# Patient Record
Sex: FEMALE | Race: BLACK OR AFRICAN AMERICAN | Employment: OTHER | URBAN - METROPOLITAN AREA
[De-identification: names, ages, dates, MRNs, and addresses within clinical notes are randomized per-mention and may not be internally consistent; named-entity substitution may affect disease eponyms.]

---

## 2018-03-29 ENCOUNTER — OFFICE VISIT (OUTPATIENT)
Dept: FAMILY MEDICINE CLINIC | Age: 68
End: 2018-03-29
Payer: MEDICARE

## 2018-03-29 VITALS
HEART RATE: 77 BPM | RESPIRATION RATE: 16 BRPM | WEIGHT: 165 LBS | TEMPERATURE: 98.3 F | OXYGEN SATURATION: 96 % | SYSTOLIC BLOOD PRESSURE: 130 MMHG | DIASTOLIC BLOOD PRESSURE: 75 MMHG | HEIGHT: 62 IN | BODY MASS INDEX: 30.36 KG/M2

## 2018-03-29 DIAGNOSIS — I10 ESSENTIAL HYPERTENSION, BENIGN: Primary | ICD-10-CM

## 2018-03-29 DIAGNOSIS — Z12.31 ENCOUNTER FOR SCREENING MAMMOGRAM FOR BREAST CANCER: ICD-10-CM

## 2018-03-29 DIAGNOSIS — R19.09 GROIN MASS IN FEMALE: ICD-10-CM

## 2018-03-29 DIAGNOSIS — F41.9 ANXIETY: ICD-10-CM

## 2018-03-29 DIAGNOSIS — Z78.0 POSTMENOPAUSAL: ICD-10-CM

## 2018-03-29 PROCEDURE — 1090F PRES/ABSN URINE INCON ASSESS: CPT | Performed by: FAMILY MEDICINE

## 2018-03-29 PROCEDURE — 3014F SCREEN MAMMO DOC REV: CPT | Performed by: FAMILY MEDICINE

## 2018-03-29 PROCEDURE — G8417 CALC BMI ABV UP PARAM F/U: HCPCS | Performed by: FAMILY MEDICINE

## 2018-03-29 PROCEDURE — G8400 PT W/DXA NO RESULTS DOC: HCPCS | Performed by: FAMILY MEDICINE

## 2018-03-29 PROCEDURE — G8484 FLU IMMUNIZE NO ADMIN: HCPCS | Performed by: FAMILY MEDICINE

## 2018-03-29 PROCEDURE — 4040F PNEUMOC VAC/ADMIN/RCVD: CPT | Performed by: FAMILY MEDICINE

## 2018-03-29 PROCEDURE — 3017F COLORECTAL CA SCREEN DOC REV: CPT | Performed by: FAMILY MEDICINE

## 2018-03-29 PROCEDURE — 1036F TOBACCO NON-USER: CPT | Performed by: FAMILY MEDICINE

## 2018-03-29 PROCEDURE — 99213 OFFICE O/P EST LOW 20 MIN: CPT | Performed by: FAMILY MEDICINE

## 2018-03-29 PROCEDURE — 1123F ACP DISCUSS/DSCN MKR DOCD: CPT | Performed by: FAMILY MEDICINE

## 2018-03-29 PROCEDURE — G8427 DOCREV CUR MEDS BY ELIG CLIN: HCPCS | Performed by: FAMILY MEDICINE

## 2018-03-29 RX ORDER — ESCITALOPRAM OXALATE 10 MG/1
10 TABLET ORAL DAILY
Qty: 30 TABLET | Refills: 5 | Status: SHIPPED | OUTPATIENT
Start: 2018-03-29 | End: 2018-04-23 | Stop reason: SDUPTHER

## 2018-03-29 NOTE — PROGRESS NOTES
unremarkable    Assessment/Plan:      Anthony Mcfarland was seen today for hypertension and mass. Diagnoses and all orders for this visit:    Essential hypertension, benign    Groin mass in female  Comments:  right side  Orders:  -     US Nonvascular Trunk Scan; Future    Encounter for screening mammogram for breast cancer  -     NADIA DIGITAL SCREEN W CAD BILATERAL; Future    Postmenopausal  -     DEXA BONE DENSITY AXIAL SKELETON; Future  -     escitalopram (LEXAPRO) 10 MG tablet; Take 1 tablet by mouth daily    Anxiety  -     escitalopram (LEXAPRO) 10 MG tablet; Take 1 tablet by mouth daily      As above. Call or go to ED immediately if symptoms worsen or persist.  Return in about 3 months (around 6/29/2018). , or sooner if necessary. Educational materials and/or home exercises printed for patient's review and were included in patient instructions on his/her After Visit Summary and given to patient at the end of visit. Counseled regarding above diagnosis, including possible risks and complications,  especially if left uncontrolled. Counseled regarding the possible side effects, risks, benefits and alternatives to treatment; patient and/or guardian verbalizes understanding, agrees, feels comfortable with and wishes to proceed with above treatment plan. Advised patient to call with any new medication issues, and read all Rx info from pharmacy to assure aware of all possible risks and side effects of medication before taking. Reviewed age and gender appropriate health screening exams and vaccinations. Advised patient regarding importance of keeping up with recommended health maintenance and to schedule as soon as possible if overdue, as this is important in assessing for undiagnosed pathology, especially cancer, as well as protecting against potentially harmful/life threatening disease. Patient and/or guardian verbalizes understanding and agrees with above counseling, assessment and plan.     All

## 2018-04-10 ENCOUNTER — HOSPITAL ENCOUNTER (OUTPATIENT)
Dept: ULTRASOUND IMAGING | Age: 68
Discharge: HOME | End: 2018-04-12
Payer: MEDICARE

## 2018-04-10 ENCOUNTER — APPOINTMENT (OUTPATIENT)
Dept: MAMMOGRAPHY | Age: 68
End: 2018-04-10
Payer: MEDICARE

## 2018-04-10 DIAGNOSIS — R19.09 GROIN MASS IN FEMALE: ICD-10-CM

## 2018-04-10 PROCEDURE — 76604 US EXAM CHEST: CPT

## 2018-04-11 DIAGNOSIS — R19.09 GROIN MASS: Primary | ICD-10-CM

## 2018-04-18 ENCOUNTER — APPOINTMENT (OUTPATIENT)
Dept: GENERAL RADIOLOGY | Age: 68
End: 2018-04-18
Payer: MEDICARE

## 2018-04-18 ENCOUNTER — APPOINTMENT (OUTPATIENT)
Dept: CT IMAGING | Age: 68
End: 2018-04-18
Payer: MEDICARE

## 2018-04-18 ENCOUNTER — HOSPITAL ENCOUNTER (OUTPATIENT)
Age: 68
Setting detail: OBSERVATION
Discharge: HOME | End: 2018-04-19
Attending: EMERGENCY MEDICINE | Admitting: INTERNAL MEDICINE
Payer: MEDICARE

## 2018-04-18 DIAGNOSIS — R07.9 CHEST PAIN, UNSPECIFIED TYPE: Primary | ICD-10-CM

## 2018-04-18 LAB
ANION GAP SERPL CALCULATED.3IONS-SCNC: 12 MMOL/L (ref 7–16)
BASOPHILS ABSOLUTE: 0.09 E9/L (ref 0–0.2)
BASOPHILS RELATIVE PERCENT: 1.1 % (ref 0–2)
BUN BLDV-MCNC: 14 MG/DL (ref 8–23)
CALCIUM SERPL-MCNC: 9 MG/DL (ref 8.6–10.2)
CHLORIDE BLD-SCNC: 100 MMOL/L (ref 98–107)
CK MB: 1.2 NG/ML (ref 0–4.3)
CK MB: 1.3 NG/ML (ref 0–4.3)
CO2: 26 MMOL/L (ref 22–29)
CREAT SERPL-MCNC: 0.6 MG/DL (ref 0.5–1)
EOSINOPHILS ABSOLUTE: 0.09 E9/L (ref 0.05–0.5)
EOSINOPHILS RELATIVE PERCENT: 1.1 % (ref 0–6)
GFR AFRICAN AMERICAN: >60
GFR NON-AFRICAN AMERICAN: >60 ML/MIN/1.73
GLUCOSE BLD-MCNC: 108 MG/DL (ref 74–109)
HCT VFR BLD CALC: 36.1 % (ref 34–48)
HEMOGLOBIN: 11.9 G/DL (ref 11.5–15.5)
IMMATURE GRANULOCYTES #: 0.1 E9/L
IMMATURE GRANULOCYTES %: 1.2 % (ref 0–5)
INR BLD: 1.1
LYMPHOCYTES ABSOLUTE: 3.05 E9/L (ref 1.5–4)
LYMPHOCYTES RELATIVE PERCENT: 35.7 % (ref 20–42)
MCH RBC QN AUTO: 33.6 PG (ref 26–35)
MCHC RBC AUTO-ENTMCNC: 33 % (ref 32–34.5)
MCV RBC AUTO: 102 FL (ref 80–99.9)
MONOCYTES ABSOLUTE: 0.99 E9/L (ref 0.1–0.95)
MONOCYTES RELATIVE PERCENT: 11.6 % (ref 2–12)
NEUTROPHILS ABSOLUTE: 4.22 E9/L (ref 1.8–7.3)
NEUTROPHILS RELATIVE PERCENT: 49.3 % (ref 43–80)
PDW BLD-RTO: 17.2 FL (ref 11.5–15)
PLATELET # BLD: 405 E9/L (ref 130–450)
PMV BLD AUTO: 10.5 FL (ref 7–12)
POTASSIUM SERPL-SCNC: 3.6 MMOL/L (ref 3.5–5)
PROTHROMBIN TIME: 12.3 SEC (ref 9.3–12.4)
RBC # BLD: 3.54 E12/L (ref 3.5–5.5)
SODIUM BLD-SCNC: 138 MMOL/L (ref 132–146)
TOTAL CK: 74 U/L (ref 20–180)
TROPONIN: <0.01 NG/ML (ref 0–0.03)
WBC # BLD: 8.5 E9/L (ref 4.5–11.5)

## 2018-04-18 PROCEDURE — G0378 HOSPITAL OBSERVATION PER HR: HCPCS

## 2018-04-18 PROCEDURE — 82553 CREATINE MB FRACTION: CPT

## 2018-04-18 PROCEDURE — 85610 PROTHROMBIN TIME: CPT

## 2018-04-18 PROCEDURE — 2580000003 HC RX 258: Performed by: GENERAL PRACTICE

## 2018-04-18 PROCEDURE — 94761 N-INVAS EAR/PLS OXIMETRY MLT: CPT

## 2018-04-18 PROCEDURE — 71250 CT THORAX DX C-: CPT

## 2018-04-18 PROCEDURE — 36415 COLL VENOUS BLD VENIPUNCTURE: CPT

## 2018-04-18 PROCEDURE — 6370000000 HC RX 637 (ALT 250 FOR IP): Performed by: EMERGENCY MEDICINE

## 2018-04-18 PROCEDURE — 84484 ASSAY OF TROPONIN QUANT: CPT

## 2018-04-18 PROCEDURE — 80048 BASIC METABOLIC PNL TOTAL CA: CPT

## 2018-04-18 PROCEDURE — 85025 COMPLETE CBC W/AUTO DIFF WBC: CPT

## 2018-04-18 PROCEDURE — 93005 ELECTROCARDIOGRAM TRACING: CPT | Performed by: EMERGENCY MEDICINE

## 2018-04-18 PROCEDURE — 71045 X-RAY EXAM CHEST 1 VIEW: CPT

## 2018-04-18 PROCEDURE — 99285 EMERGENCY DEPT VISIT HI MDM: CPT

## 2018-04-18 PROCEDURE — 6370000000 HC RX 637 (ALT 250 FOR IP): Performed by: GENERAL PRACTICE

## 2018-04-18 PROCEDURE — 82550 ASSAY OF CK (CPK): CPT

## 2018-04-18 RX ORDER — LOVASTATIN 40 MG/1
40 TABLET ORAL NIGHTLY
Status: DISCONTINUED | OUTPATIENT
Start: 2018-04-18 | End: 2018-04-19 | Stop reason: HOSPADM

## 2018-04-18 RX ORDER — SODIUM CHLORIDE 0.9 % (FLUSH) 0.9 %
10 SYRINGE (ML) INJECTION EVERY 12 HOURS SCHEDULED
Status: DISCONTINUED | OUTPATIENT
Start: 2018-04-18 | End: 2018-04-19 | Stop reason: HOSPADM

## 2018-04-18 RX ORDER — SODIUM CHLORIDE 0.9 % (FLUSH) 0.9 %
10 SYRINGE (ML) INJECTION PRN
Status: DISCONTINUED | OUTPATIENT
Start: 2018-04-18 | End: 2018-04-19 | Stop reason: HOSPADM

## 2018-04-18 RX ORDER — ESCITALOPRAM OXALATE 10 MG/1
10 TABLET ORAL EVERY MORNING
Status: DISCONTINUED | OUTPATIENT
Start: 2018-04-19 | End: 2018-04-19 | Stop reason: HOSPADM

## 2018-04-18 RX ORDER — LISINOPRIL 20 MG/1
20 TABLET ORAL DAILY
Status: DISCONTINUED | OUTPATIENT
Start: 2018-04-18 | End: 2018-04-19 | Stop reason: HOSPADM

## 2018-04-18 RX ORDER — ONDANSETRON 2 MG/ML
4 INJECTION INTRAMUSCULAR; INTRAVENOUS EVERY 6 HOURS PRN
Status: DISCONTINUED | OUTPATIENT
Start: 2018-04-18 | End: 2018-04-19 | Stop reason: HOSPADM

## 2018-04-18 RX ORDER — IBUPROFEN 400 MG/1
400 TABLET ORAL EVERY 6 HOURS PRN
Status: ON HOLD | COMMUNITY
End: 2019-07-19 | Stop reason: HOSPADM

## 2018-04-18 RX ORDER — LEVOTHYROXINE SODIUM 175 UG/1
175 TABLET ORAL DAILY
Status: DISCONTINUED | OUTPATIENT
Start: 2018-04-18 | End: 2018-04-19 | Stop reason: HOSPADM

## 2018-04-18 RX ORDER — SIMVASTATIN 20 MG
20 TABLET ORAL NIGHTLY
Status: DISCONTINUED | OUTPATIENT
Start: 2018-04-18 | End: 2018-04-18 | Stop reason: SDUPTHER

## 2018-04-18 RX ORDER — ACETAMINOPHEN 325 MG/1
650 TABLET ORAL EVERY 4 HOURS PRN
Status: DISCONTINUED | OUTPATIENT
Start: 2018-04-18 | End: 2018-04-19 | Stop reason: HOSPADM

## 2018-04-18 RX ORDER — PANTOPRAZOLE SODIUM 40 MG/1
40 TABLET, DELAYED RELEASE ORAL EVERY MORNING
Status: DISCONTINUED | OUTPATIENT
Start: 2018-04-19 | End: 2018-04-19 | Stop reason: HOSPADM

## 2018-04-18 RX ORDER — ASPIRIN 81 MG/1
324 TABLET, CHEWABLE ORAL ONCE
Status: COMPLETED | OUTPATIENT
Start: 2018-04-18 | End: 2018-04-18

## 2018-04-18 RX ADMIN — ACETAMINOPHEN 650 MG: 325 TABLET ORAL at 21:52

## 2018-04-18 RX ADMIN — Medication 10 ML: at 21:52

## 2018-04-18 RX ADMIN — ASPIRIN 81 MG 324 MG: 81 TABLET ORAL at 11:29

## 2018-04-18 RX ADMIN — LOVASTATIN 40 MG: 40 TABLET ORAL at 21:52

## 2018-04-18 RX ADMIN — LISINOPRIL 20 MG: 20 TABLET ORAL at 21:56

## 2018-04-18 ASSESSMENT — ENCOUNTER SYMPTOMS
EYE REDNESS: 0
ABDOMINAL DISTENTION: 0
COUGH: 0
SHORTNESS OF BREATH: 0
EYE PAIN: 0
BACK PAIN: 0
DIARRHEA: 0
SINUS PRESSURE: 0
SORE THROAT: 0
EYE DISCHARGE: 0
VOMITING: 0
WHEEZING: 0
NAUSEA: 0

## 2018-04-18 ASSESSMENT — PAIN DESCRIPTION - LOCATION: LOCATION: HEAD

## 2018-04-18 ASSESSMENT — PAIN DESCRIPTION - PAIN TYPE: TYPE: ACUTE PAIN

## 2018-04-18 ASSESSMENT — PAIN SCALES - GENERAL
PAINLEVEL_OUTOF10: 0
PAINLEVEL_OUTOF10: 0
PAINLEVEL_OUTOF10: 3

## 2018-04-18 ASSESSMENT — HEART SCORE: ECG: 0

## 2018-04-18 ASSESSMENT — PAIN DESCRIPTION - DESCRIPTORS: DESCRIPTORS: HEADACHE;ACHING

## 2018-04-19 ENCOUNTER — APPOINTMENT (OUTPATIENT)
Dept: NUCLEAR MEDICINE | Age: 68
End: 2018-04-19
Payer: MEDICARE

## 2018-04-19 VITALS
HEART RATE: 77 BPM | SYSTOLIC BLOOD PRESSURE: 145 MMHG | TEMPERATURE: 98.8 F | DIASTOLIC BLOOD PRESSURE: 79 MMHG | RESPIRATION RATE: 16 BRPM | WEIGHT: 167.3 LBS | BODY MASS INDEX: 29.64 KG/M2 | OXYGEN SATURATION: 96 % | HEIGHT: 63 IN

## 2018-04-19 LAB
LV EF: 66 %
LVEF MODALITY: NORMAL

## 2018-04-19 PROCEDURE — 6360000002 HC RX W HCPCS: Performed by: INTERNAL MEDICINE

## 2018-04-19 PROCEDURE — 78452 HT MUSCLE IMAGE SPECT MULT: CPT

## 2018-04-19 PROCEDURE — 6370000000 HC RX 637 (ALT 250 FOR IP): Performed by: GENERAL PRACTICE

## 2018-04-19 PROCEDURE — G0378 HOSPITAL OBSERVATION PER HR: HCPCS

## 2018-04-19 PROCEDURE — A9500 TC99M SESTAMIBI: HCPCS | Performed by: RADIOLOGY

## 2018-04-19 PROCEDURE — 93017 CV STRESS TEST TRACING ONLY: CPT

## 2018-04-19 PROCEDURE — 2580000003 HC RX 258: Performed by: GENERAL PRACTICE

## 2018-04-19 PROCEDURE — 3430000000 HC RX DIAGNOSTIC RADIOPHARMACEUTICAL: Performed by: RADIOLOGY

## 2018-04-19 RX ADMIN — Medication 10 ML: at 08:09

## 2018-04-19 RX ADMIN — REGADENOSON 0.4 MG: 0.08 INJECTION, SOLUTION INTRAVENOUS at 10:10

## 2018-04-19 RX ADMIN — Medication 30 MILLICURIE: at 08:55

## 2018-04-19 RX ADMIN — PANTOPRAZOLE SODIUM 40 MG: 40 TABLET, DELAYED RELEASE ORAL at 08:09

## 2018-04-19 RX ADMIN — ESCITALOPRAM OXALATE 10 MG: 10 TABLET ORAL at 08:09

## 2018-04-19 RX ADMIN — Medication 10 MILLICURIE: at 08:55

## 2018-04-19 RX ADMIN — LEVOTHYROXINE SODIUM 175 MCG: 0.17 TABLET ORAL at 08:08

## 2018-04-19 RX ADMIN — LISINOPRIL 20 MG: 20 TABLET ORAL at 08:09

## 2018-04-19 ASSESSMENT — PAIN SCALES - GENERAL
PAINLEVEL_OUTOF10: 0
PAINLEVEL_OUTOF10: 0

## 2018-04-21 LAB
EKG ATRIAL RATE: 88 BPM
EKG P AXIS: 43 DEGREES
EKG P-R INTERVAL: 146 MS
EKG Q-T INTERVAL: 394 MS
EKG QRS DURATION: 78 MS
EKG QTC CALCULATION (BAZETT): 476 MS
EKG R AXIS: -18 DEGREES
EKG T AXIS: 23 DEGREES
EKG VENTRICULAR RATE: 88 BPM

## 2018-04-23 DIAGNOSIS — F41.9 ANXIETY: ICD-10-CM

## 2018-04-23 DIAGNOSIS — Z78.0 POSTMENOPAUSAL: ICD-10-CM

## 2018-04-23 RX ORDER — ESCITALOPRAM OXALATE 10 MG/1
10 TABLET ORAL EVERY MORNING
Qty: 90 TABLET | Refills: 3 | Status: SHIPPED | OUTPATIENT
Start: 2018-04-23 | End: 2019-06-18 | Stop reason: SDUPTHER

## 2018-04-23 RX ORDER — LEVOTHYROXINE SODIUM 175 UG/1
TABLET ORAL
Qty: 90 TABLET | Refills: 3 | Status: SHIPPED | OUTPATIENT
Start: 2018-04-23 | End: 2019-04-11 | Stop reason: SDUPTHER

## 2018-05-04 ENCOUNTER — OFFICE VISIT (OUTPATIENT)
Dept: FAMILY MEDICINE CLINIC | Age: 68
End: 2018-05-04
Payer: MEDICARE

## 2018-05-04 VITALS
BODY MASS INDEX: 28.7 KG/M2 | DIASTOLIC BLOOD PRESSURE: 82 MMHG | TEMPERATURE: 98.2 F | RESPIRATION RATE: 16 BRPM | SYSTOLIC BLOOD PRESSURE: 133 MMHG | HEART RATE: 86 BPM | WEIGHT: 162 LBS | OXYGEN SATURATION: 97 % | HEIGHT: 63 IN

## 2018-05-04 DIAGNOSIS — F41.9 ANXIETY: ICD-10-CM

## 2018-05-04 DIAGNOSIS — R07.89 OTHER CHEST PAIN: Primary | ICD-10-CM

## 2018-05-04 DIAGNOSIS — Z78.0 POSTMENOPAUSAL: ICD-10-CM

## 2018-05-04 DIAGNOSIS — Z12.31 ENCOUNTER FOR SCREENING MAMMOGRAM FOR BREAST CANCER: ICD-10-CM

## 2018-05-04 PROCEDURE — 99214 OFFICE O/P EST MOD 30 MIN: CPT | Performed by: FAMILY MEDICINE

## 2018-05-04 PROCEDURE — G8400 PT W/DXA NO RESULTS DOC: HCPCS | Performed by: FAMILY MEDICINE

## 2018-05-04 PROCEDURE — 4040F PNEUMOC VAC/ADMIN/RCVD: CPT | Performed by: FAMILY MEDICINE

## 2018-05-04 PROCEDURE — 1090F PRES/ABSN URINE INCON ASSESS: CPT | Performed by: FAMILY MEDICINE

## 2018-05-04 PROCEDURE — 3017F COLORECTAL CA SCREEN DOC REV: CPT | Performed by: FAMILY MEDICINE

## 2018-05-04 PROCEDURE — 1123F ACP DISCUSS/DSCN MKR DOCD: CPT | Performed by: FAMILY MEDICINE

## 2018-05-04 PROCEDURE — G8417 CALC BMI ABV UP PARAM F/U: HCPCS | Performed by: FAMILY MEDICINE

## 2018-05-04 PROCEDURE — 1036F TOBACCO NON-USER: CPT | Performed by: FAMILY MEDICINE

## 2018-05-04 PROCEDURE — G8427 DOCREV CUR MEDS BY ELIG CLIN: HCPCS | Performed by: FAMILY MEDICINE

## 2018-05-04 RX ORDER — LORAZEPAM 0.5 MG/1
0.5 TABLET ORAL EVERY 8 HOURS PRN
Qty: 90 TABLET | Refills: 0 | Status: SHIPPED | OUTPATIENT
Start: 2018-05-04 | End: 2018-07-09 | Stop reason: SDUPTHER

## 2018-05-08 ENCOUNTER — HOSPITAL ENCOUNTER (OUTPATIENT)
Dept: CT IMAGING | Age: 68
Discharge: HOME | End: 2018-05-10
Payer: MEDICARE

## 2018-05-08 DIAGNOSIS — R59.0 LOCALIZED ENLARGED LYMPH NODES: ICD-10-CM

## 2018-05-08 PROCEDURE — 6360000004 HC RX CONTRAST MEDICATION: Performed by: RADIOLOGY

## 2018-05-08 PROCEDURE — 72193 CT PELVIS W/DYE: CPT

## 2018-05-08 RX ADMIN — IOPAMIDOL 110 ML: 755 INJECTION, SOLUTION INTRAVENOUS at 10:18

## 2018-05-09 DIAGNOSIS — Z78.0 POSTMENOPAUSAL: ICD-10-CM

## 2018-05-09 DIAGNOSIS — Z12.31 ENCOUNTER FOR SCREENING MAMMOGRAM FOR BREAST CANCER: ICD-10-CM

## 2018-05-09 RX ORDER — LOVASTATIN 40 MG/1
40 TABLET ORAL NIGHTLY
Qty: 90 TABLET | Refills: 3 | Status: SHIPPED | OUTPATIENT
Start: 2018-05-09 | End: 2019-05-22 | Stop reason: SDUPTHER

## 2018-05-09 RX ORDER — CYCLOBENZAPRINE HCL 10 MG
10 TABLET ORAL 2 TIMES DAILY PRN
Qty: 60 TABLET | Refills: 1 | Status: SHIPPED
Start: 2018-05-09 | End: 2020-03-24

## 2018-06-19 ENCOUNTER — HOSPITAL ENCOUNTER (OUTPATIENT)
Age: 68
Discharge: HOME | End: 2018-06-21

## 2018-06-19 PROCEDURE — 88342 IMHCHEM/IMCYTCHM 1ST ANTB: CPT

## 2018-06-19 PROCEDURE — 88184 FLOWCYTOMETRY/ TC 1 MARKER: CPT

## 2018-06-19 PROCEDURE — 88307 TISSUE EXAM BY PATHOLOGIST: CPT

## 2018-06-19 PROCEDURE — 88341 IMHCHEM/IMCYTCHM EA ADD ANTB: CPT

## 2018-06-19 PROCEDURE — 88185 FLOWCYTOMETRY/TC ADD-ON: CPT

## 2018-06-19 PROCEDURE — 88182 CELL MARKER STUDY: CPT

## 2018-06-25 LAB
Lab: NORMAL
REPORT: NORMAL
THIS TEST SENT TO: NORMAL

## 2018-06-28 ENCOUNTER — OFFICE VISIT (OUTPATIENT)
Dept: FAMILY MEDICINE CLINIC | Age: 68
End: 2018-06-28
Payer: MEDICARE

## 2018-06-28 VITALS
HEART RATE: 85 BPM | OXYGEN SATURATION: 95 % | HEIGHT: 63 IN | BODY MASS INDEX: 28.53 KG/M2 | SYSTOLIC BLOOD PRESSURE: 135 MMHG | WEIGHT: 161 LBS | RESPIRATION RATE: 16 BRPM | TEMPERATURE: 98.4 F | DIASTOLIC BLOOD PRESSURE: 77 MMHG

## 2018-06-28 DIAGNOSIS — E03.9 ACQUIRED HYPOTHYROIDISM: ICD-10-CM

## 2018-06-28 DIAGNOSIS — C80.1 ADENOCARCINOMA OF UNKNOWN PRIMARY (HCC): ICD-10-CM

## 2018-06-28 DIAGNOSIS — R97.8 OTHER ABNORMAL TUMOR MARKERS: ICD-10-CM

## 2018-06-28 DIAGNOSIS — I10 ESSENTIAL HYPERTENSION, BENIGN: Primary | ICD-10-CM

## 2018-06-28 DIAGNOSIS — M47.817 DJD (DEGENERATIVE JOINT DISEASE), LUMBOSACRAL: ICD-10-CM

## 2018-06-28 DIAGNOSIS — M17.0 PRIMARY OSTEOARTHRITIS OF BOTH KNEES: ICD-10-CM

## 2018-06-28 DIAGNOSIS — E78.00 HYPERCHOLESTEROLEMIA: ICD-10-CM

## 2018-06-28 DIAGNOSIS — K21.9 GASTROESOPHAGEAL REFLUX DISEASE WITHOUT ESOPHAGITIS: ICD-10-CM

## 2018-06-28 PROCEDURE — 3017F COLORECTAL CA SCREEN DOC REV: CPT | Performed by: FAMILY MEDICINE

## 2018-06-28 PROCEDURE — 4040F PNEUMOC VAC/ADMIN/RCVD: CPT | Performed by: FAMILY MEDICINE

## 2018-06-28 PROCEDURE — 1123F ACP DISCUSS/DSCN MKR DOCD: CPT | Performed by: FAMILY MEDICINE

## 2018-06-28 PROCEDURE — G8427 DOCREV CUR MEDS BY ELIG CLIN: HCPCS | Performed by: FAMILY MEDICINE

## 2018-06-28 PROCEDURE — 99215 OFFICE O/P EST HI 40 MIN: CPT | Performed by: FAMILY MEDICINE

## 2018-06-28 PROCEDURE — 1090F PRES/ABSN URINE INCON ASSESS: CPT | Performed by: FAMILY MEDICINE

## 2018-06-28 PROCEDURE — G8400 PT W/DXA NO RESULTS DOC: HCPCS | Performed by: FAMILY MEDICINE

## 2018-06-28 PROCEDURE — 1036F TOBACCO NON-USER: CPT | Performed by: FAMILY MEDICINE

## 2018-06-28 PROCEDURE — G8417 CALC BMI ABV UP PARAM F/U: HCPCS | Performed by: FAMILY MEDICINE

## 2018-07-06 ENCOUNTER — HOSPITAL ENCOUNTER (OUTPATIENT)
Dept: CT IMAGING | Age: 68
Discharge: HOME | End: 2018-07-08
Payer: MEDICARE

## 2018-07-06 ENCOUNTER — HOSPITAL ENCOUNTER (OUTPATIENT)
Age: 68
Discharge: HOME | End: 2018-07-06
Payer: MEDICARE

## 2018-07-06 DIAGNOSIS — C80.1 ADENOCARCINOMA OF UNKNOWN PRIMARY (HCC): ICD-10-CM

## 2018-07-06 DIAGNOSIS — R97.8 OTHER ABNORMAL TUMOR MARKERS: ICD-10-CM

## 2018-07-06 LAB
ALBUMIN SERPL-MCNC: 4.7 G/DL (ref 3.5–5.2)
ALP BLD-CCNC: 63 U/L (ref 35–104)
ALT SERPL-CCNC: 14 U/L (ref 0–32)
ANION GAP SERPL CALCULATED.3IONS-SCNC: 14 MMOL/L (ref 7–16)
AST SERPL-CCNC: 13 U/L (ref 0–31)
BASOPHILS ABSOLUTE: 0.09 E9/L (ref 0–0.2)
BASOPHILS RELATIVE PERCENT: 1.1 % (ref 0–2)
BILIRUB SERPL-MCNC: 1.1 MG/DL (ref 0–1.2)
BUN BLDV-MCNC: 26 MG/DL (ref 8–23)
CA 125: <2 U/ML (ref 0–35)
CALCIUM SERPL-MCNC: 9.7 MG/DL (ref 8.6–10.2)
CEA: 1.2 NG/ML (ref 0–5.2)
CHLORIDE BLD-SCNC: 96 MMOL/L (ref 98–107)
CO2: 25 MMOL/L (ref 22–29)
CREAT SERPL-MCNC: 0.9 MG/DL (ref 0.5–1)
EOSINOPHILS ABSOLUTE: 0.08 E9/L (ref 0.05–0.5)
EOSINOPHILS RELATIVE PERCENT: 1 % (ref 0–6)
GFR AFRICAN AMERICAN: >60
GFR NON-AFRICAN AMERICAN: >60 ML/MIN/1.73
GLUCOSE BLD-MCNC: 109 MG/DL (ref 74–109)
HCT VFR BLD CALC: 36.6 % (ref 34–48)
HEMOGLOBIN: 12.2 G/DL (ref 11.5–15.5)
IMMATURE GRANULOCYTES #: 0.08 E9/L
IMMATURE GRANULOCYTES %: 1 % (ref 0–5)
LYMPHOCYTES ABSOLUTE: 3.56 E9/L (ref 1.5–4)
LYMPHOCYTES RELATIVE PERCENT: 42.8 % (ref 20–42)
MCH RBC QN AUTO: 33.5 PG (ref 26–35)
MCHC RBC AUTO-ENTMCNC: 33.3 % (ref 32–34.5)
MCV RBC AUTO: 100.5 FL (ref 80–99.9)
MONOCYTES ABSOLUTE: 1.1 E9/L (ref 0.1–0.95)
MONOCYTES RELATIVE PERCENT: 13.2 % (ref 2–12)
NEUTROPHILS ABSOLUTE: 3.41 E9/L (ref 1.8–7.3)
NEUTROPHILS RELATIVE PERCENT: 40.9 % (ref 43–80)
PDW BLD-RTO: 17.6 FL (ref 11.5–15)
PLATELET # BLD: 455 E9/L (ref 130–450)
PMV BLD AUTO: 10.5 FL (ref 7–12)
POTASSIUM SERPL-SCNC: 4.7 MMOL/L (ref 3.5–5)
RBC # BLD: 3.64 E12/L (ref 3.5–5.5)
SODIUM BLD-SCNC: 135 MMOL/L (ref 132–146)
TOTAL PROTEIN: 8.8 G/DL (ref 6.4–8.3)
WBC # BLD: 8.3 E9/L (ref 4.5–11.5)

## 2018-07-06 PROCEDURE — 80053 COMPREHEN METABOLIC PANEL: CPT

## 2018-07-06 PROCEDURE — 74178 CT ABD&PLV WO CNTR FLWD CNTR: CPT

## 2018-07-06 PROCEDURE — 82378 CARCINOEMBRYONIC ANTIGEN: CPT

## 2018-07-06 PROCEDURE — 86301 IMMUNOASSAY TUMOR CA 19-9: CPT

## 2018-07-06 PROCEDURE — 71270 CT THORAX DX C-/C+: CPT

## 2018-07-06 PROCEDURE — 36415 COLL VENOUS BLD VENIPUNCTURE: CPT

## 2018-07-06 PROCEDURE — 86304 IMMUNOASSAY TUMOR CA 125: CPT

## 2018-07-06 PROCEDURE — 85025 COMPLETE CBC W/AUTO DIFF WBC: CPT

## 2018-07-06 PROCEDURE — 6360000004 HC RX CONTRAST MEDICATION: Performed by: RADIOLOGY

## 2018-07-06 RX ADMIN — IOPAMIDOL 110 ML: 755 INJECTION, SOLUTION INTRAVENOUS at 14:57

## 2018-07-06 RX ADMIN — IOHEXOL 50 ML: 240 INJECTION, SOLUTION INTRATHECAL; INTRAVASCULAR; INTRAVENOUS; ORAL at 14:57

## 2018-07-08 LAB — CA 19-9: 8 U/ML (ref 0–37)

## 2018-07-09 DIAGNOSIS — F41.9 ANXIETY: ICD-10-CM

## 2018-07-09 RX ORDER — LORAZEPAM 0.5 MG/1
0.5 TABLET ORAL EVERY 8 HOURS PRN
Qty: 90 TABLET | Refills: 0 | OUTPATIENT
Start: 2018-07-09 | End: 2018-08-08

## 2018-07-12 ENCOUNTER — TELEPHONE (OUTPATIENT)
Dept: FAMILY MEDICINE CLINIC | Age: 68
End: 2018-07-12

## 2018-07-12 DIAGNOSIS — R93.2 ABNORMAL CT SCAN, GALLBLADDER: Primary | ICD-10-CM

## 2018-07-12 NOTE — TELEPHONE ENCOUNTER
Discussed labs and imaging. Patient is confused as to where the cancer started. The surgeon mentioned a fallopion tube issue. She is to see the oncologist on July 24. Gallbladder ultrasound ordered. Please schedule asap.

## 2018-07-12 NOTE — TELEPHONE ENCOUNTER
She asked if you can call her today. She has some questions about the cancer cells they found. She was confused after seeing dr Yaima Lambert.

## 2018-07-14 ENCOUNTER — HOSPITAL ENCOUNTER (OUTPATIENT)
Dept: ULTRASOUND IMAGING | Age: 68
Discharge: HOME | End: 2018-07-16
Payer: MEDICARE

## 2018-07-14 DIAGNOSIS — R93.2 ABNORMAL CT SCAN, GALLBLADDER: ICD-10-CM

## 2018-07-14 PROCEDURE — 76705 ECHO EXAM OF ABDOMEN: CPT

## 2018-08-22 ENCOUNTER — HOSPITAL ENCOUNTER (OUTPATIENT)
Dept: PET IMAGING | Age: 68
Discharge: HOME | End: 2018-08-24
Payer: MEDICARE

## 2018-08-22 DIAGNOSIS — R93.5 ABNORMAL CT SCAN, PELVIS: ICD-10-CM

## 2018-08-22 DIAGNOSIS — C80.1 ADENOCARCINOMA OF UNKNOWN PRIMARY (HCC): ICD-10-CM

## 2018-08-22 PROCEDURE — 78815 PET IMAGE W/CT SKULL-THIGH: CPT

## 2018-08-22 PROCEDURE — 3430000000 HC RX DIAGNOSTIC RADIOPHARMACEUTICAL: Performed by: RADIOLOGY

## 2018-08-22 PROCEDURE — A9552 F18 FDG: HCPCS | Performed by: RADIOLOGY

## 2018-08-22 RX ORDER — FLUDEOXYGLUCOSE F 18 200 MCI/ML
15 INJECTION, SOLUTION INTRAVENOUS
Status: COMPLETED | OUTPATIENT
Start: 2018-08-22 | End: 2018-08-22

## 2018-08-22 RX ADMIN — FLUDEOXYGLUCOSE F 18 15 MILLICURIE: 200 INJECTION, SOLUTION INTRAVENOUS at 09:14

## 2018-09-10 ENCOUNTER — HOSPITAL ENCOUNTER (OUTPATIENT)
Dept: ULTRASOUND IMAGING | Age: 68
Discharge: HOME | End: 2018-09-12
Payer: MEDICARE

## 2018-09-10 DIAGNOSIS — R94.8 ABNORMAL PET SCAN OF RETROPERITONEUM: ICD-10-CM

## 2018-09-10 DIAGNOSIS — C56.9 MALIGNANT NEOPLASM OF OVARY, UNSPECIFIED LATERALITY (HCC): ICD-10-CM

## 2018-09-10 PROCEDURE — 76856 US EXAM PELVIC COMPLETE: CPT

## 2018-09-10 PROCEDURE — 76830 TRANSVAGINAL US NON-OB: CPT

## 2018-09-27 ENCOUNTER — OFFICE VISIT (OUTPATIENT)
Dept: FAMILY MEDICINE CLINIC | Age: 68
End: 2018-09-27
Payer: MEDICARE

## 2018-09-27 VITALS
HEART RATE: 101 BPM | DIASTOLIC BLOOD PRESSURE: 64 MMHG | RESPIRATION RATE: 20 BRPM | TEMPERATURE: 98.3 F | BODY MASS INDEX: 29.26 KG/M2 | SYSTOLIC BLOOD PRESSURE: 104 MMHG | OXYGEN SATURATION: 98 % | WEIGHT: 160 LBS

## 2018-09-27 DIAGNOSIS — I15.8 OTHER SECONDARY HYPERTENSION: ICD-10-CM

## 2018-09-27 PROCEDURE — 4040F PNEUMOC VAC/ADMIN/RCVD: CPT | Performed by: FAMILY MEDICINE

## 2018-09-27 PROCEDURE — 99214 OFFICE O/P EST MOD 30 MIN: CPT | Performed by: FAMILY MEDICINE

## 2018-09-27 PROCEDURE — 1090F PRES/ABSN URINE INCON ASSESS: CPT | Performed by: FAMILY MEDICINE

## 2018-09-27 PROCEDURE — G8417 CALC BMI ABV UP PARAM F/U: HCPCS | Performed by: FAMILY MEDICINE

## 2018-09-27 PROCEDURE — 1036F TOBACCO NON-USER: CPT | Performed by: FAMILY MEDICINE

## 2018-09-27 PROCEDURE — 1123F ACP DISCUSS/DSCN MKR DOCD: CPT | Performed by: FAMILY MEDICINE

## 2018-09-27 PROCEDURE — 3017F COLORECTAL CA SCREEN DOC REV: CPT | Performed by: FAMILY MEDICINE

## 2018-09-27 PROCEDURE — G8427 DOCREV CUR MEDS BY ELIG CLIN: HCPCS | Performed by: FAMILY MEDICINE

## 2018-09-27 PROCEDURE — 1101F PT FALLS ASSESS-DOCD LE1/YR: CPT | Performed by: FAMILY MEDICINE

## 2018-09-27 PROCEDURE — G8400 PT W/DXA NO RESULTS DOC: HCPCS | Performed by: FAMILY MEDICINE

## 2018-09-27 RX ORDER — LISINOPRIL 20 MG/1
10 TABLET ORAL DAILY
Qty: 90 TABLET | Refills: 3 | Status: SHIPPED | OUTPATIENT
Start: 2018-09-27 | End: 2019-04-01 | Stop reason: SDUPTHER

## 2018-09-27 NOTE — PROGRESS NOTES
Hypertension:  Patient is here for follow up chronic hypertension. This is  generally controlled on current medication regimen. Takes meds as directed and tolerates them well. Most recent labs reviewed with patient and are not remarkable. No symptoms from htn standpoint per ROS. Patient is  compliant with lifestyle modifications. Patient does not smoke. Comorbid conditions include recently dx with adenocarcinoma. She is doing chemotherapy. She does have blood work every 3 weeks with the oncologist.  She declines flu vaccine. Patient's past medical, surgical, social and/or family history reviewed, updated in chart, and are non-contributory (unless otherwise stated). Medications and allergies also reviewed and updated in chart.         Review of Systems:  Constitutional:  No fever, no fatigue, no chills, no headaches, no weight change  Dermatology:  No rash, no mole, no dry or sensitive skin  ENT:  No cough, no sore throat, no sinus pain, no runny nose, no ear pain  Cardiology:  No chest pain, no palpitations, no leg edema, no shortness of breath, no PND  Gastroenterology:  No dysphagia, no abdominal pain, no nausea, no vomiting, no constipation, no diarrhea, no heartburn  Musculoskeletal:  No joint pain, no leg cramps, no back pain, no muscle aches  Respiratory:  No shortness of breath, no orthopnea, no wheezing, no GUTIERRES, no hemoptysis  Urology:  No blood in the urine, no urinary frequency, no urinary incontinence, no urinary urgency, no nocturia, no dysuria    Vitals:    09/27/18 0840   BP: 104/64   Pulse: 101   Resp: 20   Temp: 98.3 °F (36.8 °C)   TempSrc: Oral   SpO2: 98%   Weight: 160 lb (72.6 kg)       General:  Patient alert and oriented x 3, NAD, pleasant  HEENT:  Atraumatic, normocephalic, PERRLA, EOMI, clear conjunctiva, TMs clear, nose-clear, throat - no erythema  Neck:  Supple, no goiter, no carotid bruits, no LAD  Lungs:  CTA B  Heart:  RRR, no murmurs, gallops or rubs  Abdomen:

## 2018-12-01 ENCOUNTER — HOSPITAL ENCOUNTER (OUTPATIENT)
Dept: CT IMAGING | Age: 68
Discharge: HOME | End: 2018-12-03
Payer: MEDICARE

## 2018-12-01 PROCEDURE — 74177 CT ABD & PELVIS W/CONTRAST: CPT

## 2018-12-01 PROCEDURE — 6360000004 HC RX CONTRAST MEDICATION: Performed by: RADIOLOGY

## 2018-12-01 RX ADMIN — IOHEXOL 50 ML: 240 INJECTION, SOLUTION INTRATHECAL; INTRAVASCULAR; INTRAVENOUS; ORAL at 08:47

## 2018-12-01 RX ADMIN — IOPAMIDOL 110 ML: 755 INJECTION, SOLUTION INTRAVENOUS at 08:47

## 2018-12-14 ENCOUNTER — HOSPITAL ENCOUNTER (OUTPATIENT)
Dept: ULTRASOUND IMAGING | Age: 68
Discharge: HOME | End: 2018-12-16
Payer: MEDICARE

## 2018-12-14 DIAGNOSIS — I82.411 DEEP VEIN THROMBOSIS (DVT) OF FEMORAL VEIN OF RIGHT LOWER EXTREMITY, UNSPECIFIED CHRONICITY (HCC): ICD-10-CM

## 2018-12-14 PROCEDURE — 93971 EXTREMITY STUDY: CPT

## 2019-02-01 ENCOUNTER — HOSPITAL ENCOUNTER (OUTPATIENT)
Dept: CT IMAGING | Age: 69
Discharge: HOME | End: 2019-02-03
Payer: MEDICARE

## 2019-02-01 ENCOUNTER — HOSPITAL ENCOUNTER (OUTPATIENT)
Age: 69
Discharge: HOME | End: 2019-02-01
Payer: MEDICARE

## 2019-02-01 DIAGNOSIS — C56.9 MALIGNANT NEOPLASM OF OVARY, UNSPECIFIED LATERALITY (HCC): ICD-10-CM

## 2019-02-01 LAB
BUN BLDV-MCNC: 13 MG/DL (ref 8–23)
CREAT SERPL-MCNC: 0.7 MG/DL (ref 0.5–1)
GFR AFRICAN AMERICAN: >60
GFR NON-AFRICAN AMERICAN: >60 ML/MIN/1.73

## 2019-02-01 PROCEDURE — 82565 ASSAY OF CREATININE: CPT

## 2019-02-01 PROCEDURE — 6360000004 HC RX CONTRAST MEDICATION: Performed by: RADIOLOGY

## 2019-02-01 PROCEDURE — 2580000003 HC RX 258: Performed by: RADIOLOGY

## 2019-02-01 PROCEDURE — 71260 CT THORAX DX C+: CPT

## 2019-02-01 PROCEDURE — 36415 COLL VENOUS BLD VENIPUNCTURE: CPT

## 2019-02-01 PROCEDURE — 74177 CT ABD & PELVIS W/CONTRAST: CPT

## 2019-02-01 PROCEDURE — 84520 ASSAY OF UREA NITROGEN: CPT

## 2019-02-01 RX ORDER — SODIUM CHLORIDE 0.9 % (FLUSH) 0.9 %
10 SYRINGE (ML) INJECTION PRN
Status: DISCONTINUED | OUTPATIENT
Start: 2019-02-01 | End: 2019-02-04 | Stop reason: HOSPADM

## 2019-02-01 RX ADMIN — Medication 10 ML: at 08:11

## 2019-02-01 RX ADMIN — IOHEXOL 50 ML: 240 INJECTION, SOLUTION INTRATHECAL; INTRAVASCULAR; INTRAVENOUS; ORAL at 08:11

## 2019-02-01 RX ADMIN — IOPAMIDOL 110 ML: 755 INJECTION, SOLUTION INTRAVENOUS at 08:11

## 2019-03-21 ENCOUNTER — OFFICE VISIT (OUTPATIENT)
Dept: FAMILY MEDICINE CLINIC | Age: 69
End: 2019-03-21
Payer: MEDICARE

## 2019-03-21 ENCOUNTER — HOSPITAL ENCOUNTER (OUTPATIENT)
Age: 69
Discharge: HOME | End: 2019-03-23
Payer: MEDICARE

## 2019-03-21 VITALS
OXYGEN SATURATION: 98 % | HEIGHT: 62 IN | HEART RATE: 78 BPM | BODY MASS INDEX: 28.71 KG/M2 | WEIGHT: 156 LBS | RESPIRATION RATE: 16 BRPM | DIASTOLIC BLOOD PRESSURE: 75 MMHG | SYSTOLIC BLOOD PRESSURE: 113 MMHG

## 2019-03-21 DIAGNOSIS — Z00.00 ROUTINE GENERAL MEDICAL EXAMINATION AT A HEALTH CARE FACILITY: Primary | ICD-10-CM

## 2019-03-21 DIAGNOSIS — E03.9 ACQUIRED HYPOTHYROIDISM: ICD-10-CM

## 2019-03-21 DIAGNOSIS — I10 ESSENTIAL HYPERTENSION, BENIGN: ICD-10-CM

## 2019-03-21 DIAGNOSIS — Z12.39 SCREENING FOR BREAST CANCER: ICD-10-CM

## 2019-03-21 PROCEDURE — 4040F PNEUMOC VAC/ADMIN/RCVD: CPT | Performed by: FAMILY MEDICINE

## 2019-03-21 PROCEDURE — G8484 FLU IMMUNIZE NO ADMIN: HCPCS | Performed by: FAMILY MEDICINE

## 2019-03-21 PROCEDURE — 84443 ASSAY THYROID STIM HORMONE: CPT

## 2019-03-21 PROCEDURE — 80061 LIPID PANEL: CPT

## 2019-03-21 PROCEDURE — G0439 PPPS, SUBSEQ VISIT: HCPCS | Performed by: FAMILY MEDICINE

## 2019-03-21 RX ORDER — GABAPENTIN 300 MG/1
CAPSULE ORAL
Refills: 3 | COMMUNITY
Start: 2019-03-08 | End: 2020-09-02 | Stop reason: SDUPTHER

## 2019-03-21 RX ORDER — RIVAROXABAN 20 MG/1
TABLET, FILM COATED ORAL
Refills: 6 | COMMUNITY
Start: 2019-02-17 | End: 2019-06-11

## 2019-03-21 ASSESSMENT — LIFESTYLE VARIABLES: HOW OFTEN DO YOU HAVE A DRINK CONTAINING ALCOHOL: 0

## 2019-03-21 ASSESSMENT — PATIENT HEALTH QUESTIONNAIRE - PHQ9
SUM OF ALL RESPONSES TO PHQ QUESTIONS 1-9: 0
SUM OF ALL RESPONSES TO PHQ QUESTIONS 1-9: 0

## 2019-03-21 ASSESSMENT — ANXIETY QUESTIONNAIRES: GAD7 TOTAL SCORE: 0

## 2019-03-22 LAB
CHOLESTEROL, TOTAL: 176 MG/DL (ref 0–199)
HDLC SERPL-MCNC: 37 MG/DL
LDL CHOLESTEROL CALCULATED: 110 MG/DL (ref 0–99)
TRIGL SERPL-MCNC: 144 MG/DL (ref 0–149)
TSH SERPL DL<=0.05 MIU/L-ACNC: 0.01 UIU/ML (ref 0.27–4.2)
VLDLC SERPL CALC-MCNC: 29 MG/DL

## 2019-03-22 RX ORDER — LEVOTHYROXINE SODIUM 150 MCG
150 TABLET ORAL DAILY
Qty: 30 TABLET | Refills: 3
Start: 2019-03-22 | End: 2019-04-01 | Stop reason: SDUPTHER

## 2019-04-01 DIAGNOSIS — I15.8 OTHER SECONDARY HYPERTENSION: ICD-10-CM

## 2019-04-01 RX ORDER — LISINOPRIL 20 MG/1
10 TABLET ORAL DAILY
Qty: 90 TABLET | Refills: 3 | Status: SHIPPED | OUTPATIENT
Start: 2019-04-01 | End: 2019-06-11

## 2019-04-01 RX ORDER — LEVOTHYROXINE SODIUM 150 MCG
150 TABLET ORAL DAILY
Qty: 30 TABLET | Refills: 3 | Status: SHIPPED | OUTPATIENT
Start: 2019-04-01 | End: 2019-07-12

## 2019-04-11 RX ORDER — LEVOTHYROXINE SODIUM 175 UG/1
TABLET ORAL
Qty: 90 TABLET | Refills: 3 | Status: SHIPPED | OUTPATIENT
Start: 2019-04-11 | End: 2019-04-18

## 2019-04-17 DIAGNOSIS — I15.8 OTHER SECONDARY HYPERTENSION: ICD-10-CM

## 2019-04-17 RX ORDER — LISINOPRIL 20 MG/1
20 TABLET ORAL DAILY
Qty: 90 TABLET | Refills: 3 | Status: SHIPPED | OUTPATIENT
Start: 2019-04-17 | End: 2019-06-11

## 2019-04-23 ENCOUNTER — TELEPHONE (OUTPATIENT)
Dept: FAMILY MEDICINE CLINIC | Age: 69
End: 2019-04-23

## 2019-05-03 DIAGNOSIS — Z12.39 SCREENING FOR BREAST CANCER: ICD-10-CM

## 2019-05-14 RX ORDER — PANTOPRAZOLE SODIUM 40 MG/1
40 TABLET, DELAYED RELEASE ORAL DAILY
Qty: 90 TABLET | Refills: 1 | Status: SHIPPED | OUTPATIENT
Start: 2019-05-14 | End: 2019-12-11 | Stop reason: SDUPTHER

## 2019-05-17 ENCOUNTER — HOSPITAL ENCOUNTER (OUTPATIENT)
Dept: CT IMAGING | Age: 69
Discharge: HOME | End: 2019-05-19
Payer: MEDICARE

## 2019-05-17 ENCOUNTER — HOSPITAL ENCOUNTER (OUTPATIENT)
Age: 69
Discharge: HOME | End: 2019-05-17
Payer: MEDICARE

## 2019-05-17 DIAGNOSIS — C48.2 MALIGNANT NEOPLASM OF PERITONEUM, UNSPECIFIED (HCC): ICD-10-CM

## 2019-05-17 LAB
BUN BLDV-MCNC: 15 MG/DL (ref 8–23)
CREAT SERPL-MCNC: 0.8 MG/DL (ref 0.5–1)
GFR AFRICAN AMERICAN: >60
GFR NON-AFRICAN AMERICAN: >60 ML/MIN/1.73

## 2019-05-17 PROCEDURE — 36415 COLL VENOUS BLD VENIPUNCTURE: CPT

## 2019-05-17 PROCEDURE — 74177 CT ABD & PELVIS W/CONTRAST: CPT

## 2019-05-17 PROCEDURE — 2580000003 HC RX 258: Performed by: RADIOLOGY

## 2019-05-17 PROCEDURE — 84520 ASSAY OF UREA NITROGEN: CPT

## 2019-05-17 PROCEDURE — 82565 ASSAY OF CREATININE: CPT

## 2019-05-17 PROCEDURE — 6360000004 HC RX CONTRAST MEDICATION: Performed by: RADIOLOGY

## 2019-05-17 RX ORDER — 0.9 % SODIUM CHLORIDE 0.9 %
10 VIAL (ML) INJECTION ONCE
Status: COMPLETED | OUTPATIENT
Start: 2019-05-17 | End: 2019-05-17

## 2019-05-17 RX ADMIN — IOHEXOL 50 ML: 240 INJECTION, SOLUTION INTRATHECAL; INTRAVASCULAR; INTRAVENOUS; ORAL at 11:17

## 2019-05-17 RX ADMIN — IOPAMIDOL 110 ML: 755 INJECTION, SOLUTION INTRAVENOUS at 11:17

## 2019-05-17 RX ADMIN — Medication 10 ML: at 11:18

## 2019-05-22 ENCOUNTER — TELEPHONE (OUTPATIENT)
Dept: PHARMACY | Facility: CLINIC | Age: 69
End: 2019-05-22

## 2019-05-22 RX ORDER — LOVASTATIN 40 MG/1
40 TABLET ORAL NIGHTLY
Qty: 90 TABLET | Refills: 3 | Status: SHIPPED
Start: 2019-05-22 | End: 2020-05-29

## 2019-05-22 NOTE — LETTER
[address]  [address], [place] [address]  Phone: [phone] [facility] [phone]           06/07/19     Dear Shaista Araujo,    · We tried to reach you recently regarding your lovastatin 40mg, but were unable to reach you on the telephone. It appears that this medication has not been filled at proper times. We are worried you might be missing doses or not taking it as directed. It is important that you take your medications regularly and try not to miss a single dose. · We have on file that you are currently taking lovastatin 40mg. If you are no longer taking it or taking it differently than above, please call us at the number below so that we can discuss this and update your medication profile.     Some ways to help you remember to take and refill your medications are to:  · Use a pill box, set an alarm, and/or keep your medication near something that you do every day  · Fill a 3-month supply of your prescription at a time to save you time and trips to the pharmacy  if you would like assistance in switching your prescriptions to a 3-month supply, please contact us  · Ask your pharmacy if they participate in [facility]", a program where you can  all of your medications on the same day each month  · Ask your pharmacy if you can be set up with automatic refill, where they will automatically refill your prescription when it is due and let you know it's ready to     Sincerely,     [address]  Phone: [phone], option 7

## 2019-05-22 NOTE — TELEPHONE ENCOUNTER
CLINICAL PHARMACY: [facility]    Identified care gap per [employer] insurance: lovastatin 40mg adherence  Per records, appears 90-day supply last filled 1/9/19 due 4/9/19    Patient also identified on:  lisinopril 20 MG ([address])    Per [phone]   lovastatin 40mg last filled on 5/22/19 for a 90-day supply billed thru patient's insurance    Attempting to reach patient to review. Left message asking for return call to toll free [phone] option 7.

## 2019-06-07 NOTE — TELEPHONE ENCOUNTER
2nd Attempt Documentation:  2nd attempt to contact this patient regarding the previous message  CLINICAL PHARMACY: [facility]  Patient unavailable at the time of call. Left following message on home TAD: please call back at toll-free [phone] option 7 to retrieve previous message. Letter mailed to patient.

## 2019-06-18 DIAGNOSIS — F41.9 ANXIETY: ICD-10-CM

## 2019-06-18 DIAGNOSIS — Z78.0 POSTMENOPAUSAL: ICD-10-CM

## 2019-06-18 RX ORDER — ESCITALOPRAM OXALATE 10 MG/1
10 TABLET ORAL EVERY MORNING
Qty: 90 TABLET | Refills: 3 | Status: SHIPPED
Start: 2019-06-18 | End: 2020-05-26

## 2019-07-19 PROBLEM — Z98.890 S/P EXPLORATORY LAPAROTOMY: Status: ACTIVE | Noted: 2019-07-19

## 2019-07-19 PROBLEM — R19.00 PELVIC MASS: Status: ACTIVE | Noted: 2019-07-19

## 2019-08-24 ENCOUNTER — HOSPITAL ENCOUNTER (EMERGENCY)
Age: 69
Discharge: HOME | End: 2019-08-25
Attending: EMERGENCY MEDICINE
Payer: MEDICARE

## 2019-08-24 ENCOUNTER — APPOINTMENT (OUTPATIENT)
Dept: CT IMAGING | Age: 69
End: 2019-08-24
Payer: MEDICARE

## 2019-08-24 DIAGNOSIS — K57.32 DIVERTICULITIS OF COLON: Primary | ICD-10-CM

## 2019-08-24 DIAGNOSIS — R10.30 LOWER ABDOMINAL PAIN: ICD-10-CM

## 2019-08-24 LAB
ALBUMIN SERPL-MCNC: 4 G/DL (ref 3.5–5.2)
ALP BLD-CCNC: 109 U/L (ref 35–104)
ALT SERPL-CCNC: 10 U/L (ref 0–32)
ANION GAP SERPL CALCULATED.3IONS-SCNC: 12 MMOL/L (ref 7–16)
AST SERPL-CCNC: 14 U/L (ref 0–31)
BACTERIA: ABNORMAL /HPF
BILIRUB SERPL-MCNC: 0.2 MG/DL (ref 0–1.2)
BILIRUBIN URINE: NEGATIVE
BLOOD, URINE: ABNORMAL
BUN BLDV-MCNC: 17 MG/DL (ref 8–23)
CALCIUM SERPL-MCNC: 8.7 MG/DL (ref 8.6–10.2)
CHLORIDE BLD-SCNC: 103 MMOL/L (ref 98–107)
CLARITY: CLEAR
CO2: 27 MMOL/L (ref 22–29)
COLOR: YELLOW
CREAT SERPL-MCNC: 0.8 MG/DL (ref 0.5–1)
EPITHELIAL CELLS, UA: ABNORMAL /HPF
GFR AFRICAN AMERICAN: >60
GFR NON-AFRICAN AMERICAN: >60 ML/MIN/1.73
GLUCOSE BLD-MCNC: 115 MG/DL (ref 74–99)
GLUCOSE URINE: NEGATIVE MG/DL
KETONES, URINE: NEGATIVE MG/DL
LACTIC ACID: 0.7 MMOL/L (ref 0.5–2.2)
LEUKOCYTE ESTERASE, URINE: NEGATIVE
MAGNESIUM: 1.2 MG/DL (ref 1.6–2.6)
NITRITE, URINE: NEGATIVE
PH UA: 5.5 (ref 5–9)
POTASSIUM REFLEX MAGNESIUM: 3.4 MMOL/L (ref 3.5–5)
PROTEIN UA: NEGATIVE MG/DL
RBC UA: ABNORMAL /HPF (ref 0–2)
SODIUM BLD-SCNC: 142 MMOL/L (ref 132–146)
SPECIFIC GRAVITY UA: 1.02 (ref 1–1.03)
TOTAL PROTEIN: 7.4 G/DL (ref 6.4–8.3)
UROBILINOGEN, URINE: 0.2 E.U./DL
WBC UA: ABNORMAL /HPF (ref 0–5)

## 2019-08-24 PROCEDURE — 2580000003 HC RX 258: Performed by: RADIOLOGY

## 2019-08-24 PROCEDURE — 2580000003 HC RX 258: Performed by: STUDENT IN AN ORGANIZED HEALTH CARE EDUCATION/TRAINING PROGRAM

## 2019-08-24 PROCEDURE — 6360000004 HC RX CONTRAST MEDICATION: Performed by: RADIOLOGY

## 2019-08-24 PROCEDURE — 6360000002 HC RX W HCPCS: Performed by: STUDENT IN AN ORGANIZED HEALTH CARE EDUCATION/TRAINING PROGRAM

## 2019-08-24 PROCEDURE — 85025 COMPLETE CBC W/AUTO DIFF WBC: CPT

## 2019-08-24 PROCEDURE — 74177 CT ABD & PELVIS W/CONTRAST: CPT

## 2019-08-24 PROCEDURE — 83605 ASSAY OF LACTIC ACID: CPT

## 2019-08-24 PROCEDURE — 80053 COMPREHEN METABOLIC PANEL: CPT

## 2019-08-24 PROCEDURE — 81001 URINALYSIS AUTO W/SCOPE: CPT

## 2019-08-24 PROCEDURE — 99284 EMERGENCY DEPT VISIT MOD MDM: CPT

## 2019-08-24 PROCEDURE — 83735 ASSAY OF MAGNESIUM: CPT

## 2019-08-24 PROCEDURE — 96374 THER/PROPH/DIAG INJ IV PUSH: CPT

## 2019-08-24 RX ORDER — SODIUM CHLORIDE 0.9 % (FLUSH) 0.9 %
10 SYRINGE (ML) INJECTION 2 TIMES DAILY
Status: DISCONTINUED | OUTPATIENT
Start: 2019-08-24 | End: 2019-08-25 | Stop reason: HOSPADM

## 2019-08-24 RX ORDER — MORPHINE SULFATE 4 MG/ML
4 INJECTION, SOLUTION INTRAMUSCULAR; INTRAVENOUS ONCE
Status: COMPLETED | OUTPATIENT
Start: 2019-08-24 | End: 2019-08-24

## 2019-08-24 RX ORDER — 0.9 % SODIUM CHLORIDE 0.9 %
1000 INTRAVENOUS SOLUTION INTRAVENOUS ONCE
Status: COMPLETED | OUTPATIENT
Start: 2019-08-24 | End: 2019-08-24

## 2019-08-24 RX ADMIN — Medication 10 ML: at 23:27

## 2019-08-24 RX ADMIN — IOPAMIDOL 110 ML: 755 INJECTION, SOLUTION INTRAVENOUS at 23:29

## 2019-08-24 RX ADMIN — MORPHINE SULFATE 4 MG: 4 INJECTION, SOLUTION INTRAMUSCULAR; INTRAVENOUS at 22:42

## 2019-08-24 RX ADMIN — SODIUM CHLORIDE 1000 ML: 9 INJECTION, SOLUTION INTRAVENOUS at 22:38

## 2019-08-24 ASSESSMENT — PAIN DESCRIPTION - PAIN TYPE: TYPE: ACUTE PAIN

## 2019-08-24 ASSESSMENT — PAIN SCALES - GENERAL
PAINLEVEL_OUTOF10: 8
PAINLEVEL_OUTOF10: 7

## 2019-08-25 VITALS
HEIGHT: 63 IN | DIASTOLIC BLOOD PRESSURE: 69 MMHG | OXYGEN SATURATION: 97 % | TEMPERATURE: 98.2 F | RESPIRATION RATE: 18 BRPM | BODY MASS INDEX: 26.58 KG/M2 | HEART RATE: 90 BPM | SYSTOLIC BLOOD PRESSURE: 160 MMHG | WEIGHT: 150 LBS

## 2019-08-25 LAB
ANISOCYTOSIS: ABNORMAL
BASOPHILS ABSOLUTE: 0 E9/L (ref 0–0.2)
BASOPHILS RELATIVE PERCENT: 0 % (ref 0–2)
DOHLE BODIES: ABNORMAL
EOSINOPHILS ABSOLUTE: 0.19 E9/L (ref 0.05–0.5)
EOSINOPHILS RELATIVE PERCENT: 0.9 % (ref 0–6)
HCT VFR BLD CALC: 30.5 % (ref 34–48)
HEMOGLOBIN: 9.9 G/DL (ref 11.5–15.5)
LYMPHOCYTES ABSOLUTE: 3.46 E9/L (ref 1.5–4)
LYMPHOCYTES RELATIVE PERCENT: 15.7 % (ref 20–42)
MCH RBC QN AUTO: 33.4 PG (ref 26–35)
MCHC RBC AUTO-ENTMCNC: 32.5 % (ref 32–34.5)
MCV RBC AUTO: 103 FL (ref 80–99.9)
METAMYELOCYTES RELATIVE PERCENT: 4.3 % (ref 0–1)
MONOCYTES ABSOLUTE: 0.65 E9/L (ref 0.1–0.95)
MONOCYTES RELATIVE PERCENT: 2.6 % (ref 2–12)
MYELOCYTE PERCENT: 3.5 % (ref 0–0)
NEUTROPHILS ABSOLUTE: 17.5 E9/L (ref 1.8–7.3)
NEUTROPHILS RELATIVE PERCENT: 73 % (ref 43–80)
NUCLEATED RED BLOOD CELLS: 0 /100 WBC
OVALOCYTES: ABNORMAL
PDW BLD-RTO: 15 FL (ref 11.5–15)
PLATELET # BLD: 168 E9/L (ref 130–450)
PMV BLD AUTO: 10.6 FL (ref 7–12)
POIKILOCYTES: ABNORMAL
POLYCHROMASIA: ABNORMAL
RBC # BLD: 2.96 E12/L (ref 3.5–5.5)
TOXIC GRANULATION: ABNORMAL
WBC # BLD: 21.6 E9/L (ref 4.5–11.5)

## 2019-08-25 PROCEDURE — 6370000000 HC RX 637 (ALT 250 FOR IP): Performed by: STUDENT IN AN ORGANIZED HEALTH CARE EDUCATION/TRAINING PROGRAM

## 2019-08-25 RX ORDER — METRONIDAZOLE 500 MG/1
500 TABLET ORAL 3 TIMES DAILY
Qty: 21 TABLET | Refills: 0 | Status: SHIPPED | OUTPATIENT
Start: 2019-08-25 | End: 2019-09-01

## 2019-08-25 RX ORDER — TRAMADOL HYDROCHLORIDE 50 MG/1
50 TABLET ORAL EVERY 6 HOURS PRN
Qty: 12 TABLET | Refills: 0 | Status: SHIPPED | OUTPATIENT
Start: 2019-08-25 | End: 2019-08-28

## 2019-08-25 RX ORDER — TRAMADOL HYDROCHLORIDE 50 MG/1
50 TABLET ORAL ONCE
Status: COMPLETED | OUTPATIENT
Start: 2019-08-25 | End: 2019-08-25

## 2019-08-25 RX ORDER — CEFDINIR 300 MG/1
300 CAPSULE ORAL 2 TIMES DAILY
Qty: 14 CAPSULE | Refills: 0 | Status: SHIPPED | OUTPATIENT
Start: 2019-08-25 | End: 2019-09-01

## 2019-08-25 RX ADMIN — TRAMADOL HYDROCHLORIDE 50 MG: 50 TABLET, FILM COATED ORAL at 01:15

## 2019-08-25 ASSESSMENT — ENCOUNTER SYMPTOMS
HEMATEMESIS: 0
SHORTNESS OF BREATH: 0
DIARRHEA: 0
NAUSEA: 0
HEMATOCHEZIA: 0
ABDOMINAL PAIN: 1
VOMITING: 0
PHOTOPHOBIA: 0
COUGH: 0
BACK PAIN: 0

## 2019-08-25 ASSESSMENT — PAIN SCALES - GENERAL: PAINLEVEL_OUTOF10: 2

## 2019-08-25 NOTE — ED PROVIDER NOTES
results and indications to return to ED with patient and family member, they are agreeable to plan. Advised patient to follow up with PCP or oncologist Monday for re-evaluation of abdominal pain. Advised patient to return if symptoms worsen or she develops systemic signs of infection including fever or chills. Patient would like to be discharged home at this time. Patient is not experiencing chest pain or shortness of breath. Amount and/or Complexity of Data Reviewed  Decide to obtain previous medical records or to obtain history from someone other than the patient: yes              --------------------------------------------- PAST HISTORY ---------------------------------------------  Past Medical History:  has a past medical history of Asthma, Cancer ([facility] [address] Depression, GERD (gastroesophageal reflux disease), Headache(784.0), Hyperlipidemia, Hypertension, Hypothyroidism, Osteoarthritis, and Vitamin D deficiency. Past Surgical History:  has a past surgical history that includes Hysterectomy (); Breast surgery (1960s); Appendectomy; Tunneled venous port placement; lymph node dissection;  section; and laparotomy (2019). Social History:  reports that she has never smoked. She has never used smokeless tobacco. She reports that she does not drink alcohol or use drugs. Family History: family history includes Diabetes in her father; Heart Disease in her mother; High Blood Pressure in her mother; Thyroid Disease in her sister. The patients home medications have been reviewed. Allergies: Patient has no known allergies.     -------------------------------------------------- RESULTS -------------------------------------------------  Labs:  Results for orders placed or performed during the hospital encounter of 19   CBC auto differential   Result Value Ref Range    WBC 21.6 (H) 4.5 - 11.5 E9/L    RBC 2.96 (L) 3.50 - 5.50 E12/L    Hemoglobin 9.9 (L) 11.5 - 15.5 g/dL    Hematocrit 30.5 (L) 34.0 - 48.0 %    .0 (H) 80.0 - 99.9 fL    MCH 33.4 26.0 - 35.0 pg    MCHC 32.5 32.0 - 34.5 %    RDW 15.0 11.5 - 15.0 fL    Platelets 222 838 - 084 E9/L    MPV 10.6 7.0 - 12.0 fL    Neutrophils % 73.0 43.0 - 80.0 %    Lymphocytes % 15.7 (L) 20.0 - 42.0 %    Monocytes % 2.6 2.0 - 12.0 %    Eosinophils % 0.9 0.0 - 6.0 %    Basophils % 0.0 0.0 - 2.0 %    Neutrophils Absolute 17.50 (H) 1.80 - 7.30 E9/L    Lymphocytes Absolute 3.46 1.50 - 4.00 E9/L    Monocytes Absolute 0.65 0.10 - 0.95 E9/L    Eosinophils Absolute 0.19 0.05 - 0.50 E9/L    Basophils Absolute 0.00 0.00 - 0.20 E9/L    Metamyelocytes Relative 4.3 (H) 0.0 - 1.0 %    Myelocyte Percent 3.5 0 - 0 %    nRBC 0.0 /100 WBC    Toxic Granulation 1+     Dohle Bodies 1+     Anisocytosis 1+     Polychromasia 1+     Poikilocytes 1+     Ovalocytes 1+    Comprehensive Metabolic Panel w/ Reflex to MG   Result Value Ref Range    Sodium 142 132 - 146 mmol/L    Potassium reflex Magnesium 3.4 (L) 3.5 - 5.0 mmol/L    Chloride 103 98 - 107 mmol/L    CO2 27 22 - 29 mmol/L    Anion Gap 12 7 - 16 mmol/L    Glucose 115 (H) 74 - 99 mg/dL    BUN 17 8 - 23 mg/dL    CREATININE 0.8 0.5 - 1.0 mg/dL    GFR Non-African American >60 >=60 mL/min/1.73    GFR African American >60     Calcium 8.7 8.6 - 10.2 mg/dL    Total Protein 7.4 6.4 - 8.3 g/dL    Alb 4.0 3.5 - 5.2 g/dL    Total Bilirubin 0.2 0.0 - 1.2 mg/dL    Alkaline Phosphatase 109 (H) 35 - 104 U/L    ALT 10 0 - 32 U/L    AST 14 0 - 31 U/L   Lactic acid, plasma   Result Value Ref Range    Lactic Acid 0.7 0.5 - 2.2 mmol/L   Urinalysis with Microscopic   Result Value Ref Range    Color, UA Yellow Straw/Yellow    Clarity, UA Clear Clear    Glucose, Ur Negative Negative mg/dL    Bilirubin Urine Negative Negative    Ketones, Urine Negative Negative mg/dL    Specific Gravity, UA 1.020 1.005 - 1.030    Blood, Urine MODERATE (A) Negative    pH, UA 5.5 5.0 - 9.0    Protein, UA Negative Negative mg/dL    Urobilinogen, Urine 0.2 <2.0 E. U./dL    Nitrite, Urine Negative Negative    Leukocyte Esterase, Urine Negative Negative    WBC, UA 1-3 0 - 5 /HPF    RBC, UA 5-10 (A) 0 - 2 /HPF    Epi Cells FEW /HPF    Bacteria, UA NONE /HPF   Magnesium   Result Value Ref Range    Magnesium 1.2 (L) 1.6 - 2.6 mg/dL       Radiology:  CT ABDOMEN PELVIS W IV CONTRAST Additional Contrast? None   Final Result   1. Diverticulosis of the sigmoid colon. Mild haziness is seen adjacent to the    distal sigmoid colon, findings could represent mild diverticulitis. 2. Multiple gallstones without evidence of cholecystitis    3. Simple appearing right renal cyst measuring 3.6 cm. No further workup    needed. This report has been electronically signed by Sarah Zavaleta MD.          ------------------------- NURSING NOTES AND VITALS REVIEWED ---------------------------  Date / Time Roomed:  8/24/2019  9:35 PM  ED Bed Assignment:  25/25    The nursing notes within the ED encounter and vital signs as below have been reviewed. BP (!) 160/69   Pulse 90   Temp 98.2 °F (36.8 °C) (Oral)   Resp 18   Ht 5' 3\" (1.6 m)   Wt 150 lb (68 kg)   SpO2 97%   BMI 26.57 kg/m²   Oxygen Saturation Interpretation: Normal      ------------------------------------------ PROGRESS NOTES ------------------------------------------  11:51 AM  I have spoken with the patient and discussed todays results, in addition to providing specific details for the plan of care and counseling regarding the diagnosis and prognosis. Their questions are answered at this time and they are agreeable with the plan. I discussed at length with them reasons for immediate return here for re evaluation.  They will followup with their and the on call primary care physician, general surgeon and orthopedic physician by calling their office tomorrow and on Monday.      --------------------------------- ADDITIONAL PROVIDER NOTES ---------------------------------  At this time the patient is without objective evidence

## 2019-08-28 ENCOUNTER — TELEPHONE (OUTPATIENT)
Dept: FAMILY MEDICINE CLINIC | Age: 69
End: 2019-08-28

## 2019-08-28 RX ORDER — IBUPROFEN 800 MG/1
800 TABLET ORAL EVERY 8 HOURS PRN
Qty: 60 TABLET | Refills: 0 | Status: SHIPPED
Start: 2019-08-28 | End: 2020-09-02

## 2019-08-28 RX ORDER — IBUPROFEN 800 MG/1
800 TABLET ORAL EVERY 8 HOURS PRN
COMMUNITY
End: 2019-08-28 | Stop reason: SDUPTHER

## 2019-08-28 NOTE — TELEPHONE ENCOUNTER
PT WAS IN THE ER AND WAS DX WITH DIVERTICULITIS. SHE WAS GIVEN TRAMADOL FOR PAIN. SHE STATES SHE HAS TAKEN ALL OF THIS AND DID HAVE IBU 600MG AT HOME FROM A PREVIOUS PROCEDURE. SHE HAS BEEN TAKING THOSE AND ITS BEEN HELPING. WANTING TO KNOW IF WE CAN RENEW THE RX. SHE STATES SHE WILL KEEP F/U APPT WITH [facility] AND [facility] NEXT MONTH.

## 2019-09-03 ENCOUNTER — TELEPHONE (OUTPATIENT)
Dept: PHARMACY | Facility: CLINIC | Age: 69
End: 2019-09-03

## 2019-09-24 ENCOUNTER — OFFICE VISIT (OUTPATIENT)
Dept: FAMILY MEDICINE CLINIC | Age: 69
End: 2019-09-24
Payer: MEDICARE

## 2019-09-24 VITALS
TEMPERATURE: 98.6 F | BODY MASS INDEX: 28.52 KG/M2 | DIASTOLIC BLOOD PRESSURE: 78 MMHG | HEIGHT: 62 IN | SYSTOLIC BLOOD PRESSURE: 129 MMHG | WEIGHT: 155 LBS | OXYGEN SATURATION: 97 % | HEART RATE: 94 BPM

## 2019-09-24 DIAGNOSIS — E03.9 ACQUIRED HYPOTHYROIDISM: ICD-10-CM

## 2019-09-24 DIAGNOSIS — I10 ESSENTIAL HYPERTENSION, BENIGN: Primary | ICD-10-CM

## 2019-09-24 PROCEDURE — G8400 PT W/DXA NO RESULTS DOC: HCPCS | Performed by: FAMILY MEDICINE

## 2019-09-24 PROCEDURE — 3017F COLORECTAL CA SCREEN DOC REV: CPT | Performed by: FAMILY MEDICINE

## 2019-09-24 PROCEDURE — 4040F PNEUMOC VAC/ADMIN/RCVD: CPT | Performed by: FAMILY MEDICINE

## 2019-09-24 PROCEDURE — G8417 CALC BMI ABV UP PARAM F/U: HCPCS | Performed by: FAMILY MEDICINE

## 2019-09-24 PROCEDURE — 1123F ACP DISCUSS/DSCN MKR DOCD: CPT | Performed by: FAMILY MEDICINE

## 2019-09-24 PROCEDURE — G8427 DOCREV CUR MEDS BY ELIG CLIN: HCPCS | Performed by: FAMILY MEDICINE

## 2019-09-24 PROCEDURE — 1036F TOBACCO NON-USER: CPT | Performed by: FAMILY MEDICINE

## 2019-09-24 PROCEDURE — 1090F PRES/ABSN URINE INCON ASSESS: CPT | Performed by: FAMILY MEDICINE

## 2019-09-24 PROCEDURE — 99214 OFFICE O/P EST MOD 30 MIN: CPT | Performed by: FAMILY MEDICINE

## 2019-09-24 NOTE — PROGRESS NOTES
erythema  Neck:  Supple, no goiter, no carotid bruits, no lymphadenopathy  Lungs:  CTA B  Heart:  RRR, no murmurs, gallops or rubs  Abdomen:  Soft/mild LLQ ttp without R/G/nd, + bowel sounds  Extremities:  No clubbing, cyanosis or edema  Skin: unremarkable    Assessment/Plan:      Michael Wooten was seen today for hypertension and hyperlipidemia. Diagnoses and all orders for this visit:    Essential hypertension, benign    Acquired hypothyroidism      As above. Call or go to ED immediately if symptoms worsen or persist.  Return in about 6 months (around 3/24/2020) for AMW. , or sooner if necessary. Educational materials and/or home exercises printed for patient's review and were included in patient instructions on his/her After Visit Summary and given to patient at the end of visit. Counseled regarding above diagnosis, including possible risks and complications,  especially if left uncontrolled. Counseled regarding the possible side effects, risks, benefits and alternatives to treatment; patient and/or guardian verbalizes understanding, agrees, feels comfortable with and wishes to proceed with above treatment plan. Advised patient to call with any new medication issues, and read all Rx info from pharmacy to assure aware of all possible risks and side effects of medication before taking. Reviewed age and gender appropriate health screening exams and vaccinations. Advised patient regarding importance of keeping up with recommended health maintenance and to schedule as soon as possible if overdue, as this is important in assessing for undiagnosed pathology, especially cancer, as well as protecting against potentially harmful/life threatening disease. Patient and/or guardian verbalizes understanding and agrees with above counseling, assessment and plan. All questions answered.     Margareth Reed MD  9/24/2019    I have personally reviewed and updated the chief complaint, HPI, Past Medical, Family and Social History, as well as the above Review of Systems.

## 2019-12-11 RX ORDER — BUMETANIDE 0.5 MG/1
0.5 TABLET ORAL DAILY PRN
Qty: 90 TABLET | Refills: 1 | Status: SHIPPED
Start: 2019-12-11 | End: 2020-09-02 | Stop reason: SDUPTHER

## 2019-12-11 RX ORDER — PANTOPRAZOLE SODIUM 40 MG/1
40 TABLET, DELAYED RELEASE ORAL DAILY
Qty: 90 TABLET | Refills: 1 | Status: SHIPPED
Start: 2019-12-11 | End: 2020-05-26

## 2019-12-15 ENCOUNTER — HOSPITAL ENCOUNTER (OUTPATIENT)
Dept: CT IMAGING | Age: 69
Discharge: HOME | End: 2019-12-17
Payer: MEDICARE

## 2019-12-15 DIAGNOSIS — C56.9 MALIGNANT NEOPLASM OF OVARY, UNSPECIFIED LATERALITY (HCC): ICD-10-CM

## 2019-12-15 PROCEDURE — 74177 CT ABD & PELVIS W/CONTRAST: CPT

## 2019-12-15 PROCEDURE — 6360000004 HC RX CONTRAST MEDICATION: Performed by: RADIOLOGY

## 2019-12-15 RX ORDER — SODIUM CHLORIDE 0.9 % (FLUSH) 0.9 %
10 SYRINGE (ML) INJECTION PRN
Status: DISCONTINUED | OUTPATIENT
Start: 2019-12-15 | End: 2019-12-18 | Stop reason: HOSPADM

## 2019-12-15 RX ADMIN — IOPAMIDOL 110 ML: 755 INJECTION, SOLUTION INTRAVENOUS at 08:53

## 2019-12-15 RX ADMIN — IOHEXOL 50 ML: 240 INJECTION, SOLUTION INTRATHECAL; INTRAVASCULAR; INTRAVENOUS; ORAL at 08:53

## 2020-01-16 RX ORDER — LEVOTHYROXINE SODIUM 175 UG/1
175 TABLET ORAL DAILY
Qty: 90 TABLET | Refills: 1 | Status: SHIPPED
Start: 2020-01-16 | End: 2020-05-26

## 2020-03-24 RX ORDER — CYCLOBENZAPRINE HCL 10 MG
TABLET ORAL
Qty: 60 TABLET | Refills: 1 | Status: SHIPPED
Start: 2020-03-24 | End: 2020-09-02

## 2020-03-27 ENCOUNTER — TELEPHONE (OUTPATIENT)
Dept: FAMILY MEDICINE CLINIC | Age: 70
End: 2020-03-27

## 2020-03-27 NOTE — TELEPHONE ENCOUNTER
Spoke with patient in regards to setting up a [employer] account. Patient gave me their e-mail address, sent over [employer] email with activation code for patient.  Patient agreed to sign up and appreciative of the call

## 2020-05-26 RX ORDER — PANTOPRAZOLE SODIUM 40 MG/1
40 TABLET, DELAYED RELEASE ORAL DAILY
Qty: 90 TABLET | Refills: 1 | Status: SHIPPED
Start: 2020-05-26 | End: 2020-09-02 | Stop reason: SDUPTHER

## 2020-05-26 RX ORDER — ESCITALOPRAM OXALATE 10 MG/1
10 TABLET ORAL EVERY MORNING
Qty: 90 TABLET | Refills: 3 | Status: SHIPPED
Start: 2020-05-26 | End: 2020-09-02 | Stop reason: SDUPTHER

## 2020-05-26 RX ORDER — LEVOTHYROXINE SODIUM 175 UG/1
175 TABLET ORAL DAILY
Qty: 90 TABLET | Refills: 1 | Status: SHIPPED
Start: 2020-05-26 | End: 2020-09-02 | Stop reason: SDUPTHER

## 2020-05-29 RX ORDER — LOVASTATIN 40 MG/1
40 TABLET ORAL NIGHTLY
Qty: 90 TABLET | Refills: 0 | Status: SHIPPED
Start: 2020-05-29 | End: 2020-09-02 | Stop reason: SDUPTHER

## 2020-07-11 ENCOUNTER — HOSPITAL ENCOUNTER (OUTPATIENT)
Age: 70
Discharge: HOME | End: 2020-07-11
Payer: MEDICARE

## 2020-07-11 ENCOUNTER — HOSPITAL ENCOUNTER (OUTPATIENT)
Dept: CT IMAGING | Age: 70
Discharge: HOME | End: 2020-07-13
Payer: MEDICARE

## 2020-07-11 LAB
BUN BLDV-MCNC: 15 MG/DL (ref 8–23)
CREAT SERPL-MCNC: 0.7 MG/DL (ref 0.5–1)
GFR AFRICAN AMERICAN: >60
GFR NON-AFRICAN AMERICAN: >60 ML/MIN/1.73

## 2020-07-11 PROCEDURE — 36415 COLL VENOUS BLD VENIPUNCTURE: CPT

## 2020-07-11 PROCEDURE — 2580000003 HC RX 258: Performed by: RADIOLOGY

## 2020-07-11 PROCEDURE — 82565 ASSAY OF CREATININE: CPT

## 2020-07-11 PROCEDURE — 84520 ASSAY OF UREA NITROGEN: CPT

## 2020-07-11 PROCEDURE — 6360000004 HC RX CONTRAST MEDICATION: Performed by: RADIOLOGY

## 2020-07-11 PROCEDURE — 74177 CT ABD & PELVIS W/CONTRAST: CPT

## 2020-07-11 RX ORDER — SODIUM CHLORIDE 0.9 % (FLUSH) 0.9 %
10 SYRINGE (ML) INJECTION PRN
Status: DISCONTINUED | OUTPATIENT
Start: 2020-07-11 | End: 2020-07-14 | Stop reason: HOSPADM

## 2020-07-11 RX ADMIN — IOPAMIDOL 110 ML: 755 INJECTION, SOLUTION INTRAVENOUS at 09:04

## 2020-07-11 RX ADMIN — SODIUM CHLORIDE, PRESERVATIVE FREE 10 ML: 5 INJECTION INTRAVENOUS at 09:04

## 2020-07-11 RX ADMIN — IOHEXOL 50 ML: 240 INJECTION, SOLUTION INTRATHECAL; INTRAVASCULAR; INTRAVENOUS; ORAL at 09:03

## 2020-09-02 ENCOUNTER — OFFICE VISIT (OUTPATIENT)
Dept: FAMILY MEDICINE CLINIC | Age: 70
End: 2020-09-02
Payer: MEDICARE

## 2020-09-02 ENCOUNTER — HOSPITAL ENCOUNTER (OUTPATIENT)
Age: 70
Discharge: HOME | End: 2020-09-04
Payer: MEDICARE

## 2020-09-02 VITALS
BODY MASS INDEX: 28.34 KG/M2 | OXYGEN SATURATION: 97 % | DIASTOLIC BLOOD PRESSURE: 70 MMHG | SYSTOLIC BLOOD PRESSURE: 120 MMHG | RESPIRATION RATE: 18 BRPM | HEIGHT: 62 IN | WEIGHT: 154 LBS | TEMPERATURE: 97.3 F | HEART RATE: 107 BPM

## 2020-09-02 PROBLEM — C79.89 SECONDARY MALIGNANT NEOPLASM OF OTHER SPECIFIED SITES (HCC): Status: ACTIVE | Noted: 2020-09-02

## 2020-09-02 PROBLEM — R07.9 CHEST PAIN: Status: RESOLVED | Noted: 2018-04-18 | Resolved: 2020-09-02

## 2020-09-02 PROBLEM — Z98.890 S/P EXPLORATORY LAPAROTOMY: Status: RESOLVED | Noted: 2019-07-19 | Resolved: 2020-09-02

## 2020-09-02 PROBLEM — R19.00 PELVIC MASS: Status: RESOLVED | Noted: 2019-07-19 | Resolved: 2020-09-02

## 2020-09-02 LAB
ALBUMIN SERPL-MCNC: 4.4 G/DL (ref 3.5–5.2)
ALP BLD-CCNC: 100 U/L (ref 35–104)
ALT SERPL-CCNC: 11 U/L (ref 0–32)
ANION GAP SERPL CALCULATED.3IONS-SCNC: 15 MMOL/L (ref 7–16)
AST SERPL-CCNC: 21 U/L (ref 0–31)
BASOPHILS ABSOLUTE: 0.04 E9/L (ref 0–0.2)
BASOPHILS RELATIVE PERCENT: 0.6 % (ref 0–2)
BILIRUB SERPL-MCNC: 0.4 MG/DL (ref 0–1.2)
BUN BLDV-MCNC: 14 MG/DL (ref 8–23)
CALCIUM SERPL-MCNC: 9.7 MG/DL (ref 8.6–10.2)
CHLORIDE BLD-SCNC: 100 MMOL/L (ref 98–107)
CHOLESTEROL, TOTAL: 166 MG/DL (ref 0–199)
CO2: 23 MMOL/L (ref 22–29)
CREAT SERPL-MCNC: 0.8 MG/DL (ref 0.5–1)
EOSINOPHILS ABSOLUTE: 0.04 E9/L (ref 0.05–0.5)
EOSINOPHILS RELATIVE PERCENT: 0.6 % (ref 0–6)
GFR AFRICAN AMERICAN: >60
GFR NON-AFRICAN AMERICAN: >60 ML/MIN/1.73
GLUCOSE BLD-MCNC: 85 MG/DL (ref 74–99)
HCT VFR BLD CALC: 37.1 % (ref 34–48)
HDLC SERPL-MCNC: 46 MG/DL
HEMOGLOBIN: 11.9 G/DL (ref 11.5–15.5)
IMMATURE GRANULOCYTES #: 0.03 E9/L
IMMATURE GRANULOCYTES %: 0.4 % (ref 0–5)
LDL CHOLESTEROL CALCULATED: 91 MG/DL (ref 0–99)
LYMPHOCYTES ABSOLUTE: 3.47 E9/L (ref 1.5–4)
LYMPHOCYTES RELATIVE PERCENT: 51 % (ref 20–42)
MCH RBC QN AUTO: 32.3 PG (ref 26–35)
MCHC RBC AUTO-ENTMCNC: 32.1 % (ref 32–34.5)
MCV RBC AUTO: 100.8 FL (ref 80–99.9)
MONOCYTES ABSOLUTE: 0.62 E9/L (ref 0.1–0.95)
MONOCYTES RELATIVE PERCENT: 9.1 % (ref 2–12)
NEUTROPHILS ABSOLUTE: 2.61 E9/L (ref 1.8–7.3)
NEUTROPHILS RELATIVE PERCENT: 38.3 % (ref 43–80)
PDW BLD-RTO: 16.2 FL (ref 11.5–15)
PLATELET # BLD: 220 E9/L (ref 130–450)
PMV BLD AUTO: 10 FL (ref 7–12)
POTASSIUM SERPL-SCNC: 4.5 MMOL/L (ref 3.5–5)
RBC # BLD: 3.68 E12/L (ref 3.5–5.5)
SODIUM BLD-SCNC: 138 MMOL/L (ref 132–146)
TOTAL PROTEIN: 7.9 G/DL (ref 6.4–8.3)
TRIGL SERPL-MCNC: 146 MG/DL (ref 0–149)
TSH SERPL DL<=0.05 MIU/L-ACNC: <0.01 UIU/ML (ref 0.27–4.2)
VITAMIN D 25-HYDROXY: 33 NG/ML (ref 30–100)
VLDLC SERPL CALC-MCNC: 29 MG/DL
WBC # BLD: 6.8 E9/L (ref 4.5–11.5)

## 2020-09-02 PROCEDURE — 85025 COMPLETE CBC W/AUTO DIFF WBC: CPT

## 2020-09-02 PROCEDURE — 80053 COMPREHEN METABOLIC PANEL: CPT

## 2020-09-02 PROCEDURE — 3288F FALL RISK ASSESSMENT DOCD: CPT | Performed by: FAMILY MEDICINE

## 2020-09-02 PROCEDURE — 80061 LIPID PANEL: CPT

## 2020-09-02 PROCEDURE — 99214 OFFICE O/P EST MOD 30 MIN: CPT | Performed by: FAMILY MEDICINE

## 2020-09-02 PROCEDURE — G8510 SCR DEP NEG, NO PLAN REQD: HCPCS | Performed by: FAMILY MEDICINE

## 2020-09-02 PROCEDURE — 84443 ASSAY THYROID STIM HORMONE: CPT

## 2020-09-02 PROCEDURE — 82306 VITAMIN D 25 HYDROXY: CPT

## 2020-09-02 RX ORDER — PANTOPRAZOLE SODIUM 40 MG/1
40 TABLET, DELAYED RELEASE ORAL DAILY
Qty: 90 TABLET | Refills: 3 | Status: SHIPPED
Start: 2020-09-02 | End: 2021-01-01 | Stop reason: SDUPTHER

## 2020-09-02 RX ORDER — BUMETANIDE 0.5 MG/1
0.5 TABLET ORAL DAILY PRN
Qty: 90 TABLET | Refills: 3 | Status: ON HOLD
Start: 2020-09-02 | End: 2021-01-01 | Stop reason: HOSPADM

## 2020-09-02 RX ORDER — LOVASTATIN 40 MG/1
40 TABLET ORAL NIGHTLY
Qty: 90 TABLET | Refills: 3 | Status: SHIPPED
Start: 2020-09-02 | End: 2021-01-01 | Stop reason: SDUPTHER

## 2020-09-02 RX ORDER — TIZANIDINE 2 MG/1
2 TABLET ORAL EVERY 8 HOURS PRN
Qty: 90 TABLET | Refills: 0 | Status: SHIPPED
Start: 2020-09-02 | End: 2021-01-01 | Stop reason: SDUPTHER

## 2020-09-02 RX ORDER — NIRAPARIB 100 MG/1
200 CAPSULE ORAL NIGHTLY
Status: ON HOLD | COMMUNITY
End: 2021-01-01 | Stop reason: HOSPADM

## 2020-09-02 RX ORDER — LEVOTHYROXINE SODIUM 175 UG/1
175 TABLET ORAL DAILY
Qty: 90 TABLET | Refills: 3 | Status: SHIPPED
Start: 2020-09-02 | End: 2020-09-03 | Stop reason: SDUPTHER

## 2020-09-02 RX ORDER — LISINOPRIL 20 MG/1
20 TABLET ORAL DAILY
Qty: 90 TABLET | Refills: 3 | Status: SHIPPED
Start: 2020-09-02 | End: 2021-01-01 | Stop reason: SDUPTHER

## 2020-09-02 RX ORDER — ALBUTEROL SULFATE 90 UG/1
2 AEROSOL, METERED RESPIRATORY (INHALATION) EVERY 6 HOURS PRN
Qty: 1 INHALER | Refills: 11 | Status: SHIPPED
Start: 2020-09-02 | End: 2021-01-01 | Stop reason: SDUPTHER

## 2020-09-02 RX ORDER — GABAPENTIN 300 MG/1
300 CAPSULE ORAL 3 TIMES DAILY
Qty: 270 CAPSULE | Refills: 3 | Status: SHIPPED
Start: 2020-09-02 | End: 2021-01-01 | Stop reason: SDUPTHER

## 2020-09-02 RX ORDER — ESCITALOPRAM OXALATE 10 MG/1
10 TABLET ORAL EVERY MORNING
Qty: 90 TABLET | Refills: 3 | Status: SHIPPED
Start: 2020-09-02 | End: 2021-01-01 | Stop reason: SDUPTHER

## 2020-09-02 RX ORDER — ACETAMINOPHEN 500 MG
500 TABLET ORAL EVERY 6 HOURS PRN
COMMUNITY

## 2020-09-02 ASSESSMENT — PATIENT HEALTH QUESTIONNAIRE - PHQ9
SUM OF ALL RESPONSES TO PHQ9 QUESTIONS 1 & 2: 0
SUM OF ALL RESPONSES TO PHQ QUESTIONS 1-9: 0
2. FEELING DOWN, DEPRESSED OR HOPELESS: 0
1. LITTLE INTEREST OR PLEASURE IN DOING THINGS: 0
SUM OF ALL RESPONSES TO PHQ QUESTIONS 1-9: 0

## 2020-09-02 NOTE — PROGRESS NOTES
WOMENS 50 PLUS PO) Take by mouth   Yes Historical Provider, MD   Niraparib Tosylate (Harden Cheema) 100 MG CAPS Take by mouth   Yes Historical Provider, MD   lovastatin (MEVACOR) 40 MG tablet Take 1 tablet by mouth nightly 9/2/20  Yes Saadia Blunt MD   levothyroxine (SYNTHROID) 175 MCG tablet Take 1 tablet by mouth Daily 9/2/20  Yes Saadia Blunt MD   pantoprazole (PROTONIX) 40 MG tablet Take 1 tablet by mouth daily 9/2/20  Yes Saadia Blunt MD   escitalopram (LEXAPRO) 10 MG tablet Take 1 tablet by mouth every morning 9/2/20  Yes Saadia Blunt MD   bumetanide (BUMEX) 0.5 MG tablet Take 1 tablet by mouth daily as needed (ankle swelling) 9/2/20  Yes Saadia Blunt MD   tiZANidine (ZANAFLEX) 2 MG tablet Take 1 tablet by mouth every 8 hours as needed (muscle spasms) 9/2/20  Yes Saadia Blunt MD   lisinopril (PRINIVIL;ZESTRIL) 20 MG tablet Take 1 tablet by mouth daily 9/2/20  Yes Saadia Blunt MD   gabapentin (NEURONTIN) 300 MG capsule Take 1 capsule by mouth 3 times daily for 360 days. 9/2/20 8/28/21 Yes Iraida Sahni MD   albuterol sulfate HFA (VENTOLIN HFA) 108 (90 Base) MCG/ACT inhaler Inhale 2 puffs into the lungs every 6 hours as needed for Wheezing or Shortness of Breath 9/2/20  Yes Iraida Sahni MD   LORazepam (ATIVAN) 0.5 MG tablet Take 0.5 mg by mouth 3 times daily as needed for Anxiety. Yes Historical Provider, MD   vitamin B-6 (PYRIDOXINE) 100 MG tablet Take 100 mg by mouth daily   Yes Historical Provider, MD   vitamin B-12 (CYANOCOBALAMIN) 1000 MCG tablet Take 1,000 mcg by mouth daily   Yes Historical Provider, MD   Handicap Placard MISC by Does not apply route Cannot walk 200 ft.  Without stopping to rest  Duration: Lifetime  Exp:  4/23/2024 4/23/19  Yes Johnny Lefort, MD   calcium carbonate-vitamin D3 (CALCIUM 600+D3) 600-400 MG-UNIT TABS Take by mouth daily    Yes Historical Provider, MD   aspirin 325 MG tablet Take 1 tablet by mouth daily for 28 days  Patient not taking: Reported on 9/24/2019 7/22/19 9/24/19  Rajani Padron MD       ROS:  General: no new fever, chills, night sweats, weight changes      Ophthalmic: no new vision changes  ENT: no new headaches, sinus problems, URI symptoms  Cardiovascular: no new chest pain or dyspnea on exertion, palpitations  Respiratory: no new cough, shortness of breath  Gastrointestinal: no new abdominal pain, change in bowel habits, or black or bloody stools  Genito-Urinary: no new dysuria, trouble voiding, or hematuria  Endocrine: no new hot flashes, malaise/lethargy, polydipsia/polyuria, skin changes, temperature intolerance and unexpected weight changes   Musculoskeletal: no new  joint pain, muscle pain  Hematological and Lymphatic: no new bleeding problems  Dermatological: no new rash and skin lesion changes  Neurological: no new TIA or stroke symptoms  Psychological: + current depression or anxiety; no homicidal or suicidal ideation  Allergy and Immunology: no new nasal congestion, postnasal drip and seasonal allergies    Vitals:  /70 (Site: Left Upper Arm, Position: Sitting, Cuff Size: Medium Adult)   Pulse 107   Temp 97.3 °F (36.3 °C) (Temporal)   Resp 18   Ht 5' 2\" (1.575 m)   Wt 154 lb (69.9 kg)   SpO2 97%   BMI 28.17 kg/m²   Wt Readings from Last 3 Encounters:   09/02/20 154 lb (69.9 kg)   06/24/20 150 lb 3.2 oz (68.1 kg)   02/11/20 149 lb 12.8 oz (67.9 kg)       Physical Exam:  Constitutional - alert, well appearing, and in no distress  Eyes - pupils equal and reactive, extraocular eye movements intact, left eye normal, right eye normal, no conjunctivitis noted  ENT - right ear normal, left ear normal; nose normal and patent, no erythema, discharge; mouth/throat mucous membranes moist, pharynx normal without lesions  Neck - supple, no significant adenopathy; thyroid exam: thyroid is normal in size without nodules or tenderness  Respiratory- clear to auscultation, no wheezes, rales or rhonchi, symmetric air capsule   13. Hypovitaminosis D  Vitamin D 25 Hydroxy   14. Medication refill  bumetanide (BUMEX) 0.5 MG tablet    tiZANidine (ZANAFLEX) 2 MG tablet    gabapentin (NEURONTIN) 300 MG capsule   15. Screen for colon cancer  POCT Fecal Immunochemical Test (FIT)       Medication refills provided  Change flexeril to zanaflex prn  bumex only when needed  Labs as ordered. RTO: Return in about 6 months (around 3/2/2021) for HTN, HLD.       An electronic signature was used to authenticate this note.  ---- Donna Vaughan MD on 9/2/2020 at 2:27 PM

## 2020-09-03 RX ORDER — LEVOTHYROXINE SODIUM 0.15 MG/1
150 TABLET ORAL DAILY
Qty: 90 TABLET | Refills: 3 | Status: SHIPPED
Start: 2020-09-03 | End: 2021-01-01 | Stop reason: SDUPTHER

## 2020-09-24 LAB
CONTROL: NORMAL
HEMOCCULT STL QL: POSITIVE

## 2020-09-24 PROCEDURE — 82274 ASSAY TEST FOR BLOOD FECAL: CPT | Performed by: FAMILY MEDICINE

## 2020-10-15 ENCOUNTER — OFFICE VISIT (OUTPATIENT)
Dept: SURGERY | Age: 70
End: 2020-10-15
Payer: MEDICARE

## 2020-10-15 VITALS
WEIGHT: 150 LBS | DIASTOLIC BLOOD PRESSURE: 88 MMHG | RESPIRATION RATE: 18 BRPM | HEIGHT: 62 IN | SYSTOLIC BLOOD PRESSURE: 137 MMHG | OXYGEN SATURATION: 98 % | TEMPERATURE: 97.3 F | BODY MASS INDEX: 27.6 KG/M2 | HEART RATE: 108 BPM

## 2020-10-15 PROCEDURE — 3017F COLORECTAL CA SCREEN DOC REV: CPT | Performed by: SURGERY

## 2020-10-15 PROCEDURE — 99203 OFFICE O/P NEW LOW 30 MIN: CPT | Performed by: SURGERY

## 2020-10-15 PROCEDURE — 1090F PRES/ABSN URINE INCON ASSESS: CPT | Performed by: SURGERY

## 2020-10-15 PROCEDURE — G8400 PT W/DXA NO RESULTS DOC: HCPCS | Performed by: SURGERY

## 2020-10-15 PROCEDURE — G8484 FLU IMMUNIZE NO ADMIN: HCPCS | Performed by: SURGERY

## 2020-10-15 PROCEDURE — 4040F PNEUMOC VAC/ADMIN/RCVD: CPT | Performed by: SURGERY

## 2020-10-15 PROCEDURE — 1123F ACP DISCUSS/DSCN MKR DOCD: CPT | Performed by: SURGERY

## 2020-10-15 PROCEDURE — 1036F TOBACCO NON-USER: CPT | Performed by: SURGERY

## 2020-10-15 PROCEDURE — G8427 DOCREV CUR MEDS BY ELIG CLIN: HCPCS | Performed by: SURGERY

## 2020-10-15 PROCEDURE — G8417 CALC BMI ABV UP PARAM F/U: HCPCS | Performed by: SURGERY

## 2020-10-15 RX ORDER — SODIUM CHLORIDE 9 MG/ML
INJECTION, SOLUTION INTRAVENOUS CONTINUOUS
Status: CANCELLED | OUTPATIENT
Start: 2020-10-15

## 2020-10-15 NOTE — PATIENT INSTRUCTIONS
Lissett Palm MD, FACS    Preoperative Instructions    Please read the following information very carefully. It contains information that is necessary to best prepare you for your upcoming procedure. Make arrangements for a  to take you to and from your procedure. YOU MUST HAVE SOMEONE DRIVE YOU HOME - this cannot be a taxi or public transportation. You will not be administered anesthesia without someone to go home and be at home with you that day. Nothing to eat or drink after midnight the night before your procedure. Follow your bowel prep instructions if you have them for this procedure. 3 days prior to your procedure: Stop taking blood thinners like Coumadin or Plavix or Xarelto. 5 days prior to your procedure: Stop taking Aspirin or Aspirin containing products. If you cannot stop any of these medications prior to your procedure, please contact our office. Medications morning of procedure: Only heart, breathing, blood pressure, and seizure medications are permitted on the morning of your procedure. These medications can be taken with a sip of water. IF YOU ARE UNABLE TO KEEP THE ABOVE SCHEDULED PROCEDURE, YOU MUST NOTIFY DR. RDZ'S OFFICE [phone]. NOT THE FACILITY. NO CHEWING GUM OR CHEWING TOBACCO AFTER MIDNIGHT ON DAY OF PROCEDURE.    YOU MUST HAVE TRANSPORTATION TO AND FROM THE FACILITY. What is a colonoscopy? A colonoscopy is a test that lets a doctor look inside your colon. The doctor uses a thin, lighted tube called a colonoscope to look for problems. These include small growths called polyps, cancer, or bleeding. During the test, the doctor can take samples of tissue that can be checked for cancer or other problems. This is called a biopsy. The doctor can also take out polyps. Before the test, you will need to stop eating solid foods. You also will drink a liquid or take a tablet that cleans out your colon.  This helps your doctor be able to see inside your colon during the test.  Follow-up care is a key part of your treatment and safety. Be sure to make and go to all appointments, and call your doctor if you are having problems. It's also a good idea to know your test results and keep a list of the medicines you take. What happens before the procedure? Procedures can be stressful. This information will help you understand what you can expect. And it will help you safely prepare for your procedure. Preparing for the procedure  · Understand exactly what procedure is planned, along with the risks, benefits, and other options. · Tell your doctors ALL the medicines, vitamins, supplements, and herbal remedies you take. Some of these can increase the risk of bleeding or interact with anesthesia. · If you take blood thinners, such as warfarin (Coumadin), clopidogrel (Plavix), or aspirin, be sure to talk to your doctor. He or she will tell you if you should stop taking these medicines before your procedure. Make sure that you understand exactly what your doctor wants you to do. · Your doctor will tell you which medicines to take or stop before your procedure. You may need to stop taking certain medicines a week or more before the procedure. So talk to your doctor as soon as you can. · If you have an advance directive, let your doctor know. It may include a living will and a durable power of  for health care. Bring a copy to the hospital. If you don't have one, you may want to prepare one. It lets your doctor and loved ones know your health care wishes. Doctors advise that everyone prepare these papers before any type of surgery or procedure. Before the procedure  · Follow your doctor's directions about when to stop eating solid foods and drink only clear liquids. You can drink water, clear juices, clear broths, flavored ice pops, and gelatin (such as Jell-O). Do not eat or drink anything red or purple.  This includes grape juice questions or concerns. · You don't understand how to prepare for your procedure. · You are having trouble with the bowel prep. · You become ill before the procedure (such as fever, flu, or a cold). · You need to reschedule or have changed your mind about having the procedure. Where can you learn more? Go to [email or web address] and sign in to your Trustribe account. Enter C315 in the Noveporter box to learn more about Colonoscopy: Before Your Procedure.     If you do not have an account, please click on the Sign Up Now link. © 1637-0864 Healthwise, Incorporated. Care instructions adapted under license by [facility] ([facility]). This care instruction is for use with your licensed healthcare professional. If you have questions about a medical condition or this instruction, always ask your healthcare professional. [address] any warranty or liability for your use of this information.   Content Version: 87.5.589716; Current as of: November 20, 2015

## 2020-10-15 NOTE — PROGRESS NOTES
General Surgery History and Physical    Patient's Name/Date of Birth: Mary Holder / 1950    Date: 10/15/2020    PCP: Kia Mccord MD    Referring Physician:   Juno Aguiar MD  [phone]    CHIEF COMPLAINT:    Chief Complaint   Patient presents with    Colonoscopy     colonoscopy consultation, fit test positive         HISTORY OF PRESENT ILLNESS:    Mary Holder is an 68 y.o. female who presents for a colonoscopy. The patient had positive FIT. No nausea, vomiting, diarrhea, constipation. No changes in stool caliber. No black stools. No abdominal pain. No unintentional weight loss. She has a family history of colon cancer - her son Garrison Tenorio was my patient and had colon cancer. The patient has a known history of: first degree relative with colon cancer. The patient has had a colonoscopy before - her last was 10-20 years ago. Past Medical History:   Past Medical History:   Diagnosis Date    Asthma     Cancer ([address]     ovarian    Depression     GERD (gastroesophageal reflux disease)     Headache(784.0)     resolved    Hyperlipidemia     Hypertension     Hypothyroidism     Osteoarthritis     Vitamin D deficiency         Past Surgical History:   Past Surgical History:   Procedure Laterality Date    APPENDECTOMY      BREAST SURGERY  1960s     SECTION      HYSTERECTOMY  1984    LAPAROTOMY  2019    debulking of pelvic mass, DR. Fel[facility]SAI[facility]    LYMPH NODE DISSECTION      TUNNELED VENOUS PORT PLACEMENT          Allergies: Patient has no known allergies.      Medications:   Current Outpatient Medications   Medication Sig Dispense Refill    levothyroxine (SYNTHROID) 150 MCG tablet Take 1 tablet by mouth Daily 90 tablet 3    acetaminophen (TYLENOL) 500 MG tablet Take 500 mg by mouth every 6 hours as needed for Pain      Multiple Vitamins-Minerals (ONE-A-DAY WOMENS 50 PLUS PO) Take by mouth      Niraparib Tosylate (ZEJULA) 100 MG CAPS Take by mouth      lovastatin (MEVACOR) 40 MG tablet Take 1 tablet by mouth nightly 90 tablet 3    pantoprazole (PROTONIX) 40 MG tablet Take 1 tablet by mouth daily 90 tablet 3    escitalopram (LEXAPRO) 10 MG tablet Take 1 tablet by mouth every morning 90 tablet 3    bumetanide (BUMEX) 0.5 MG tablet Take 1 tablet by mouth daily as needed (ankle swelling) 90 tablet 3    tiZANidine (ZANAFLEX) 2 MG tablet Take 1 tablet by mouth every 8 hours as needed (muscle spasms) 90 tablet 0    lisinopril (PRINIVIL;ZESTRIL) 20 MG tablet Take 1 tablet by mouth daily 90 tablet 3    gabapentin (NEURONTIN) 300 MG capsule Take 1 capsule by mouth 3 times daily for 360 days. 270 capsule 3    albuterol sulfate HFA (VENTOLIN HFA) 108 (90 Base) MCG/ACT inhaler Inhale 2 puffs into the lungs every 6 hours as needed for Wheezing or Shortness of Breath 1 Inhaler 11    LORazepam (ATIVAN) 0.5 MG tablet Take 0.5 mg by mouth 3 times daily as needed for Anxiety.  vitamin B-6 (PYRIDOXINE) 100 MG tablet Take 100 mg by mouth daily      vitamin B-12 (CYANOCOBALAMIN) 1000 MCG tablet Take 1,000 mcg by mouth daily      Handicap Placard MISC by Does not apply route Cannot walk 200 ft. Without stopping to rest  Duration: Lifetime  Exp:  4/23/2024 1 each 0    calcium carbonate-vitamin D3 (CALCIUM 600+D3) 600-400 MG-UNIT TABS Take by mouth daily       aspirin 325 MG tablet Take 1 tablet by mouth daily for 28 days (Patient not taking: Reported on 9/24/2019) 28 tablet 0     No current facility-administered medications for this visit.           Social History:   Social History     Tobacco Use    Smoking status: Never Smoker    Smokeless tobacco: Never Used   Substance Use Topics    Alcohol use: No        Family History:   Family History   Problem Relation Age of Onset    Heart Disease Mother     High Blood Pressure Mother     Diabetes Father     Thyroid Disease Sister         2    Ovarian Cancer Neg Hx     Uterine Cancer Neg Hx     Colon Cancer Neg Hx    

## 2020-10-16 ENCOUNTER — TELEPHONE (OUTPATIENT)
Dept: SURGERY | Age: 70
End: 2020-10-16

## 2020-10-16 ENCOUNTER — HOSPITAL ENCOUNTER (OUTPATIENT)
Dept: NON INVASIVE DIAGNOSTICS | Age: 70
Discharge: HOME | End: 2020-10-16
Payer: MEDICARE

## 2020-10-16 LAB
EKG ATRIAL RATE: 100 BPM
EKG P AXIS: 60 DEGREES
EKG P-R INTERVAL: 146 MS
EKG Q-T INTERVAL: 376 MS
EKG QRS DURATION: 76 MS
EKG QTC CALCULATION (BAZETT): 485 MS
EKG R AXIS: 6 DEGREES
EKG T AXIS: 52 DEGREES
EKG VENTRICULAR RATE: 100 BPM

## 2020-10-16 PROCEDURE — 93005 ELECTROCARDIOGRAM TRACING: CPT | Performed by: NURSE PRACTITIONER

## 2020-10-16 NOTE — TELEPHONE ENCOUNTER
Prior Authorization Form:      DEMOGRAPHICS:                     Patient Name:  Chasity Pacheco  Patient :  1950            Insurance:  Payor: [facility] MEDICARE / Plan: [facility] MEDICARE COMPLETE / Product Type: *No Product type* /   Insurance ID Number:    Payor/Plan Subscr  Sex Relation Sub. Ins. ID Effective Group Num   1.  [address] 1950 Female Self [id number]                                   [address] [address]         DIAGNOSIS & PROCEDURE:                       Procedure/Operation: COLONOSACOPY           CPT Code: 03991    Diagnosis:  BLOOD IN STOOL    ICD10 Code: K92.1    Location:  [facility]    Surgeon:  Subhash Gary INFORMATION:                          Date: 2020    Time: 930              Anesthesia:  MAC/TIVA                                                       Status:  Outpatient        Special Comments:         Electronically signed by Jaun Yeh MA on 10/16/2020 at 10:41 AM

## 2020-11-02 ENCOUNTER — HOSPITAL ENCOUNTER (OUTPATIENT)
Age: 70
Discharge: HOME | End: 2020-11-04
Payer: MEDICARE

## 2020-11-02 DIAGNOSIS — Z01.818 PREOP TESTING: ICD-10-CM

## 2020-11-02 PROCEDURE — U0003 INFECTIOUS AGENT DETECTION BY NUCLEIC ACID (DNA OR RNA); SEVERE ACUTE RESPIRATORY SYNDROME CORONAVIRUS 2 (SARS-COV-2) (CORONAVIRUS DISEASE [COVID-19]), AMPLIFIED PROBE TECHNIQUE, MAKING USE OF HIGH THROUGHPUT TECHNOLOGIES AS DESCRIBED BY CMS-2020-01-R: HCPCS

## 2020-11-02 NOTE — PROGRESS NOTES
[facility] PRE-ADMISSION TESTING INSTRUCTIONS    The Preadmission Testing patient is instructed accordingly using the following criteria (check applicable):    ARRIVAL INSTRUCTIONS:  [x] Parking the day of Surgery is located in the Main Entrance lot. Upon entering the door, make an immediate right to the surgery reception desk    [x] Bring photo ID and insurance card    [] Bring in a copy of Living will or Durable Power of  papers. [x] Please be sure to arrange for responsible adult to provide transportation to and from the hospital    [x] Please arrange for responsible adult to be with you for the 24 hour period post procedure due to having anesthesia      GENERAL INSTRUCTIONS:    [x] Nothing by mouth after midnight, including gum, candy, mints or water    [x] You may brush your teeth, but do not swallow any water    [x] Take medications as instructed with 1-2 oz of water    [x] Stop herbal supplements and vitamins 5 days prior to procedure    [] Follow preop dosing of blood thinners per physician instructions    [] Take 1/2 dose of evening insulin, but no insulin after midnight    [] No oral diabetic medications after midnight    [] If diabetic and have low blood sugar or feel symptomatic, take 1-2oz apple juice only    [x] Bring inhalers day of surgery    [] Bring C-PAP/ Bi-Pap day of surgery    [] Bring urine specimen day of surgery    [x]  no lotion, powders or creams    [x] Follow bowel prep as instructed per surgeon    [] No tobacco products within 24 hours of surgery     [] No alcohol or illegal drug use within 24 hours of surgery.     [x] Jewelry, body piercing's, eyeglasses, contact lenses and dentures are not permitted into surgery (bring cases)      [x] Please do not wear any nail polish, make up or hair products on the day of surgery    [x] You can expect a call the business day prior to procedure to notify you if your arrival time changes    [x] If you receive a survey after surgery we would greatly appreciate your comments    [] Parent/guardian of a minor must accompany their child and remain on the premises  the entire time they are under our care     [] Pediatric patients may bring favorite toy, blanket or comfort item with them    [] A caregiver or family member must remain with the patient during their stay if they are mentally handicapped, have dementia, disoriented or unable to use a call light or would be a safety concern if left unattended    [x] Please notify surgeon if you develop any illness between now and time of surgery (cold, cough, sore throat, fever, nausea, vomiting) or any signs of infections  including skin, wounds, and dental.    []  The Outpatient Pharmacy is available to fill your prescription here on your day of surgery, ask your preop nurse for details    [] Other instructions    EDUCATIONAL MATERIALS PROVIDED:    [] PAT Preoperative Education Packet/Booklet     [] Medication List    [] Transfusion bracelet applied with instructions    [] Shower with soap, lather and rinse well, and use CHG wipes provided the evening before surgery as instructed    [] Incentive spirometer with instructions

## 2020-11-04 LAB
SARS-COV-2: NOT DETECTED
SOURCE: NORMAL

## 2020-11-06 ENCOUNTER — ANESTHESIA (OUTPATIENT)
Dept: ENDOSCOPY | Age: 70
End: 2020-11-06
Payer: MEDICARE

## 2020-11-06 ENCOUNTER — ANESTHESIA EVENT (OUTPATIENT)
Dept: ENDOSCOPY | Age: 70
End: 2020-11-06
Payer: MEDICARE

## 2020-11-06 ENCOUNTER — HOSPITAL ENCOUNTER (OUTPATIENT)
Age: 70
Setting detail: OUTPATIENT SURGERY
Discharge: HOME | End: 2020-11-06
Attending: SURGERY | Admitting: SURGERY
Payer: MEDICARE

## 2020-11-06 VITALS
TEMPERATURE: 96.9 F | BODY MASS INDEX: 27.6 KG/M2 | SYSTOLIC BLOOD PRESSURE: 146 MMHG | RESPIRATION RATE: 18 BRPM | DIASTOLIC BLOOD PRESSURE: 75 MMHG | HEART RATE: 89 BPM | OXYGEN SATURATION: 100 % | WEIGHT: 150 LBS | HEIGHT: 62 IN

## 2020-11-06 VITALS
SYSTOLIC BLOOD PRESSURE: 91 MMHG | RESPIRATION RATE: 18 BRPM | OXYGEN SATURATION: 100 % | DIASTOLIC BLOOD PRESSURE: 52 MMHG

## 2020-11-06 PROCEDURE — 2709999900 HC NON-CHARGEABLE SUPPLY: Performed by: SURGERY

## 2020-11-06 PROCEDURE — 6360000002 HC RX W HCPCS: Performed by: NURSE ANESTHETIST, CERTIFIED REGISTERED

## 2020-11-06 PROCEDURE — 7100000011 HC PHASE II RECOVERY - ADDTL 15 MIN: Performed by: SURGERY

## 2020-11-06 PROCEDURE — 3609027000 HC COLONOSCOPY: Performed by: SURGERY

## 2020-11-06 PROCEDURE — 45378 DIAGNOSTIC COLONOSCOPY: CPT | Performed by: SURGERY

## 2020-11-06 PROCEDURE — 2580000003 HC RX 258: Performed by: SURGERY

## 2020-11-06 PROCEDURE — 3700000000 HC ANESTHESIA ATTENDED CARE: Performed by: SURGERY

## 2020-11-06 PROCEDURE — 7100000010 HC PHASE II RECOVERY - FIRST 15 MIN: Performed by: SURGERY

## 2020-11-06 PROCEDURE — 3700000001 HC ADD 15 MINUTES (ANESTHESIA): Performed by: SURGERY

## 2020-11-06 RX ORDER — PROPOFOL 10 MG/ML
INJECTION, EMULSION INTRAVENOUS PRN
Status: DISCONTINUED | OUTPATIENT
Start: 2020-11-06 | End: 2020-11-06 | Stop reason: SDUPTHER

## 2020-11-06 RX ORDER — SODIUM CHLORIDE 9 MG/ML
INJECTION, SOLUTION INTRAVENOUS CONTINUOUS
Status: DISCONTINUED | OUTPATIENT
Start: 2020-11-06 | End: 2020-11-06 | Stop reason: HOSPADM

## 2020-11-06 RX ADMIN — SODIUM CHLORIDE: 9 INJECTION, SOLUTION INTRAVENOUS at 09:32

## 2020-11-06 RX ADMIN — PROPOFOL 80 MG: 10 INJECTION, EMULSION INTRAVENOUS at 09:41

## 2020-11-06 RX ADMIN — PROPOFOL 50 MG: 10 INJECTION, EMULSION INTRAVENOUS at 09:46

## 2020-11-06 ASSESSMENT — PAIN - FUNCTIONAL ASSESSMENT: PAIN_FUNCTIONAL_ASSESSMENT: 0-10

## 2020-11-06 ASSESSMENT — LIFESTYLE VARIABLES: SMOKING_STATUS: 0

## 2020-11-06 NOTE — ANESTHESIA PRE PROCEDURE
carbonate-vitamin D3 (CALCIUM 600+D3) 600-400 MG-UNIT TABS Take by mouth daily    Yes Historical Provider, MD   bumetanide (BUMEX) 0.5 MG tablet Take 1 tablet by mouth daily as needed (ankle swelling) 20   You Vargas MD   Handicajess Placard MISC by Does not apply route Cannot walk 200 ft.  Without stopping to rest  Duration: Lifetime  Exp:  2024   Sarah Buitrago MD       Current medications:    Current Facility-Administered Medications   Medication Dose Route Frequency Provider Last Rate Last Dose    0.9 % sodium chloride infusion   Intravenous Continuous Chivo Pendleton MD           Allergies:  No Known Allergies    Problem List:    Patient Active Problem List   Diagnosis Code    HTN (hypertension) I10    DJD (degenerative joint disease) of knee M17.10    DJD (degenerative joint disease), lumbosacral M47.817    Hypothyroid E03.9    Hypercholesterolemia E78.00    GERD (gastroesophageal reflux disease) K21.9    Mild intermittent asthma without complication K64.84    Secondary malignant neoplasm of other specified sites ([facility] C79.89    Depression F32.9       Past Medical History:        Diagnosis Date    Ankle swelling     takes diuretic prn    Asthma     mild    Cancer ([facility] 2019    ovarian, treated with surgery and chemo    Depression     GERD (gastroesophageal reflux disease)     Hyperlipidemia     Hypertension     Hypothyroidism     Osteoarthritis     Positive FIT (fecal immunochemical test)     Vitamin D deficiency     Wears partial dentures     upper       Past Surgical History:        Procedure Laterality Date    APPENDECTOMY  years ago    BREAST SURGERY  1960s    removal lump     SECTION      x 1    HYSTERECTOMY  1984    LAPAROTOMY  2019    debulking of pelvic mass, DR. [facility]    LYMPH NODE DISSECTION      TUNNELED VENOUS PORT PLACEMENT         Social History:    Social History     Tobacco Use    Smoking status: Never Smoker    Smokeless tobacco: Never Used   Substance Use Topics    Alcohol use: No                                Counseling given: Not Answered      Vital Signs (Current):   Vitals:    11/02/20 1511 11/06/20 0836   BP:  (!) 142/83   Pulse:  86   Resp:  18   Temp:  96.9 °F (36.1 °C)   TempSrc:  Temporal   SpO2:  100%   Weight: 150 lb (68 kg) 150 lb (68 kg)   Height: 5' 2\" (1.575 m) 5' 2\" (1.575 m)                                              BP Readings from Last 3 Encounters:   11/06/20 (!) 142/83   10/15/20 137/88   09/25/20 134/87       NPO Status: Time of last liquid consumption: 2130                        Time of last solid consumption: 0900                        Date of last liquid consumption: 11/05/20                        Date of last solid food consumption: 11/05/20    BMI:   Wt Readings from Last 3 Encounters:   11/06/20 150 lb (68 kg)   10/15/20 150 lb (68 kg)   09/25/20 152 lb 6.4 oz (69.1 kg)     Body mass index is 27.44 kg/m². CBC:   Lab Results   Component Value Date    WBC 6.8 09/02/2020    RBC 3.68 09/02/2020    RBC 3.12 07/20/2019    HGB 11.9 09/02/2020    HCT 37.1 09/02/2020    .8 09/02/2020    RDW 16.2 09/02/2020     09/02/2020       CMP:   Lab Results   Component Value Date     09/02/2020    K 4.5 09/02/2020    K 3.4 08/24/2019     09/02/2020    CO2 23 09/02/2020    BUN 14 09/02/2020    CREATININE 0.8 09/02/2020    GFRAA >60 09/02/2020    LABGLOM >60 09/02/2020    GLUCOSE 85 09/02/2020    GLUCOSE 101 12/19/2011    PROT 7.9 09/02/2020    CALCIUM 9.7 09/02/2020    BILITOT 0.4 09/02/2020    ALKPHOS 100 09/02/2020    AST 21 09/02/2020    ALT 11 09/02/2020       POC Tests: No results for input(s): POCGLU, POCNA, POCK, POCCL, POCBUN, POCHEMO, POCHCT in the last 72 hours.     Coags:   Lab Results   Component Value Date    PROTIME 12.3 04/18/2018    PROTIME 11.0 09/14/2011    INR 1.1 04/18/2018    APTT 31.0 09/14/2011       HCG (If Applicable): No results found for: PREGTESTUR, PREGSERUM, HCG, HCGQUANT     ABGs: No results found for: PHART, PO2ART, DNN4SRC, JAV8QPO, BEART, T5PIXVJS     Type & Screen (If Applicable):  Lab Results   Component Value Date    LABABO O 07/19/2019       Drug/Infectious Status (If Applicable):  No results found for: HIV, HEPCAB    COVID-19 Screening (If Applicable):   Lab Results   Component Value Date    COVID19 Not Detected 11/02/2020         Anesthesia Evaluation  Patient summary reviewed and Nursing notes reviewed no history of anesthetic complications:   Airway: Mallampati: II  TM distance: >3 FB   Neck ROM: full  Mouth opening: > = 3 FB Dental:    (+) upper dentures and partials      Pulmonary: breath sounds clear to auscultation  (+) asthma:     (-) not a current smoker                           Cardiovascular:  Exercise tolerance: good (>4 METS),   (+) hypertension:, hyperlipidemia        Rhythm: regular  Rate: normal           Beta Blocker:  Not on Beta Blocker         Neuro/Psych:   (+) depression/anxiety             GI/Hepatic/Renal:   (+) GERD: well controlled, bowel prep,           Endo/Other:    (+) hypothyroidism: arthritis: OA., malignancy/cancer. Abdominal:           Vascular: negative vascular ROS. Anesthesia Plan      MAC     ASA 3       Induction: intravenous. Anesthetic plan and risks discussed with patient and spouse.                       Fidencio Atkinson MD   11/6/2020

## 2020-11-06 NOTE — H&P
Patient's office history and physical was reviewed. Patient examined. There has been no change in the patient's history and physical.      Physician Signature: Electronically signed by Dr. Michaela Herring Surgery History and Physical    Patient's Name/Date of Birth: Trent Simeon / 1950    Date: 10/15/2020    PCP: You Vargas MD    Referring Physician:   Leigh De Dios MD  [phone]    CHIEF COMPLAINT:    No chief complaint on file. HISTORY OF PRESENT ILLNESS:    Trent Simeon is an 68 y.o. female who presents for a colonoscopy. The patient had positive FIT. No nausea, vomiting, diarrhea, constipation. No changes in stool caliber. No black stools. No abdominal pain. No unintentional weight loss. She has a family history of colon cancer - her son Mali Rasmussen was my patient and had colon cancer. The patient has a known history of: first degree relative with colon cancer. The patient has had a colonoscopy before - her last was 10-20 years ago. Past Medical History:   Past Medical History:   Diagnosis Date    Ankle swelling     takes diuretic prn    Asthma     mild    Cancer ([address] 2019    ovarian, treated with surgery and chemo    Depression     GERD (gastroesophageal reflux disease)     Hyperlipidemia     Hypertension     Hypothyroidism     Osteoarthritis     Positive FIT (fecal immunochemical test)     Vitamin D deficiency     Wears partial dentures     upper        Past Surgical History:   Past Surgical History:   Procedure Laterality Date    APPENDECTOMY  years ago    BREAST SURGERY  1960s    removal lump     SECTION      x 1    HYSTERECTOMY  1984    [facility]    LYMPH NODE DISSECTION      TUNNELED VENOUS PORT PLACEMENT          Allergies: Patient has no known allergies.      Medications:   Current Facility-Administered Medications   Medication Dose Route Frequency Provider Last Rate Last Dose    0.9 % sodium chloride infusion   Intravenous Continuous Sakina Garcia MD             Social History:   Social History     Tobacco Use    Smoking status: Never Smoker    Smokeless tobacco: Never Used   Substance Use Topics    Alcohol use: No        Family History:   Family History   Problem Relation Age of Onset    Heart Disease Mother     High Blood Pressure Mother     Diabetes Father     Thyroid Disease Sister         2    Ovarian Cancer Neg Hx     Uterine Cancer Neg Hx     Colon Cancer Neg Hx     Breast Cancer Neg Hx        REVIEW OF SYSTEMS:    Constitutional: negative  Eyes: negative  Ears, nose, mouth, throat, and face: negative  Respiratory: negative  Cardiovascular: negative  Gastrointestinal: as in HPI  Genitourinary:negative  Integument/breast: negative  Hematologic/lymphatic: negative  Musculoskeletal:negative  Neurological: negative  Allergic/Immunologic: negative    PHYSICAL EXAM   BP (!) 142/83   Pulse 86   Temp 96.9 °F (36.1 °C) (Temporal)   Resp 18   Ht 5' 2\" (1.575 m)   Wt 150 lb (68 kg)   SpO2 100%   BMI 27.44 kg/m²     General appearance: alert, cooperative and in no acute distress. Eyes: Grossly normal   Lungs: clear to auscultation bilaterally  Heart: regular rate and rhythm  Abdomen:  soft, non-tender; bowel sounds normal; no masses,  no organomegaly  Skin: No skin abnormalities  Neurologic: Alert and oriented x 3. Grossly normal  Musculoskeletal: No clubbing cyanosis or edema. ASSESSMENT AND PLAN:       Assessment: Jose Ramon Farrar is an 68 y.o. female who presents for a colonoscopy with blood in her stool     Plan: I will set the patient up for a colonoscopy, possible biopsy, possible polypectomy. I explained the risks including but not limited to bleeding, perforation leading to possible surgery, or infection.  The benefits, alternatives, and potential complications associated with the above procedure to be performed and transfusions when applicable with the patient/responsible person prior to the procedure. I discussed the risk of bowel peroration, postoperative bleeding, post-polypectomy syndrome, as well as the possibility of needing emergency surgery or another colonoscopy. All of the patient's questions were answered. The patient understands and agrees to the procedure.        Physician Signature: Electronically signed by Olive Ramires MD, General Surgery    Send copy of H&P to PCP, Vasu Quiles MD and referring physician, Evelyn Chairez MD

## 2020-11-06 NOTE — ANESTHESIA POSTPROCEDURE EVALUATION
Department of Anesthesiology  Postprocedure Note    Patient: Syed Aranda  MRN: [id number]  YOB: 1950  Date of evaluation: 11/6/2020  Time:  11:49 AM     Procedure Summary     Date:  11/06/20 Room / Location:  [address] [place] [address]    Anesthesia Start:  0876 Anesthesia Stop:  9240    Procedure:  COLONOSCOPY DIAGNOSTIC (N/A ) Diagnosis:  (BLOOD IN STOOL)    Surgeon:  Debbie Phillips MD Responsible Provider:  Kimmy Griggs MD    Anesthesia Type:  MAC ASA Status:  3          Anesthesia Type: MAC    Jolly Phase I: Jolly Score: 10    Jolly Phase II: Jolly Score: 10    Last vitals: Reviewed and per EMR flowsheets.        Anesthesia Post Evaluation    Patient location during evaluation: PACU  Patient participation: complete - patient participated  Level of consciousness: awake and alert  Airway patency: patent  Nausea & Vomiting: no vomiting and no nausea  Complications: no  Cardiovascular status: blood pressure returned to baseline  Respiratory status: acceptable  Hydration status: euvolemic

## 2021-01-01 ENCOUNTER — TELEPHONE (OUTPATIENT)
Dept: FAMILY MEDICINE CLINIC | Age: 71
End: 2021-01-01

## 2021-01-01 ENCOUNTER — APPOINTMENT (OUTPATIENT)
Dept: CT IMAGING | Age: 71
End: 2021-01-01
Payer: MEDICARE

## 2021-01-01 ENCOUNTER — APPOINTMENT (OUTPATIENT)
Dept: CT IMAGING | Age: 71
DRG: 644 | End: 2021-01-01
Payer: MEDICARE

## 2021-01-01 ENCOUNTER — HOSPITAL ENCOUNTER (OUTPATIENT)
Dept: CT IMAGING | Age: 71
Discharge: HOME | End: 2021-02-16
Payer: MEDICARE

## 2021-01-01 ENCOUNTER — OFFICE VISIT (OUTPATIENT)
Dept: FAMILY MEDICINE CLINIC | Age: 71
End: 2021-01-01
Payer: MEDICARE

## 2021-01-01 ENCOUNTER — TELEPHONE (OUTPATIENT)
Dept: NON INVASIVE DIAGNOSTICS | Age: 71
End: 2021-01-01

## 2021-01-01 ENCOUNTER — CARE COORDINATION (OUTPATIENT)
Dept: CASE MANAGEMENT | Age: 71
End: 2021-01-01

## 2021-01-01 ENCOUNTER — HOSPITAL ENCOUNTER (EMERGENCY)
Age: 71
Discharge: ANOTHER HOSPITAL | End: 2021-12-12
Attending: EMERGENCY MEDICINE
Payer: MEDICARE

## 2021-01-01 ENCOUNTER — HOSPITAL ENCOUNTER (INPATIENT)
Age: 71
LOS: 15 days | Discharge: HOME | DRG: 644 | End: 2021-11-24
Attending: EMERGENCY MEDICINE | Admitting: HOSPITALIST
Payer: MEDICARE

## 2021-01-01 ENCOUNTER — HOSPITAL ENCOUNTER (INPATIENT)
Age: 71
LOS: 3 days | Discharge: HOME | DRG: 809 | End: 2021-10-22
Attending: EMERGENCY MEDICINE | Admitting: INTERNAL MEDICINE
Payer: MEDICARE

## 2021-01-01 ENCOUNTER — APPOINTMENT (OUTPATIENT)
Dept: GENERAL RADIOLOGY | Age: 71
DRG: 644 | End: 2021-01-01
Payer: MEDICARE

## 2021-01-01 ENCOUNTER — HOSPITAL ENCOUNTER (INPATIENT)
Age: 71
LOS: 4 days | Discharge: HOME | DRG: 809 | End: 2021-10-31
Attending: EMERGENCY MEDICINE | Admitting: INTERNAL MEDICINE
Payer: MEDICARE

## 2021-01-01 ENCOUNTER — APPOINTMENT (OUTPATIENT)
Dept: ULTRASOUND IMAGING | Age: 71
DRG: 644 | End: 2021-01-01
Payer: MEDICARE

## 2021-01-01 ENCOUNTER — APPOINTMENT (OUTPATIENT)
Dept: GENERAL RADIOLOGY | Age: 71
End: 2021-01-01
Payer: MEDICARE

## 2021-01-01 ENCOUNTER — HOSPITAL ENCOUNTER (OUTPATIENT)
Age: 71
Setting detail: OBSERVATION
Discharge: HOME | End: 2021-01-31
Attending: EMERGENCY MEDICINE | Admitting: INTERNAL MEDICINE
Payer: MEDICARE

## 2021-01-01 ENCOUNTER — HOSPITAL ENCOUNTER (OUTPATIENT)
Dept: CT IMAGING | Age: 71
Discharge: HOME | End: 2021-06-05
Payer: MEDICARE

## 2021-01-01 ENCOUNTER — ANESTHESIA EVENT (OUTPATIENT)
Dept: CT IMAGING | Age: 71
DRG: 644 | End: 2021-01-01
Payer: MEDICARE

## 2021-01-01 ENCOUNTER — OFFICE VISIT (OUTPATIENT)
Dept: NON INVASIVE DIAGNOSTICS | Age: 71
End: 2021-01-01
Payer: MEDICARE

## 2021-01-01 ENCOUNTER — ANESTHESIA (OUTPATIENT)
Dept: CT IMAGING | Age: 71
DRG: 644 | End: 2021-01-01
Payer: MEDICARE

## 2021-01-01 ENCOUNTER — HOSPITAL ENCOUNTER (OUTPATIENT)
Age: 71
Discharge: HOME | End: 2021-10-02
Payer: MEDICARE

## 2021-01-01 ENCOUNTER — APPOINTMENT (OUTPATIENT)
Dept: CT IMAGING | Age: 71
DRG: 809 | End: 2021-01-01
Payer: MEDICARE

## 2021-01-01 ENCOUNTER — HOSPITAL ENCOUNTER (OUTPATIENT)
Dept: GENERAL RADIOLOGY | Age: 71
Discharge: HOME | End: 2021-10-02
Payer: MEDICARE

## 2021-01-01 ENCOUNTER — HOSPITAL ENCOUNTER (OUTPATIENT)
Age: 71
Setting detail: OBSERVATION
Discharge: HOME | End: 2021-12-08
Attending: EMERGENCY MEDICINE | Admitting: FAMILY MEDICINE
Payer: MEDICARE

## 2021-01-01 ENCOUNTER — APPOINTMENT (OUTPATIENT)
Dept: GENERAL RADIOLOGY | Age: 71
DRG: 809 | End: 2021-01-01
Payer: MEDICARE

## 2021-01-01 VITALS
DIASTOLIC BLOOD PRESSURE: 50 MMHG | SYSTOLIC BLOOD PRESSURE: 100 MMHG | WEIGHT: 133 LBS | OXYGEN SATURATION: 98 % | BODY MASS INDEX: 24.33 KG/M2 | TEMPERATURE: 97.8 F | HEART RATE: 101 BPM | RESPIRATION RATE: 16 BRPM

## 2021-01-01 VITALS
BODY MASS INDEX: 23.92 KG/M2 | WEIGHT: 130 LBS | DIASTOLIC BLOOD PRESSURE: 65 MMHG | HEART RATE: 90 BPM | HEIGHT: 62 IN | OXYGEN SATURATION: 99 % | SYSTOLIC BLOOD PRESSURE: 123 MMHG | RESPIRATION RATE: 16 BRPM | TEMPERATURE: 98.3 F

## 2021-01-01 VITALS
RESPIRATION RATE: 16 BRPM | OXYGEN SATURATION: 100 % | HEART RATE: 59 BPM | HEIGHT: 62 IN | DIASTOLIC BLOOD PRESSURE: 71 MMHG | SYSTOLIC BLOOD PRESSURE: 115 MMHG | TEMPERATURE: 97.3 F | WEIGHT: 138.3 LBS | BODY MASS INDEX: 25.45 KG/M2

## 2021-01-01 VITALS
TEMPERATURE: 98.4 F | RESPIRATION RATE: 17 BRPM | SYSTOLIC BLOOD PRESSURE: 110 MMHG | OXYGEN SATURATION: 95 % | DIASTOLIC BLOOD PRESSURE: 57 MMHG | HEART RATE: 82 BPM | HEIGHT: 62 IN | BODY MASS INDEX: 24.84 KG/M2 | WEIGHT: 135 LBS

## 2021-01-01 VITALS
HEART RATE: 117 BPM | DIASTOLIC BLOOD PRESSURE: 62 MMHG | RESPIRATION RATE: 16 BRPM | HEIGHT: 62 IN | BODY MASS INDEX: 25.95 KG/M2 | TEMPERATURE: 97 F | OXYGEN SATURATION: 98 % | WEIGHT: 141 LBS | SYSTOLIC BLOOD PRESSURE: 110 MMHG

## 2021-01-01 VITALS — DIASTOLIC BLOOD PRESSURE: 59 MMHG | OXYGEN SATURATION: 100 % | SYSTOLIC BLOOD PRESSURE: 109 MMHG

## 2021-01-01 VITALS
DIASTOLIC BLOOD PRESSURE: 80 MMHG | RESPIRATION RATE: 18 BRPM | OXYGEN SATURATION: 100 % | WEIGHT: 137 LBS | TEMPERATURE: 98.2 F | SYSTOLIC BLOOD PRESSURE: 122 MMHG | BODY MASS INDEX: 25.21 KG/M2 | HEART RATE: 100 BPM | HEIGHT: 62 IN

## 2021-01-01 VITALS
OXYGEN SATURATION: 100 % | HEART RATE: 105 BPM | TEMPERATURE: 98.1 F | BODY MASS INDEX: 25.21 KG/M2 | DIASTOLIC BLOOD PRESSURE: 70 MMHG | WEIGHT: 137 LBS | RESPIRATION RATE: 20 BRPM | SYSTOLIC BLOOD PRESSURE: 128 MMHG | HEIGHT: 62 IN

## 2021-01-01 VITALS
WEIGHT: 131 LBS | RESPIRATION RATE: 16 BRPM | SYSTOLIC BLOOD PRESSURE: 122 MMHG | DIASTOLIC BLOOD PRESSURE: 86 MMHG | BODY MASS INDEX: 24.11 KG/M2 | HEIGHT: 62 IN | HEART RATE: 89 BPM

## 2021-01-01 VITALS
WEIGHT: 140.6 LBS | SYSTOLIC BLOOD PRESSURE: 130 MMHG | OXYGEN SATURATION: 96 % | RESPIRATION RATE: 16 BRPM | DIASTOLIC BLOOD PRESSURE: 68 MMHG | TEMPERATURE: 96.5 F | HEART RATE: 102 BPM | BODY MASS INDEX: 25.72 KG/M2

## 2021-01-01 VITALS
DIASTOLIC BLOOD PRESSURE: 70 MMHG | BODY MASS INDEX: 26.05 KG/M2 | OXYGEN SATURATION: 99 % | RESPIRATION RATE: 16 BRPM | TEMPERATURE: 97.5 F | WEIGHT: 142.4 LBS | SYSTOLIC BLOOD PRESSURE: 120 MMHG | HEART RATE: 72 BPM

## 2021-01-01 VITALS
DIASTOLIC BLOOD PRESSURE: 65 MMHG | HEIGHT: 62 IN | HEART RATE: 57 BPM | OXYGEN SATURATION: 100 % | TEMPERATURE: 98.1 F | BODY MASS INDEX: 24 KG/M2 | WEIGHT: 130.4 LBS | RESPIRATION RATE: 16 BRPM | SYSTOLIC BLOOD PRESSURE: 127 MMHG

## 2021-01-01 VITALS
BODY MASS INDEX: 23.92 KG/M2 | DIASTOLIC BLOOD PRESSURE: 80 MMHG | HEART RATE: 94 BPM | WEIGHT: 130 LBS | SYSTOLIC BLOOD PRESSURE: 118 MMHG | TEMPERATURE: 97.8 F | HEIGHT: 62 IN | RESPIRATION RATE: 18 BRPM | OXYGEN SATURATION: 99 %

## 2021-01-01 VITALS
WEIGHT: 140 LBS | RESPIRATION RATE: 14 BRPM | DIASTOLIC BLOOD PRESSURE: 80 MMHG | HEIGHT: 62 IN | HEART RATE: 94 BPM | SYSTOLIC BLOOD PRESSURE: 149 MMHG | TEMPERATURE: 99.8 F | BODY MASS INDEX: 25.76 KG/M2 | OXYGEN SATURATION: 98 %

## 2021-01-01 VITALS
RESPIRATION RATE: 16 BRPM | HEIGHT: 62 IN | SYSTOLIC BLOOD PRESSURE: 115 MMHG | DIASTOLIC BLOOD PRESSURE: 63 MMHG | HEART RATE: 94 BPM | TEMPERATURE: 99 F | WEIGHT: 140.2 LBS | OXYGEN SATURATION: 96 % | BODY MASS INDEX: 25.8 KG/M2

## 2021-01-01 DIAGNOSIS — E03.9 ACQUIRED HYPOTHYROIDISM: ICD-10-CM

## 2021-01-01 DIAGNOSIS — Z23 NEED FOR SHINGLES VACCINE: ICD-10-CM

## 2021-01-01 DIAGNOSIS — C48.2 MALIGNANT NEOPLASM OF PERITONEUM, UNSPECIFIED (HCC): ICD-10-CM

## 2021-01-01 DIAGNOSIS — T80.911A: Primary | ICD-10-CM

## 2021-01-01 DIAGNOSIS — E83.42 HYPOMAGNESEMIA: ICD-10-CM

## 2021-01-01 DIAGNOSIS — I10 ESSENTIAL HYPERTENSION, BENIGN: ICD-10-CM

## 2021-01-01 DIAGNOSIS — K21.9 GASTROESOPHAGEAL REFLUX DISEASE WITHOUT ESOPHAGITIS: ICD-10-CM

## 2021-01-01 DIAGNOSIS — C80.1 CANCER (HCC): ICD-10-CM

## 2021-01-01 DIAGNOSIS — C56.9 MALIGNANT NEOPLASM OF OVARY, UNSPECIFIED LATERALITY (HCC): ICD-10-CM

## 2021-01-01 DIAGNOSIS — R55 SYNCOPE AND COLLAPSE: Primary | ICD-10-CM

## 2021-01-01 DIAGNOSIS — Z85.9 HISTORY OF CANCER: ICD-10-CM

## 2021-01-01 DIAGNOSIS — D61.818 PANCYTOPENIA (HCC): ICD-10-CM

## 2021-01-01 DIAGNOSIS — E87.1 HYPONATREMIA: ICD-10-CM

## 2021-01-01 DIAGNOSIS — R00.0 TACHYCARDIA: ICD-10-CM

## 2021-01-01 DIAGNOSIS — Z00.00 ROUTINE GENERAL MEDICAL EXAMINATION AT A HEALTH CARE FACILITY: Primary | ICD-10-CM

## 2021-01-01 DIAGNOSIS — E03.8 OTHER SPECIFIED HYPOTHYROIDISM: ICD-10-CM

## 2021-01-01 DIAGNOSIS — Z23 NEED FOR VACCINATION: ICD-10-CM

## 2021-01-01 DIAGNOSIS — R05.9 COUGH: ICD-10-CM

## 2021-01-01 DIAGNOSIS — I48.91 NEW ONSET ATRIAL FIBRILLATION (HCC): Primary | ICD-10-CM

## 2021-01-01 DIAGNOSIS — J45.901 ASTHMA WITH ACUTE EXACERBATION, UNSPECIFIED ASTHMA SEVERITY, UNSPECIFIED WHETHER PERSISTENT: ICD-10-CM

## 2021-01-01 DIAGNOSIS — U07.1 COVID-19 VIRUS INFECTION: Primary | ICD-10-CM

## 2021-01-01 DIAGNOSIS — I95.1 ORTHOSTASIS: ICD-10-CM

## 2021-01-01 DIAGNOSIS — I95.9 HYPOTENSION, UNSPECIFIED HYPOTENSION TYPE: ICD-10-CM

## 2021-01-01 DIAGNOSIS — M62.838 MUSCLE SPASM: ICD-10-CM

## 2021-01-01 DIAGNOSIS — Z71.89 ADVANCED CARE PLANNING/COUNSELING DISCUSSION: Primary | ICD-10-CM

## 2021-01-01 DIAGNOSIS — D64.9 ANEMIA, UNSPECIFIED TYPE: Primary | ICD-10-CM

## 2021-01-01 DIAGNOSIS — R55 SYNCOPE AND COLLAPSE: ICD-10-CM

## 2021-01-01 DIAGNOSIS — J45.901 ASTHMA WITH ACUTE EXACERBATION, UNSPECIFIED ASTHMA SEVERITY, UNSPECIFIED WHETHER PERSISTENT: Primary | ICD-10-CM

## 2021-01-01 DIAGNOSIS — I49.1 PAC (PREMATURE ATRIAL CONTRACTION): ICD-10-CM

## 2021-01-01 DIAGNOSIS — R17: ICD-10-CM

## 2021-01-01 DIAGNOSIS — U07.1 COVID-19: Primary | ICD-10-CM

## 2021-01-01 DIAGNOSIS — D64.9 ANEMIA, UNSPECIFIED TYPE: ICD-10-CM

## 2021-01-01 DIAGNOSIS — R25.2 LEG CRAMP: ICD-10-CM

## 2021-01-01 DIAGNOSIS — Z12.31 ENCOUNTER FOR SCREENING MAMMOGRAM FOR MALIGNANT NEOPLASM OF BREAST: ICD-10-CM

## 2021-01-01 DIAGNOSIS — D64.9 ANEMIA REQUIRING TRANSFUSIONS: ICD-10-CM

## 2021-01-01 DIAGNOSIS — J45.21 MILD INTERMITTENT ASTHMA WITH ACUTE EXACERBATION: ICD-10-CM

## 2021-01-01 DIAGNOSIS — I10 HYPERTENSION, UNSPECIFIED TYPE: ICD-10-CM

## 2021-01-01 DIAGNOSIS — J45.20 MILD INTERMITTENT ASTHMA WITHOUT COMPLICATION: ICD-10-CM

## 2021-01-01 DIAGNOSIS — R47.01 EXPRESSIVE APHASIA: ICD-10-CM

## 2021-01-01 DIAGNOSIS — E78.5 HYPERLIPIDEMIA, UNSPECIFIED HYPERLIPIDEMIA TYPE: ICD-10-CM

## 2021-01-01 DIAGNOSIS — R55 SYNCOPE, UNSPECIFIED SYNCOPE TYPE: ICD-10-CM

## 2021-01-01 DIAGNOSIS — I49.1 PAC (PREMATURE ATRIAL CONTRACTION): Primary | ICD-10-CM

## 2021-01-01 DIAGNOSIS — I48.91 NEW ONSET ATRIAL FIBRILLATION (HCC): ICD-10-CM

## 2021-01-01 DIAGNOSIS — Z76.0 MEDICATION REFILL: ICD-10-CM

## 2021-01-01 DIAGNOSIS — D61.818 PANCYTOPENIA (HCC): Primary | ICD-10-CM

## 2021-01-01 DIAGNOSIS — I60.9 SUBARACHNOID BLEED (HCC): ICD-10-CM

## 2021-01-01 DIAGNOSIS — E78.00 HYPERCHOLESTEROLEMIA: ICD-10-CM

## 2021-01-01 DIAGNOSIS — E87.6 HYPOKALEMIA: ICD-10-CM

## 2021-01-01 DIAGNOSIS — R06.02 SHORTNESS OF BREATH: ICD-10-CM

## 2021-01-01 DIAGNOSIS — F32.A DEPRESSION, UNSPECIFIED DEPRESSION TYPE: ICD-10-CM

## 2021-01-01 DIAGNOSIS — E55.9 VITAMIN D DEFICIENCY, UNSPECIFIED: ICD-10-CM

## 2021-01-01 DIAGNOSIS — I10 PRIMARY HYPERTENSION: ICD-10-CM

## 2021-01-01 DIAGNOSIS — Z78.0 POSTMENOPAUSAL: ICD-10-CM

## 2021-01-01 DIAGNOSIS — F41.9 ANXIETY: ICD-10-CM

## 2021-01-01 DIAGNOSIS — T80.89XA: ICD-10-CM

## 2021-01-01 DIAGNOSIS — S06.5XAA SUBDURAL HEMATOMA: Primary | ICD-10-CM

## 2021-01-01 LAB
ABO/RH: NORMAL
ACETAMINOPHEN LEVEL: 10.7 MCG/ML (ref 10–30)
ALBUMIN SERPL-MCNC: 3.3 G/DL (ref 3.5–5.2)
ALBUMIN SERPL-MCNC: 3.5 G/DL (ref 3.5–5.2)
ALBUMIN SERPL-MCNC: 3.6 G/DL (ref 3.5–5.2)
ALBUMIN SERPL-MCNC: 3.7 G/DL (ref 3.5–5.2)
ALBUMIN SERPL-MCNC: 3.7 G/DL (ref 3.5–5.2)
ALBUMIN SERPL-MCNC: 3.9 G/DL (ref 3.5–5.2)
ALBUMIN SERPL-MCNC: 4.2 G/DL (ref 3.5–5.2)
ALBUMIN SERPL-MCNC: 4.4 G/DL (ref 3.5–5.2)
ALBUMIN SERPL-MCNC: 4.7 G/DL (ref 3.5–5.2)
ALP BLD-CCNC: 102 U/L (ref 35–104)
ALP BLD-CCNC: 103 U/L (ref 35–104)
ALP BLD-CCNC: 110 U/L (ref 35–104)
ALP BLD-CCNC: 80 U/L (ref 35–104)
ALP BLD-CCNC: 82 U/L (ref 35–104)
ALP BLD-CCNC: 87 U/L (ref 35–104)
ALP BLD-CCNC: 88 U/L (ref 35–104)
ALP BLD-CCNC: 89 U/L (ref 35–104)
ALP BLD-CCNC: 91 U/L (ref 35–104)
ALP BLD-CCNC: 94 U/L (ref 35–104)
ALP BLD-CCNC: 95 U/L (ref 35–104)
ALP BLD-CCNC: 95 U/L (ref 35–104)
ALP BLD-CCNC: 99 U/L (ref 35–104)
ALT SERPL-CCNC: 10 U/L (ref 0–32)
ALT SERPL-CCNC: 11 U/L (ref 0–32)
ALT SERPL-CCNC: 12 U/L (ref 0–32)
ALT SERPL-CCNC: 12 U/L (ref 0–32)
ALT SERPL-CCNC: 13 U/L (ref 0–32)
ALT SERPL-CCNC: 17 U/L (ref 0–32)
ALT SERPL-CCNC: 7 U/L (ref 0–32)
ALT SERPL-CCNC: 8 U/L (ref 0–32)
AMMONIA: <10 UMOL/L (ref 11–51)
ANION GAP SERPL CALCULATED.3IONS-SCNC: 10 MMOL/L (ref 7–16)
ANION GAP SERPL CALCULATED.3IONS-SCNC: 11 MMOL/L (ref 7–16)
ANION GAP SERPL CALCULATED.3IONS-SCNC: 12 MMOL/L (ref 7–16)
ANION GAP SERPL CALCULATED.3IONS-SCNC: 13 MMOL/L (ref 7–16)
ANION GAP SERPL CALCULATED.3IONS-SCNC: 14 MMOL/L (ref 7–16)
ANION GAP SERPL CALCULATED.3IONS-SCNC: 15 MMOL/L (ref 7–16)
ANION GAP SERPL CALCULATED.3IONS-SCNC: 15 MMOL/L (ref 7–16)
ANION GAP SERPL CALCULATED.3IONS-SCNC: 16 MMOL/L (ref 7–16)
ANION GAP SERPL CALCULATED.3IONS-SCNC: 17 MMOL/L (ref 7–16)
ANION GAP SERPL CALCULATED.3IONS-SCNC: 7 MMOL/L (ref 7–16)
ANION GAP SERPL CALCULATED.3IONS-SCNC: 8 MMOL/L (ref 7–16)
ANION GAP SERPL CALCULATED.3IONS-SCNC: 9 MMOL/L (ref 7–16)
ANISOCYTOSIS: ABNORMAL
ANTIBODY IDENTIFICATION: NORMAL
ANTIBODY SCREEN: NORMAL
APTT: 25.5 SEC (ref 24.5–35.1)
APTT: 25.9 SEC (ref 24.5–35.1)
APTT: 28.6 SEC (ref 24.5–35.1)
AST SERPL-CCNC: 12 U/L (ref 0–31)
AST SERPL-CCNC: 13 U/L (ref 0–31)
AST SERPL-CCNC: 13 U/L (ref 0–31)
AST SERPL-CCNC: 14 U/L (ref 0–31)
AST SERPL-CCNC: 15 U/L (ref 0–31)
AST SERPL-CCNC: 17 U/L (ref 0–31)
AST SERPL-CCNC: 17 U/L (ref 0–31)
AST SERPL-CCNC: 18 U/L (ref 0–31)
AST SERPL-CCNC: 19 U/L (ref 0–31)
AST SERPL-CCNC: 21 U/L (ref 0–31)
AST SERPL-CCNC: 22 U/L (ref 0–31)
AST SERPL-CCNC: 26 U/L (ref 0–31)
AST SERPL-CCNC: 50 U/L (ref 0–31)
ATYPICAL LYMPHOCYTE RELATIVE PERCENT: 0.9 % (ref 0–4)
ATYPICAL LYMPHOCYTE RELATIVE PERCENT: 1 % (ref 0–4)
ATYPICAL LYMPHOCYTE RELATIVE PERCENT: 1 % (ref 0–4)
ATYPICAL LYMPHOCYTE RELATIVE PERCENT: 1.8 % (ref 0–4)
ATYPICAL LYMPHOCYTE RELATIVE PERCENT: 10.4 % (ref 0–4)
ATYPICAL LYMPHOCYTE RELATIVE PERCENT: 2 % (ref 0–4)
ATYPICAL LYMPHOCYTE RELATIVE PERCENT: 2 % (ref 0–4)
ATYPICAL LYMPHOCYTE RELATIVE PERCENT: 5 % (ref 0–4)
ATYPICAL LYMPHOCYTE RELATIVE PERCENT: 8 % (ref 0–4)
BACTERIA: ABNORMAL /HPF
BACTERIA: NORMAL /HPF
BASOPHILIC STIPPLING: ABNORMAL
BASOPHILS ABSOLUTE: 0 E9/L (ref 0–0.2)
BASOPHILS ABSOLUTE: 0.02 E9/L (ref 0–0.2)
BASOPHILS ABSOLUTE: 0.03 E9/L (ref 0–0.2)
BASOPHILS ABSOLUTE: 0.04 E9/L (ref 0–0.2)
BASOPHILS RELATIVE PERCENT: 0 % (ref 0–2)
BASOPHILS RELATIVE PERCENT: 0.7 % (ref 0–2)
BASOPHILS RELATIVE PERCENT: 0.8 % (ref 0–2)
BASOPHILS RELATIVE PERCENT: 0.8 % (ref 0–2)
BASOPHILS RELATIVE PERCENT: 0.9 % (ref 0–2)
BASOPHILS RELATIVE PERCENT: 1.5 % (ref 0–2)
BASOPHILS RELATIVE PERCENT: 1.7 % (ref 0–2)
BILIRUB SERPL-MCNC: 0.5 MG/DL (ref 0–1.2)
BILIRUB SERPL-MCNC: 0.6 MG/DL (ref 0–1.2)
BILIRUB SERPL-MCNC: 0.7 MG/DL (ref 0–1.2)
BILIRUB SERPL-MCNC: 0.8 MG/DL (ref 0–1.2)
BILIRUB SERPL-MCNC: 1 MG/DL (ref 0–1.2)
BILIRUB SERPL-MCNC: 1.1 MG/DL (ref 0–1.2)
BILIRUB SERPL-MCNC: 1.2 MG/DL (ref 0–1.2)
BILIRUB SERPL-MCNC: 1.2 MG/DL (ref 0–1.2)
BILIRUB SERPL-MCNC: 1.5 MG/DL (ref 0–1.2)
BILIRUB SERPL-MCNC: 1.9 MG/DL (ref 0–1.2)
BILIRUB SERPL-MCNC: 2.1 MG/DL (ref 0–1.2)
BILIRUB SERPL-MCNC: 9.5 MG/DL (ref 0–1.2)
BILIRUBIN DIRECT: 0.2 MG/DL (ref 0–0.3)
BILIRUBIN DIRECT: 0.3 MG/DL (ref 0–0.3)
BILIRUBIN DIRECT: 0.5 MG/DL (ref 0–0.3)
BILIRUBIN DIRECT: 0.9 MG/DL (ref 0–0.3)
BILIRUBIN DIRECT: <0.2 MG/DL (ref 0–0.3)
BILIRUBIN URINE: NEGATIVE
BILIRUBIN, INDIRECT: 0.5 MG/DL (ref 0–1)
BILIRUBIN, INDIRECT: 0.6 MG/DL (ref 0–1)
BILIRUBIN, INDIRECT: 0.7 MG/DL (ref 0–1)
BILIRUBIN, INDIRECT: 0.9 MG/DL (ref 0–1)
BILIRUBIN, INDIRECT: 1.2 MG/DL (ref 0–1)
BILIRUBIN, INDIRECT: 1.6 MG/DL (ref 0–1)
BLASTS RELATIVE PERCENT: 0.9 % (ref 0–0)
BLOOD BANK DISPENSE STATUS: NORMAL
BLOOD BANK PRODUCT CODE: NORMAL
BLOOD BANK REFERENCE LAB TEST: NORMAL
BLOOD BANK REFERENCE LAB TEST: NORMAL
BLOOD CULTURE, ROUTINE: NORMAL
BLOOD, URINE: ABNORMAL
BLOOD, URINE: NEGATIVE
BLOOD, URINE: NEGATIVE
BLOOD, URINE: NORMAL
BPU ID: NORMAL
BUN BLDV-MCNC: 10 MG/DL (ref 6–23)
BUN BLDV-MCNC: 10 MG/DL (ref 6–23)
BUN BLDV-MCNC: 11 MG/DL (ref 6–23)
BUN BLDV-MCNC: 11 MG/DL (ref 8–23)
BUN BLDV-MCNC: 12 MG/DL (ref 8–23)
BUN BLDV-MCNC: 13 MG/DL (ref 6–23)
BUN BLDV-MCNC: 14 MG/DL (ref 6–23)
BUN BLDV-MCNC: 15 MG/DL (ref 6–23)
BUN BLDV-MCNC: 15 MG/DL (ref 6–23)
BUN BLDV-MCNC: 16 MG/DL (ref 6–23)
BUN BLDV-MCNC: 17 MG/DL (ref 6–23)
BUN BLDV-MCNC: 18 MG/DL (ref 6–23)
BUN BLDV-MCNC: 19 MG/DL (ref 6–23)
BUN BLDV-MCNC: 19 MG/DL (ref 8–23)
BUN BLDV-MCNC: 20 MG/DL (ref 6–23)
BUN BLDV-MCNC: 20 MG/DL (ref 6–23)
BUN BLDV-MCNC: 21 MG/DL (ref 6–23)
BUN BLDV-MCNC: 22 MG/DL (ref 6–23)
BUN BLDV-MCNC: 22 MG/DL (ref 6–23)
BUN BLDV-MCNC: 23 MG/DL (ref 6–23)
BUN BLDV-MCNC: 23 MG/DL (ref 6–23)
BUN BLDV-MCNC: 24 MG/DL (ref 6–23)
BUN BLDV-MCNC: 24 MG/DL (ref 6–23)
BUN BLDV-MCNC: 28 MG/DL (ref 6–23)
BUN BLDV-MCNC: 29 MG/DL (ref 6–23)
BUN BLDV-MCNC: 29 MG/DL (ref 6–23)
BUN BLDV-MCNC: 32 MG/DL (ref 6–23)
BUN BLDV-MCNC: 33 MG/DL (ref 6–23)
BUN BLDV-MCNC: 34 MG/DL (ref 6–23)
BUN BLDV-MCNC: 37 MG/DL (ref 6–23)
BUN BLDV-MCNC: 8 MG/DL (ref 6–23)
BUN BLDV-MCNC: 8 MG/DL (ref 6–23)
BUN BLDV-MCNC: 9 MG/DL (ref 6–23)
BURR CELLS: ABNORMAL
C-REACTIVE PROTEIN: 5.4 MG/DL (ref 0–0.4)
C-REACTIVE PROTEIN: 5.7 MG/DL (ref 0–0.4)
C-REACTIVE PROTEIN: 6.7 MG/DL (ref 0–0.4)
CALCIUM SERPL-MCNC: 7.9 MG/DL (ref 8.6–10.2)
CALCIUM SERPL-MCNC: 7.9 MG/DL (ref 8.6–10.2)
CALCIUM SERPL-MCNC: 8.1 MG/DL (ref 8.6–10.2)
CALCIUM SERPL-MCNC: 8.2 MG/DL (ref 8.6–10.2)
CALCIUM SERPL-MCNC: 8.3 MG/DL (ref 8.6–10.2)
CALCIUM SERPL-MCNC: 8.4 MG/DL (ref 8.6–10.2)
CALCIUM SERPL-MCNC: 8.5 MG/DL (ref 8.6–10.2)
CALCIUM SERPL-MCNC: 8.6 MG/DL (ref 8.6–10.2)
CALCIUM SERPL-MCNC: 8.7 MG/DL (ref 8.6–10.2)
CALCIUM SERPL-MCNC: 8.8 MG/DL (ref 8.6–10.2)
CALCIUM SERPL-MCNC: 8.9 MG/DL (ref 8.6–10.2)
CALCIUM SERPL-MCNC: 9 MG/DL (ref 8.6–10.2)
CALCIUM SERPL-MCNC: 9.1 MG/DL (ref 8.6–10.2)
CALCIUM SERPL-MCNC: 9.2 MG/DL (ref 8.6–10.2)
CALCIUM SERPL-MCNC: 9.3 MG/DL (ref 8.6–10.2)
CALCIUM SERPL-MCNC: 9.3 MG/DL (ref 8.6–10.2)
CALCIUM SERPL-MCNC: 9.9 MG/DL (ref 8.6–10.2)
CHLORIDE BLD-SCNC: 100 MMOL/L (ref 98–107)
CHLORIDE BLD-SCNC: 100 MMOL/L (ref 98–107)
CHLORIDE BLD-SCNC: 87 MMOL/L (ref 98–107)
CHLORIDE BLD-SCNC: 87 MMOL/L (ref 98–107)
CHLORIDE BLD-SCNC: 88 MMOL/L (ref 98–107)
CHLORIDE BLD-SCNC: 89 MMOL/L (ref 98–107)
CHLORIDE BLD-SCNC: 90 MMOL/L (ref 98–107)
CHLORIDE BLD-SCNC: 91 MMOL/L (ref 98–107)
CHLORIDE BLD-SCNC: 92 MMOL/L (ref 98–107)
CHLORIDE BLD-SCNC: 93 MMOL/L (ref 98–107)
CHLORIDE BLD-SCNC: 94 MMOL/L (ref 98–107)
CHLORIDE BLD-SCNC: 95 MMOL/L (ref 98–107)
CHLORIDE BLD-SCNC: 96 MMOL/L (ref 98–107)
CHLORIDE BLD-SCNC: 97 MMOL/L (ref 98–107)
CHLORIDE BLD-SCNC: 98 MMOL/L (ref 98–107)
CHLORIDE BLD-SCNC: 99 MMOL/L (ref 98–107)
CHLORIDE BLD-SCNC: 99 MMOL/L (ref 98–107)
CHLORIDE URINE RANDOM: 116 MMOL/L
CHP ED QC CHECK: NORMAL
CHP ED QC CHECK: NORMAL
CHP ED QC CHECK: YES
CLARITY: CLEAR
CO2: 20 MMOL/L (ref 22–29)
CO2: 21 MMOL/L (ref 22–29)
CO2: 22 MMOL/L (ref 22–29)
CO2: 23 MMOL/L (ref 22–29)
CO2: 24 MMOL/L (ref 22–29)
CO2: 25 MMOL/L (ref 22–29)
CO2: 26 MMOL/L (ref 22–29)
CO2: 27 MMOL/L (ref 22–29)
CO2: 27 MMOL/L (ref 22–29)
COLOR: YELLOW
CORTISOL TOTAL: 11.34 MCG/DL (ref 2.68–18.4)
CREAT SERPL-MCNC: 0.5 MG/DL (ref 0.5–1)
CREAT SERPL-MCNC: 0.6 MG/DL (ref 0.5–1)
CREAT SERPL-MCNC: 0.7 MG/DL (ref 0.5–1)
CREAT SERPL-MCNC: 0.8 MG/DL (ref 0.5–1)
CREAT SERPL-MCNC: 1 MG/DL (ref 0.5–1)
CREAT SERPL-MCNC: 1 MG/DL (ref 0.5–1)
CREAT SERPL-MCNC: 1.1 MG/DL (ref 0.5–1)
CREAT SERPL-MCNC: 1.2 MG/DL (ref 0.5–1)
CREATININE URINE: 49 MG/DL (ref 29–226)
CREATININE URINE: 66 MG/DL (ref 29–226)
CULTURE, BLOOD 2: ABNORMAL
D DIMER: 2316 NG/ML DDU
D DIMER: 297 NG/ML DDU
D DIMER: 378 NG/ML DDU
D DIMER: 430 NG/ML DDU
DAT C3: NORMAL
DAT IGG: NORMAL
DAT POLYSPECIFIC: NORMAL
DESCRIPTION BLOOD BANK: NORMAL
DOHLE BODIES: ABNORMAL
DR. NOTIFY: NORMAL
EKG ATRIAL RATE: 103 BPM
EKG ATRIAL RATE: 110 BPM
EKG ATRIAL RATE: 111 BPM
EKG ATRIAL RATE: 61 BPM
EKG ATRIAL RATE: 89 BPM
EKG P AXIS: 48 DEGREES
EKG P AXIS: 51 DEGREES
EKG P AXIS: 53 DEGREES
EKG P AXIS: 62 DEGREES
EKG P AXIS: 65 DEGREES
EKG P-R INTERVAL: 126 MS
EKG P-R INTERVAL: 128 MS
EKG P-R INTERVAL: 132 MS
EKG P-R INTERVAL: 134 MS
EKG P-R INTERVAL: 138 MS
EKG Q-T INTERVAL: 348 MS
EKG Q-T INTERVAL: 352 MS
EKG Q-T INTERVAL: 358 MS
EKG Q-T INTERVAL: 372 MS
EKG Q-T INTERVAL: 472 MS
EKG QRS DURATION: 66 MS
EKG QRS DURATION: 70 MS
EKG QRS DURATION: 70 MS
EKG QRS DURATION: 72 MS
EKG QRS DURATION: 80 MS
EKG QTC CALCULATION (BAZETT): 428 MS
EKG QTC CALCULATION (BAZETT): 473 MS
EKG QTC CALCULATION (BAZETT): 475 MS
EKG QTC CALCULATION (BAZETT): 484 MS
EKG QTC CALCULATION (BAZETT): 487 MS
EKG R AXIS: -5 DEGREES
EKG R AXIS: -5 DEGREES
EKG R AXIS: 1 DEGREES
EKG R AXIS: 26 DEGREES
EKG R AXIS: 6 DEGREES
EKG T AXIS: 49 DEGREES
EKG T AXIS: 59 DEGREES
EKG T AXIS: 59 DEGREES
EKG T AXIS: 60 DEGREES
EKG T AXIS: 63 DEGREES
EKG VENTRICULAR RATE: 103 BPM
EKG VENTRICULAR RATE: 110 BPM
EKG VENTRICULAR RATE: 111 BPM
EKG VENTRICULAR RATE: 61 BPM
EKG VENTRICULAR RATE: 89 BPM
ELUATE ANTIBODY IDENTIFICATION: NORMAL
EOSINOPHIL, URINE: 0 % (ref 0–1)
EOSINOPHILS ABSOLUTE: 0 E9/L (ref 0.05–0.5)
EOSINOPHILS ABSOLUTE: 0.02 E9/L (ref 0.05–0.5)
EOSINOPHILS ABSOLUTE: 0.03 E9/L (ref 0.05–0.5)
EOSINOPHILS ABSOLUTE: 0.03 E9/L (ref 0.05–0.5)
EOSINOPHILS ABSOLUTE: 0.04 E9/L (ref 0.05–0.5)
EOSINOPHILS ABSOLUTE: 0.06 E9/L (ref 0.05–0.5)
EOSINOPHILS ABSOLUTE: 0.08 E9/L (ref 0.05–0.5)
EOSINOPHILS ABSOLUTE: 0.09 E9/L (ref 0.05–0.5)
EOSINOPHILS RELATIVE PERCENT: 0 % (ref 0–6)
EOSINOPHILS RELATIVE PERCENT: 0.7 % (ref 0–6)
EOSINOPHILS RELATIVE PERCENT: 0.8 % (ref 0–6)
EOSINOPHILS RELATIVE PERCENT: 0.9 % (ref 0–6)
EOSINOPHILS RELATIVE PERCENT: 0.9 % (ref 0–6)
EOSINOPHILS RELATIVE PERCENT: 1 % (ref 0–6)
EOSINOPHILS RELATIVE PERCENT: 1.2 % (ref 0–6)
EOSINOPHILS RELATIVE PERCENT: 1.7 % (ref 0–6)
EOSINOPHILS RELATIVE PERCENT: 1.8 % (ref 0–6)
EOSINOPHILS RELATIVE PERCENT: 2.6 % (ref 0–6)
EOSINOPHILS RELATIVE PERCENT: 2.6 % (ref 0–6)
EOSINOPHILS RELATIVE PERCENT: 2.7 % (ref 0–6)
EOSINOPHILS RELATIVE PERCENT: 3.5 % (ref 0–6)
EPITHELIAL CELLS, UA: NORMAL /HPF
ETHANOL: <10 MG/DL (ref 0–0.08)
FERRITIN: 1069 NG/ML
FERRITIN: 456 NG/ML
FERRITIN: 659 NG/ML
FIBRINOGEN: 627 MG/DL (ref 225–540)
FOLATE: 13.9 NG/ML (ref 4.8–24.2)
FOLATE: 16.5 NG/ML (ref 4.8–24.2)
GFR AFRICAN AMERICAN: 53
GFR AFRICAN AMERICAN: 59
GFR AFRICAN AMERICAN: >60
GFR NON-AFRICAN AMERICAN: 53 ML/MIN/1.73
GFR NON-AFRICAN AMERICAN: 59 ML/MIN/1.73
GFR NON-AFRICAN AMERICAN: >60 ML/MIN/1.73
GLUCOSE BLD-MCNC: 101 MG/DL (ref 74–99)
GLUCOSE BLD-MCNC: 105 MG/DL (ref 74–99)
GLUCOSE BLD-MCNC: 114 MG/DL (ref 74–99)
GLUCOSE BLD-MCNC: 116 MG/DL (ref 74–99)
GLUCOSE BLD-MCNC: 118 MG/DL (ref 74–99)
GLUCOSE BLD-MCNC: 118 MG/DL (ref 74–99)
GLUCOSE BLD-MCNC: 119 MG/DL (ref 74–99)
GLUCOSE BLD-MCNC: 119 MG/DL (ref 74–99)
GLUCOSE BLD-MCNC: 120 MG/DL (ref 74–99)
GLUCOSE BLD-MCNC: 121 MG/DL
GLUCOSE BLD-MCNC: 121 MG/DL (ref 74–99)
GLUCOSE BLD-MCNC: 124 MG/DL (ref 74–99)
GLUCOSE BLD-MCNC: 125 MG/DL (ref 74–99)
GLUCOSE BLD-MCNC: 125 MG/DL (ref 74–99)
GLUCOSE BLD-MCNC: 126 MG/DL (ref 74–99)
GLUCOSE BLD-MCNC: 126 MG/DL (ref 74–99)
GLUCOSE BLD-MCNC: 133 MG/DL (ref 74–99)
GLUCOSE BLD-MCNC: 135 MG/DL (ref 74–99)
GLUCOSE BLD-MCNC: 143 MG/DL (ref 74–99)
GLUCOSE BLD-MCNC: 144 MG/DL (ref 74–99)
GLUCOSE BLD-MCNC: 147 MG/DL (ref 74–99)
GLUCOSE BLD-MCNC: 147 MG/DL (ref 74–99)
GLUCOSE BLD-MCNC: 148 MG/DL (ref 74–99)
GLUCOSE BLD-MCNC: 152 MG/DL (ref 74–99)
GLUCOSE BLD-MCNC: 153 MG/DL (ref 74–99)
GLUCOSE BLD-MCNC: 153 MG/DL (ref 74–99)
GLUCOSE BLD-MCNC: 159 MG/DL (ref 74–99)
GLUCOSE BLD-MCNC: 163 MG/DL (ref 74–99)
GLUCOSE BLD-MCNC: 165 MG/DL (ref 74–99)
GLUCOSE BLD-MCNC: 170 MG/DL (ref 74–99)
GLUCOSE BLD-MCNC: 171 MG/DL (ref 74–99)
GLUCOSE BLD-MCNC: 172 MG/DL
GLUCOSE BLD-MCNC: 178 MG/DL (ref 74–99)
GLUCOSE BLD-MCNC: 180 MG/DL (ref 74–99)
GLUCOSE BLD-MCNC: 183 MG/DL (ref 74–99)
GLUCOSE BLD-MCNC: 185 MG/DL (ref 74–99)
GLUCOSE BLD-MCNC: 186 MG/DL (ref 74–99)
GLUCOSE BLD-MCNC: 193 MG/DL (ref 74–99)
GLUCOSE BLD-MCNC: 200 MG/DL (ref 74–99)
GLUCOSE BLD-MCNC: 205 MG/DL (ref 74–99)
GLUCOSE BLD-MCNC: 208 MG/DL (ref 74–99)
GLUCOSE BLD-MCNC: 210 MG/DL (ref 74–99)
GLUCOSE BLD-MCNC: 259 MG/DL (ref 74–99)
GLUCOSE BLD-MCNC: 270 MG/DL (ref 74–99)
GLUCOSE BLD-MCNC: 304 MG/DL (ref 74–99)
GLUCOSE BLD-MCNC: 88 MG/DL
GLUCOSE BLD-MCNC: 94 MG/DL (ref 74–99)
GLUCOSE URINE: NEGATIVE MG/DL
HAPTOGLOBIN: 107 MG/DL (ref 30–200)
HAPTOGLOBIN: 110 MG/DL (ref 30–200)
HAPTOGLOBIN: 110 MG/DL (ref 30–200)
HAPTOGLOBIN: 116 MG/DL (ref 30–200)
HAPTOGLOBIN: 121 MG/DL (ref 30–200)
HAPTOGLOBIN: 41 MG/DL (ref 30–200)
HAPTOGLOBIN: 72 MG/DL (ref 30–200)
HAPTOGLOBIN: 84 MG/DL (ref 30–200)
HAPTOGLOBIN: 99 MG/DL (ref 30–200)
HAPTOGLOBIN: <10 MG/DL (ref 30–200)
HBA1C MFR BLD: 6.6 % (ref 4–5.6)
HCT VFR BLD CALC: 14.5 % (ref 34–48)
HCT VFR BLD CALC: 15.8 % (ref 34–48)
HCT VFR BLD CALC: 15.9 % (ref 34–48)
HCT VFR BLD CALC: 16 % (ref 34–48)
HCT VFR BLD CALC: 16.8 % (ref 34–48)
HCT VFR BLD CALC: 17.6 % (ref 34–48)
HCT VFR BLD CALC: 18 % (ref 34–48)
HCT VFR BLD CALC: 18.8 % (ref 34–48)
HCT VFR BLD CALC: 19.1 % (ref 34–48)
HCT VFR BLD CALC: 19.3 % (ref 34–48)
HCT VFR BLD CALC: 19.3 % (ref 34–48)
HCT VFR BLD CALC: 19.4 % (ref 34–48)
HCT VFR BLD CALC: 19.4 % (ref 34–48)
HCT VFR BLD CALC: 19.5 % (ref 34–48)
HCT VFR BLD CALC: 19.6 % (ref 34–48)
HCT VFR BLD CALC: 19.7 % (ref 34–48)
HCT VFR BLD CALC: 20.3 % (ref 34–48)
HCT VFR BLD CALC: 20.5 % (ref 34–48)
HCT VFR BLD CALC: 20.5 % (ref 34–48)
HCT VFR BLD CALC: 21.9 % (ref 34–48)
HCT VFR BLD CALC: 22.9 % (ref 34–48)
HCT VFR BLD CALC: 22.9 % (ref 34–48)
HCT VFR BLD CALC: 23.2 % (ref 34–48)
HCT VFR BLD CALC: 23.7 % (ref 34–48)
HCT VFR BLD CALC: 24 % (ref 34–48)
HCT VFR BLD CALC: 24.1 % (ref 34–48)
HCT VFR BLD CALC: 24.6 % (ref 34–48)
HCT VFR BLD CALC: 27.8 % (ref 34–48)
HCT VFR BLD CALC: 28 % (ref 34–48)
HCT VFR BLD CALC: 28.6 % (ref 34–48)
HCT VFR BLD CALC: 28.7 % (ref 34–48)
HCT VFR BLD CALC: 28.8 % (ref 34–48)
HCT VFR BLD CALC: 30.1 % (ref 34–48)
HCT VFR BLD CALC: 31 % (ref 34–48)
HCT VFR BLD CALC: 34.4 % (ref 34–48)
HCT VFR BLD CALC: 35.6 % (ref 34–48)
HEMOGLOBIN: 10.4 G/DL (ref 11.5–15.5)
HEMOGLOBIN: 10.8 G/DL (ref 11.5–15.5)
HEMOGLOBIN: 11.6 G/DL (ref 11.5–15.5)
HEMOGLOBIN: 4.9 G/DL (ref 11.5–15.5)
HEMOGLOBIN: 5.2 G/DL (ref 11.5–15.5)
HEMOGLOBIN: 5.3 G/DL (ref 11.5–15.5)
HEMOGLOBIN: 5.7 G/DL (ref 11.5–15.5)
HEMOGLOBIN: 6 G/DL (ref 11.5–15.5)
HEMOGLOBIN: 6 G/DL (ref 11.5–15.5)
HEMOGLOBIN: 6.3 G/DL (ref 11.5–15.5)
HEMOGLOBIN: 6.3 G/DL (ref 11.5–15.5)
HEMOGLOBIN: 6.4 G/DL (ref 11.5–15.5)
HEMOGLOBIN: 6.4 G/DL (ref 11.5–15.5)
HEMOGLOBIN: 6.5 G/DL (ref 11.5–15.5)
HEMOGLOBIN: 6.6 G/DL (ref 11.5–15.5)
HEMOGLOBIN: 6.7 G/DL (ref 11.5–15.5)
HEMOGLOBIN: 6.8 G/DL (ref 11.5–15.5)
HEMOGLOBIN: 6.8 G/DL (ref 11.5–15.5)
HEMOGLOBIN: 7.2 G/DL (ref 11.5–15.5)
HEMOGLOBIN: 7.5 G/DL (ref 11.5–15.5)
HEMOGLOBIN: 7.9 G/DL (ref 11.5–15.5)
HEMOGLOBIN: 7.9 G/DL (ref 11.5–15.5)
HEMOGLOBIN: 8 G/DL (ref 11.5–15.5)
HEMOGLOBIN: 8.1 G/DL (ref 11.5–15.5)
HEMOGLOBIN: 8.1 G/DL (ref 11.5–15.5)
HEMOGLOBIN: 9.3 G/DL (ref 11.5–15.5)
HEMOGLOBIN: 9.3 G/DL (ref 11.5–15.5)
HEMOGLOBIN: 9.5 G/DL (ref 11.5–15.5)
HEMOGLOBIN: 9.6 G/DL (ref 11.5–15.5)
HEMOGLOBIN: 9.7 G/DL (ref 11.5–15.5)
HEMOGLOBIN: 9.9 G/DL (ref 11.5–15.5)
HYPOCHROMIA: ABNORMAL
IMMATURE GRANULOCYTES #: 0.01 E9/L
IMMATURE GRANULOCYTES #: 0.02 E9/L
IMMATURE GRANULOCYTES %: 0.4 % (ref 0–5)
IMMATURE GRANULOCYTES %: 0.8 % (ref 0–5)
IMMATURE RETIC FRACT: 13.6 % (ref 3–15.9)
IMMATURE RETIC FRACT: 15.7 % (ref 3–15.9)
IMMATURE RETIC FRACT: 16.7 % (ref 3–15.9)
IMMATURE RETIC FRACT: 18.1 % (ref 3–15.9)
IMMATURE RETIC FRACT: 19 % (ref 3–15.9)
IMMATURE RETIC FRACT: 20.9 % (ref 3–15.9)
IMMATURE RETIC FRACT: 21 % (ref 3–15.9)
IMMATURE RETIC FRACT: 21.2 % (ref 3–15.9)
IMMATURE RETIC FRACT: 23.3 % (ref 3–15.9)
IMMATURE RETIC FRACT: 25.7 % (ref 3–15.9)
IMMATURE RETIC FRACT: 29 % (ref 3–15.9)
IMMATURE RETIC FRACT: 42.5 % (ref 3–15.9)
INR BLD: 1.2
INR BLD: 1.3
IRON SATURATION: 40 % (ref 15–50)
IRON SATURATION: 74 % (ref 15–50)
IRON: 123 MCG/DL (ref 37–145)
IRON: 222 MCG/DL (ref 37–145)
KETONES, URINE: NEGATIVE MG/DL
LACTATE DEHYDROGENASE: 178 U/L (ref 135–214)
LACTATE DEHYDROGENASE: 310 U/L (ref 135–214)
LACTATE DEHYDROGENASE: 313 U/L (ref 135–214)
LACTATE DEHYDROGENASE: 327 U/L (ref 135–214)
LACTATE DEHYDROGENASE: 330 U/L (ref 135–214)
LACTATE DEHYDROGENASE: 336 U/L (ref 135–214)
LACTATE DEHYDROGENASE: 336 U/L (ref 135–214)
LACTATE DEHYDROGENASE: 359 U/L (ref 135–214)
LACTATE DEHYDROGENASE: 363 U/L (ref 135–214)
LACTATE DEHYDROGENASE: 369 U/L (ref 135–214)
LACTATE DEHYDROGENASE: 542 U/L (ref 135–214)
LACTATE DEHYDROGENASE: 611 U/L (ref 135–214)
LACTATE DEHYDROGENASE: 733 U/L (ref 135–214)
LACTIC ACID, SEPSIS: 2.6 MMOL/L (ref 0.5–1.9)
LACTIC ACID: 1.1 MMOL/L (ref 0.5–2.2)
LACTIC ACID: 1.4 MMOL/L (ref 0.5–2.2)
LEUKOCYTE ESTERASE, URINE: NEGATIVE
LIPASE: 16 U/L (ref 13–60)
LIPASE: 19 U/L (ref 13–60)
LV EF: 58 %
LVEF MODALITY: NORMAL
LYMPHOCYTES ABSOLUTE: 0.67 E9/L (ref 1.5–4)
LYMPHOCYTES ABSOLUTE: 0.7 E9/L (ref 1.5–4)
LYMPHOCYTES ABSOLUTE: 0.72 E9/L (ref 1.5–4)
LYMPHOCYTES ABSOLUTE: 0.83 E9/L (ref 1.5–4)
LYMPHOCYTES ABSOLUTE: 0.85 E9/L (ref 1.5–4)
LYMPHOCYTES ABSOLUTE: 0.91 E9/L (ref 1.5–4)
LYMPHOCYTES ABSOLUTE: 1.01 E9/L (ref 1.5–4)
LYMPHOCYTES ABSOLUTE: 1.04 E9/L (ref 1.5–4)
LYMPHOCYTES ABSOLUTE: 1.2 E9/L (ref 1.5–4)
LYMPHOCYTES ABSOLUTE: 1.22 E9/L (ref 1.5–4)
LYMPHOCYTES ABSOLUTE: 1.23 E9/L (ref 1.5–4)
LYMPHOCYTES ABSOLUTE: 1.27 E9/L (ref 1.5–4)
LYMPHOCYTES ABSOLUTE: 1.34 E9/L (ref 1.5–4)
LYMPHOCYTES ABSOLUTE: 1.4 E9/L (ref 1.5–4)
LYMPHOCYTES ABSOLUTE: 1.44 E9/L (ref 1.5–4)
LYMPHOCYTES ABSOLUTE: 1.55 E9/L (ref 1.5–4)
LYMPHOCYTES ABSOLUTE: 1.86 E9/L (ref 1.5–4)
LYMPHOCYTES ABSOLUTE: 2.21 E9/L (ref 1.5–4)
LYMPHOCYTES ABSOLUTE: 2.57 E9/L (ref 1.5–4)
LYMPHOCYTES ABSOLUTE: 3.3 E9/L (ref 1.5–4)
LYMPHOCYTES ABSOLUTE: 3.82 E9/L (ref 1.5–4)
LYMPHOCYTES RELATIVE PERCENT: 10 % (ref 20–42)
LYMPHOCYTES RELATIVE PERCENT: 23 % (ref 20–42)
LYMPHOCYTES RELATIVE PERCENT: 31 % (ref 20–42)
LYMPHOCYTES RELATIVE PERCENT: 33 % (ref 20–42)
LYMPHOCYTES RELATIVE PERCENT: 33.1 % (ref 20–42)
LYMPHOCYTES RELATIVE PERCENT: 36.9 % (ref 20–42)
LYMPHOCYTES RELATIVE PERCENT: 37 % (ref 20–42)
LYMPHOCYTES RELATIVE PERCENT: 37.4 % (ref 20–42)
LYMPHOCYTES RELATIVE PERCENT: 37.4 % (ref 20–42)
LYMPHOCYTES RELATIVE PERCENT: 40 % (ref 20–42)
LYMPHOCYTES RELATIVE PERCENT: 40.9 % (ref 20–42)
LYMPHOCYTES RELATIVE PERCENT: 41 % (ref 20–42)
LYMPHOCYTES RELATIVE PERCENT: 43.6 % (ref 20–42)
LYMPHOCYTES RELATIVE PERCENT: 43.9 % (ref 20–42)
LYMPHOCYTES RELATIVE PERCENT: 44.4 % (ref 20–42)
LYMPHOCYTES RELATIVE PERCENT: 44.7 % (ref 20–42)
LYMPHOCYTES RELATIVE PERCENT: 45.3 % (ref 20–42)
LYMPHOCYTES RELATIVE PERCENT: 46.5 % (ref 20–42)
LYMPHOCYTES RELATIVE PERCENT: 52.8 % (ref 20–42)
LYMPHOCYTES RELATIVE PERCENT: 53 % (ref 20–42)
LYMPHOCYTES RELATIVE PERCENT: 65 % (ref 20–42)
Lab: NORMAL
MAGNESIUM: 1.2 MG/DL (ref 1.6–2.6)
MAGNESIUM: 1.3 MG/DL (ref 1.6–2.6)
MAGNESIUM: 1.4 MG/DL (ref 1.6–2.6)
MAGNESIUM: 1.5 MG/DL (ref 1.6–2.6)
MAGNESIUM: 1.6 MG/DL (ref 1.6–2.6)
MAGNESIUM: 1.6 MG/DL (ref 1.6–2.6)
MAGNESIUM: 1.7 MG/DL (ref 1.6–2.6)
MAGNESIUM: 1.8 MG/DL (ref 1.6–2.6)
MCH RBC QN AUTO: 29.1 PG (ref 26–35)
MCH RBC QN AUTO: 29.5 PG (ref 26–35)
MCH RBC QN AUTO: 30.1 PG (ref 26–35)
MCH RBC QN AUTO: 30.6 PG (ref 26–35)
MCH RBC QN AUTO: 30.7 PG (ref 26–35)
MCH RBC QN AUTO: 31.2 PG (ref 26–35)
MCH RBC QN AUTO: 31.3 PG (ref 26–35)
MCH RBC QN AUTO: 31.6 PG (ref 26–35)
MCH RBC QN AUTO: 32 PG (ref 26–35)
MCH RBC QN AUTO: 32 PG (ref 26–35)
MCH RBC QN AUTO: 32.1 PG (ref 26–35)
MCH RBC QN AUTO: 32.2 PG (ref 26–35)
MCH RBC QN AUTO: 34.2 PG (ref 26–35)
MCH RBC QN AUTO: 34.5 PG (ref 26–35)
MCH RBC QN AUTO: 34.9 PG (ref 26–35)
MCH RBC QN AUTO: 35.1 PG (ref 26–35)
MCH RBC QN AUTO: 35.2 PG (ref 26–35)
MCH RBC QN AUTO: 35.3 PG (ref 26–35)
MCH RBC QN AUTO: 35.9 PG (ref 26–35)
MCH RBC QN AUTO: 36 PG (ref 26–35)
MCH RBC QN AUTO: 36.1 PG (ref 26–35)
MCH RBC QN AUTO: 36.6 PG (ref 26–35)
MCH RBC QN AUTO: 36.8 PG (ref 26–35)
MCH RBC QN AUTO: 37 PG (ref 26–35)
MCH RBC QN AUTO: 37.1 PG (ref 26–35)
MCH RBC QN AUTO: 37.9 PG (ref 26–35)
MCH RBC QN AUTO: 40.3 PG (ref 26–35)
MCH RBC QN AUTO: 40.5 PG (ref 26–35)
MCH RBC QN AUTO: 41.5 PG (ref 26–35)
MCH RBC QN AUTO: 41.9 PG (ref 26–35)
MCHC RBC AUTO-ENTMCNC: 31.4 % (ref 32–34.5)
MCHC RBC AUTO-ENTMCNC: 32.5 % (ref 32–34.5)
MCHC RBC AUTO-ENTMCNC: 32.6 % (ref 32–34.5)
MCHC RBC AUTO-ENTMCNC: 32.8 % (ref 32–34.5)
MCHC RBC AUTO-ENTMCNC: 32.8 % (ref 32–34.5)
MCHC RBC AUTO-ENTMCNC: 32.9 % (ref 32–34.5)
MCHC RBC AUTO-ENTMCNC: 32.9 % (ref 32–34.5)
MCHC RBC AUTO-ENTMCNC: 33 % (ref 32–34.5)
MCHC RBC AUTO-ENTMCNC: 33 % (ref 32–34.5)
MCHC RBC AUTO-ENTMCNC: 33.2 % (ref 32–34.5)
MCHC RBC AUTO-ENTMCNC: 33.3 % (ref 32–34.5)
MCHC RBC AUTO-ENTMCNC: 33.3 % (ref 32–34.5)
MCHC RBC AUTO-ENTMCNC: 33.4 % (ref 32–34.5)
MCHC RBC AUTO-ENTMCNC: 33.5 % (ref 32–34.5)
MCHC RBC AUTO-ENTMCNC: 33.6 % (ref 32–34.5)
MCHC RBC AUTO-ENTMCNC: 33.7 % (ref 32–34.5)
MCHC RBC AUTO-ENTMCNC: 33.7 % (ref 32–34.5)
MCHC RBC AUTO-ENTMCNC: 33.8 % (ref 32–34.5)
MCHC RBC AUTO-ENTMCNC: 33.9 % (ref 32–34.5)
MCHC RBC AUTO-ENTMCNC: 34 % (ref 32–34.5)
MCHC RBC AUTO-ENTMCNC: 34 % (ref 32–34.5)
MCHC RBC AUTO-ENTMCNC: 34.1 % (ref 32–34.5)
MCHC RBC AUTO-ENTMCNC: 34.1 % (ref 32–34.5)
MCHC RBC AUTO-ENTMCNC: 34.5 % (ref 32–34.5)
MCHC RBC AUTO-ENTMCNC: 34.7 % (ref 32–34.5)
MCHC RBC AUTO-ENTMCNC: 34.7 % (ref 32–34.5)
MCHC RBC AUTO-ENTMCNC: 34.9 % (ref 32–34.5)
MCV RBC AUTO: 100.4 FL (ref 80–99.9)
MCV RBC AUTO: 101 FL (ref 80–99.9)
MCV RBC AUTO: 102.6 FL (ref 80–99.9)
MCV RBC AUTO: 103.4 FL (ref 80–99.9)
MCV RBC AUTO: 103.5 FL (ref 80–99.9)
MCV RBC AUTO: 103.9 FL (ref 80–99.9)
MCV RBC AUTO: 105.1 FL (ref 80–99.9)
MCV RBC AUTO: 106 FL (ref 80–99.9)
MCV RBC AUTO: 108.5 FL (ref 80–99.9)
MCV RBC AUTO: 108.7 FL (ref 80–99.9)
MCV RBC AUTO: 109 FL (ref 80–99.9)
MCV RBC AUTO: 109.2 FL (ref 80–99.9)
MCV RBC AUTO: 110.2 FL (ref 80–99.9)
MCV RBC AUTO: 118.2 FL (ref 80–99.9)
MCV RBC AUTO: 121.6 FL (ref 80–99.9)
MCV RBC AUTO: 122.9 FL (ref 80–99.9)
MCV RBC AUTO: 123.5 FL (ref 80–99.9)
MCV RBC AUTO: 124 FL (ref 80–99.9)
MCV RBC AUTO: 89.4 FL (ref 80–99.9)
MCV RBC AUTO: 89.5 FL (ref 80–99.9)
MCV RBC AUTO: 90.9 FL (ref 80–99.9)
MCV RBC AUTO: 92.6 FL (ref 80–99.9)
MCV RBC AUTO: 92.8 FL (ref 80–99.9)
MCV RBC AUTO: 93.9 FL (ref 80–99.9)
MCV RBC AUTO: 94.3 FL (ref 80–99.9)
MCV RBC AUTO: 94.5 FL (ref 80–99.9)
MCV RBC AUTO: 94.6 FL (ref 80–99.9)
MCV RBC AUTO: 94.8 FL (ref 80–99.9)
MCV RBC AUTO: 95.1 FL (ref 80–99.9)
MCV RBC AUTO: 95.1 FL (ref 80–99.9)
MCV RBC AUTO: 95.3 FL (ref 80–99.9)
MCV RBC AUTO: 96.1 FL (ref 80–99.9)
METAMYELOCYTES RELATIVE PERCENT: 0.9 % (ref 0–1)
METAMYELOCYTES RELATIVE PERCENT: 2 % (ref 0–1)
METAMYELOCYTES RELATIVE PERCENT: 2 % (ref 0–1)
METAMYELOCYTES RELATIVE PERCENT: 3 % (ref 0–1)
METER GLUCOSE: 121 MG/DL (ref 74–99)
METER GLUCOSE: 124 MG/DL (ref 74–99)
METER GLUCOSE: 129 MG/DL (ref 74–99)
METER GLUCOSE: 130 MG/DL (ref 74–99)
METER GLUCOSE: 131 MG/DL (ref 74–99)
METER GLUCOSE: 151 MG/DL (ref 74–99)
METER GLUCOSE: 153 MG/DL (ref 74–99)
METER GLUCOSE: 156 MG/DL (ref 74–99)
METER GLUCOSE: 163 MG/DL (ref 74–99)
METER GLUCOSE: 164 MG/DL (ref 74–99)
METER GLUCOSE: 166 MG/DL (ref 74–99)
METER GLUCOSE: 169 MG/DL (ref 74–99)
METER GLUCOSE: 172 MG/DL (ref 74–99)
METER GLUCOSE: 178 MG/DL (ref 74–99)
METER GLUCOSE: 188 MG/DL (ref 74–99)
METER GLUCOSE: 189 MG/DL (ref 74–99)
METER GLUCOSE: 244 MG/DL (ref 74–99)
METER GLUCOSE: 275 MG/DL (ref 74–99)
METER GLUCOSE: 279 MG/DL (ref 74–99)
METER GLUCOSE: 355 MG/DL (ref 74–99)
METER GLUCOSE: 360 MG/DL (ref 74–99)
METER GLUCOSE: 363 MG/DL (ref 74–99)
METER GLUCOSE: 88 MG/DL (ref 74–99)
MONOCYTES ABSOLUTE: 0.04 E9/L (ref 0.1–0.95)
MONOCYTES ABSOLUTE: 0.05 E9/L (ref 0.1–0.95)
MONOCYTES ABSOLUTE: 0.08 E9/L (ref 0.1–0.95)
MONOCYTES ABSOLUTE: 0.13 E9/L (ref 0.1–0.95)
MONOCYTES ABSOLUTE: 0.13 E9/L (ref 0.1–0.95)
MONOCYTES ABSOLUTE: 0.14 E9/L (ref 0.1–0.95)
MONOCYTES ABSOLUTE: 0.14 E9/L (ref 0.1–0.95)
MONOCYTES ABSOLUTE: 0.16 E9/L (ref 0.1–0.95)
MONOCYTES ABSOLUTE: 0.18 E9/L (ref 0.1–0.95)
MONOCYTES ABSOLUTE: 0.23 E9/L (ref 0.1–0.95)
MONOCYTES ABSOLUTE: 0.24 E9/L (ref 0.1–0.95)
MONOCYTES ABSOLUTE: 0.24 E9/L (ref 0.1–0.95)
MONOCYTES ABSOLUTE: 0.28 E9/L (ref 0.1–0.95)
MONOCYTES ABSOLUTE: 0.29 E9/L (ref 0.1–0.95)
MONOCYTES ABSOLUTE: 0.31 E9/L (ref 0.1–0.95)
MONOCYTES ABSOLUTE: 0.31 E9/L (ref 0.1–0.95)
MONOCYTES ABSOLUTE: 0.5 E9/L (ref 0.1–0.95)
MONOCYTES ABSOLUTE: 0.63 E9/L (ref 0.1–0.95)
MONOCYTES ABSOLUTE: 0.63 E9/L (ref 0.1–0.95)
MONOCYTES ABSOLUTE: 0.7 E9/L (ref 0.1–0.95)
MONOCYTES ABSOLUTE: 0.75 E9/L (ref 0.1–0.95)
MONOCYTES ABSOLUTE: 0.76 E9/L (ref 0.1–0.95)
MONOCYTES ABSOLUTE: 1.08 E9/L (ref 0.1–0.95)
MONOCYTES RELATIVE PERCENT: 1.7 % (ref 2–12)
MONOCYTES RELATIVE PERCENT: 1.7 % (ref 2–12)
MONOCYTES RELATIVE PERCENT: 10 % (ref 2–12)
MONOCYTES RELATIVE PERCENT: 10 % (ref 2–12)
MONOCYTES RELATIVE PERCENT: 10.3 % (ref 2–12)
MONOCYTES RELATIVE PERCENT: 12.2 % (ref 2–12)
MONOCYTES RELATIVE PERCENT: 12.6 % (ref 2–12)
MONOCYTES RELATIVE PERCENT: 14 % (ref 2–12)
MONOCYTES RELATIVE PERCENT: 14.7 % (ref 2–12)
MONOCYTES RELATIVE PERCENT: 15.3 % (ref 2–12)
MONOCYTES RELATIVE PERCENT: 19 % (ref 2–12)
MONOCYTES RELATIVE PERCENT: 2 % (ref 2–12)
MONOCYTES RELATIVE PERCENT: 22.6 % (ref 2–12)
MONOCYTES RELATIVE PERCENT: 4 % (ref 2–12)
MONOCYTES RELATIVE PERCENT: 6 % (ref 2–12)
MONOCYTES RELATIVE PERCENT: 6.1 % (ref 2–12)
MONOCYTES RELATIVE PERCENT: 6.1 % (ref 2–12)
MONOCYTES RELATIVE PERCENT: 6.2 % (ref 2–12)
MONOCYTES RELATIVE PERCENT: 6.9 % (ref 2–12)
MONOCYTES RELATIVE PERCENT: 7 % (ref 2–12)
MONOCYTES RELATIVE PERCENT: 7 % (ref 2–12)
MONOCYTES RELATIVE PERCENT: 7.8 % (ref 2–12)
MONOCYTES RELATIVE PERCENT: 9.4 % (ref 2–12)
MYELOCYTE PERCENT: 0 % (ref 0–0)
MYELOCYTE PERCENT: 0.9 % (ref 0–0)
MYELOCYTE PERCENT: 1.8 % (ref 0–0)
MYELOCYTE PERCENT: 2.6 % (ref 0–0)
MYELOCYTE PERCENT: 2.6 % (ref 0–0)
MYELOCYTE PERCENT: 4 % (ref 0–0)
NEUTROPHILS ABSOLUTE: 0.82 E9/L (ref 1.8–7.3)
NEUTROPHILS ABSOLUTE: 0.85 E9/L (ref 1.8–7.3)
NEUTROPHILS ABSOLUTE: 0.97 E9/L (ref 1.8–7.3)
NEUTROPHILS ABSOLUTE: 1.03 E9/L (ref 1.8–7.3)
NEUTROPHILS ABSOLUTE: 1.06 E9/L (ref 1.8–7.3)
NEUTROPHILS ABSOLUTE: 1.13 E9/L (ref 1.8–7.3)
NEUTROPHILS ABSOLUTE: 1.13 E9/L (ref 1.8–7.3)
NEUTROPHILS ABSOLUTE: 1.22 E9/L (ref 1.8–7.3)
NEUTROPHILS ABSOLUTE: 1.37 E9/L (ref 1.8–7.3)
NEUTROPHILS ABSOLUTE: 1.41 E9/L (ref 1.8–7.3)
NEUTROPHILS ABSOLUTE: 1.48 E9/L (ref 1.8–7.3)
NEUTROPHILS ABSOLUTE: 1.52 E9/L (ref 1.8–7.3)
NEUTROPHILS ABSOLUTE: 1.53 E9/L (ref 1.8–7.3)
NEUTROPHILS ABSOLUTE: 1.53 E9/L (ref 1.8–7.3)
NEUTROPHILS ABSOLUTE: 1.6 E9/L (ref 1.8–7.3)
NEUTROPHILS ABSOLUTE: 1.65 E9/L (ref 1.8–7.3)
NEUTROPHILS ABSOLUTE: 1.83 E9/L (ref 1.8–7.3)
NEUTROPHILS ABSOLUTE: 1.89 E9/L (ref 1.8–7.3)
NEUTROPHILS ABSOLUTE: 2 E9/L (ref 1.8–7.3)
NEUTROPHILS ABSOLUTE: 2.48 E9/L (ref 1.8–7.3)
NEUTROPHILS ABSOLUTE: 3.2 E9/L (ref 1.8–7.3)
NEUTROPHILS ABSOLUTE: 3.3 E9/L (ref 1.8–7.3)
NEUTROPHILS ABSOLUTE: 3.38 E9/L (ref 1.8–7.3)
NEUTROPHILS RELATIVE PERCENT: 29 % (ref 43–80)
NEUTROPHILS RELATIVE PERCENT: 32.2 % (ref 43–80)
NEUTROPHILS RELATIVE PERCENT: 33 % (ref 43–80)
NEUTROPHILS RELATIVE PERCENT: 36.5 % (ref 43–80)
NEUTROPHILS RELATIVE PERCENT: 38.1 % (ref 43–80)
NEUTROPHILS RELATIVE PERCENT: 38.3 % (ref 43–80)
NEUTROPHILS RELATIVE PERCENT: 38.8 % (ref 43–80)
NEUTROPHILS RELATIVE PERCENT: 42.4 % (ref 43–80)
NEUTROPHILS RELATIVE PERCENT: 43 % (ref 43–80)
NEUTROPHILS RELATIVE PERCENT: 43.6 % (ref 43–80)
NEUTROPHILS RELATIVE PERCENT: 44.4 % (ref 43–80)
NEUTROPHILS RELATIVE PERCENT: 46.5 % (ref 43–80)
NEUTROPHILS RELATIVE PERCENT: 48 % (ref 43–80)
NEUTROPHILS RELATIVE PERCENT: 48.7 % (ref 43–80)
NEUTROPHILS RELATIVE PERCENT: 48.7 % (ref 43–80)
NEUTROPHILS RELATIVE PERCENT: 49 % (ref 43–80)
NEUTROPHILS RELATIVE PERCENT: 56 % (ref 43–80)
NEUTROPHILS RELATIVE PERCENT: 56.5 % (ref 43–80)
NEUTROPHILS RELATIVE PERCENT: 59 % (ref 43–80)
NEUTROPHILS RELATIVE PERCENT: 59 % (ref 43–80)
NEUTROPHILS RELATIVE PERCENT: 63.5 % (ref 43–80)
NEUTROPHILS RELATIVE PERCENT: 64 % (ref 43–80)
NEUTROPHILS RELATIVE PERCENT: 80 % (ref 43–80)
NITRITE, URINE: NEGATIVE
NUCLEATED RED BLOOD CELLS: 0 /100 WBC
NUCLEATED RED BLOOD CELLS: 0.9 /100 WBC
NUCLEATED RED BLOOD CELLS: 0.9 /100 WBC
NUCLEATED RED BLOOD CELLS: 1 /100 WBC
NUCLEATED RED BLOOD CELLS: 1.7 /100 WBC
NUCLEATED RED BLOOD CELLS: 2 /100 WBC
NUCLEATED RED BLOOD CELLS: 3 /100 WBC
NUCLEATED RED BLOOD CELLS: 5 /100 WBC
NUCLEATED RED BLOOD CELLS: 9 /100 WBC
ORGANISM: ABNORMAL
OSMOLALITY URINE: 286 MOSM/KG (ref 300–900)
OSMOLALITY URINE: 425 MOSM/KG (ref 300–900)
OSMOLALITY URINE: 430 MOSM/KG (ref 300–900)
OSMOLALITY URINE: 447 MOSM/KG (ref 300–900)
OSMOLALITY URINE: 508 MOSM/KG (ref 300–900)
OSMOLALITY URINE: 562 MOSM/KG (ref 300–900)
OSMOLALITY URINE: 573 MOSM/KG (ref 300–900)
OSMOLALITY URINE: 582 MOSM/KG (ref 300–900)
OSMOLALITY URINE: 583 MOSM/KG (ref 300–900)
OSMOLALITY URINE: 609 MOSM/KG (ref 300–900)
OSMOLALITY URINE: 646 MOSM/KG (ref 300–900)
OSMOLALITY URINE: 652 MOSM/KG (ref 300–900)
OSMOLALITY: 272 MOSM/KG (ref 285–310)
OVALOCYTES: ABNORMAL
PATHOLOGIST REPORT, TRANSFUSION REACTION: NORMAL
PATHOLOGIST REPORT, TRANSFUSION REACTION: NORMAL
PATHOLOGIST REVIEW: NORMAL
PATHOLOGIST REVIEW: NORMAL
PDW BLD-RTO: 14.5 FL (ref 11.5–15)
PDW BLD-RTO: 14.5 FL (ref 11.5–15)
PDW BLD-RTO: 14.6 FL (ref 11.5–15)
PDW BLD-RTO: 14.7 FL (ref 11.5–15)
PDW BLD-RTO: 14.9 FL (ref 11.5–15)
PDW BLD-RTO: 15.1 FL (ref 11.5–15)
PDW BLD-RTO: 16.3 FL (ref 11.5–15)
PDW BLD-RTO: 16.7 FL (ref 11.5–15)
PDW BLD-RTO: 17 FL (ref 11.5–15)
PDW BLD-RTO: 17.6 FL (ref 11.5–15)
PDW BLD-RTO: 17.8 FL (ref 11.5–15)
PDW BLD-RTO: 19.7 FL (ref 11.5–15)
PDW BLD-RTO: 19.8 FL (ref 11.5–15)
PDW BLD-RTO: 20 FL (ref 11.5–15)
PDW BLD-RTO: 20 FL (ref 11.5–15)
PDW BLD-RTO: 21 FL (ref 11.5–15)
PDW BLD-RTO: 21.8 FL (ref 11.5–15)
PDW BLD-RTO: 22.6 FL (ref 11.5–15)
PDW BLD-RTO: 23.1 FL (ref 11.5–15)
PDW BLD-RTO: 23.2 FL (ref 11.5–15)
PDW BLD-RTO: 23.2 FL (ref 11.5–15)
PDW BLD-RTO: 23.3 FL (ref 11.5–15)
PDW BLD-RTO: 23.5 FL (ref 11.5–15)
PDW BLD-RTO: 23.6 FL (ref 11.5–15)
PDW BLD-RTO: 23.7 FL (ref 11.5–15)
PDW BLD-RTO: 23.8 FL (ref 11.5–15)
PDW BLD-RTO: 23.9 FL (ref 11.5–15)
PDW BLD-RTO: 24.1 FL (ref 11.5–15)
PDW BLD-RTO: 24.4 FL (ref 11.5–15)
PDW BLD-RTO: 24.4 FL (ref 11.5–15)
PDW BLD-RTO: 24.5 FL (ref 11.5–15)
PDW BLD-RTO: 25.1 FL (ref 11.5–15)
PH UA: 6 (ref 5–9)
PH UA: 6.5 (ref 5–9)
PH UA: 6.5 (ref 5–9)
PH UA: 7 (ref 5–9)
PHOSPHORUS: 2.5 MG/DL (ref 2.5–4.5)
PHOSPHORUS: 2.9 MG/DL (ref 2.5–4.5)
PHOSPHORUS: 3.7 MG/DL (ref 2.5–4.5)
PLASMA CELLS PERCENT: 0 % (ref 0–0)
PLATELET # BLD: 110 E9/L (ref 130–450)
PLATELET # BLD: 113 E9/L (ref 130–450)
PLATELET # BLD: 114 E9/L (ref 130–450)
PLATELET # BLD: 135 E9/L (ref 130–450)
PLATELET # BLD: 18 E9/L (ref 130–450)
PLATELET # BLD: 20 E9/L (ref 130–450)
PLATELET # BLD: 21 E9/L (ref 130–450)
PLATELET # BLD: 23 E9/L (ref 130–450)
PLATELET # BLD: 24 E9/L (ref 130–450)
PLATELET # BLD: 25 E9/L (ref 130–450)
PLATELET # BLD: 25 E9/L (ref 130–450)
PLATELET # BLD: 26 E9/L (ref 130–450)
PLATELET # BLD: 26 E9/L (ref 130–450)
PLATELET # BLD: 27 E9/L (ref 130–450)
PLATELET # BLD: 28 E9/L (ref 130–450)
PLATELET # BLD: 29 E9/L (ref 130–450)
PLATELET # BLD: 32 E9/L (ref 130–450)
PLATELET # BLD: 33 E9/L (ref 130–450)
PLATELET # BLD: 341 E9/L (ref 130–450)
PLATELET # BLD: 35 E9/L (ref 130–450)
PLATELET # BLD: 35 E9/L (ref 130–450)
PLATELET # BLD: 36 E9/L (ref 130–450)
PLATELET # BLD: 37 E9/L (ref 130–450)
PLATELET # BLD: 52 E9/L (ref 130–450)
PLATELET # BLD: 52 E9/L (ref 130–450)
PLATELET # BLD: 58 E9/L (ref 130–450)
PLATELET # BLD: 66 E9/L (ref 130–450)
PLATELET # BLD: 67 E9/L (ref 130–450)
PLATELET # BLD: 71 E9/L (ref 130–450)
PLATELET # BLD: 73 E9/L (ref 130–450)
PLATELET # BLD: 78 E9/L (ref 130–450)
PLATELET # BLD: 84 E9/L (ref 130–450)
PLATELET CONFIRMATION: NORMAL
PMV BLD AUTO: 10 FL (ref 7–12)
PMV BLD AUTO: 10.1 FL (ref 7–12)
PMV BLD AUTO: 10.5 FL (ref 7–12)
PMV BLD AUTO: 10.5 FL (ref 7–12)
PMV BLD AUTO: 10.9 FL (ref 7–12)
PMV BLD AUTO: 11 FL (ref 7–12)
PMV BLD AUTO: 11 FL (ref 7–12)
PMV BLD AUTO: 11.1 FL (ref 7–12)
PMV BLD AUTO: 11.1 FL (ref 7–12)
PMV BLD AUTO: 11.3 FL (ref 7–12)
PMV BLD AUTO: 11.3 FL (ref 7–12)
PMV BLD AUTO: 11.5 FL (ref 7–12)
PMV BLD AUTO: 11.5 FL (ref 7–12)
PMV BLD AUTO: 11.6 FL (ref 7–12)
PMV BLD AUTO: 11.7 FL (ref 7–12)
PMV BLD AUTO: 11.8 FL (ref 7–12)
PMV BLD AUTO: 11.9 FL (ref 7–12)
PMV BLD AUTO: 12.1 FL (ref 7–12)
PMV BLD AUTO: 12.2 FL (ref 7–12)
PMV BLD AUTO: 12.4 FL (ref 7–12)
PMV BLD AUTO: 12.5 FL (ref 7–12)
PMV BLD AUTO: 12.6 FL (ref 7–12)
PMV BLD AUTO: 12.7 FL (ref 7–12)
PMV BLD AUTO: 12.8 FL (ref 7–12)
PMV BLD AUTO: 13 FL (ref 7–12)
PMV BLD AUTO: ABNORMAL FL (ref 7–12)
POIKILOCYTES: ABNORMAL
POLYCHROMASIA: ABNORMAL
POTASSIUM REFLEX MAGNESIUM: 3.7 MMOL/L (ref 3.5–5)
POTASSIUM REFLEX MAGNESIUM: 3.8 MMOL/L (ref 3.5–5)
POTASSIUM REFLEX MAGNESIUM: 4 MMOL/L (ref 3.5–5)
POTASSIUM REFLEX MAGNESIUM: 4.1 MMOL/L (ref 3.5–5)
POTASSIUM REFLEX MAGNESIUM: 4.3 MMOL/L (ref 3.5–5)
POTASSIUM REFLEX MAGNESIUM: 4.5 MMOL/L (ref 3.5–5)
POTASSIUM REFLEX MAGNESIUM: 4.5 MMOL/L (ref 3.5–5)
POTASSIUM SERPL-SCNC: 2.8 MMOL/L (ref 3.5–5)
POTASSIUM SERPL-SCNC: 2.9 MMOL/L (ref 3.5–5)
POTASSIUM SERPL-SCNC: 3 MMOL/L (ref 3.5–5)
POTASSIUM SERPL-SCNC: 3.3 MMOL/L (ref 3.5–5)
POTASSIUM SERPL-SCNC: 3.4 MMOL/L (ref 3.5–5)
POTASSIUM SERPL-SCNC: 3.4 MMOL/L (ref 3.5–5)
POTASSIUM SERPL-SCNC: 3.5 MMOL/L (ref 3.5–5)
POTASSIUM SERPL-SCNC: 3.5 MMOL/L (ref 3.5–5)
POTASSIUM SERPL-SCNC: 3.6 MMOL/L (ref 3.5–5)
POTASSIUM SERPL-SCNC: 3.7 MMOL/L (ref 3.5–5)
POTASSIUM SERPL-SCNC: 3.8 MMOL/L (ref 3.5–5)
POTASSIUM SERPL-SCNC: 3.9 MMOL/L (ref 3.5–5)
POTASSIUM SERPL-SCNC: 3.9 MMOL/L (ref 3.5–5)
POTASSIUM SERPL-SCNC: 4 MMOL/L (ref 3.5–5)
POTASSIUM SERPL-SCNC: 4.1 MMOL/L (ref 3.5–5)
POTASSIUM SERPL-SCNC: 4.3 MMOL/L (ref 3.5–5)
POTASSIUM SERPL-SCNC: 4.3 MMOL/L (ref 3.5–5)
POTASSIUM SERPL-SCNC: 4.4 MMOL/L (ref 3.5–5)
POTASSIUM SERPL-SCNC: 4.5 MMOL/L (ref 3.5–5)
POTASSIUM SERPL-SCNC: 4.6 MMOL/L (ref 3.5–5)
POTASSIUM SERPL-SCNC: 4.6 MMOL/L (ref 3.5–5)
POTASSIUM SERPL-SCNC: 4.7 MMOL/L (ref 3.5–5)
POTASSIUM SERPL-SCNC: 4.8 MMOL/L (ref 3.5–5)
POTASSIUM, UR: 34 MMOL/L
PRO-BNP: 12 PG/ML (ref 0–125)
PRO-BNP: 368 PG/ML (ref 0–125)
PRO-BNP: 96 PG/ML (ref 0–125)
PROCALCITONIN: 0.08 NG/ML (ref 0–0.08)
PROCALCITONIN: 0.13 NG/ML (ref 0–0.08)
PROMYELOCYTES PERCENT: 0.9 % (ref 0–0)
PROTEIN UA: ABNORMAL MG/DL
PROTEIN UA: NEGATIVE MG/DL
PROTHROMBIN TIME: 13.4 SEC (ref 9.3–12.4)
PROTHROMBIN TIME: 13.7 SEC (ref 9.3–12.4)
RBC # BLD: 1.18 E12/L (ref 3.5–5.5)
RBC # BLD: 1.29 E12/L (ref 3.5–5.5)
RBC # BLD: 1.36 E12/L (ref 3.5–5.5)
RBC # BLD: 1.48 E12/L (ref 3.5–5.5)
RBC # BLD: 1.7 E12/L (ref 3.5–5.5)
RBC # BLD: 1.77 E12/L (ref 3.5–5.5)
RBC # BLD: 1.82 E12/L (ref 3.5–5.5)
RBC # BLD: 1.84 E12/L (ref 3.5–5.5)
RBC # BLD: 1.92 E12/L (ref 3.5–5.5)
RBC # BLD: 1.99 E12/L (ref 3.5–5.5)
RBC # BLD: 2.02 E12/L (ref 3.5–5.5)
RBC # BLD: 2.03 E12/L (ref 3.5–5.5)
RBC # BLD: 2.05 E12/L (ref 3.5–5.5)
RBC # BLD: 2.06 E12/L (ref 3.5–5.5)
RBC # BLD: 2.09 E12/L (ref 3.5–5.5)
RBC # BLD: 2.09 E12/L (ref 3.5–5.5)
RBC # BLD: 2.15 E12/L (ref 3.5–5.5)
RBC # BLD: 2.27 E12/L (ref 3.5–5.5)
RBC # BLD: 2.28 E12/L (ref 3.5–5.5)
RBC # BLD: 2.31 E12/L (ref 3.5–5.5)
RBC # BLD: 2.31 E12/L (ref 3.5–5.5)
RBC # BLD: 2.32 E12/L (ref 3.5–5.5)
RBC # BLD: 2.35 E12/L (ref 3.5–5.5)
RBC # BLD: 2.44 E12/L (ref 3.5–5.5)
RBC # BLD: 2.56 E12/L (ref 3.5–5.5)
RBC # BLD: 2.58 E12/L (ref 3.5–5.5)
RBC # BLD: 2.63 E12/L (ref 3.5–5.5)
RBC # BLD: 2.73 E12/L (ref 3.5–5.5)
RBC # BLD: 2.75 E12/L (ref 3.5–5.5)
RBC # BLD: 2.84 E12/L (ref 3.5–5.5)
RBC # BLD: 2.84 E12/L (ref 3.5–5.5)
RBC # BLD: 2.91 E12/L (ref 3.5–5.5)
RBC # BLD: 3.17 E12/L (ref 3.5–5.5)
RBC # BLD: 3.23 E12/L (ref 3.5–5.5)
RBC # BLD: NORMAL 10*6/UL
RBC # BLD: NORMAL 10*6/UL
RBC UA: ABNORMAL /HPF (ref 0–2)
RBC UA: NORMAL /HPF (ref 0–2)
REASON FOR REJECTION: NORMAL
REJECTED TEST: NORMAL
RETIC HGB EQUIVALENT: 41.6 PG (ref 28.2–36.6)
RETIC HGB EQUIVALENT: 41.6 PG (ref 28.2–36.6)
RETIC HGB EQUIVALENT: 41.7 PG (ref 28.2–36.6)
RETIC HGB EQUIVALENT: 42 PG (ref 28.2–36.6)
RETIC HGB EQUIVALENT: 42.2 PG (ref 28.2–36.6)
RETIC HGB EQUIVALENT: 42.5 PG (ref 28.2–36.6)
RETIC HGB EQUIVALENT: 42.7 PG (ref 28.2–36.6)
RETIC HGB EQUIVALENT: 43 PG (ref 28.2–36.6)
RETIC HGB EQUIVALENT: 43.9 PG (ref 28.2–36.6)
RETIC HGB EQUIVALENT: 44.4 PG (ref 28.2–36.6)
RETIC HGB EQUIVALENT: 44.5 PG (ref 28.2–36.6)
RETIC HGB EQUIVALENT: 45.8 PG (ref 28.2–36.6)
RETICULOCYTE ABSOLUTE COUNT: 0.02 E12/L
RETICULOCYTE ABSOLUTE COUNT: 0.03 E12/L
RETICULOCYTE ABSOLUTE COUNT: 0.03 E12/L
RETICULOCYTE ABSOLUTE COUNT: 0.04 E12/L
RETICULOCYTE ABSOLUTE COUNT: 0.04 E12/L
RETICULOCYTE COUNT PCT: 0.7 % (ref 0.4–1.9)
RETICULOCYTE COUNT PCT: 0.8 % (ref 0.4–1.9)
RETICULOCYTE COUNT PCT: 0.8 % (ref 0.4–1.9)
RETICULOCYTE COUNT PCT: 0.9 % (ref 0.4–1.9)
RETICULOCYTE COUNT PCT: 1 % (ref 0.4–1.9)
RETICULOCYTE COUNT PCT: 1.1 % (ref 0.4–1.9)
RETICULOCYTE COUNT PCT: 1.1 % (ref 0.4–1.9)
RETICULOCYTE COUNT PCT: 1.6 % (ref 0.4–1.9)
RETICULOCYTE COUNT PCT: 1.7 % (ref 0.4–1.9)
RETICULOCYTE COUNT PCT: 2.7 % (ref 0.4–1.9)
ROULEAUX: ABNORMAL
SALICYLATE, SERUM: <0.3 MG/DL (ref 0–30)
SARS-COV-2, NAAT: DETECTED
SARS-COV-2, NAAT: NOT DETECTED
SCHISTOCYTES: ABNORMAL
SEDIMENTATION RATE, ERYTHROCYTE: 140 MM/HR (ref 0–20)
SODIUM BLD-SCNC: 122 MMOL/L (ref 132–146)
SODIUM BLD-SCNC: 123 MMOL/L (ref 132–146)
SODIUM BLD-SCNC: 124 MMOL/L (ref 132–146)
SODIUM BLD-SCNC: 125 MMOL/L (ref 132–146)
SODIUM BLD-SCNC: 126 MMOL/L (ref 132–146)
SODIUM BLD-SCNC: 127 MMOL/L (ref 132–146)
SODIUM BLD-SCNC: 127 MMOL/L (ref 132–146)
SODIUM BLD-SCNC: 128 MMOL/L (ref 132–146)
SODIUM BLD-SCNC: 129 MMOL/L (ref 132–146)
SODIUM BLD-SCNC: 130 MMOL/L (ref 132–146)
SODIUM BLD-SCNC: 131 MMOL/L (ref 132–146)
SODIUM BLD-SCNC: 131 MMOL/L (ref 132–146)
SODIUM BLD-SCNC: 132 MMOL/L (ref 132–146)
SODIUM BLD-SCNC: 133 MMOL/L (ref 132–146)
SODIUM BLD-SCNC: 134 MMOL/L (ref 132–146)
SODIUM BLD-SCNC: 135 MMOL/L (ref 132–146)
SODIUM BLD-SCNC: 136 MMOL/L (ref 132–146)
SODIUM BLD-SCNC: 137 MMOL/L (ref 132–146)
SODIUM URINE: 119 MMOL/L
SODIUM URINE: 77 MMOL/L
SPECIFIC GRAVITY UA: 1.01 (ref 1–1.03)
SPECIFIC GRAVITY UA: 1.02 (ref 1–1.03)
STOMATOCYTES: ABNORMAL
T4 FREE: 2.03 NG/DL (ref 0.93–1.7)
T4 TOTAL: 12.1 MCG/DL (ref 4.5–11.7)
TARGET CELLS: ABNORMAL
TARGET CELLS: ABNORMAL
TEAR DROP CELLS: ABNORMAL
TOTAL IRON BINDING CAPACITY: 301 MCG/DL (ref 250–450)
TOTAL IRON BINDING CAPACITY: 305 MCG/DL (ref 250–450)
TOTAL PROTEIN: 6.7 G/DL (ref 6.4–8.3)
TOTAL PROTEIN: 6.8 G/DL (ref 6.4–8.3)
TOTAL PROTEIN: 7 G/DL (ref 6.4–8.3)
TOTAL PROTEIN: 7.1 G/DL (ref 6.4–8.3)
TOTAL PROTEIN: 7.3 G/DL (ref 6.4–8.3)
TOTAL PROTEIN: 7.6 G/DL (ref 6.4–8.3)
TOTAL PROTEIN: 7.9 G/DL (ref 6.4–8.3)
TOTAL PROTEIN: 8.1 G/DL (ref 6.4–8.3)
TOTAL PROTEIN: 8.2 G/DL (ref 6.4–8.3)
TOTAL PROTEIN: 8.5 G/DL (ref 6.4–8.3)
TOTAL PROTEIN: 8.6 G/DL (ref 6.4–8.3)
TRICYCLIC ANTIDEPRESSANTS SCREEN SERUM: NEGATIVE NG/ML
TROPONIN, HIGH SENSITIVITY: 10 NG/L (ref 0–9)
TROPONIN, HIGH SENSITIVITY: 11 NG/L (ref 0–9)
TROPONIN, HIGH SENSITIVITY: 7 NG/L (ref 0–9)
TROPONIN, HIGH SENSITIVITY: 8 NG/L (ref 0–9)
TROPONIN, HIGH SENSITIVITY: <6 NG/L (ref 0–9)
TROPONIN: <0.01 NG/ML (ref 0–0.03)
TROPONIN: <0.01 NG/ML (ref 0–0.03)
TSH SERPL DL<=0.05 MIU/L-ACNC: 0.01 UIU/ML (ref 0.27–4.2)
TSH SERPL DL<=0.05 MIU/L-ACNC: 0.04 UIU/ML (ref 0.27–4.2)
TSH SERPL DL<=0.05 MIU/L-ACNC: 0.25 UIU/ML (ref 0.27–4.2)
TSH SERPL DL<=0.05 MIU/L-ACNC: <0.01 UIU/ML (ref 0.27–4.2)
TSH SERPL DL<=0.05 MIU/L-ACNC: <0.01 UIU/ML (ref 0.27–4.2)
URIC ACID, SERUM: 4.1 MG/DL (ref 2.4–5.7)
UROBILINOGEN, URINE: 0.2 E.U./DL
UROBILINOGEN, URINE: 1 E.U./DL
UROBILINOGEN, URINE: 2 E.U./DL
UROBILINOGEN, URINE: 4 E.U./DL
VACUOLATED NEUTROPHILS: ABNORMAL
VITAMIN B-12: 1514 PG/ML (ref 211–946)
VITAMIN B-12: 1537 PG/ML (ref 211–946)
VITAMIN B-12: 637 PG/ML (ref 211–946)
WBC # BLD: 1.8 E9/L (ref 4.5–11.5)
WBC # BLD: 1.9 E9/L (ref 4.5–11.5)
WBC # BLD: 2 E9/L (ref 4.5–11.5)
WBC # BLD: 2.1 E9/L (ref 4.5–11.5)
WBC # BLD: 2.2 E9/L (ref 4.5–11.5)
WBC # BLD: 2.2 E9/L (ref 4.5–11.5)
WBC # BLD: 2.3 E9/L (ref 4.5–11.5)
WBC # BLD: 2.3 E9/L (ref 4.5–11.5)
WBC # BLD: 2.5 E9/L (ref 4.5–11.5)
WBC # BLD: 2.6 E9/L (ref 4.5–11.5)
WBC # BLD: 2.6 E9/L (ref 4.5–11.5)
WBC # BLD: 3 E9/L (ref 4.5–11.5)
WBC # BLD: 3 E9/L (ref 4.5–11.5)
WBC # BLD: 3.1 E9/L (ref 4.5–11.5)
WBC # BLD: 3.1 E9/L (ref 4.5–11.5)
WBC # BLD: 3.2 E9/L (ref 4.5–11.5)
WBC # BLD: 3.2 E9/L (ref 4.5–11.5)
WBC # BLD: 3.3 E9/L (ref 4.5–11.5)
WBC # BLD: 3.3 E9/L (ref 4.5–11.5)
WBC # BLD: 3.4 E9/L (ref 4.5–11.5)
WBC # BLD: 3.5 E9/L (ref 4.5–11.5)
WBC # BLD: 4 E9/L (ref 4.5–11.5)
WBC # BLD: 4.2 E9/L (ref 4.5–11.5)
WBC # BLD: 4.5 E9/L (ref 4.5–11.5)
WBC # BLD: 4.6 E9/L (ref 4.5–11.5)
WBC # BLD: 4.7 E9/L (ref 4.5–11.5)
WBC # BLD: 5 E9/L (ref 4.5–11.5)
WBC # BLD: 5.7 E9/L (ref 4.5–11.5)
WBC # BLD: 5.7 E9/L (ref 4.5–11.5)
WBC # BLD: 7.5 E9/L (ref 4.5–11.5)
WBC UA: ABNORMAL /HPF (ref 0–5)
WBC UA: NORMAL /HPF (ref 0–5)

## 2021-01-01 PROCEDURE — 85045 AUTOMATED RETICULOCYTE COUNT: CPT

## 2021-01-01 PROCEDURE — 82962 GLUCOSE BLOOD TEST: CPT

## 2021-01-01 PROCEDURE — 82248 BILIRUBIN DIRECT: CPT

## 2021-01-01 PROCEDURE — 99214 OFFICE O/P EST MOD 30 MIN: CPT | Performed by: FAMILY MEDICINE

## 2021-01-01 PROCEDURE — 6360000002 HC RX W HCPCS: Performed by: INTERNAL MEDICINE

## 2021-01-01 PROCEDURE — G0378 HOSPITAL OBSERVATION PER HR: HCPCS

## 2021-01-01 PROCEDURE — 6370000000 HC RX 637 (ALT 250 FOR IP): Performed by: HOSPITALIST

## 2021-01-01 PROCEDURE — 96361 HYDRATE IV INFUSION ADD-ON: CPT

## 2021-01-01 PROCEDURE — 85027 COMPLETE CBC AUTOMATED: CPT

## 2021-01-01 PROCEDURE — 86880 COOMBS TEST DIRECT: CPT

## 2021-01-01 PROCEDURE — 36430 TRANSFUSION BLD/BLD COMPNT: CPT

## 2021-01-01 PROCEDURE — 80076 HEPATIC FUNCTION PANEL: CPT

## 2021-01-01 PROCEDURE — 86901 BLOOD TYPING SEROLOGIC RH(D): CPT

## 2021-01-01 PROCEDURE — 83036 HEMOGLOBIN GLYCOSYLATED A1C: CPT

## 2021-01-01 PROCEDURE — 84145 PROCALCITONIN (PCT): CPT

## 2021-01-01 PROCEDURE — 83615 LACTATE (LD) (LDH) ENZYME: CPT

## 2021-01-01 PROCEDURE — 1200000000 HC SEMI PRIVATE

## 2021-01-01 PROCEDURE — 36415 COLL VENOUS BLD VENIPUNCTURE: CPT

## 2021-01-01 PROCEDURE — 1123F ACP DISCUSS/DSCN MKR DOCD: CPT | Performed by: FAMILY MEDICINE

## 2021-01-01 PROCEDURE — P9016 RBC LEUKOCYTES REDUCED: HCPCS

## 2021-01-01 PROCEDURE — 84100 ASSAY OF PHOSPHORUS: CPT

## 2021-01-01 PROCEDURE — 6370000000 HC RX 637 (ALT 250 FOR IP): Performed by: INTERNAL MEDICINE

## 2021-01-01 PROCEDURE — 82077 ASSAY SPEC XCP UR&BREATH IA: CPT

## 2021-01-01 PROCEDURE — 99222 1ST HOSP IP/OBS MODERATE 55: CPT | Performed by: FAMILY MEDICINE

## 2021-01-01 PROCEDURE — 99215 OFFICE O/P EST HI 40 MIN: CPT | Performed by: FAMILY MEDICINE

## 2021-01-01 PROCEDURE — 1111F DSCHRG MED/CURRENT MED MERGE: CPT | Performed by: PHYSICIAN ASSISTANT

## 2021-01-01 PROCEDURE — 84443 ASSAY THYROID STIM HORMONE: CPT

## 2021-01-01 PROCEDURE — 85610 PROTHROMBIN TIME: CPT

## 2021-01-01 PROCEDURE — 85025 COMPLETE CBC W/AUTO DIFF WBC: CPT

## 2021-01-01 PROCEDURE — 85014 HEMATOCRIT: CPT

## 2021-01-01 PROCEDURE — 99232 SBSQ HOSP IP/OBS MODERATE 35: CPT | Performed by: INTERNAL MEDICINE

## 2021-01-01 PROCEDURE — 80053 COMPREHEN METABOLIC PANEL: CPT

## 2021-01-01 PROCEDURE — 6360000004 HC RX CONTRAST MEDICATION: Performed by: RADIOLOGY

## 2021-01-01 PROCEDURE — 86923 COMPATIBILITY TEST ELECTRIC: CPT

## 2021-01-01 PROCEDURE — 80048 BASIC METABOLIC PNL TOTAL CA: CPT

## 2021-01-01 PROCEDURE — 2580000003 HC RX 258: Performed by: INTERNAL MEDICINE

## 2021-01-01 PROCEDURE — 6370000000 HC RX 637 (ALT 250 FOR IP): Performed by: PHYSICIAN ASSISTANT

## 2021-01-01 PROCEDURE — 99232 SBSQ HOSP IP/OBS MODERATE 35: CPT | Performed by: STUDENT IN AN ORGANIZED HEALTH CARE EDUCATION/TRAINING PROGRAM

## 2021-01-01 PROCEDURE — 82570 ASSAY OF URINE CREATININE: CPT

## 2021-01-01 PROCEDURE — 99283 EMERGENCY DEPT VISIT LOW MDM: CPT

## 2021-01-01 PROCEDURE — 86900 BLOOD TYPING SEROLOGIC ABO: CPT

## 2021-01-01 PROCEDURE — 6370000000 HC RX 637 (ALT 250 FOR IP): Performed by: EMERGENCY MEDICINE

## 2021-01-01 PROCEDURE — 86850 RBC ANTIBODY SCREEN: CPT

## 2021-01-01 PROCEDURE — 99204 OFFICE O/P NEW MOD 45 MIN: CPT | Performed by: INTERNAL MEDICINE

## 2021-01-01 PROCEDURE — 2580000003 HC RX 258: Performed by: HOSPITALIST

## 2021-01-01 PROCEDURE — 99233 SBSQ HOSP IP/OBS HIGH 50: CPT | Performed by: INTERNAL MEDICINE

## 2021-01-01 PROCEDURE — 2580000003 HC RX 258: Performed by: GENERAL PRACTICE

## 2021-01-01 PROCEDURE — 85018 HEMOGLOBIN: CPT

## 2021-01-01 PROCEDURE — 83880 ASSAY OF NATRIURETIC PEPTIDE: CPT

## 2021-01-01 PROCEDURE — 87205 SMEAR GRAM STAIN: CPT

## 2021-01-01 PROCEDURE — P9040 RBC LEUKOREDUCED IRRADIATED: HCPCS

## 2021-01-01 PROCEDURE — 96372 THER/PROPH/DIAG INJ SC/IM: CPT

## 2021-01-01 PROCEDURE — 83735 ASSAY OF MAGNESIUM: CPT

## 2021-01-01 PROCEDURE — 84484 ASSAY OF TROPONIN QUANT: CPT

## 2021-01-01 PROCEDURE — 83540 ASSAY OF IRON: CPT

## 2021-01-01 PROCEDURE — 99239 HOSP IP/OBS DSCHRG MGMT >30: CPT | Performed by: STUDENT IN AN ORGANIZED HEALTH CARE EDUCATION/TRAINING PROGRAM

## 2021-01-01 PROCEDURE — 93005 ELECTROCARDIOGRAM TRACING: CPT | Performed by: EMERGENCY MEDICINE

## 2021-01-01 PROCEDURE — 85730 THROMBOPLASTIN TIME PARTIAL: CPT

## 2021-01-01 PROCEDURE — 83690 ASSAY OF LIPASE: CPT

## 2021-01-01 PROCEDURE — 93005 ELECTROCARDIOGRAM TRACING: CPT | Performed by: PHYSICIAN ASSISTANT

## 2021-01-01 PROCEDURE — 85378 FIBRIN DEGRADE SEMIQUANT: CPT

## 2021-01-01 PROCEDURE — G8400 PT W/DXA NO RESULTS DOC: HCPCS | Performed by: FAMILY MEDICINE

## 2021-01-01 PROCEDURE — 84436 ASSAY OF TOTAL THYROXINE: CPT

## 2021-01-01 PROCEDURE — G8420 CALC BMI NORM PARAMETERS: HCPCS | Performed by: PHYSICIAN ASSISTANT

## 2021-01-01 PROCEDURE — G8420 CALC BMI NORM PARAMETERS: HCPCS | Performed by: FAMILY MEDICINE

## 2021-01-01 PROCEDURE — 86902 BLOOD TYPE ANTIGEN DONOR EA: CPT

## 2021-01-01 PROCEDURE — G8417 CALC BMI ABV UP PARAM F/U: HCPCS | Performed by: PHYSICIAN ASSISTANT

## 2021-01-01 PROCEDURE — 6370000000 HC RX 637 (ALT 250 FOR IP): Performed by: NURSE PRACTITIONER

## 2021-01-01 PROCEDURE — APPSS30 APP SPLIT SHARED TIME 16-30 MINUTES: Performed by: PHYSICIAN ASSISTANT

## 2021-01-01 PROCEDURE — 7100000010 HC PHASE II RECOVERY - FIRST 15 MIN

## 2021-01-01 PROCEDURE — 6360000002 HC RX W HCPCS

## 2021-01-01 PROCEDURE — 82607 VITAMIN B-12: CPT

## 2021-01-01 PROCEDURE — 2580000003 HC RX 258: Performed by: EMERGENCY MEDICINE

## 2021-01-01 PROCEDURE — G8400 PT W/DXA NO RESULTS DOC: HCPCS | Performed by: PHYSICIAN ASSISTANT

## 2021-01-01 PROCEDURE — 7100000011 HC PHASE II RECOVERY - ADDTL 15 MIN

## 2021-01-01 PROCEDURE — 99223 1ST HOSP IP/OBS HIGH 75: CPT | Performed by: INTERNAL MEDICINE

## 2021-01-01 PROCEDURE — G8420 CALC BMI NORM PARAMETERS: HCPCS | Performed by: INTERNAL MEDICINE

## 2021-01-01 PROCEDURE — G0008 ADMIN INFLUENZA VIRUS VAC: HCPCS | Performed by: FAMILY MEDICINE

## 2021-01-01 PROCEDURE — 6360000002 HC RX W HCPCS: Performed by: NURSE PRACTITIONER

## 2021-01-01 PROCEDURE — 1036F TOBACCO NON-USER: CPT | Performed by: FAMILY MEDICINE

## 2021-01-01 PROCEDURE — 74177 CT ABD & PELVIS W/CONTRAST: CPT

## 2021-01-01 PROCEDURE — 86870 RBC ANTIBODY IDENTIFICATION: CPT

## 2021-01-01 PROCEDURE — 1123F ACP DISCUSS/DSCN MKR DOCD: CPT | Performed by: INTERNAL MEDICINE

## 2021-01-01 PROCEDURE — 83010 ASSAY OF HAPTOGLOBIN QUANT: CPT

## 2021-01-01 PROCEDURE — 3017F COLORECTAL CA SCREEN DOC REV: CPT | Performed by: FAMILY MEDICINE

## 2021-01-01 PROCEDURE — G8400 PT W/DXA NO RESULTS DOC: HCPCS | Performed by: INTERNAL MEDICINE

## 2021-01-01 PROCEDURE — 87635 SARS-COV-2 COVID-19 AMP PRB: CPT

## 2021-01-01 PROCEDURE — 81003 URINALYSIS AUTO W/O SCOPE: CPT

## 2021-01-01 PROCEDURE — 99284 EMERGENCY DEPT VISIT MOD MDM: CPT

## 2021-01-01 PROCEDURE — 4040F PNEUMOC VAC/ADMIN/RCVD: CPT | Performed by: PHYSICIAN ASSISTANT

## 2021-01-01 PROCEDURE — 86922 COMPATIBILITY TEST ANTIGLOB: CPT

## 2021-01-01 PROCEDURE — G8427 DOCREV CUR MEDS BY ELIG CLIN: HCPCS | Performed by: PHYSICIAN ASSISTANT

## 2021-01-01 PROCEDURE — 3017F COLORECTAL CA SCREEN DOC REV: CPT | Performed by: PHYSICIAN ASSISTANT

## 2021-01-01 PROCEDURE — 1090F PRES/ABSN URINE INCON ASSESS: CPT | Performed by: FAMILY MEDICINE

## 2021-01-01 PROCEDURE — 2580000003 HC RX 258: Performed by: FAMILY MEDICINE

## 2021-01-01 PROCEDURE — APPSS60 APP SPLIT SHARED TIME 46-60 MINUTES: Performed by: PHYSICIAN ASSISTANT

## 2021-01-01 PROCEDURE — G8484 FLU IMMUNIZE NO ADMIN: HCPCS | Performed by: PHYSICIAN ASSISTANT

## 2021-01-01 PROCEDURE — 83935 ASSAY OF URINE OSMOLALITY: CPT

## 2021-01-01 PROCEDURE — 93010 ELECTROCARDIOGRAM REPORT: CPT | Performed by: INTERNAL MEDICINE

## 2021-01-01 PROCEDURE — 85660 RBC SICKLE CELL TEST: CPT

## 2021-01-01 PROCEDURE — 70450 CT HEAD/BRAIN W/O DYE: CPT

## 2021-01-01 PROCEDURE — 6360000002 HC RX W HCPCS: Performed by: PHYSICIAN ASSISTANT

## 2021-01-01 PROCEDURE — 99203 OFFICE O/P NEW LOW 30 MIN: CPT | Performed by: STUDENT IN AN ORGANIZED HEALTH CARE EDUCATION/TRAINING PROGRAM

## 2021-01-01 PROCEDURE — G8484 FLU IMMUNIZE NO ADMIN: HCPCS | Performed by: FAMILY MEDICINE

## 2021-01-01 PROCEDURE — 93000 ELECTROCARDIOGRAM COMPLETE: CPT | Performed by: FAMILY MEDICINE

## 2021-01-01 PROCEDURE — 83550 IRON BINDING TEST: CPT

## 2021-01-01 PROCEDURE — G8417 CALC BMI ABV UP PARAM F/U: HCPCS | Performed by: FAMILY MEDICINE

## 2021-01-01 PROCEDURE — 3700000000 HC ANESTHESIA ATTENDED CARE

## 2021-01-01 PROCEDURE — 86140 C-REACTIVE PROTEIN: CPT

## 2021-01-01 PROCEDURE — 84439 ASSAY OF FREE THYROXINE: CPT

## 2021-01-01 PROCEDURE — 87040 BLOOD CULTURE FOR BACTERIA: CPT

## 2021-01-01 PROCEDURE — 2500000003 HC RX 250 WO HCPCS: Performed by: RADIOLOGY

## 2021-01-01 PROCEDURE — 6360000002 HC RX W HCPCS: Performed by: HOSPITALIST

## 2021-01-01 PROCEDURE — G8427 DOCREV CUR MEDS BY ELIG CLIN: HCPCS | Performed by: INTERNAL MEDICINE

## 2021-01-01 PROCEDURE — 99214 OFFICE O/P EST MOD 30 MIN: CPT | Performed by: PHYSICIAN ASSISTANT

## 2021-01-01 PROCEDURE — 93000 ELECTROCARDIOGRAM COMPLETE: CPT | Performed by: INTERNAL MEDICINE

## 2021-01-01 PROCEDURE — 1036F TOBACCO NON-USER: CPT | Performed by: PHYSICIAN ASSISTANT

## 2021-01-01 PROCEDURE — U0002 COVID-19 LAB TEST NON-CDC: HCPCS

## 2021-01-01 PROCEDURE — G0439 PPPS, SUBSEQ VISIT: HCPCS | Performed by: FAMILY MEDICINE

## 2021-01-01 PROCEDURE — 99213 OFFICE O/P EST LOW 20 MIN: CPT | Performed by: PHYSICIAN ASSISTANT

## 2021-01-01 PROCEDURE — 1090F PRES/ABSN URINE INCON ASSESS: CPT | Performed by: PHYSICIAN ASSISTANT

## 2021-01-01 PROCEDURE — 85651 RBC SED RATE NONAUTOMATED: CPT

## 2021-01-01 PROCEDURE — P9073 PLATELETS PHERESIS PATH REDU: HCPCS

## 2021-01-01 PROCEDURE — 6360000002 HC RX W HCPCS: Performed by: GENERAL PRACTICE

## 2021-01-01 PROCEDURE — 81001 URINALYSIS AUTO W/SCOPE: CPT

## 2021-01-01 PROCEDURE — 82247 BILIRUBIN TOTAL: CPT

## 2021-01-01 PROCEDURE — 6370000000 HC RX 637 (ALT 250 FOR IP): Performed by: FAMILY MEDICINE

## 2021-01-01 PROCEDURE — 99219 PR INITIAL OBSERVATION CARE/DAY 50 MINUTES: CPT | Performed by: INTERNAL MEDICINE

## 2021-01-01 PROCEDURE — 86860 RBC ANTIBODY ELUTION: CPT

## 2021-01-01 PROCEDURE — 83605 ASSAY OF LACTIC ACID: CPT

## 2021-01-01 PROCEDURE — 80307 DRUG TEST PRSMV CHEM ANLYZR: CPT

## 2021-01-01 PROCEDURE — 2709999900 CT BIOPSY BONE MARROW

## 2021-01-01 PROCEDURE — 71046 X-RAY EXAM CHEST 2 VIEWS: CPT

## 2021-01-01 PROCEDURE — 96374 THER/PROPH/DIAG INJ IV PUSH: CPT

## 2021-01-01 PROCEDURE — 99222 1ST HOSP IP/OBS MODERATE 55: CPT | Performed by: INTERNAL MEDICINE

## 2021-01-01 PROCEDURE — 82533 TOTAL CORTISOL: CPT

## 2021-01-01 PROCEDURE — 84550 ASSAY OF BLOOD/URIC ACID: CPT

## 2021-01-01 PROCEDURE — 82436 ASSAY OF URINE CHLORIDE: CPT

## 2021-01-01 PROCEDURE — 87186 SC STD MICRODIL/AGAR DIL: CPT

## 2021-01-01 PROCEDURE — 4040F PNEUMOC VAC/ADMIN/RCVD: CPT | Performed by: FAMILY MEDICINE

## 2021-01-01 PROCEDURE — 71045 X-RAY EXAM CHEST 1 VIEW: CPT

## 2021-01-01 PROCEDURE — 80179 DRUG ASSAY SALICYLATE: CPT

## 2021-01-01 PROCEDURE — APPSS45 APP SPLIT SHARED TIME 31-45 MINUTES: Performed by: PHYSICIAN ASSISTANT

## 2021-01-01 PROCEDURE — 4040F PNEUMOC VAC/ADMIN/RCVD: CPT | Performed by: INTERNAL MEDICINE

## 2021-01-01 PROCEDURE — 07DR3ZX EXTRACTION OF ILIAC BONE MARROW, PERCUTANEOUS APPROACH, DIAGNOSTIC: ICD-10-PCS | Performed by: RADIOLOGY

## 2021-01-01 PROCEDURE — 99225 PR SBSQ OBSERVATION CARE/DAY 25 MINUTES: CPT | Performed by: INTERNAL MEDICINE

## 2021-01-01 PROCEDURE — 84133 ASSAY OF URINE POTASSIUM: CPT

## 2021-01-01 PROCEDURE — 77012 CT SCAN FOR NEEDLE BIOPSY: CPT

## 2021-01-01 PROCEDURE — 87077 CULTURE AEROBIC IDENTIFY: CPT

## 2021-01-01 PROCEDURE — 2060000000 HC ICU INTERMEDIATE R&B

## 2021-01-01 PROCEDURE — 2580000003 HC RX 258: Performed by: RADIOLOGY

## 2021-01-01 PROCEDURE — 90694 VACC AIIV4 NO PRSRV 0.5ML IM: CPT | Performed by: FAMILY MEDICINE

## 2021-01-01 PROCEDURE — 82728 ASSAY OF FERRITIN: CPT

## 2021-01-01 PROCEDURE — 71260 CT THORAX DX C+: CPT

## 2021-01-01 PROCEDURE — 82140 ASSAY OF AMMONIA: CPT

## 2021-01-01 PROCEDURE — 1123F ACP DISCUSS/DSCN MKR DOCD: CPT | Performed by: PHYSICIAN ASSISTANT

## 2021-01-01 PROCEDURE — 1090F PRES/ABSN URINE INCON ASSESS: CPT | Performed by: INTERNAL MEDICINE

## 2021-01-01 PROCEDURE — 96360 HYDRATION IV INFUSION INIT: CPT

## 2021-01-01 PROCEDURE — 99222 1ST HOSP IP/OBS MODERATE 55: CPT | Performed by: HOSPITALIST

## 2021-01-01 PROCEDURE — G8427 DOCREV CUR MEDS BY ELIG CLIN: HCPCS | Performed by: FAMILY MEDICINE

## 2021-01-01 PROCEDURE — 76770 US EXAM ABDO BACK WALL COMP: CPT

## 2021-01-01 PROCEDURE — 84300 ASSAY OF URINE SODIUM: CPT

## 2021-01-01 PROCEDURE — 99217 PR OBSERVATION CARE DISCHARGE MANAGEMENT: CPT | Performed by: INTERNAL MEDICINE

## 2021-01-01 PROCEDURE — 93005 ELECTROCARDIOGRAM TRACING: CPT | Performed by: NURSE PRACTITIONER

## 2021-01-01 PROCEDURE — 99239 HOSP IP/OBS DSCHRG MGMT >30: CPT | Performed by: INTERNAL MEDICINE

## 2021-01-01 PROCEDURE — 3700000001 HC ADD 15 MINUTES (ANESTHESIA)

## 2021-01-01 PROCEDURE — 6360000002 HC RX W HCPCS: Performed by: NURSE ANESTHETIST, CERTIFIED REGISTERED

## 2021-01-01 PROCEDURE — 80143 DRUG ASSAY ACETAMINOPHEN: CPT

## 2021-01-01 PROCEDURE — 85384 FIBRINOGEN ACTIVITY: CPT

## 2021-01-01 PROCEDURE — 82746 ASSAY OF FOLIC ACID SERUM: CPT

## 2021-01-01 PROCEDURE — 2580000003 HC RX 258: Performed by: NURSE ANESTHETIST, CERTIFIED REGISTERED

## 2021-01-01 PROCEDURE — 93306 TTE W/DOPPLER COMPLETE: CPT

## 2021-01-01 PROCEDURE — 1036F TOBACCO NON-USER: CPT | Performed by: INTERNAL MEDICINE

## 2021-01-01 PROCEDURE — 83930 ASSAY OF BLOOD OSMOLALITY: CPT

## 2021-01-01 PROCEDURE — 99233 SBSQ HOSP IP/OBS HIGH 50: CPT | Performed by: STUDENT IN AN ORGANIZED HEALTH CARE EDUCATION/TRAINING PROGRAM

## 2021-01-01 PROCEDURE — G8484 FLU IMMUNIZE NO ADMIN: HCPCS | Performed by: INTERNAL MEDICINE

## 2021-01-01 PROCEDURE — 3017F COLORECTAL CA SCREEN DOC REV: CPT | Performed by: INTERNAL MEDICINE

## 2021-01-01 RX ORDER — SODIUM CHLORIDE 9 MG/ML
INJECTION, SOLUTION INTRAVENOUS PRN
Status: DISCONTINUED | OUTPATIENT
Start: 2021-01-01 | End: 2021-01-01 | Stop reason: HOSPADM

## 2021-01-01 RX ORDER — ACETAMINOPHEN 325 MG/1
650 TABLET ORAL EVERY 6 HOURS PRN
Status: DISCONTINUED | OUTPATIENT
Start: 2021-01-01 | End: 2021-01-01 | Stop reason: SDUPTHER

## 2021-01-01 RX ORDER — PREDNISONE 20 MG/1
20 TABLET ORAL 2 TIMES DAILY
Qty: 10 TABLET | Refills: 0 | Status: SHIPPED | OUTPATIENT
Start: 2021-01-01 | End: 2021-01-01

## 2021-01-01 RX ORDER — SODIUM CHLORIDE 0.9 % (FLUSH) 0.9 %
5-40 SYRINGE (ML) INJECTION PRN
Status: CANCELLED | OUTPATIENT
Start: 2021-01-01

## 2021-01-01 RX ORDER — GABAPENTIN 300 MG/1
300 CAPSULE ORAL 3 TIMES DAILY
Status: DISCONTINUED | OUTPATIENT
Start: 2021-01-01 | End: 2021-01-01 | Stop reason: HOSPADM

## 2021-01-01 RX ORDER — SODIUM CHLORIDE 0.9 % (FLUSH) 0.9 %
5-40 SYRINGE (ML) INJECTION EVERY 12 HOURS SCHEDULED
Status: DISCONTINUED | OUTPATIENT
Start: 2021-01-01 | End: 2021-01-01 | Stop reason: HOSPADM

## 2021-01-01 RX ORDER — LISINOPRIL 10 MG/1
10 TABLET ORAL DAILY
Status: DISCONTINUED | OUTPATIENT
Start: 2021-01-01 | End: 2021-01-01

## 2021-01-01 RX ORDER — TIZANIDINE 4 MG/1
2 TABLET ORAL EVERY 8 HOURS PRN
Status: DISCONTINUED | OUTPATIENT
Start: 2021-01-01 | End: 2021-01-01 | Stop reason: HOSPADM

## 2021-01-01 RX ORDER — ACETAMINOPHEN 650 MG/1
650 SUPPOSITORY RECTAL EVERY 6 HOURS PRN
Status: DISCONTINUED | OUTPATIENT
Start: 2021-01-01 | End: 2021-01-01 | Stop reason: HOSPADM

## 2021-01-01 RX ORDER — ONDANSETRON 2 MG/ML
4 INJECTION INTRAMUSCULAR; INTRAVENOUS EVERY 6 HOURS PRN
Status: DISCONTINUED | OUTPATIENT
Start: 2021-01-01 | End: 2021-01-01 | Stop reason: HOSPADM

## 2021-01-01 RX ORDER — PANTOPRAZOLE SODIUM 40 MG/1
40 TABLET, DELAYED RELEASE ORAL DAILY
Status: DISCONTINUED | OUTPATIENT
Start: 2021-01-01 | End: 2021-01-01 | Stop reason: HOSPADM

## 2021-01-01 RX ORDER — SODIUM CHLORIDE 0.9 % (FLUSH) 0.9 %
10 SYRINGE (ML) INJECTION PRN
Status: COMPLETED | OUTPATIENT
Start: 2021-01-01 | End: 2021-01-01

## 2021-01-01 RX ORDER — MAGNESIUM SULFATE IN WATER 40 MG/ML
2000 INJECTION, SOLUTION INTRAVENOUS ONCE
Status: DISCONTINUED | OUTPATIENT
Start: 2021-01-01 | End: 2021-01-01

## 2021-01-01 RX ORDER — DEXAMETHASONE 4 MG/1
4 TABLET ORAL 3 TIMES DAILY
Qty: 30 TABLET | Refills: 0 | Status: ON HOLD | OUTPATIENT
Start: 2021-01-01 | End: 2021-01-01 | Stop reason: HOSPADM

## 2021-01-01 RX ORDER — PANTOPRAZOLE SODIUM 40 MG/1
40 TABLET, DELAYED RELEASE ORAL DAILY
Qty: 90 TABLET | Refills: 1 | Status: SHIPPED | OUTPATIENT
Start: 2021-01-01

## 2021-01-01 RX ORDER — ALBUTEROL SULFATE 2.5 MG/3ML
2.5 SOLUTION RESPIRATORY (INHALATION) EVERY 6 HOURS PRN
Status: DISCONTINUED | OUTPATIENT
Start: 2021-01-01 | End: 2021-01-01 | Stop reason: HOSPADM

## 2021-01-01 RX ORDER — POLYETHYLENE GLYCOL 3350 17 G/17G
17 POWDER, FOR SOLUTION ORAL ONCE
Status: COMPLETED | OUTPATIENT
Start: 2021-01-01 | End: 2021-01-01

## 2021-01-01 RX ORDER — MAGNESIUM SULFATE IN WATER 40 MG/ML
2000 INJECTION, SOLUTION INTRAVENOUS ONCE
Status: COMPLETED | OUTPATIENT
Start: 2021-01-01 | End: 2021-01-01

## 2021-01-01 RX ORDER — METOPROLOL SUCCINATE 25 MG/1
25 TABLET, EXTENDED RELEASE ORAL DAILY
Qty: 90 TABLET | Refills: 0
Start: 2021-01-01 | End: 2021-01-01

## 2021-01-01 RX ORDER — NICOTINE POLACRILEX 4 MG
15 LOZENGE BUCCAL PRN
Status: DISCONTINUED | OUTPATIENT
Start: 2021-01-01 | End: 2021-01-01 | Stop reason: HOSPADM

## 2021-01-01 RX ORDER — LISINOPRIL 20 MG/1
20 TABLET ORAL DAILY
Status: DISCONTINUED | OUTPATIENT
Start: 2021-01-01 | End: 2021-01-01

## 2021-01-01 RX ORDER — DEXTROSE MONOHYDRATE 25 G/50ML
12.5 INJECTION, SOLUTION INTRAVENOUS PRN
Status: DISCONTINUED | OUTPATIENT
Start: 2021-01-01 | End: 2021-01-01 | Stop reason: HOSPADM

## 2021-01-01 RX ORDER — SODIUM CHLORIDE 9 MG/ML
25 INJECTION, SOLUTION INTRAVENOUS PRN
Status: DISCONTINUED | OUTPATIENT
Start: 2021-01-01 | End: 2021-01-01 | Stop reason: HOSPADM

## 2021-01-01 RX ORDER — DEXAMETHASONE 4 MG/1
4 TABLET ORAL 3 TIMES DAILY
Status: DISPENSED | OUTPATIENT
Start: 2021-01-01 | End: 2021-01-01

## 2021-01-01 RX ORDER — ALBUTEROL SULFATE 90 UG/1
2 AEROSOL, METERED RESPIRATORY (INHALATION) EVERY 6 HOURS PRN
Status: DISCONTINUED | OUTPATIENT
Start: 2021-01-01 | End: 2021-01-01 | Stop reason: CLARIF

## 2021-01-01 RX ORDER — FOLIC ACID 1 MG/1
1 TABLET ORAL DAILY
Qty: 30 TABLET | Refills: 3 | Status: SHIPPED | OUTPATIENT
Start: 2021-01-01

## 2021-01-01 RX ORDER — SODIUM CHLORIDE 1000 MG
2 TABLET, SOLUBLE MISCELLANEOUS
Status: DISCONTINUED | OUTPATIENT
Start: 2021-01-01 | End: 2021-01-01

## 2021-01-01 RX ORDER — SODIUM CHLORIDE 9 MG/ML
INJECTION, SOLUTION INTRAVENOUS PRN
Status: DISCONTINUED | OUTPATIENT
Start: 2021-01-01 | End: 2021-01-01

## 2021-01-01 RX ORDER — MULTIVITAMIN WITH IRON
100 TABLET ORAL DAILY
Status: DISCONTINUED | OUTPATIENT
Start: 2021-01-01 | End: 2021-01-01 | Stop reason: HOSPADM

## 2021-01-01 RX ORDER — PREDNISONE 20 MG/1
60 TABLET ORAL ONCE
Status: COMPLETED | OUTPATIENT
Start: 2021-01-01 | End: 2021-01-01

## 2021-01-01 RX ORDER — FOLIC ACID 1 MG/1
1 TABLET ORAL DAILY
Status: DISCONTINUED | OUTPATIENT
Start: 2021-01-01 | End: 2021-01-01 | Stop reason: HOSPADM

## 2021-01-01 RX ORDER — CYCLOBENZAPRINE HCL 5 MG
5 TABLET ORAL NIGHTLY PRN
Qty: 30 TABLET | Refills: 0 | Status: SHIPPED | OUTPATIENT
Start: 2021-01-01 | End: 2021-01-01

## 2021-01-01 RX ORDER — CHOLECALCIFEROL (VITAMIN D3) 50 MCG
2000 TABLET ORAL DAILY
Status: DISCONTINUED | OUTPATIENT
Start: 2021-01-01 | End: 2021-01-01 | Stop reason: HOSPADM

## 2021-01-01 RX ORDER — LEVOTHYROXINE SODIUM 0.12 MG/1
125 TABLET ORAL DAILY
Qty: 90 TABLET | Refills: 3 | Status: SHIPPED
Start: 2021-01-01 | End: 2021-01-01 | Stop reason: SDUPTHER

## 2021-01-01 RX ORDER — ASCORBIC ACID
1 CRYSTALS ORAL DAILY
Status: DISCONTINUED | OUTPATIENT
Start: 2021-01-01 | End: 2021-01-01 | Stop reason: CLARIF

## 2021-01-01 RX ORDER — LEVOTHYROXINE SODIUM 0.07 MG/1
75 TABLET ORAL DAILY
Status: DISCONTINUED | OUTPATIENT
Start: 2021-01-01 | End: 2021-01-01 | Stop reason: HOSPADM

## 2021-01-01 RX ORDER — ALBUTEROL SULFATE 2.5 MG/3ML
2.5 SOLUTION RESPIRATORY (INHALATION)
Status: DISCONTINUED | OUTPATIENT
Start: 2021-01-01 | End: 2021-01-01 | Stop reason: HOSPADM

## 2021-01-01 RX ORDER — POTASSIUM CHLORIDE 20 MEQ/1
40 TABLET, EXTENDED RELEASE ORAL ONCE
Status: COMPLETED | OUTPATIENT
Start: 2021-01-01 | End: 2021-01-01

## 2021-01-01 RX ORDER — PREDNISONE 20 MG/1
20 TABLET ORAL 2 TIMES DAILY
Qty: 10 TABLET | Refills: 0 | Status: SHIPPED
Start: 2021-01-01 | End: 2021-01-01 | Stop reason: SDUPTHER

## 2021-01-01 RX ORDER — ATORVASTATIN CALCIUM 10 MG/1
10 TABLET, FILM COATED ORAL NIGHTLY
Status: DISCONTINUED | OUTPATIENT
Start: 2021-01-01 | End: 2021-01-01 | Stop reason: HOSPADM

## 2021-01-01 RX ORDER — ACETAMINOPHEN 325 MG/1
TABLET ORAL
Status: DISPENSED
Start: 2021-01-01 | End: 2021-01-01

## 2021-01-01 RX ORDER — ZOSTER VACCINE RECOMBINANT, ADJUVANTED 50 MCG/0.5
0.5 KIT INTRAMUSCULAR SEE ADMIN INSTRUCTIONS
Qty: 0.5 ML | Refills: 0 | Status: SHIPPED | OUTPATIENT
Start: 2021-01-01 | End: 2021-01-01

## 2021-01-01 RX ORDER — ACETAMINOPHEN 325 MG/1
650 TABLET ORAL EVERY 6 HOURS PRN
Status: DISCONTINUED | OUTPATIENT
Start: 2021-01-01 | End: 2021-01-01 | Stop reason: HOSPADM

## 2021-01-01 RX ORDER — POLYETHYLENE GLYCOL 3350 17 G/17G
17 POWDER, FOR SOLUTION ORAL DAILY PRN
Status: DISCONTINUED | OUTPATIENT
Start: 2021-01-01 | End: 2021-01-01 | Stop reason: HOSPADM

## 2021-01-01 RX ORDER — SODIUM CHLORIDE 9 MG/ML
INJECTION, SOLUTION INTRAVENOUS CONTINUOUS
Status: DISCONTINUED | OUTPATIENT
Start: 2021-01-01 | End: 2021-01-01 | Stop reason: HOSPADM

## 2021-01-01 RX ORDER — LISINOPRIL 10 MG/1
10 TABLET ORAL DAILY
Status: DISCONTINUED | OUTPATIENT
Start: 2021-01-01 | End: 2021-01-01 | Stop reason: HOSPADM

## 2021-01-01 RX ORDER — LANOLIN ALCOHOL/MO/W.PET/CERES
250 CREAM (GRAM) TOPICAL DAILY
Status: DISCONTINUED | OUTPATIENT
Start: 2021-01-01 | End: 2021-01-01 | Stop reason: HOSPADM

## 2021-01-01 RX ORDER — ACETAMINOPHEN 500 MG
500 TABLET ORAL EVERY 6 HOURS PRN
Status: DISCONTINUED | OUTPATIENT
Start: 2021-01-01 | End: 2021-01-01 | Stop reason: HOSPADM

## 2021-01-01 RX ORDER — ATORVASTATIN CALCIUM 10 MG/1
10 TABLET, FILM COATED ORAL DAILY
Status: DISCONTINUED | OUTPATIENT
Start: 2021-01-01 | End: 2021-01-01 | Stop reason: HOSPADM

## 2021-01-01 RX ORDER — ACETAMINOPHEN 325 MG/1
650 TABLET ORAL ONCE
Status: COMPLETED | OUTPATIENT
Start: 2021-01-01 | End: 2021-01-01

## 2021-01-01 RX ORDER — 0.9 % SODIUM CHLORIDE 0.9 %
1000 INTRAVENOUS SOLUTION INTRAVENOUS ONCE
Status: COMPLETED | OUTPATIENT
Start: 2021-01-01 | End: 2021-01-01

## 2021-01-01 RX ORDER — LISINOPRIL 20 MG/1
20 TABLET ORAL DAILY
Qty: 90 TABLET | Refills: 1 | Status: ON HOLD
Start: 2021-01-01 | End: 2021-01-01 | Stop reason: HOSPADM

## 2021-01-01 RX ORDER — CHLORPHENIR/PHENYLEPH/ASPIRIN 2-7.8-325
1 TABLET, EFFERVESCENT ORAL DAILY
Status: DISCONTINUED | OUTPATIENT
Start: 2021-01-01 | End: 2021-01-01 | Stop reason: CLARIF

## 2021-01-01 RX ORDER — ATORVASTATIN CALCIUM 10 MG/1
10 TABLET, FILM COATED ORAL DAILY
Refills: 3 | Status: DISCONTINUED | OUTPATIENT
Start: 2021-01-01 | End: 2021-01-01 | Stop reason: HOSPADM

## 2021-01-01 RX ORDER — ESCITALOPRAM OXALATE 10 MG/1
10 TABLET ORAL EVERY MORNING
Status: DISCONTINUED | OUTPATIENT
Start: 2021-01-01 | End: 2021-01-01 | Stop reason: HOSPADM

## 2021-01-01 RX ORDER — AZITHROMYCIN 250 MG/1
250 TABLET, FILM COATED ORAL SEE ADMIN INSTRUCTIONS
Qty: 6 TABLET | Refills: 0 | Status: SHIPPED | OUTPATIENT
Start: 2021-01-01 | End: 2021-01-01

## 2021-01-01 RX ORDER — PANTOPRAZOLE SODIUM 40 MG/1
40 TABLET, DELAYED RELEASE ORAL DAILY
Status: DISCONTINUED | OUTPATIENT
Start: 2021-01-01 | End: 2021-01-01

## 2021-01-01 RX ORDER — ESCITALOPRAM OXALATE 10 MG/1
10 TABLET ORAL EVERY MORNING
Qty: 90 TABLET | Refills: 1 | Status: SHIPPED | OUTPATIENT
Start: 2021-01-01

## 2021-01-01 RX ORDER — ALBUTEROL SULFATE 90 UG/1
1 AEROSOL, METERED RESPIRATORY (INHALATION) EVERY 6 HOURS PRN
Status: DISCONTINUED | OUTPATIENT
Start: 2021-01-01 | End: 2021-01-01 | Stop reason: CLARIF

## 2021-01-01 RX ORDER — SODIUM CHLORIDE 0.9 % (FLUSH) 0.9 %
10 SYRINGE (ML) INJECTION EVERY 12 HOURS SCHEDULED
Status: DISCONTINUED | OUTPATIENT
Start: 2021-01-01 | End: 2021-01-01 | Stop reason: HOSPADM

## 2021-01-01 RX ORDER — ALBUTEROL SULFATE 90 UG/1
2 AEROSOL, METERED RESPIRATORY (INHALATION) EVERY 6 HOURS PRN
Status: DISCONTINUED | OUTPATIENT
Start: 2021-01-01 | End: 2021-01-01 | Stop reason: HOSPADM

## 2021-01-01 RX ORDER — POTASSIUM CHLORIDE 20 MEQ/1
40 TABLET, EXTENDED RELEASE ORAL PRN
Status: DISCONTINUED | OUTPATIENT
Start: 2021-01-01 | End: 2021-01-01 | Stop reason: HOSPADM

## 2021-01-01 RX ORDER — OYSTER SHELL CALCIUM WITH VITAMIN D 500; 200 MG/1; [IU]/1
1 TABLET, FILM COATED ORAL DAILY
Status: DISCONTINUED | OUTPATIENT
Start: 2021-01-01 | End: 2021-01-01 | Stop reason: SDUPTHER

## 2021-01-01 RX ORDER — MAGNESIUM SULFATE IN WATER 40 MG/ML
2000 INJECTION, SOLUTION INTRAVENOUS ONCE
Status: DISCONTINUED | OUTPATIENT
Start: 2021-01-01 | End: 2021-01-01 | Stop reason: HOSPADM

## 2021-01-01 RX ORDER — LEVOTHYROXINE SODIUM 0.1 MG/1
100 TABLET ORAL DAILY
Qty: 90 TABLET | Refills: 1 | Status: SHIPPED | OUTPATIENT
Start: 2021-01-01

## 2021-01-01 RX ORDER — MULTIVITAMIN WITH IRON
1 TABLET ORAL DAILY
Status: DISCONTINUED | OUTPATIENT
Start: 2021-01-01 | End: 2021-01-01 | Stop reason: HOSPADM

## 2021-01-01 RX ORDER — BROMPHENIRAMINE MALEATE, PSEUDOEPHEDRINE HYDROCHLORIDE, AND DEXTROMETHORPHAN HYDROBROMIDE 2; 30; 10 MG/5ML; MG/5ML; MG/5ML
5 SYRUP ORAL 4 TIMES DAILY PRN
Qty: 240 ML | Refills: 0 | Status: SHIPPED
Start: 2021-01-01 | End: 2021-01-01

## 2021-01-01 RX ORDER — ONDANSETRON 4 MG/1
4 TABLET, ORALLY DISINTEGRATING ORAL EVERY 8 HOURS PRN
Status: DISCONTINUED | OUTPATIENT
Start: 2021-01-01 | End: 2021-01-01 | Stop reason: HOSPADM

## 2021-01-01 RX ORDER — ALBUTEROL SULFATE 90 UG/1
2 AEROSOL, METERED RESPIRATORY (INHALATION) EVERY 6 HOURS PRN
Qty: 1 EACH | Refills: 5 | Status: SHIPPED
Start: 2021-01-01 | End: 2021-01-01 | Stop reason: CLARIF

## 2021-01-01 RX ORDER — PROPOFOL 10 MG/ML
INJECTION, EMULSION INTRAVENOUS PRN
Status: DISCONTINUED | OUTPATIENT
Start: 2021-01-01 | End: 2021-01-01 | Stop reason: SDUPTHER

## 2021-01-01 RX ORDER — PROMETHAZINE HYDROCHLORIDE 25 MG/1
12.5 TABLET ORAL EVERY 6 HOURS PRN
Status: DISCONTINUED | OUTPATIENT
Start: 2021-01-01 | End: 2021-01-01 | Stop reason: HOSPADM

## 2021-01-01 RX ORDER — OYSTER SHELL CALCIUM WITH VITAMIN D 500; 200 MG/1; [IU]/1
1 TABLET, FILM COATED ORAL DAILY
Qty: 90 TABLET | Refills: 1 | Status: SHIPPED | OUTPATIENT
Start: 2021-01-01

## 2021-01-01 RX ORDER — PREDNISONE 20 MG/1
20 TABLET ORAL 2 TIMES DAILY
Qty: 10 TABLET | Refills: 0 | Status: SHIPPED
Start: 2021-01-01 | End: 2021-01-01 | Stop reason: CLARIF

## 2021-01-01 RX ORDER — SODIUM CHLORIDE 0.9 % (FLUSH) 0.9 %
5-40 SYRINGE (ML) INJECTION PRN
Status: DISCONTINUED | OUTPATIENT
Start: 2021-01-01 | End: 2021-01-01 | Stop reason: HOSPADM

## 2021-01-01 RX ORDER — LEVOTHYROXINE SODIUM 0.1 MG/1
100 TABLET ORAL DAILY
Status: DISCONTINUED | OUTPATIENT
Start: 2021-01-01 | End: 2021-01-01 | Stop reason: HOSPADM

## 2021-01-01 RX ORDER — SODIUM CHLORIDE 0.9 % (FLUSH) 0.9 %
10 SYRINGE (ML) INJECTION PRN
Status: DISCONTINUED | OUTPATIENT
Start: 2021-01-01 | End: 2021-01-01 | Stop reason: HOSPADM

## 2021-01-01 RX ORDER — POTASSIUM CHLORIDE 7.45 MG/ML
10 INJECTION INTRAVENOUS
Status: COMPLETED | OUTPATIENT
Start: 2021-01-01 | End: 2021-01-01

## 2021-01-01 RX ORDER — MULTIVITAMIN WITH IRON
100 TABLET ORAL DAILY
Qty: 30 TABLET | Refills: 1 | Status: SHIPPED | OUTPATIENT
Start: 2021-01-01 | End: 2021-01-01 | Stop reason: SDUPTHER

## 2021-01-01 RX ORDER — ACETAMINOPHEN 325 MG/1
650 TABLET ORAL SEE ADMIN INSTRUCTIONS
Status: COMPLETED | OUTPATIENT
Start: 2021-01-01 | End: 2021-01-01

## 2021-01-01 RX ORDER — OYSTER SHELL CALCIUM WITH VITAMIN D 500; 200 MG/1; [IU]/1
1 TABLET, FILM COATED ORAL DAILY
Status: DISCONTINUED | OUTPATIENT
Start: 2021-01-01 | End: 2021-01-01 | Stop reason: HOSPADM

## 2021-01-01 RX ORDER — UBIDECARENONE 75 MG
100 CAPSULE ORAL DAILY
Status: DISCONTINUED | OUTPATIENT
Start: 2021-01-01 | End: 2021-01-01 | Stop reason: HOSPADM

## 2021-01-01 RX ORDER — POLYETHYLENE GLYCOL 3350 17 G/17G
17 POWDER, FOR SOLUTION ORAL DAILY
Status: DISCONTINUED | OUTPATIENT
Start: 2021-01-01 | End: 2021-01-01 | Stop reason: HOSPADM

## 2021-01-01 RX ORDER — DEXTROSE MONOHYDRATE 50 MG/ML
100 INJECTION, SOLUTION INTRAVENOUS PRN
Status: DISCONTINUED | OUTPATIENT
Start: 2021-01-01 | End: 2021-01-01 | Stop reason: HOSPADM

## 2021-01-01 RX ORDER — LIDOCAINE HYDROCHLORIDE 20 MG/ML
10 INJECTION, SOLUTION INFILTRATION; PERINEURAL ONCE
Status: COMPLETED | OUTPATIENT
Start: 2021-01-01 | End: 2021-01-01

## 2021-01-01 RX ORDER — DIPHENHYDRAMINE HYDROCHLORIDE 50 MG/ML
25 INJECTION INTRAMUSCULAR; INTRAVENOUS ONCE
Status: COMPLETED | OUTPATIENT
Start: 2021-01-01 | End: 2021-01-01

## 2021-01-01 RX ORDER — SODIUM CHLORIDE 9 MG/ML
INJECTION, SOLUTION INTRAVENOUS PRN
Status: DISCONTINUED | OUTPATIENT
Start: 2021-01-01 | End: 2021-01-01 | Stop reason: SDUPTHER

## 2021-01-01 RX ORDER — METOPROLOL SUCCINATE 25 MG/1
12.5 TABLET, EXTENDED RELEASE ORAL DAILY
Qty: 45 TABLET | Refills: 1 | Status: SHIPPED
Start: 2021-01-01 | End: 2021-01-01 | Stop reason: SDUPTHER

## 2021-01-01 RX ORDER — ESCITALOPRAM OXALATE 10 MG/1
10 TABLET ORAL EVERY MORNING
Status: DISCONTINUED | OUTPATIENT
Start: 2021-01-01 | End: 2021-01-01

## 2021-01-01 RX ORDER — DEXAMETHASONE SODIUM PHOSPHATE 4 MG/ML
4 INJECTION, SOLUTION INTRA-ARTICULAR; INTRALESIONAL; INTRAMUSCULAR; INTRAVENOUS; SOFT TISSUE EVERY 8 HOURS
Status: DISCONTINUED | OUTPATIENT
Start: 2021-01-01 | End: 2021-01-01

## 2021-01-01 RX ORDER — BROMPHENIRAMINE MALEATE, PSEUDOEPHEDRINE HYDROCHLORIDE, AND DEXTROMETHORPHAN HYDROBROMIDE 2; 30; 10 MG/5ML; MG/5ML; MG/5ML
5 SYRUP ORAL 4 TIMES DAILY PRN
Qty: 240 ML | Refills: 0 | Status: SHIPPED
Start: 2021-01-01 | End: 2021-01-01 | Stop reason: SDUPTHER

## 2021-01-01 RX ORDER — DEXAMETHASONE 4 MG/1
4 TABLET ORAL 3 TIMES DAILY
Status: DISCONTINUED | OUTPATIENT
Start: 2021-01-01 | End: 2021-01-01 | Stop reason: HOSPADM

## 2021-01-01 RX ORDER — LEVOTHYROXINE SODIUM 0.1 MG/1
100 TABLET ORAL DAILY
Qty: 90 TABLET | Refills: 1 | Status: SHIPPED
Start: 2021-01-01 | End: 2021-01-01 | Stop reason: SDUPTHER

## 2021-01-01 RX ORDER — MULTIVITAMIN WITH IRON
100 TABLET ORAL DAILY
Qty: 90 TABLET | Refills: 1 | Status: SHIPPED | OUTPATIENT
Start: 2021-01-01

## 2021-01-01 RX ORDER — OYSTER SHELL CALCIUM WITH VITAMIN D 500; 200 MG/1; [IU]/1
1 TABLET, FILM COATED ORAL DAILY
Qty: 30 TABLET | Refills: 1 | Status: SHIPPED | OUTPATIENT
Start: 2021-01-01 | End: 2021-01-01 | Stop reason: SDUPTHER

## 2021-01-01 RX ORDER — FAMOTIDINE 20 MG/1
20 TABLET, FILM COATED ORAL DAILY
Status: DISCONTINUED | OUTPATIENT
Start: 2021-01-01 | End: 2021-01-01 | Stop reason: HOSPADM

## 2021-01-01 RX ORDER — LISINOPRIL 10 MG/1
10 TABLET ORAL DAILY
Qty: 30 TABLET | Refills: 3 | Status: SHIPPED | OUTPATIENT
Start: 2021-01-01 | End: 2021-01-01

## 2021-01-01 RX ORDER — LOVASTATIN 40 MG/1
40 TABLET ORAL NIGHTLY
Qty: 90 TABLET | Refills: 1 | Status: SHIPPED | OUTPATIENT
Start: 2021-01-01

## 2021-01-01 RX ORDER — DEXAMETHASONE SODIUM PHOSPHATE 4 MG/ML
4 INJECTION, SOLUTION INTRA-ARTICULAR; INTRALESIONAL; INTRAMUSCULAR; INTRAVENOUS; SOFT TISSUE EVERY 6 HOURS
Status: DISCONTINUED | OUTPATIENT
Start: 2021-01-01 | End: 2021-01-01

## 2021-01-01 RX ORDER — AMLODIPINE BESYLATE 10 MG/1
10 TABLET ORAL DAILY
Status: DISCONTINUED | OUTPATIENT
Start: 2021-01-01 | End: 2021-01-01 | Stop reason: HOSPADM

## 2021-01-01 RX ORDER — CEFTRIAXONE 1 G/1
INJECTION, POWDER, FOR SOLUTION INTRAMUSCULAR; INTRAVENOUS
Status: DISCONTINUED
Start: 2021-01-01 | End: 2021-01-01

## 2021-01-01 RX ORDER — SODIUM CHLORIDE 9 MG/ML
25 INJECTION, SOLUTION INTRAVENOUS PRN
Status: CANCELLED | OUTPATIENT
Start: 2021-01-01

## 2021-01-01 RX ORDER — HEPARIN SODIUM 5000 [USP'U]/ML
5000 INJECTION, SOLUTION INTRAVENOUS; SUBCUTANEOUS EVERY 8 HOURS SCHEDULED
Status: DISCONTINUED | OUTPATIENT
Start: 2021-01-01 | End: 2021-01-01

## 2021-01-01 RX ORDER — COLCHICINE 0.6 MG/1
0.6 TABLET ORAL 2 TIMES DAILY
Status: DISCONTINUED | OUTPATIENT
Start: 2021-01-01 | End: 2021-01-01 | Stop reason: HOSPADM

## 2021-01-01 RX ORDER — GABAPENTIN 300 MG/1
300 CAPSULE ORAL 3 TIMES DAILY
Qty: 270 CAPSULE | Refills: 0 | Status: SHIPPED | OUTPATIENT
Start: 2021-01-01 | End: 2022-12-05

## 2021-01-01 RX ORDER — 0.9 % SODIUM CHLORIDE 0.9 %
2000 INTRAVENOUS SOLUTION INTRAVENOUS ONCE
Status: COMPLETED | OUTPATIENT
Start: 2021-01-01 | End: 2021-01-01

## 2021-01-01 RX ORDER — SIMETHICONE 125 MG
125 TABLET,CHEWABLE ORAL EVERY 6 HOURS PRN
Status: DISCONTINUED | OUTPATIENT
Start: 2021-01-01 | End: 2021-01-01 | Stop reason: HOSPADM

## 2021-01-01 RX ORDER — BROMPHENIRAMINE MALEATE, PSEUDOEPHEDRINE HYDROCHLORIDE, AND DEXTROMETHORPHAN HYDROBROMIDE 2; 30; 10 MG/5ML; MG/5ML; MG/5ML
5 SYRUP ORAL 4 TIMES DAILY PRN
Qty: 240 ML | Refills: 0 | Status: ON HOLD
Start: 2021-01-01 | End: 2021-01-01

## 2021-01-01 RX ORDER — BROMPHENIRAMINE MALEATE, PSEUDOEPHEDRINE HYDROCHLORIDE, AND DEXTROMETHORPHAN HYDROBROMIDE 2; 30; 10 MG/5ML; MG/5ML; MG/5ML
5 SYRUP ORAL 4 TIMES DAILY PRN
Qty: 240 ML | Refills: 0 | Status: SHIPPED
Start: 2021-01-01 | End: 2021-01-01 | Stop reason: CLARIF

## 2021-01-01 RX ORDER — DEXAMETHASONE SODIUM PHOSPHATE 4 MG/ML
4 INJECTION, SOLUTION INTRA-ARTICULAR; INTRALESIONAL; INTRAMUSCULAR; INTRAVENOUS; SOFT TISSUE EVERY 8 HOURS
Status: DISCONTINUED | OUTPATIENT
Start: 2021-01-01 | End: 2021-01-01 | Stop reason: HOSPADM

## 2021-01-01 RX ORDER — SODIUM CHLORIDE 9 MG/ML
INJECTION, SOLUTION INTRAVENOUS CONTINUOUS PRN
Status: DISCONTINUED | OUTPATIENT
Start: 2021-01-01 | End: 2021-01-01 | Stop reason: SDUPTHER

## 2021-01-01 RX ORDER — TIZANIDINE 2 MG/1
2 TABLET ORAL EVERY 8 HOURS PRN
Qty: 90 TABLET | Refills: 1 | Status: SHIPPED
Start: 2021-01-01 | End: 2021-01-01

## 2021-01-01 RX ORDER — GUAIFENESIN/DEXTROMETHORPHAN 100-10MG/5
5 SYRUP ORAL EVERY 4 HOURS PRN
Status: DISCONTINUED | OUTPATIENT
Start: 2021-01-01 | End: 2021-01-01 | Stop reason: HOSPADM

## 2021-01-01 RX ORDER — POTASSIUM CHLORIDE 7.45 MG/ML
10 INJECTION INTRAVENOUS PRN
Status: DISCONTINUED | OUTPATIENT
Start: 2021-01-01 | End: 2021-01-01 | Stop reason: HOSPADM

## 2021-01-01 RX ORDER — DIPHENHYDRAMINE HYDROCHLORIDE 50 MG/ML
25 INJECTION INTRAMUSCULAR; INTRAVENOUS SEE ADMIN INSTRUCTIONS
Status: COMPLETED | OUTPATIENT
Start: 2021-01-01 | End: 2021-01-01

## 2021-01-01 RX ORDER — CALCIUM CARBONATE 200(500)MG
1000 TABLET,CHEWABLE ORAL 3 TIMES DAILY PRN
Status: DISCONTINUED | OUTPATIENT
Start: 2021-01-01 | End: 2021-01-01 | Stop reason: HOSPADM

## 2021-01-01 RX ORDER — MAGNESIUM SULFATE 1 G/100ML
1000 INJECTION INTRAVENOUS ONCE
Status: COMPLETED | OUTPATIENT
Start: 2021-01-01 | End: 2021-01-01

## 2021-01-01 RX ORDER — LEVOTHYROXINE SODIUM 0.07 MG/1
150 TABLET ORAL DAILY
Status: DISCONTINUED | OUTPATIENT
Start: 2021-01-01 | End: 2021-01-01 | Stop reason: HOSPADM

## 2021-01-01 RX ORDER — GABAPENTIN 300 MG/1
CAPSULE ORAL
Qty: 270 CAPSULE | Refills: 3 | OUTPATIENT
Start: 2021-01-01

## 2021-01-01 RX ORDER — LEVOTHYROXINE SODIUM 0.1 MG/1
100 TABLET ORAL DAILY
Status: DISCONTINUED | OUTPATIENT
Start: 2021-01-01 | End: 2021-01-01

## 2021-01-01 RX ORDER — TIZANIDINE 2 MG/1
2 TABLET ORAL EVERY 8 HOURS PRN
Qty: 90 TABLET | Refills: 2 | Status: SHIPPED
Start: 2021-01-01 | End: 2021-01-01

## 2021-01-01 RX ORDER — ALBUTEROL SULFATE 90 UG/1
2 AEROSOL, METERED RESPIRATORY (INHALATION) EVERY 6 HOURS PRN
Qty: 1 EACH | Refills: 5 | Status: SHIPPED | OUTPATIENT
Start: 2021-01-01

## 2021-01-01 RX ADMIN — MAGNESIUM SULFATE HEPTAHYDRATE 2000 MG: 40 INJECTION, SOLUTION INTRAVENOUS at 12:20

## 2021-01-01 RX ADMIN — LEVOTHYROXINE SODIUM 100 MCG: 100 TABLET ORAL at 06:57

## 2021-01-01 RX ADMIN — FAMOTIDINE 20 MG: 20 TABLET, FILM COATED ORAL at 08:37

## 2021-01-01 RX ADMIN — SODIUM CHLORIDE, PRESERVATIVE FREE 10 ML: 5 INJECTION INTRAVENOUS at 08:41

## 2021-01-01 RX ADMIN — INSULIN LISPRO 3 UNITS: 100 INJECTION, SOLUTION INTRAVENOUS; SUBCUTANEOUS at 20:19

## 2021-01-01 RX ADMIN — GABAPENTIN 300 MG: 300 CAPSULE ORAL at 21:00

## 2021-01-01 RX ADMIN — INSULIN LISPRO 1 UNITS: 100 INJECTION, SOLUTION INTRAVENOUS; SUBCUTANEOUS at 22:31

## 2021-01-01 RX ADMIN — GABAPENTIN 300 MG: 300 CAPSULE ORAL at 09:02

## 2021-01-01 RX ADMIN — SODIUM CHLORIDE, PRESERVATIVE FREE 10 ML: 5 INJECTION INTRAVENOUS at 22:03

## 2021-01-01 RX ADMIN — CALCIUM CARBONATE-VITAMIN D TAB 500 MG-200 UNIT 1 TABLET: 500-200 TAB at 09:42

## 2021-01-01 RX ADMIN — GABAPENTIN 300 MG: 300 CAPSULE ORAL at 19:54

## 2021-01-01 RX ADMIN — CALCIUM CARBONATE-VITAMIN D TAB 500 MG-200 UNIT 1 TABLET: 500-200 TAB at 09:05

## 2021-01-01 RX ADMIN — LEVOTHYROXINE SODIUM 75 MCG: 75 TABLET ORAL at 06:21

## 2021-01-01 RX ADMIN — SODIUM CHLORIDE, PRESERVATIVE FREE 10 ML: 5 INJECTION INTRAVENOUS at 21:48

## 2021-01-01 RX ADMIN — ATORVASTATIN CALCIUM 10 MG: 10 TABLET, FILM COATED ORAL at 09:49

## 2021-01-01 RX ADMIN — Medication 10 ML: at 09:18

## 2021-01-01 RX ADMIN — CALCIUM CARBONATE-VITAMIN D TAB 500 MG-200 UNIT 1 TABLET: 500-200 TAB at 08:45

## 2021-01-01 RX ADMIN — FAMOTIDINE 20 MG: 20 TABLET, FILM COATED ORAL at 08:21

## 2021-01-01 RX ADMIN — Medication 15 G: at 09:01

## 2021-01-01 RX ADMIN — IOPAMIDOL 75 ML: 755 INJECTION, SOLUTION INTRAVENOUS at 14:15

## 2021-01-01 RX ADMIN — ATORVASTATIN CALCIUM 10 MG: 10 TABLET, FILM COATED ORAL at 20:37

## 2021-01-01 RX ADMIN — Medication 100 MCG: at 08:21

## 2021-01-01 RX ADMIN — ACETAMINOPHEN 650 MG: 325 TABLET ORAL at 04:53

## 2021-01-01 RX ADMIN — FAMOTIDINE 20 MG: 20 TABLET, FILM COATED ORAL at 08:32

## 2021-01-01 RX ADMIN — SODIUM CHLORIDE, PRESERVATIVE FREE 10 ML: 5 INJECTION INTRAVENOUS at 11:19

## 2021-01-01 RX ADMIN — Medication 100 MG: at 09:03

## 2021-01-01 RX ADMIN — ACETAMINOPHEN 650 MG: 325 TABLET ORAL at 09:54

## 2021-01-01 RX ADMIN — POTASSIUM CHLORIDE 40 MEQ: 1500 TABLET, EXTENDED RELEASE ORAL at 22:39

## 2021-01-01 RX ADMIN — PANTOPRAZOLE SODIUM 40 MG: 40 TABLET, DELAYED RELEASE ORAL at 09:03

## 2021-01-01 RX ADMIN — Medication 100 MG: at 09:46

## 2021-01-01 RX ADMIN — INSULIN LISPRO 3 UNITS: 100 INJECTION, SOLUTION INTRAVENOUS; SUBCUTANEOUS at 12:27

## 2021-01-01 RX ADMIN — GABAPENTIN 300 MG: 300 CAPSULE ORAL at 14:26

## 2021-01-01 RX ADMIN — GABAPENTIN 300 MG: 300 CAPSULE ORAL at 08:20

## 2021-01-01 RX ADMIN — GABAPENTIN 300 MG: 300 CAPSULE ORAL at 08:54

## 2021-01-01 RX ADMIN — GABAPENTIN 300 MG: 300 CAPSULE ORAL at 09:09

## 2021-01-01 RX ADMIN — DEXAMETHASONE SODIUM PHOSPHATE 4 MG: 4 INJECTION, SOLUTION INTRA-ARTICULAR; INTRALESIONAL; INTRAMUSCULAR; INTRAVENOUS; SOFT TISSUE at 17:47

## 2021-01-01 RX ADMIN — GABAPENTIN 300 MG: 300 CAPSULE ORAL at 16:57

## 2021-01-01 RX ADMIN — POLYETHYLENE GLYCOL 3350 17 G: 17 POWDER, FOR SOLUTION ORAL at 08:35

## 2021-01-01 RX ADMIN — GABAPENTIN 300 MG: 300 CAPSULE ORAL at 13:04

## 2021-01-01 RX ADMIN — OYSTER SHELL CALCIUM WITH VITAMIN D 1 TABLET: 500; 200 TABLET, FILM COATED ORAL at 11:18

## 2021-01-01 RX ADMIN — DEXAMETHASONE SODIUM PHOSPHATE 4 MG: 4 INJECTION, SOLUTION INTRAMUSCULAR; INTRAVENOUS at 21:48

## 2021-01-01 RX ADMIN — GUAIFENESIN AND DEXTROMETHORPHAN 5 ML: 100; 10 SYRUP ORAL at 20:38

## 2021-01-01 RX ADMIN — Medication 10 ML: at 08:27

## 2021-01-01 RX ADMIN — CALCIUM CARBONATE-VITAMIN D TAB 500 MG-200 UNIT 1 TABLET: 500-200 TAB at 12:24

## 2021-01-01 RX ADMIN — Medication 15 G: at 09:42

## 2021-01-01 RX ADMIN — ACETAMINOPHEN 650 MG: 325 TABLET ORAL at 21:14

## 2021-01-01 RX ADMIN — LEVOTHYROXINE SODIUM 100 MCG: 100 TABLET ORAL at 05:48

## 2021-01-01 RX ADMIN — FOLIC ACID 1 MG: 1 TABLET ORAL at 09:46

## 2021-01-01 RX ADMIN — DEXAMETHASONE SODIUM PHOSPHATE 4 MG: 4 INJECTION, SOLUTION INTRAMUSCULAR; INTRAVENOUS at 05:23

## 2021-01-01 RX ADMIN — GABAPENTIN 300 MG: 300 CAPSULE ORAL at 14:14

## 2021-01-01 RX ADMIN — ATORVASTATIN CALCIUM 10 MG: 10 TABLET, FILM COATED ORAL at 20:14

## 2021-01-01 RX ADMIN — Medication 10 ML: at 22:04

## 2021-01-01 RX ADMIN — GABAPENTIN 300 MG: 300 CAPSULE ORAL at 15:59

## 2021-01-01 RX ADMIN — ACETAMINOPHEN 650 MG: 325 TABLET ORAL at 11:50

## 2021-01-01 RX ADMIN — Medication 30 G: at 10:35

## 2021-01-01 RX ADMIN — DEXAMETHASONE SODIUM PHOSPHATE 4 MG: 4 INJECTION, SOLUTION INTRAMUSCULAR; INTRAVENOUS at 11:24

## 2021-01-01 RX ADMIN — GABAPENTIN 300 MG: 300 CAPSULE ORAL at 12:24

## 2021-01-01 RX ADMIN — ENOXAPARIN SODIUM 40 MG: 40 INJECTION SUBCUTANEOUS at 08:39

## 2021-01-01 RX ADMIN — IOHEXOL 50 ML: 240 INJECTION, SOLUTION INTRATHECAL; INTRAVASCULAR; INTRAVENOUS; ORAL at 10:46

## 2021-01-01 RX ADMIN — SODIUM CHLORIDE 2000 ML: 9 INJECTION, SOLUTION INTRAVENOUS at 02:38

## 2021-01-01 RX ADMIN — Medication 2000 UNITS: at 10:51

## 2021-01-01 RX ADMIN — LISINOPRIL 20 MG: 20 TABLET ORAL at 09:55

## 2021-01-01 RX ADMIN — Medication 100 MG: at 08:36

## 2021-01-01 RX ADMIN — GABAPENTIN 300 MG: 300 CAPSULE ORAL at 20:38

## 2021-01-01 RX ADMIN — LEVOTHYROXINE SODIUM 150 MCG: 75 TABLET ORAL at 05:31

## 2021-01-01 RX ADMIN — CALCIUM CARBONATE-VITAMIN D TAB 500 MG-200 UNIT 1 TABLET: 500-200 TAB at 08:39

## 2021-01-01 RX ADMIN — LISINOPRIL 10 MG: 10 TABLET ORAL at 08:41

## 2021-01-01 RX ADMIN — DEXAMETHASONE SODIUM PHOSPHATE 4 MG: 4 INJECTION, SOLUTION INTRAMUSCULAR; INTRAVENOUS at 12:16

## 2021-01-01 RX ADMIN — LEVOTHYROXINE SODIUM 75 MCG: 75 TABLET ORAL at 05:53

## 2021-01-01 RX ADMIN — GABAPENTIN 300 MG: 300 CAPSULE ORAL at 22:39

## 2021-01-01 RX ADMIN — Medication 10 ML: at 10:17

## 2021-01-01 RX ADMIN — GABAPENTIN 300 MG: 300 CAPSULE ORAL at 20:14

## 2021-01-01 RX ADMIN — FAMOTIDINE 20 MG: 20 TABLET, FILM COATED ORAL at 08:20

## 2021-01-01 RX ADMIN — ATORVASTATIN CALCIUM 10 MG: 10 TABLET, FILM COATED ORAL at 21:17

## 2021-01-01 RX ADMIN — GABAPENTIN 300 MG: 300 CAPSULE ORAL at 08:00

## 2021-01-01 RX ADMIN — CALCIUM CARBONATE-VITAMIN D TAB 500 MG-200 UNIT 1 TABLET: 500-200 TAB at 08:36

## 2021-01-01 RX ADMIN — PANTOPRAZOLE SODIUM 40 MG: 40 TABLET, DELAYED RELEASE ORAL at 06:57

## 2021-01-01 RX ADMIN — FOLIC ACID 1 MG: 1 TABLET ORAL at 09:08

## 2021-01-01 RX ADMIN — Medication 30 G: at 12:26

## 2021-01-01 RX ADMIN — Medication 100 MG: at 09:08

## 2021-01-01 RX ADMIN — ESCITALOPRAM OXALATE 10 MG: 10 TABLET ORAL at 08:41

## 2021-01-01 RX ADMIN — Medication 100 MCG: at 12:24

## 2021-01-01 RX ADMIN — Medication 15 G: at 09:40

## 2021-01-01 RX ADMIN — LISINOPRIL 10 MG: 10 TABLET ORAL at 09:08

## 2021-01-01 RX ADMIN — Medication 100 MCG: at 08:45

## 2021-01-01 RX ADMIN — Medication 10 ML: at 22:46

## 2021-01-01 RX ADMIN — CYANOCOBALAMIN TAB 1000 MCG 250 MCG: 1000 TAB at 12:49

## 2021-01-01 RX ADMIN — Medication 2 G: at 16:57

## 2021-01-01 RX ADMIN — LEVOTHYROXINE SODIUM 100 MCG: 100 TABLET ORAL at 05:51

## 2021-01-01 RX ADMIN — Medication 10 ML: at 21:00

## 2021-01-01 RX ADMIN — INSULIN LISPRO 5 UNITS: 100 INJECTION, SOLUTION INTRAVENOUS; SUBCUTANEOUS at 20:55

## 2021-01-01 RX ADMIN — GABAPENTIN 300 MG: 300 CAPSULE ORAL at 20:28

## 2021-01-01 RX ADMIN — GABAPENTIN 300 MG: 300 CAPSULE ORAL at 08:40

## 2021-01-01 RX ADMIN — POLYETHYLENE GLYCOL 3350 17 G: 17 POWDER, FOR SOLUTION ORAL at 08:37

## 2021-01-01 RX ADMIN — PANTOPRAZOLE SODIUM 40 MG: 40 TABLET, DELAYED RELEASE ORAL at 18:17

## 2021-01-01 RX ADMIN — MAGNESIUM SULFATE HEPTAHYDRATE 1000 MG: 1 INJECTION, SOLUTION INTRAVENOUS at 13:10

## 2021-01-01 RX ADMIN — SODIUM CHLORIDE, PRESERVATIVE FREE 10 ML: 5 INJECTION INTRAVENOUS at 14:12

## 2021-01-01 RX ADMIN — Medication 100 MCG: at 08:37

## 2021-01-01 RX ADMIN — Medication 100 MG: at 08:39

## 2021-01-01 RX ADMIN — GABAPENTIN 300 MG: 300 CAPSULE ORAL at 13:54

## 2021-01-01 RX ADMIN — GUAIFENESIN AND DEXTROMETHORPHAN 5 ML: 100; 10 SYRUP ORAL at 22:12

## 2021-01-01 RX ADMIN — PROPOFOL 30 MG: 10 INJECTION, EMULSION INTRAVENOUS at 08:31

## 2021-01-01 RX ADMIN — GABAPENTIN 300 MG: 300 CAPSULE ORAL at 14:10

## 2021-01-01 RX ADMIN — COLCHICINE 0.6 MG: 0.6 TABLET ORAL at 09:03

## 2021-01-01 RX ADMIN — LISINOPRIL 20 MG: 20 TABLET ORAL at 08:42

## 2021-01-01 RX ADMIN — INSULIN LISPRO 3 UNITS: 100 INJECTION, SOLUTION INTRAVENOUS; SUBCUTANEOUS at 12:13

## 2021-01-01 RX ADMIN — ATORVASTATIN CALCIUM 10 MG: 10 TABLET, FILM COATED ORAL at 21:33

## 2021-01-01 RX ADMIN — ATORVASTATIN CALCIUM 10 MG: 10 TABLET, FILM COATED ORAL at 20:28

## 2021-01-01 RX ADMIN — SODIUM CHLORIDE, PRESERVATIVE FREE 10 ML: 5 INJECTION INTRAVENOUS at 12:54

## 2021-01-01 RX ADMIN — Medication 100 MG: at 09:05

## 2021-01-01 RX ADMIN — LEVOTHYROXINE SODIUM 75 MCG: 75 TABLET ORAL at 06:08

## 2021-01-01 RX ADMIN — SODIUM CHLORIDE, PRESERVATIVE FREE 10 ML: 5 INJECTION INTRAVENOUS at 21:01

## 2021-01-01 RX ADMIN — GABAPENTIN 300 MG: 300 CAPSULE ORAL at 08:39

## 2021-01-01 RX ADMIN — Medication 100 MG: at 08:21

## 2021-01-01 RX ADMIN — Medication 100 MG: at 08:54

## 2021-01-01 RX ADMIN — PANTOPRAZOLE SODIUM 40 MG: 40 TABLET, DELAYED RELEASE ORAL at 06:05

## 2021-01-01 RX ADMIN — GABAPENTIN 300 MG: 300 CAPSULE ORAL at 22:40

## 2021-01-01 RX ADMIN — CYANOCOBALAMIN TAB 1000 MCG 250 MCG: 1000 TAB at 11:19

## 2021-01-01 RX ADMIN — Medication 10 ML: at 08:36

## 2021-01-01 RX ADMIN — PREDNISONE 60 MG: 20 TABLET ORAL at 21:49

## 2021-01-01 RX ADMIN — DEXAMETHASONE SODIUM PHOSPHATE 4 MG: 4 INJECTION, SOLUTION INTRA-ARTICULAR; INTRALESIONAL; INTRAMUSCULAR; INTRAVENOUS; SOFT TISSUE at 22:29

## 2021-01-01 RX ADMIN — GABAPENTIN 300 MG: 300 CAPSULE ORAL at 22:03

## 2021-01-01 RX ADMIN — DEXAMETHASONE 4 MG: 4 TABLET ORAL at 01:26

## 2021-01-01 RX ADMIN — CYANOCOBALAMIN TAB 1000 MCG 250 MCG: 1000 TAB at 09:54

## 2021-01-01 RX ADMIN — DEXAMETHASONE SODIUM PHOSPHATE 4 MG: 4 INJECTION, SOLUTION INTRAMUSCULAR; INTRAVENOUS at 18:24

## 2021-01-01 RX ADMIN — ENOXAPARIN SODIUM 30 MG: 30 INJECTION SUBCUTANEOUS at 10:52

## 2021-01-01 RX ADMIN — GABAPENTIN 300 MG: 300 CAPSULE ORAL at 08:36

## 2021-01-01 RX ADMIN — GABAPENTIN 300 MG: 300 CAPSULE ORAL at 14:00

## 2021-01-01 RX ADMIN — FOLIC ACID 1 MG: 1 TABLET ORAL at 08:41

## 2021-01-01 RX ADMIN — Medication 100 MG: at 12:24

## 2021-01-01 RX ADMIN — GABAPENTIN 300 MG: 300 CAPSULE ORAL at 21:50

## 2021-01-01 RX ADMIN — DEXAMETHASONE SODIUM PHOSPHATE 4 MG: 4 INJECTION, SOLUTION INTRA-ARTICULAR; INTRALESIONAL; INTRAMUSCULAR; INTRAVENOUS; SOFT TISSUE at 15:59

## 2021-01-01 RX ADMIN — GUAIFENESIN AND DEXTROMETHORPHAN 5 ML: 100; 10 SYRUP ORAL at 08:39

## 2021-01-01 RX ADMIN — Medication 10 ML: at 08:49

## 2021-01-01 RX ADMIN — SODIUM CHLORIDE, PRESERVATIVE FREE 10 ML: 5 INJECTION INTRAVENOUS at 20:08

## 2021-01-01 RX ADMIN — CEFTRIAXONE 1000 MG: 1 INJECTION, POWDER, FOR SOLUTION INTRAMUSCULAR; INTRAVENOUS at 21:58

## 2021-01-01 RX ADMIN — Medication 15 G: at 08:40

## 2021-01-01 RX ADMIN — PROPOFOL 50 MG: 10 INJECTION, EMULSION INTRAVENOUS at 08:25

## 2021-01-01 RX ADMIN — GABAPENTIN 300 MG: 300 CAPSULE ORAL at 20:33

## 2021-01-01 RX ADMIN — DEXAMETHASONE SODIUM PHOSPHATE 4 MG: 4 INJECTION, SOLUTION INTRA-ARTICULAR; INTRALESIONAL; INTRAMUSCULAR; INTRAVENOUS; SOFT TISSUE at 00:03

## 2021-01-01 RX ADMIN — ACETAMINOPHEN 500 MG: 500 TABLET ORAL at 14:24

## 2021-01-01 RX ADMIN — GABAPENTIN 300 MG: 300 CAPSULE ORAL at 21:12

## 2021-01-01 RX ADMIN — POLYETHYLENE GLYCOL 3350 17 G: 17 POWDER, FOR SOLUTION ORAL at 17:57

## 2021-01-01 RX ADMIN — SODIUM CHLORIDE, PRESERVATIVE FREE 10 ML: 5 INJECTION INTRAVENOUS at 20:14

## 2021-01-01 RX ADMIN — GABAPENTIN 300 MG: 300 CAPSULE ORAL at 21:09

## 2021-01-01 RX ADMIN — Medication 100 MG: at 09:55

## 2021-01-01 RX ADMIN — SODIUM CHLORIDE, PRESERVATIVE FREE 10 ML: 5 INJECTION INTRAVENOUS at 21:32

## 2021-01-01 RX ADMIN — GABAPENTIN 300 MG: 300 CAPSULE ORAL at 08:42

## 2021-01-01 RX ADMIN — FAMOTIDINE 20 MG: 20 TABLET, FILM COATED ORAL at 08:36

## 2021-01-01 RX ADMIN — PANTOPRAZOLE SODIUM 40 MG: 20 TABLET, DELAYED RELEASE ORAL at 08:40

## 2021-01-01 RX ADMIN — DEXAMETHASONE SODIUM PHOSPHATE 4 MG: 4 INJECTION, SOLUTION INTRAMUSCULAR; INTRAVENOUS at 05:51

## 2021-01-01 RX ADMIN — ESCITALOPRAM OXALATE 10 MG: 10 TABLET ORAL at 12:39

## 2021-01-01 RX ADMIN — GABAPENTIN 300 MG: 300 CAPSULE ORAL at 08:45

## 2021-01-01 RX ADMIN — Medication 100 MG: at 08:00

## 2021-01-01 RX ADMIN — GUAIFENESIN AND DEXTROMETHORPHAN 5 ML: 100; 10 SYRUP ORAL at 09:03

## 2021-01-01 RX ADMIN — PANTOPRAZOLE SODIUM 40 MG: 20 TABLET, DELAYED RELEASE ORAL at 09:46

## 2021-01-01 RX ADMIN — COLCHICINE 0.6 MG: 0.6 TABLET ORAL at 08:39

## 2021-01-01 RX ADMIN — Medication 2000 UNITS: at 08:39

## 2021-01-01 RX ADMIN — Medication 10 ML: at 21:12

## 2021-01-01 RX ADMIN — Medication 10 ML: at 08:02

## 2021-01-01 RX ADMIN — IMMUNE GLOBULIN (HUMAN) 30 G: 10 INJECTION INTRAVENOUS; SUBCUTANEOUS at 16:27

## 2021-01-01 RX ADMIN — IOHEXOL 50 ML: 240 INJECTION, SOLUTION INTRATHECAL; INTRAVASCULAR; INTRAVENOUS; ORAL at 12:19

## 2021-01-01 RX ADMIN — Medication 30 G: at 08:21

## 2021-01-01 RX ADMIN — INSULIN LISPRO 2 UNITS: 100 INJECTION, SOLUTION INTRAVENOUS; SUBCUTANEOUS at 08:37

## 2021-01-01 RX ADMIN — Medication 2000 UNITS: at 09:03

## 2021-01-01 RX ADMIN — Medication 100 MG: at 09:42

## 2021-01-01 RX ADMIN — SODIUM CHLORIDE, PRESERVATIVE FREE 10 ML: 5 INJECTION INTRAVENOUS at 09:02

## 2021-01-01 RX ADMIN — ESCITALOPRAM OXALATE 10 MG: 10 TABLET ORAL at 10:51

## 2021-01-01 RX ADMIN — FAMOTIDINE 20 MG: 20 TABLET, FILM COATED ORAL at 09:05

## 2021-01-01 RX ADMIN — DEXAMETHASONE SODIUM PHOSPHATE 4 MG: 4 INJECTION, SOLUTION INTRAMUSCULAR; INTRAVENOUS at 20:28

## 2021-01-01 RX ADMIN — Medication 100 MG: at 21:33

## 2021-01-01 RX ADMIN — SODIUM CHLORIDE 25 ML: 9 INJECTION, SOLUTION INTRAVENOUS at 22:40

## 2021-01-01 RX ADMIN — GABAPENTIN 300 MG: 300 CAPSULE ORAL at 14:49

## 2021-01-01 RX ADMIN — LISINOPRIL 20 MG: 20 TABLET ORAL at 18:17

## 2021-01-01 RX ADMIN — Medication 100 MG: at 12:49

## 2021-01-01 RX ADMIN — Medication 15 G: at 09:56

## 2021-01-01 RX ADMIN — CALCIUM CARBONATE-VITAMIN D TAB 500 MG-200 UNIT 1 TABLET: 500-200 TAB at 08:21

## 2021-01-01 RX ADMIN — SODIUM CHLORIDE: 9 INJECTION, SOLUTION INTRAVENOUS at 08:12

## 2021-01-01 RX ADMIN — DEXAMETHASONE SODIUM PHOSPHATE 4 MG: 4 INJECTION, SOLUTION INTRA-ARTICULAR; INTRALESIONAL; INTRAMUSCULAR; INTRAVENOUS; SOFT TISSUE at 08:54

## 2021-01-01 RX ADMIN — GABAPENTIN 300 MG: 300 CAPSULE ORAL at 08:32

## 2021-01-01 RX ADMIN — ESCITALOPRAM OXALATE 10 MG: 10 TABLET ORAL at 08:42

## 2021-01-01 RX ADMIN — Medication 15 G: at 17:00

## 2021-01-01 RX ADMIN — SODIUM CHLORIDE, PRESERVATIVE FREE 10 ML: 5 INJECTION INTRAVENOUS at 21:51

## 2021-01-01 RX ADMIN — ATORVASTATIN CALCIUM 10 MG: 10 TABLET, FILM COATED ORAL at 20:55

## 2021-01-01 RX ADMIN — GABAPENTIN 300 MG: 300 CAPSULE ORAL at 21:33

## 2021-01-01 RX ADMIN — ATORVASTATIN CALCIUM 10 MG: 10 TABLET, FILM COATED ORAL at 21:48

## 2021-01-01 RX ADMIN — SODIUM CHLORIDE, PRESERVATIVE FREE 10 ML: 5 INJECTION INTRAVENOUS at 09:46

## 2021-01-01 RX ADMIN — GABAPENTIN 300 MG: 300 CAPSULE ORAL at 09:05

## 2021-01-01 RX ADMIN — GABAPENTIN 300 MG: 300 CAPSULE ORAL at 20:54

## 2021-01-01 RX ADMIN — OYSTER SHELL CALCIUM WITH VITAMIN D 1 TABLET: 500; 200 TABLET, FILM COATED ORAL at 09:55

## 2021-01-01 RX ADMIN — ATORVASTATIN CALCIUM 10 MG: 10 TABLET, FILM COATED ORAL at 23:18

## 2021-01-01 RX ADMIN — Medication 15 G: at 08:54

## 2021-01-01 RX ADMIN — Medication 10 ML: at 10:15

## 2021-01-01 RX ADMIN — DEXAMETHASONE SODIUM PHOSPHATE 4 MG: 4 INJECTION, SOLUTION INTRA-ARTICULAR; INTRALESIONAL; INTRAMUSCULAR; INTRAVENOUS; SOFT TISSUE at 06:28

## 2021-01-01 RX ADMIN — DEXAMETHASONE SODIUM PHOSPHATE 4 MG: 4 INJECTION, SOLUTION INTRA-ARTICULAR; INTRALESIONAL; INTRAMUSCULAR; INTRAVENOUS; SOFT TISSUE at 08:46

## 2021-01-01 RX ADMIN — ATORVASTATIN CALCIUM 10 MG: 10 TABLET, FILM COATED ORAL at 20:08

## 2021-01-01 RX ADMIN — ESCITALOPRAM OXALATE 10 MG: 10 TABLET ORAL at 09:55

## 2021-01-01 RX ADMIN — Medication 10 ML: at 21:18

## 2021-01-01 RX ADMIN — LEVOTHYROXINE SODIUM 75 MCG: 75 TABLET ORAL at 06:10

## 2021-01-01 RX ADMIN — CALCIUM CARBONATE-VITAMIN D TAB 500 MG-200 UNIT 1 TABLET: 500-200 TAB at 22:33

## 2021-01-01 RX ADMIN — GABAPENTIN 300 MG: 300 CAPSULE ORAL at 21:27

## 2021-01-01 RX ADMIN — INSULIN LISPRO 2 UNITS: 100 INJECTION, SOLUTION INTRAVENOUS; SUBCUTANEOUS at 07:10

## 2021-01-01 RX ADMIN — Medication 2 G: at 12:18

## 2021-01-01 RX ADMIN — ACETAMINOPHEN 650 MG: 325 TABLET ORAL at 21:00

## 2021-01-01 RX ADMIN — Medication 30 G: at 08:38

## 2021-01-01 RX ADMIN — MULTIVITAMIN TABLET 1 TABLET: TABLET at 09:54

## 2021-01-01 RX ADMIN — GABAPENTIN 300 MG: 300 CAPSULE ORAL at 21:17

## 2021-01-01 RX ADMIN — GABAPENTIN 300 MG: 300 CAPSULE ORAL at 13:14

## 2021-01-01 RX ADMIN — GABAPENTIN 300 MG: 300 CAPSULE ORAL at 14:17

## 2021-01-01 RX ADMIN — GABAPENTIN 300 MG: 300 CAPSULE ORAL at 15:00

## 2021-01-01 RX ADMIN — SODIUM CHLORIDE 1000 ML: 9 INJECTION, SOLUTION INTRAVENOUS at 19:48

## 2021-01-01 RX ADMIN — LEVOTHYROXINE SODIUM 100 MCG: 100 TABLET ORAL at 05:18

## 2021-01-01 RX ADMIN — ACETAMINOPHEN 500 MG: 500 TABLET ORAL at 22:40

## 2021-01-01 RX ADMIN — DEXAMETHASONE SODIUM PHOSPHATE 4 MG: 4 INJECTION, SOLUTION INTRA-ARTICULAR; INTRALESIONAL; INTRAMUSCULAR; INTRAVENOUS; SOFT TISSUE at 06:09

## 2021-01-01 RX ADMIN — SODIUM CHLORIDE, PRESERVATIVE FREE 10 ML: 5 INJECTION INTRAVENOUS at 20:29

## 2021-01-01 RX ADMIN — DEXAMETHASONE SODIUM PHOSPHATE 4 MG: 4 INJECTION, SOLUTION INTRA-ARTICULAR; INTRALESIONAL; INTRAMUSCULAR; INTRAVENOUS; SOFT TISSUE at 00:41

## 2021-01-01 RX ADMIN — LEVOTHYROXINE SODIUM 100 MCG: 100 TABLET ORAL at 10:16

## 2021-01-01 RX ADMIN — INSULIN LISPRO 3 UNITS: 100 INJECTION, SOLUTION INTRAVENOUS; SUBCUTANEOUS at 22:10

## 2021-01-01 RX ADMIN — Medication 100 MG: at 09:49

## 2021-01-01 RX ADMIN — LEVOTHYROXINE SODIUM 100 MCG: 100 TABLET ORAL at 06:05

## 2021-01-01 RX ADMIN — DEXAMETHASONE SODIUM PHOSPHATE 4 MG: 4 INJECTION, SOLUTION INTRA-ARTICULAR; INTRALESIONAL; INTRAMUSCULAR; INTRAVENOUS; SOFT TISSUE at 08:39

## 2021-01-01 RX ADMIN — GABAPENTIN 300 MG: 300 CAPSULE ORAL at 09:42

## 2021-01-01 RX ADMIN — DEXAMETHASONE SODIUM PHOSPHATE 4 MG: 4 INJECTION, SOLUTION INTRA-ARTICULAR; INTRALESIONAL; INTRAMUSCULAR; INTRAVENOUS; SOFT TISSUE at 22:03

## 2021-01-01 RX ADMIN — GABAPENTIN 300 MG: 300 CAPSULE ORAL at 14:28

## 2021-01-01 RX ADMIN — FAMOTIDINE 20 MG: 20 TABLET, FILM COATED ORAL at 08:45

## 2021-01-01 RX ADMIN — SODIUM CHLORIDE, PRESERVATIVE FREE 10 ML: 5 INJECTION INTRAVENOUS at 09:50

## 2021-01-01 RX ADMIN — GABAPENTIN 300 MG: 300 CAPSULE ORAL at 20:37

## 2021-01-01 RX ADMIN — ESCITALOPRAM OXALATE 10 MG: 10 TABLET ORAL at 09:02

## 2021-01-01 RX ADMIN — Medication 2 G: at 17:57

## 2021-01-01 RX ADMIN — Medication 10 ML: at 22:34

## 2021-01-01 RX ADMIN — FOLIC ACID 1 MG: 1 TABLET ORAL at 09:49

## 2021-01-01 RX ADMIN — AMLODIPINE BESYLATE 10 MG: 10 TABLET ORAL at 10:16

## 2021-01-01 RX ADMIN — Medication 10 ML: at 20:18

## 2021-01-01 RX ADMIN — ATORVASTATIN CALCIUM 10 MG: 10 TABLET, FILM COATED ORAL at 22:39

## 2021-01-01 RX ADMIN — CALCIUM CARBONATE-VITAMIN D TAB 500 MG-200 UNIT 1 TABLET: 500-200 TAB at 08:20

## 2021-01-01 RX ADMIN — GABAPENTIN 300 MG: 300 CAPSULE ORAL at 21:31

## 2021-01-01 RX ADMIN — LEVOTHYROXINE SODIUM 150 MCG: 75 TABLET ORAL at 06:33

## 2021-01-01 RX ADMIN — ACETAMINOPHEN 650 MG: 325 TABLET ORAL at 11:38

## 2021-01-01 RX ADMIN — CALCIUM CARBONATE-VITAMIN D TAB 500 MG-200 UNIT 1 TABLET: 500-200 TAB at 08:00

## 2021-01-01 RX ADMIN — Medication 100 MCG: at 09:42

## 2021-01-01 RX ADMIN — ENOXAPARIN SODIUM 30 MG: 30 INJECTION SUBCUTANEOUS at 02:36

## 2021-01-01 RX ADMIN — Medication 100 MG: at 09:02

## 2021-01-01 RX ADMIN — DEXAMETHASONE SODIUM PHOSPHATE 4 MG: 4 INJECTION, SOLUTION INTRA-ARTICULAR; INTRALESIONAL; INTRAMUSCULAR; INTRAVENOUS; SOFT TISSUE at 06:16

## 2021-01-01 RX ADMIN — ATORVASTATIN CALCIUM 10 MG: 10 TABLET, FILM COATED ORAL at 21:23

## 2021-01-01 RX ADMIN — LEVOTHYROXINE SODIUM 100 MCG: 100 TABLET ORAL at 07:03

## 2021-01-01 RX ADMIN — FAMOTIDINE 20 MG: 20 TABLET, FILM COATED ORAL at 08:00

## 2021-01-01 RX ADMIN — ACETAMINOPHEN 500 MG: 500 TABLET ORAL at 13:39

## 2021-01-01 RX ADMIN — GABAPENTIN 300 MG: 300 CAPSULE ORAL at 14:12

## 2021-01-01 RX ADMIN — POLYETHYLENE GLYCOL 3350 17 G: 17 POWDER, FOR SOLUTION ORAL at 08:00

## 2021-01-01 RX ADMIN — CYANOCOBALAMIN TAB 1000 MCG 250 MCG: 1000 TAB at 18:17

## 2021-01-01 RX ADMIN — INSULIN LISPRO 3 UNITS: 100 INJECTION, SOLUTION INTRAVENOUS; SUBCUTANEOUS at 11:38

## 2021-01-01 RX ADMIN — GABAPENTIN 300 MG: 300 CAPSULE ORAL at 14:44

## 2021-01-01 RX ADMIN — CALCIUM CARBONATE 1000 MG: 500 TABLET, CHEWABLE ORAL at 21:48

## 2021-01-01 RX ADMIN — Medication 2000 MG: at 12:23

## 2021-01-01 RX ADMIN — ATORVASTATIN CALCIUM 10 MG: 10 TABLET, FILM COATED ORAL at 22:40

## 2021-01-01 RX ADMIN — INSULIN LISPRO 6 UNITS: 100 INJECTION, SOLUTION INTRAVENOUS; SUBCUTANEOUS at 16:29

## 2021-01-01 RX ADMIN — SODIUM CHLORIDE, PRESERVATIVE FREE 10 ML: 5 INJECTION INTRAVENOUS at 08:42

## 2021-01-01 RX ADMIN — SODIUM CHLORIDE 1000 ML: 9 INJECTION, SOLUTION INTRAVENOUS at 12:10

## 2021-01-01 RX ADMIN — Medication 10 ML: at 20:40

## 2021-01-01 RX ADMIN — GABAPENTIN 300 MG: 300 CAPSULE ORAL at 08:37

## 2021-01-01 RX ADMIN — MULTIVITAMIN TABLET 1 TABLET: TABLET at 11:18

## 2021-01-01 RX ADMIN — LEVOTHYROXINE SODIUM 75 MCG: 75 TABLET ORAL at 06:28

## 2021-01-01 RX ADMIN — LISINOPRIL 10 MG: 10 TABLET ORAL at 11:19

## 2021-01-01 RX ADMIN — POLYETHYLENE GLYCOL 3350 17 G: 17 POWDER, FOR SOLUTION ORAL at 13:04

## 2021-01-01 RX ADMIN — Medication 10 ML: at 08:54

## 2021-01-01 RX ADMIN — GABAPENTIN 300 MG: 300 CAPSULE ORAL at 09:04

## 2021-01-01 RX ADMIN — PANTOPRAZOLE SODIUM 40 MG: 20 TABLET, DELAYED RELEASE ORAL at 21:50

## 2021-01-01 RX ADMIN — Medication 100 MG: at 11:19

## 2021-01-01 RX ADMIN — ATORVASTATIN CALCIUM 10 MG: 10 TABLET, FILM COATED ORAL at 20:33

## 2021-01-01 RX ADMIN — DEXAMETHASONE SODIUM PHOSPHATE 4 MG: 4 INJECTION, SOLUTION INTRAMUSCULAR; INTRAVENOUS at 04:53

## 2021-01-01 RX ADMIN — DIPHENHYDRAMINE HYDROCHLORIDE 25 MG: 50 INJECTION, SOLUTION INTRAMUSCULAR; INTRAVENOUS at 21:13

## 2021-01-01 RX ADMIN — GABAPENTIN 300 MG: 300 CAPSULE ORAL at 15:58

## 2021-01-01 RX ADMIN — LEVOTHYROXINE SODIUM 75 MCG: 75 TABLET ORAL at 05:48

## 2021-01-01 RX ADMIN — COLCHICINE 0.6 MG: 0.6 TABLET ORAL at 19:53

## 2021-01-01 RX ADMIN — SODIUM CHLORIDE 1000 ML: 9 INJECTION, SOLUTION INTRAVENOUS at 14:45

## 2021-01-01 RX ADMIN — LISINOPRIL 10 MG: 10 TABLET ORAL at 23:16

## 2021-01-01 RX ADMIN — CALCIUM CARBONATE-VITAMIN D TAB 500 MG-200 UNIT 1 TABLET: 500-200 TAB at 08:54

## 2021-01-01 RX ADMIN — POTASSIUM CHLORIDE 10 MEQ: 7.45 INJECTION INTRAVENOUS at 23:45

## 2021-01-01 RX ADMIN — LEVOTHYROXINE SODIUM 100 MCG: 100 TABLET ORAL at 05:23

## 2021-01-01 RX ADMIN — Medication 100 MCG: at 08:32

## 2021-01-01 RX ADMIN — IOPAMIDOL 75 ML: 755 INJECTION, SOLUTION INTRAVENOUS at 12:19

## 2021-01-01 RX ADMIN — ESCITALOPRAM OXALATE 10 MG: 10 TABLET ORAL at 08:39

## 2021-01-01 RX ADMIN — POTASSIUM CHLORIDE 40 MEQ: 1500 TABLET, EXTENDED RELEASE ORAL at 11:50

## 2021-01-01 RX ADMIN — LEVOTHYROXINE SODIUM 75 MCG: 75 TABLET ORAL at 06:22

## 2021-01-01 RX ADMIN — PANTOPRAZOLE SODIUM 40 MG: 40 TABLET, DELAYED RELEASE ORAL at 05:48

## 2021-01-01 RX ADMIN — FAMOTIDINE 20 MG: 20 TABLET, FILM COATED ORAL at 09:42

## 2021-01-01 RX ADMIN — GABAPENTIN 300 MG: 300 CAPSULE ORAL at 21:48

## 2021-01-01 RX ADMIN — AMLODIPINE BESYLATE 10 MG: 10 TABLET ORAL at 09:05

## 2021-01-01 RX ADMIN — GABAPENTIN 300 MG: 300 CAPSULE ORAL at 20:19

## 2021-01-01 RX ADMIN — GABAPENTIN 300 MG: 300 CAPSULE ORAL at 20:08

## 2021-01-01 RX ADMIN — LIDOCAINE HYDROCHLORIDE 10 ML: 20 INJECTION, SOLUTION INFILTRATION; PERINEURAL at 08:41

## 2021-01-01 RX ADMIN — FAMOTIDINE 20 MG: 20 TABLET, FILM COATED ORAL at 12:23

## 2021-01-01 RX ADMIN — GABAPENTIN 300 MG: 300 CAPSULE ORAL at 20:13

## 2021-01-01 RX ADMIN — GABAPENTIN 300 MG: 300 CAPSULE ORAL at 16:38

## 2021-01-01 RX ADMIN — POTASSIUM CHLORIDE 10 MEQ: 7.45 INJECTION INTRAVENOUS at 22:39

## 2021-01-01 RX ADMIN — GABAPENTIN 300 MG: 300 CAPSULE ORAL at 11:18

## 2021-01-01 RX ADMIN — ONDANSETRON 4 MG: 2 INJECTION INTRAMUSCULAR; INTRAVENOUS at 12:32

## 2021-01-01 RX ADMIN — ESCITALOPRAM OXALATE 10 MG: 10 TABLET ORAL at 09:08

## 2021-01-01 RX ADMIN — ATORVASTATIN CALCIUM 10 MG: 10 TABLET, FILM COATED ORAL at 21:31

## 2021-01-01 RX ADMIN — GABAPENTIN 300 MG: 300 CAPSULE ORAL at 08:21

## 2021-01-01 RX ADMIN — Medication 10 ML: at 00:41

## 2021-01-01 RX ADMIN — INSULIN LISPRO 3 UNITS: 100 INJECTION, SOLUTION INTRAVENOUS; SUBCUTANEOUS at 06:28

## 2021-01-01 RX ADMIN — SODIUM CHLORIDE, PRESERVATIVE FREE 10 ML: 5 INJECTION INTRAVENOUS at 09:55

## 2021-01-01 RX ADMIN — DIPHENHYDRAMINE HYDROCHLORIDE 25 MG: 50 INJECTION, SOLUTION INTRAMUSCULAR; INTRAVENOUS at 11:39

## 2021-01-01 RX ADMIN — GABAPENTIN 300 MG: 300 CAPSULE ORAL at 09:46

## 2021-01-01 RX ADMIN — SODIUM CHLORIDE 1000 ML: 9 INJECTION, SOLUTION INTRAVENOUS at 22:27

## 2021-01-01 RX ADMIN — Medication 100 MG: at 08:37

## 2021-01-01 RX ADMIN — INSULIN LISPRO 3 UNITS: 100 INJECTION, SOLUTION INTRAVENOUS; SUBCUTANEOUS at 16:50

## 2021-01-01 RX ADMIN — GABAPENTIN 300 MG: 300 CAPSULE ORAL at 21:23

## 2021-01-01 RX ADMIN — ACETAMINOPHEN 650 MG: 325 TABLET ORAL at 21:58

## 2021-01-01 RX ADMIN — Medication 100 MCG: at 09:05

## 2021-01-01 RX ADMIN — ACETAMINOPHEN 650 MG: 325 TABLET ORAL at 21:17

## 2021-01-01 RX ADMIN — Medication 100 MG: at 08:40

## 2021-01-01 RX ADMIN — Medication 10 ML: at 08:45

## 2021-01-01 RX ADMIN — Medication 10 ML: at 09:05

## 2021-01-01 RX ADMIN — IMMUNE GLOBULIN (HUMAN) 30 G: 10 INJECTION INTRAVENOUS; SUBCUTANEOUS at 22:19

## 2021-01-01 RX ADMIN — MULTIVITAMIN TABLET 1 TABLET: TABLET at 18:17

## 2021-01-01 RX ADMIN — Medication 10 ML: at 20:37

## 2021-01-01 RX ADMIN — Medication 10 ML: at 21:30

## 2021-01-01 RX ADMIN — GABAPENTIN 300 MG: 300 CAPSULE ORAL at 08:41

## 2021-01-01 RX ADMIN — GABAPENTIN 300 MG: 300 CAPSULE ORAL at 09:54

## 2021-01-01 RX ADMIN — GABAPENTIN 300 MG: 300 CAPSULE ORAL at 13:59

## 2021-01-01 RX ADMIN — LEVOTHYROXINE SODIUM 100 MCG: 100 TABLET ORAL at 04:55

## 2021-01-01 RX ADMIN — GABAPENTIN 300 MG: 300 CAPSULE ORAL at 10:50

## 2021-01-01 RX ADMIN — ATORVASTATIN CALCIUM 10 MG: 10 TABLET, FILM COATED ORAL at 19:55

## 2021-01-01 RX ADMIN — Medication 10 ML: at 21:23

## 2021-01-01 RX ADMIN — Medication 15 G: at 08:45

## 2021-01-01 RX ADMIN — Medication 10 ML: at 10:32

## 2021-01-01 RX ADMIN — GABAPENTIN 300 MG: 300 CAPSULE ORAL at 13:40

## 2021-01-01 RX ADMIN — PANTOPRAZOLE SODIUM 40 MG: 20 TABLET, DELAYED RELEASE ORAL at 09:08

## 2021-01-01 RX ADMIN — LEVOTHYROXINE SODIUM 100 MCG: 100 TABLET ORAL at 18:17

## 2021-01-01 RX ADMIN — SODIUM CHLORIDE, PRESERVATIVE FREE 10 ML: 5 INJECTION INTRAVENOUS at 18:17

## 2021-01-01 RX ADMIN — ENOXAPARIN SODIUM 40 MG: 40 INJECTION SUBCUTANEOUS at 09:04

## 2021-01-01 RX ADMIN — POLYETHYLENE GLYCOL 3350 17 G: 17 POWDER, FOR SOLUTION ORAL at 08:20

## 2021-01-01 RX ADMIN — INSULIN LISPRO 10 UNITS: 100 INJECTION, SOLUTION INTRAVENOUS; SUBCUTANEOUS at 16:41

## 2021-01-01 RX ADMIN — GABAPENTIN 300 MG: 300 CAPSULE ORAL at 13:36

## 2021-01-01 RX ADMIN — Medication 10 ML: at 20:59

## 2021-01-01 RX ADMIN — DEXAMETHASONE SODIUM PHOSPHATE 4 MG: 4 INJECTION, SOLUTION INTRA-ARTICULAR; INTRALESIONAL; INTRAMUSCULAR; INTRAVENOUS; SOFT TISSUE at 00:40

## 2021-01-01 RX ADMIN — CALCIUM CARBONATE-VITAMIN D TAB 500 MG-200 UNIT 1 TABLET: 500-200 TAB at 08:32

## 2021-01-01 RX ADMIN — FAMOTIDINE 20 MG: 20 TABLET, FILM COATED ORAL at 08:54

## 2021-01-01 RX ADMIN — LEVOTHYROXINE SODIUM 75 MCG: 75 TABLET ORAL at 06:09

## 2021-01-01 RX ADMIN — Medication 100 MG: at 08:45

## 2021-01-01 RX ADMIN — GUAIFENESIN AND DEXTROMETHORPHAN 5 ML: 100; 10 SYRUP ORAL at 15:58

## 2021-01-01 RX ADMIN — LEVOTHYROXINE SODIUM 100 MCG: 100 TABLET ORAL at 05:47

## 2021-01-01 RX ADMIN — INSULIN LISPRO 15 UNITS: 100 INJECTION, SOLUTION INTRAVENOUS; SUBCUTANEOUS at 11:37

## 2021-01-01 RX ADMIN — ESCITALOPRAM OXALATE 10 MG: 10 TABLET ORAL at 09:03

## 2021-01-01 RX ADMIN — ESCITALOPRAM OXALATE 10 MG: 10 TABLET ORAL at 09:46

## 2021-01-01 RX ADMIN — SODIUM CHLORIDE: 9 INJECTION, SOLUTION INTRAVENOUS at 21:00

## 2021-01-01 RX ADMIN — Medication 10 ML: at 12:30

## 2021-01-01 RX ADMIN — OYSTER SHELL CALCIUM WITH VITAMIN D 1 TABLET: 500; 200 TABLET, FILM COATED ORAL at 18:17

## 2021-01-01 RX ADMIN — GABAPENTIN 300 MG: 300 CAPSULE ORAL at 09:49

## 2021-01-01 RX ADMIN — COLCHICINE 0.6 MG: 0.6 TABLET ORAL at 06:58

## 2021-01-01 RX ADMIN — PANTOPRAZOLE SODIUM 40 MG: 40 TABLET, DELAYED RELEASE ORAL at 07:03

## 2021-01-01 RX ADMIN — INSULIN LISPRO 3 UNITS: 100 INJECTION, SOLUTION INTRAVENOUS; SUBCUTANEOUS at 08:21

## 2021-01-01 RX ADMIN — ATORVASTATIN CALCIUM 10 MG: 10 TABLET, FILM COATED ORAL at 21:12

## 2021-01-01 RX ADMIN — Medication 100 MG: at 10:50

## 2021-01-01 RX ADMIN — ATORVASTATIN CALCIUM 10 MG: 10 TABLET, FILM COATED ORAL at 20:19

## 2021-01-01 RX ADMIN — IMMUNE GLOBULIN (HUMAN) 30 G: 10 INJECTION INTRAVENOUS; SUBCUTANEOUS at 16:41

## 2021-01-01 RX ADMIN — FAMOTIDINE 20 MG: 20 TABLET, FILM COATED ORAL at 08:40

## 2021-01-01 RX ADMIN — OYSTER SHELL CALCIUM WITH VITAMIN D 1 TABLET: 500; 200 TABLET, FILM COATED ORAL at 08:42

## 2021-01-01 RX ADMIN — Medication 100 MCG: at 08:00

## 2021-01-01 RX ADMIN — SODIUM CHLORIDE 1000 ML: 9 INJECTION, SOLUTION INTRAVENOUS at 12:36

## 2021-01-01 RX ADMIN — SODIUM CHLORIDE, PRESERVATIVE FREE 10 ML: 5 INJECTION INTRAVENOUS at 09:09

## 2021-01-01 RX ADMIN — DEXAMETHASONE 4 MG: 4 TABLET ORAL at 13:54

## 2021-01-01 RX ADMIN — POLYETHYLENE GLYCOL 3350 17 G: 17 POWDER, FOR SOLUTION ORAL at 12:25

## 2021-01-01 RX ADMIN — Medication 10 ML: at 19:54

## 2021-01-01 RX ADMIN — LISINOPRIL 10 MG: 10 TABLET ORAL at 09:02

## 2021-01-01 RX ADMIN — Medication 100 MCG: at 08:36

## 2021-01-01 RX ADMIN — ATORVASTATIN CALCIUM 10 MG: 10 TABLET, FILM COATED ORAL at 21:09

## 2021-01-01 RX ADMIN — DEXAMETHASONE SODIUM PHOSPHATE 4 MG: 4 INJECTION, SOLUTION INTRA-ARTICULAR; INTRALESIONAL; INTRAMUSCULAR; INTRAVENOUS; SOFT TISSUE at 16:38

## 2021-01-01 RX ADMIN — DEXAMETHASONE SODIUM PHOSPHATE 4 MG: 4 INJECTION, SOLUTION INTRAMUSCULAR; INTRAVENOUS at 23:32

## 2021-01-01 RX ADMIN — LEVOTHYROXINE SODIUM 75 MCG: 75 TABLET ORAL at 06:16

## 2021-01-01 RX ADMIN — ESCITALOPRAM OXALATE 10 MG: 10 TABLET ORAL at 11:19

## 2021-01-01 RX ADMIN — PANTOPRAZOLE SODIUM 40 MG: 40 TABLET, DELAYED RELEASE ORAL at 10:51

## 2021-01-01 RX ADMIN — GABAPENTIN 300 MG: 300 CAPSULE ORAL at 10:16

## 2021-01-01 RX ADMIN — CALCIUM CARBONATE-VITAMIN D TAB 500 MG-200 UNIT 1 TABLET: 500-200 TAB at 09:49

## 2021-01-01 RX ADMIN — PANTOPRAZOLE SODIUM 40 MG: 40 TABLET, DELAYED RELEASE ORAL at 08:39

## 2021-01-01 RX ADMIN — ATORVASTATIN CALCIUM 10 MG: 10 TABLET, FILM COATED ORAL at 22:33

## 2021-01-01 RX ADMIN — SODIUM CHLORIDE, PRESERVATIVE FREE 10 ML: 5 INJECTION INTRAVENOUS at 22:40

## 2021-01-01 RX ADMIN — FOLIC ACID 1 MG: 1 TABLET ORAL at 21:50

## 2021-01-01 RX ADMIN — Medication 100 MCG: at 08:54

## 2021-01-01 RX ADMIN — Medication 100 MCG: at 08:39

## 2021-01-01 RX ADMIN — HYDROMORPHONE HYDROCHLORIDE 0.5 MG: 1 INJECTION, SOLUTION INTRAMUSCULAR; INTRAVENOUS; SUBCUTANEOUS at 01:16

## 2021-01-01 RX ADMIN — DEXAMETHASONE SODIUM PHOSPHATE 4 MG: 4 INJECTION, SOLUTION INTRA-ARTICULAR; INTRALESIONAL; INTRAMUSCULAR; INTRAVENOUS; SOFT TISSUE at 16:29

## 2021-01-01 RX ADMIN — Medication 10 ML: at 08:41

## 2021-01-01 RX ADMIN — ACETAMINOPHEN 650 MG: 325 TABLET ORAL at 20:37

## 2021-01-01 RX ADMIN — MAGNESIUM SULFATE HEPTAHYDRATE 2000 MG: 40 INJECTION, SOLUTION INTRAVENOUS at 12:47

## 2021-01-01 RX ADMIN — IOPAMIDOL 100 ML: 755 INJECTION, SOLUTION INTRAVENOUS at 10:46

## 2021-01-01 RX ADMIN — Medication 100 MG: at 23:18

## 2021-01-01 RX ADMIN — MAGNESIUM SULFATE HEPTAHYDRATE 2000 MG: 40 INJECTION, SOLUTION INTRAVENOUS at 12:18

## 2021-01-01 RX ADMIN — MULTIVITAMIN TABLET 1 TABLET: TABLET at 08:42

## 2021-01-01 RX ADMIN — PROPOFOL 30 MG: 10 INJECTION, EMULSION INTRAVENOUS at 08:38

## 2021-01-01 RX ADMIN — DEXAMETHASONE SODIUM PHOSPHATE 4 MG: 4 INJECTION, SOLUTION INTRA-ARTICULAR; INTRALESIONAL; INTRAMUSCULAR; INTRAVENOUS; SOFT TISSUE at 14:44

## 2021-01-01 RX ADMIN — GUAIFENESIN AND DEXTROMETHORPHAN 5 ML: 100; 10 SYRUP ORAL at 10:51

## 2021-01-01 RX ADMIN — GABAPENTIN 300 MG: 300 CAPSULE ORAL at 22:31

## 2021-01-01 RX ADMIN — Medication 2 G: at 08:32

## 2021-01-01 RX ADMIN — ATORVASTATIN CALCIUM 10 MG: 10 TABLET, FILM COATED ORAL at 20:38

## 2021-01-01 RX ADMIN — DEXAMETHASONE SODIUM PHOSPHATE 4 MG: 4 INJECTION, SOLUTION INTRA-ARTICULAR; INTRALESIONAL; INTRAMUSCULAR; INTRAVENOUS; SOFT TISSUE at 16:50

## 2021-01-01 RX ADMIN — Medication 10 ML: at 08:42

## 2021-01-01 RX ADMIN — FOLIC ACID 1 MG: 1 TABLET ORAL at 09:02

## 2021-01-01 RX ADMIN — GABAPENTIN 300 MG: 300 CAPSULE ORAL at 14:24

## 2021-01-01 RX ADMIN — LEVOTHYROXINE SODIUM 75 MCG: 75 TABLET ORAL at 06:06

## 2021-01-01 RX ADMIN — Medication 100 MG: at 08:32

## 2021-01-01 RX ADMIN — ATORVASTATIN CALCIUM 10 MG: 10 TABLET, FILM COATED ORAL at 22:31

## 2021-01-01 RX ADMIN — POLYETHYLENE GLYCOL 3350 17 G: 17 POWDER, FOR SOLUTION ORAL at 12:32

## 2021-01-01 RX ADMIN — FAMOTIDINE 20 MG: 20 TABLET, FILM COATED ORAL at 10:16

## 2021-01-01 RX ADMIN — ATORVASTATIN CALCIUM 10 MG: 10 TABLET, FILM COATED ORAL at 22:03

## 2021-01-01 RX ADMIN — ATORVASTATIN CALCIUM 10 MG: 10 TABLET, FILM COATED ORAL at 21:27

## 2021-01-01 RX ADMIN — Medication 10 ML: at 08:22

## 2021-01-01 RX ADMIN — LEVOTHYROXINE SODIUM 75 MCG: 75 TABLET ORAL at 05:05

## 2021-01-01 RX ADMIN — Medication 10 ML: at 09:43

## 2021-01-01 RX ADMIN — PANTOPRAZOLE SODIUM 40 MG: 20 TABLET, DELAYED RELEASE ORAL at 09:02

## 2021-01-01 RX ADMIN — Medication 10 ML: at 08:37

## 2021-01-01 SDOH — ECONOMIC STABILITY: FOOD INSECURITY: WITHIN THE PAST 12 MONTHS, THE FOOD YOU BOUGHT JUST DIDN'T LAST AND YOU DIDN'T HAVE MONEY TO GET MORE.: SOMETIMES TRUE

## 2021-01-01 SDOH — ECONOMIC STABILITY: TRANSPORTATION INSECURITY
IN THE PAST 12 MONTHS, HAS LACK OF TRANSPORTATION KEPT YOU FROM MEETINGS, WORK, OR FROM GETTING THINGS NEEDED FOR DAILY LIVING?: NO

## 2021-01-01 ASSESSMENT — PAIN DESCRIPTION - PAIN TYPE
TYPE: ACUTE PAIN

## 2021-01-01 ASSESSMENT — PAIN - FUNCTIONAL ASSESSMENT: PAIN_FUNCTIONAL_ASSESSMENT: ACTIVITIES ARE NOT PREVENTED

## 2021-01-01 ASSESSMENT — PAIN SCALES - GENERAL
PAINLEVEL_OUTOF10: 3
PAINLEVEL_OUTOF10: 0
PAINLEVEL_OUTOF10: 9
PAINLEVEL_OUTOF10: 0
PAINLEVEL_OUTOF10: 0
PAINLEVEL_OUTOF10: 5
PAINLEVEL_OUTOF10: 0
PAINLEVEL_OUTOF10: 3
PAINLEVEL_OUTOF10: 0
PAINLEVEL_OUTOF10: 3
PAINLEVEL_OUTOF10: 1
PAINLEVEL_OUTOF10: 0
PAINLEVEL_OUTOF10: 1
PAINLEVEL_OUTOF10: 5
PAINLEVEL_OUTOF10: 0
PAINLEVEL_OUTOF10: 6
PAINLEVEL_OUTOF10: 0
PAINLEVEL_OUTOF10: 3
PAINLEVEL_OUTOF10: 0
PAINLEVEL_OUTOF10: 3
PAINLEVEL_OUTOF10: 0
PAINLEVEL_OUTOF10: 2
PAINLEVEL_OUTOF10: 0
PAINLEVEL_OUTOF10: 6

## 2021-01-01 ASSESSMENT — LIFESTYLE VARIABLES
SMOKING_STATUS: 0
HOW OFTEN DO YOU HAVE A DRINK CONTAINING ALCOHOL: 0

## 2021-01-01 ASSESSMENT — ENCOUNTER SYMPTOMS
SORE THROAT: 0
VOMITING: 0
COUGH: 0
COUGH: 0
SORE THROAT: 0
NAUSEA: 0
COUGH: 0
COLOR CHANGE: 0
ABDOMINAL PAIN: 0
SINUS PAIN: 0
SHORTNESS OF BREATH: 0
CHEST TIGHTNESS: 0
DIARRHEA: 0
ABDOMINAL PAIN: 0
EYE ITCHING: 0
COUGH: 0
CHOKING: 0
SINUS PRESSURE: 0
NAUSEA: 0
VOMITING: 0
SHORTNESS OF BREATH: 0
VOMITING: 0
WHEEZING: 0
CONSTIPATION: 0
NAUSEA: 0
SORE THROAT: 0
SORE THROAT: 0
COUGH: 0
COUGH: 0
DIARRHEA: 0
SHORTNESS OF BREATH: 0
ABDOMINAL PAIN: 0
ABDOMINAL PAIN: 0
NAUSEA: 0
CHEST TIGHTNESS: 0
EYE PAIN: 0
EYE REDNESS: 0
SHORTNESS OF BREATH: 0
NAUSEA: 0
DIARRHEA: 0
NAUSEA: 0
SINUS PAIN: 0
EYE REDNESS: 0
WHEEZING: 0
CHEST TIGHTNESS: 0
DIARRHEA: 0
SINUS PRESSURE: 0
EYE PAIN: 0
DIARRHEA: 0
COUGH: 0
NAUSEA: 0
ABDOMINAL DISTENTION: 0
WHEEZING: 0
DIARRHEA: 0
SHORTNESS OF BREATH: 0
ABDOMINAL PAIN: 0
BACK PAIN: 0
SINUS PAIN: 0
ABDOMINAL PAIN: 0
VOMITING: 0
SHORTNESS OF BREATH: 0
BACK PAIN: 0
CONSTIPATION: 0
DIARRHEA: 0
EYE PAIN: 0
EYE DISCHARGE: 0
SHORTNESS OF BREATH: 0
EYE PAIN: 0
VOMITING: 0
VOMITING: 0
ABDOMINAL DISTENTION: 0
ABDOMINAL DISTENTION: 0
WHEEZING: 0
DIARRHEA: 0
NAUSEA: 0
BACK PAIN: 0
EYE DISCHARGE: 0

## 2021-01-01 ASSESSMENT — PATIENT HEALTH QUESTIONNAIRE - PHQ9
SUM OF ALL RESPONSES TO PHQ9 QUESTIONS 1 & 2: 0
SUM OF ALL RESPONSES TO PHQ QUESTIONS 1-9: 0
SUM OF ALL RESPONSES TO PHQ QUESTIONS 1-9: 0

## 2021-01-01 ASSESSMENT — PAIN DESCRIPTION - LOCATION
LOCATION: HEAD

## 2021-01-01 ASSESSMENT — PAIN DESCRIPTION - DESCRIPTORS
DESCRIPTORS: HEADACHE

## 2021-01-01 ASSESSMENT — PAIN DESCRIPTION - PROGRESSION: CLINICAL_PROGRESSION: GRADUALLY WORSENING

## 2021-01-01 ASSESSMENT — PAIN DESCRIPTION - ORIENTATION
ORIENTATION: LEFT
ORIENTATION: LEFT

## 2021-01-01 ASSESSMENT — PAIN DESCRIPTION - ONSET: ONSET: GRADUAL

## 2021-01-01 ASSESSMENT — PAIN DESCRIPTION - FREQUENCY: FREQUENCY: CONTINUOUS

## 2021-01-28 PROBLEM — E86.0 DEHYDRATION: Status: ACTIVE | Noted: 2021-01-01

## 2021-01-28 NOTE — ED NOTES
Bed: 09  Expected date:   Expected time:   Means of arrival:   Comments:  aj Bautista RN  01/28/21 8187

## 2021-01-28 NOTE — ED PROVIDER NOTES
ED  Provider Note  Admit Date/RoomTime: 1/28/2021  2:07 PM  ED Room: [facility]/[place] [address]     HPI:   Marge Mcadams is a 65 y.o. female presenting to the ED for weakness, beginning 3 days ago. History comes primarily from the patient. Past medical history includes pelvic cancer (unsure of source) s/p resection, currently in remission; hypertension; hypothyroid. The complaint has been persistent, moderate in severity, improved by nothing and worsened by nothing. Shay Garcia states that she receives maintenance chemotherapy at the [facility] every so often for her cancer in remission. The course the last several days she has been feeling very fatigued with decreased appetite. Denies any fever or chills or pain with this fatigue. She called the [facility] with the symptoms, and they advised her to come in for evaluation. At evaluation there she was found to be tachycardic and hypotensive was therefore redirected to [facility] emergency department for further evaluation and treatment. On arrival, she was assessed with history, physical exam, imaging studies, auditory studies, EKG, vital signs. Patient's vital signs were 89/53 and she was tachycardic at 116. Review of Systems   Constitutional: Positive for activity change, appetite change and fatigue. Negative for chills and fever. HENT: Negative for sinus pain and sore throat. Eyes: Negative for pain and visual disturbance. Respiratory: Negative for cough, choking, chest tightness, shortness of breath and wheezing. Cardiovascular: Negative for chest pain and palpitations. Gastrointestinal: Negative for abdominal pain, diarrhea, nausea and vomiting. Genitourinary: Negative for hematuria. Musculoskeletal: Negative for neck pain and neck stiffness. Skin: Negative for rash. Neurological: Negative for tremors, syncope and weakness. Psychiatric/Behavioral: Negative for confusion.         Physical Exam  Constitutional: AST 26 0 - 31 U/L   Troponin   Result Value Ref Range    Troponin <0.01 0.00 - 0.03 ng/mL   Lactic Acid, Plasma   Result Value Ref Range    Lactic Acid 1.1 0.5 - 2.2 mmol/L   Urinalysis   Result Value Ref Range    Color, UA Yellow Straw/Yellow    Clarity, UA Clear Clear    Glucose, Ur Negative Negative mg/dL    Bilirubin Urine Negative Negative    Ketones, Urine Negative Negative mg/dL    Specific Gravity, UA 1.020 1.005 - 1.030    Blood, Urine SMALL (A) Negative    pH, UA 6.0 5.0 - 9.0    Protein, UA TRACE Negative mg/dL    Urobilinogen, Urine 1.0 <2.0 E.U./dL    Nitrite, Urine Negative Negative    Leukocyte Esterase, Urine Negative Negative   TSH without Reflex   Result Value Ref Range    TSH 0.013 (L) 0.270 - 4.200 uIU/mL   T4   Result Value Ref Range    T4, Total 12.1 (H) 4.5 - 11.7 mcg/dL   Microscopic Urinalysis   Result Value Ref Range    WBC, UA NONE 0 - 5 /HPF    RBC, UA 0-1 0 - 2 /HPF    Epithelial Cells, UA RARE /HPF    Bacteria, UA NONE SEEN None Seen /HPF   Brain Natriuretic Peptide   Result Value Ref Range    Pro-BNP 12 0 - 125 pg/mL   COVID-19   Result Value Ref Range    SARS-CoV-2, NAAT DETECTED (A) Not Detected   EKG 12 Lead   Result Value Ref Range    Ventricular Rate 111 BPM    Atrial Rate 111 BPM    P-R Interval 132 ms    QRS Duration 66 ms    Q-T Interval 348 ms    QTc Calculation (Bazett) 473 ms    P Axis 65 degrees    R Axis 26 degrees    T Axis 59 degrees       RADIOLOGY:  XR CHEST (2 VW)   Final Result   Suspect early basilar infiltrates. ------------------------- NURSING NOTES AND VITALS REVIEWED ---------------------------  Date / Time Roomed:  1/28/2021  2:07 PM  ED Bed Assignment:  10/10    The nursing notes within the ED encounter and vital signs as below have been reviewed.      Patient Vitals for the past 24 hrs:   BP Temp Temp src Pulse Resp SpO2 Height Weight   01/28/21 2104 104/68 98.7 °F (37.1 °C) Oral 97 16 95 %   01/28/21 1900 (!) 102/58 98.5 °F (36.9 °C)  92 20 95 %     01/28/21 1645 129/65 98.6 °F (37 °C)  93 20 94 %     01/28/21 1455 120/66   93       01/28/21 1343 (!) 89/53 98.5 °F (36.9 °C)  116 18  5' 2\" (1.575 m) 135 lb (61.2 kg)       Oxygen Saturation Interpretation: Normal    ------------------------------------------ PROGRESS NOTES ------------------------------------------  Re-evaluation(s):  Time: 9:50 PM EST  Patients symptoms show no change  Repeat physical examination is not changed    Counseling:  I have spoken with the patient and discussed todays results, in addition to providing specific details for the plan of care and counseling regarding the diagnosis and prognosis. Their questions are answered at this time and they are agreeable with the plan of admission.    --------------------------------- ADDITIONAL PROVIDER NOTES ---------------------------------  Consultations:  Time: 9:51 PM EST. Spoke with Dr. Umesh Pratt. Discussed case. They will admit the patient. This patient's ED course included: a personal history and physicial examination, re-evaluation prior to disposition, multiple bedside re-evaluations, IV medications, cardiac monitoring and continuous pulse oximetry    This patient has remained hemodynamically stable during their ED course. Diagnosis:  1. COVID-19 virus infection    2. Hypotension, unspecified hypotension type        Disposition:  Patient's disposition: Admit to telemetry  Patient's condition is fair.                    [address] DO  Resident  01/28/21 5667

## 2021-01-28 NOTE — ED NOTES
FIRST PROVIDER CONTACT ASSESSMENT NOTE      Department of Emergency Medicine   ED  First Provider Note   1/28/21  1:51 PM EST    Chief Complaint: Fatigue (pt sent in by [facility] for leg weakness, fatigue, fever) and Fever    History of Present Illness:    Lannette Fothergill is a 65 y.o. female who presents to the ED by private car for fever, weakness/fatigue. Focused Screening Exam:  Constitutional:  Alert, appears stated age and is in no distress. *ALLERGIES*     Patient has no known allergies.      ED Triage Vitals [01/28/21 1343]   BP Temp Temp src Pulse Resp SpO2 Height Weight   (!) 89/53 98.5 °F (36.9 °C) -- 116 18 -- 5' 2\" (1.575 m) 135 lb (61.2 kg)      Initial Plan of Care:  Initiate Treatment-Testing, Proceed toTreatment Area When Bed Available for ED Attending/MLP to Continue Care    -----------------[address] ASSESSMENT NOTE--------------  Electronically signed by Dmitri Mckeon PA-C   DD: 1/28/21         Dmitri Mckeon PA-C  01/28/21 8788

## 2021-01-29 PROBLEM — U07.1 COVID-19 VIRUS INFECTION: Status: ACTIVE | Noted: 2021-01-01

## 2021-01-29 PROBLEM — R91.8 PULMONARY INFILTRATES ON CXR: Status: ACTIVE | Noted: 2021-01-01

## 2021-01-29 PROBLEM — I95.9 HYPOTENSION: Status: ACTIVE | Noted: 2021-01-01

## 2021-01-29 NOTE — ED NOTES
Patient ambulated in hallway. Gait steady.  Spo2 remained above 92% RA and      Gunjan Walker RN  01/28/21 5050

## 2021-01-29 NOTE — CONSULTS
[address] [place] [address] [facility]  Consultation Note     Admit Date: 2021  2:07 PM    Reason for Consult:   Covid +. Cancer high risk patient, low BP    Attending Physician:  Kaitlynn Acosta DO    HISTORY OF PRESENT ILLNESS:             The history is obtained from extensive review of available past medical records. The patient is a 65 y.o. female with a history of metastatic primary peritoneal adenocarcinoma. She had a hysterectomy with oophorectomy in . She developed lymphadenopathy in the right inguinal area that was biopsied and was positive for metastatic adenocarcinoma. Started treatment with carboplatin and paclitaxel in 2018. Completed chemotherapy in 2019. She started [facility] in 2019 because of progression of disease. She is being followed by the [facility]. Last seen on 2020. The patient presents to the ED on 2021 with weakness. She was tachycardic and hypotensive. White count was 4.0 with absolute lymphocyte count of 0.72. Tested positive for SARS-CoV-2. Procalcitonin was slightly elevated. CRP was 6.7. Cortisol level was not drawn. She is not requiring oxygen. She feels well and would like to go home.     Past Medical History:        Diagnosis Date    Ankle swelling     takes diuretic prn    Asthma     mild    Cancer ([address] 2019    ovarian, treated with surgery and chemo    Depression     GERD (gastroesophageal reflux disease)     Hyperlipidemia     Hypertension     Hypothyroidism     Osteoarthritis     Positive FIT (fecal immunochemical test)     Vitamin D deficiency     Wears partial dentures     upper     Past Surgical History:        Procedure Laterality Date    APPENDECTOMY  years ago    BREAST SURGERY  1960s    removal lump     SECTION      x 1    COLONOSCOPY N/A 2020    COLONOSCOPY DIAGNOSTIC performed by Lucy Cooper MD at [address] [place] [address]  LAPAROTOMY  07/19/2019    debulking of pelvic mass, DR. Brenden L[facility]    LYMPH NODE DISSECTION      TUNNELED VENOUS PORT PLACEMENT  2019     Current Medications:   Scheduled Meds:   sodium chloride  2,000 mL Intravenous Once    sodium chloride flush  10 mL Intravenous 2 times per day    enoxaparin  30 mg Subcutaneous BID    colchicine  0.6 mg Oral BID    vitamin D  2,000 Units Oral Daily    escitalopram  10 mg Oral QAM    gabapentin  300 mg Oral TID    levothyroxine  150 mcg Oral Daily    atorvastatin  10 mg Oral Daily    pantoprazole  40 mg Oral Daily    Niraparib Tosylate  2 capsule Oral Nightly    vitamin B-6  100 mg Oral Daily    acetaminophen        cefTRIAXone        sterile water         Continuous Infusions:  PRN Meds:sodium chloride flush, promethazine **OR** ondansetron, polyethylene glycol, acetaminophen **OR** acetaminophen, guaiFENesin-dextromethorphan, albuterol sulfate HFA, tiZANidine    Allergies:  Patient has no known allergies.     Social History:   Social History     Socioeconomic History    Marital status:      Spouse name: Not on file    Number of children: Not on file    Years of education: Not on file    Highest education level: Not on file   Occupational History    Occupation: disability     Comment: ankle/low back   Social Needs    Financial resource strain: Not on file    Food insecurity     Worry: Not on file     Inability: Not on file   [employer] needs     Medical: Not on file     Non-medical: Not on file   Tobacco Use    Smoking status: Never Smoker    Smokeless tobacco: Never Used   Substance and Sexual Activity    Alcohol use: No    Drug use: Never    Sexual activity: Not on file   Lifestyle    Physical activity     Days per week: Not on file     Minutes per session: Not on file    Stress: Not on file   Relationships    Social connections     Talks on phone: Not on file     Gets together: Not on file Attends Alevism service: Not on file     Active member of club or organization: Not on file     Attends meetings of clubs or organizations: Not on file     Relationship status: Not on file    Intimate partner violence     Fear of current or ex partner: Not on file     Emotionally abused: Not on file     Physically abused: Not on file     Forced sexual activity: Not on file   Other Topics Concern    Not on file   Social History Narrative    Not on file      Pets: None  Travel: No  The patient is around with her     Family History:       Problem Relation Age of Onset    Heart Disease Mother     High Blood Pressure Mother     Diabetes Father     Thyroid Disease Sister         2    Ovarian Cancer Neg Hx     Uterine Cancer Neg Hx     Colon Cancer Neg Hx     Breast Cancer Neg Hx    . Otherwise non-pertinent to the chief complaint. REVIEW OF SYSTEMS:    Constitutional: Negative for fevers, chills, diaphoresis  Neurologic: Negative   Psychiatric: Negative  Rheumatologic: Negative   Endocrine: Negative  Hematologic: Negative  Immunologic: Negative  ENT: Negative  Respiratory: Negative   Cardiovascular: Negative  GI: Negative  : Negative  Musculoskeletal: Negative  Skin: No rashes. PHYSICAL EXAM:    Vitals:   /78 Comment: manual  Pulse 85   Temp 98.5 °F (36.9 °C) (Oral)   Resp 16   Ht 5' 2\" (1.575 m)   Wt 135 lb (61.2 kg)   SpO2 94%   BMI 24.69 kg/m²   Constitutional: The patient is awake, alert, and oriented. Skin: Warm and dry. No rashes were noted. HEENT: Eyes show round, and reactive pupils. No jaundice. Moist mucous membranes, no ulcerations, no thrush. Neck: Supple to movements. No lymphadenopathy. Chest: No use of accessory muscles to breathe. Symmetrical expansion. Auscultation reveals no wheezing, crackles, or rhonchi. Cardiovascular: S1 and S2 are rhythmic and regular. No murmurs appreciated. Abdomen: Positive bowel sounds to auscultation. Benign to palpation. No masses felt. No hepatosplenomegaly. Extremities: No clubbing, no cyanosis, no edema. Lines: peripheral      CBC+dif:  Recent Labs     01/28/21  1349 01/29/21  0231   WBC 4.0* 2.6*   HGB 11.6 10.4*   HCT 35.6 31.0*   .2* 109.2*    113*   NEUTROABS 3.20 1.53*     Lab Results   Component Value Date    CRP 6.7 (H) 01/29/2021      No results found for: CRPHS  No results found for: SEDRATE  Lab Results   Component Value Date    ALT 10 01/28/2021    AST 26 01/28/2021    ALKPHOS 99 01/28/2021    BILITOT 0.5 01/28/2021     Lab Results   Component Value Date     01/28/2021    K 4.0 01/28/2021    K 3.4 08/24/2019    CL 92 01/28/2021    CO2 24 01/28/2021    BUN 19 01/28/2021    CREATININE 1.2 01/28/2021    GFRAA 53 01/28/2021    LABGLOM 53 01/28/2021    GLUCOSE 126 01/28/2021    GLUCOSE 101 12/19/2011    PROT 8.5 01/28/2021    LABALBU 3.9 01/28/2021    LABALBU 4.6 12/19/2011    CALCIUM 9.0 01/28/2021    BILITOT 0.5 01/28/2021    ALKPHOS 99 01/28/2021    AST 26 01/28/2021    ALT 10 01/28/2021       Lab Results   Component Value Date    PROTIME 12.3 04/18/2018    PROTIME 11.0 09/14/2011    INR 1.1 04/18/2018       Lab Results   Component Value Date    TSH 0.013 01/28/2021       Lab Results   Component Value Date    NITRITE n 03/10/2015    COLORU Yellow 01/28/2021    PHUR 6.0 01/28/2021    WBCUA NONE 01/28/2021    RBCUA 0-1 01/28/2021    BACTERIA NONE SEEN 01/28/2021    CLARITYU Clear 01/28/2021    SPECGRAV 1.020 01/28/2021    LEUKOCYTESUR Negative 01/28/2021    UROBILINOGEN 1.0 01/28/2021    BILIRUBINUR Negative 01/28/2021    BILIRUBINUR n 03/10/2015    BLOODU SMALL 01/28/2021    GLUCOSEU Negative 01/28/2021       No results found for: HCO3, BE, O2SAT, PH, THGB, PCO2, PO2, TCO2  Radiology:  Noted    Microbiology:  Pending  No results for input(s): BC in the last 72 hours. No results for input(s): ORG in the last 72 hours. No results for input(s): Estrada Haste in the last 72 hours. No results for input(s): STREPNEUMAGU in the last 72 hours. No results for input(s): LP1UAG in the last 72 hours. No results for input(s): ASO in the last 72 hours. No results for input(s): CULTRESP in the last 72 hours. Recent Labs     01/29/21  0231   PROCAL 0.13*       Assessment:  · SARS-CoV-2 infection. We cannot say she is symptomatic since she was only hypotensive. She had one episode of loose stools today but she did receive antibiotics last night  · Hypotension. Possible adrenal insufficiency versus dehydration. Her creatinine on presentation was 1.2 and it was not repeated this morning. · Metastatic adenocarcinoma of unknown origin. Currently undergoing chemotherapy  · Immunocompromised host    Plan:    · Antibiotics are not warranted  · Remdesivir is not warranted  · Check cortisol level  · Will follow with you    Thank you for having us see this patient in consultation. I will be discussing this case with the treating physicians.     Arian Peace  9:09 AM  1/29/2021

## 2021-01-29 NOTE — PROGRESS NOTES
Pete Kirby was ordered [facility]) which is a nonformulary medication. The patient has indicated that the home supply of this medication will be brought in to the hospital for inpatient use. If the medication has not been administered by 1400 on the following day from the time the order was placed, a pharmacist will follow-up with the nurse of the patient to assess the capability of the patient to bring in the medication. If it is determined that the patient cannot supply the medication and it is not available to be dispensed from the pharmacy, a call will be placed to the ordering provider to discuss alternative options.     Connor Deluna, [address] [facility]  1/29/2021  1:59 AM

## 2021-01-29 NOTE — CARE COORDINATION
CM NOTE: Per QFR---covid positive. From home with . [employer] at home. Active with [facility] for [address] ovarian cancer. Receiving IVF bolus. ID & palliative med on case. Await therapy evals. Await input from consultants. CM attempted to speak with pt but number remains busy in room. Will follow pt for transition of care.

## 2021-01-29 NOTE — H&P
 LYMPH NODE DISSECTION      TUNNELED VENOUS PORT PLACEMENT  2019       Home Medications:  Prior to Admission medications    Medication Sig Start Date End Date Taking? Authorizing Provider   Cyanocobalamin (VITAMIN B 12 PO) Take by mouth daily   Yes Historical Provider, MD   levothyroxine (SYNTHROID) 150 MCG tablet Take 1 tablet by mouth Daily 9/3/20  Yes Mk Hernandez MD   acetaminophen (TYLENOL) 500 MG tablet Take 500 mg by mouth every 6 hours as needed for Pain   Yes Historical Provider, MD   Niraparib Tosylate (ZEJULA) 100 MG CAPS Take 2 tablets by mouth nightly    Yes Historical Provider, MD   lovastatin (MEVACOR) 40 MG tablet Take 1 tablet by mouth nightly 9/2/20  Yes Mk Hernandez MD   pantoprazole (PROTONIX) 40 MG tablet Take 1 tablet by mouth daily 9/2/20  Yes Mk Hernandez MD   escitalopram (LEXAPRO) 10 MG tablet Take 1 tablet by mouth every morning 9/2/20  Yes Mk Hernandez MD   bumetanide (BUMEX) 0.5 MG tablet Take 1 tablet by mouth daily as needed (ankle swelling) 9/2/20  Yes Mk Hernandez MD   tiZANidine (ZANAFLEX) 2 MG tablet Take 1 tablet by mouth every 8 hours as needed (muscle spasms) 9/2/20  Yes Mk Hernandez MD   lisinopril (PRINIVIL;ZESTRIL) 20 MG tablet Take 1 tablet by mouth daily 9/2/20  Yes Mk Hernandez MD   gabapentin (NEURONTIN) 300 MG capsule Take 1 capsule by mouth 3 times daily for 360 days. 9/2/20 8/28/21 Yes Mk Hernandez MD   albuterol sulfate HFA (VENTOLIN HFA) 108 (90 Base) MCG/ACT inhaler Inhale 2 puffs into the lungs every 6 hours as needed for Wheezing or Shortness of Breath 9/2/20  Yes Mk Hernandez MD   vitamin B-6 (PYRIDOXINE) 100 MG tablet Take 100 mg by mouth daily   Yes Historical Provider, MD   Handicap Placard MISC by Does not apply route Cannot walk 200 ft.  Without stopping to rest  Duration: Lifetime  Exp:  4/23/2024 4/23/19  Yes Jose Piper MD calcium carbonate-vitamin D3 (CALCIUM 600+D3) 600-400 MG-UNIT TABS Take by mouth daily    Yes Historical Provider, MD   Multiple Vitamins-Minerals (ONE-A-DAY WOMENS 50 PLUS PO) Take by mouth daily     Historical Provider, MD       Allergies:  Patient has no known allergies. Social History:   TOBACCO:   reports that she has never smoked. She has never used smokeless tobacco.  ETOH:   reports no history of alcohol use. Family History:       Problem Relation Age of Onset    Heart Disease Mother     High Blood Pressure Mother     Diabetes Father     Thyroid Disease Sister         2    Ovarian Cancer Neg Hx     Uterine Cancer Neg Hx     Colon Cancer Neg Hx     Breast Cancer Neg Hx       Or deferred/otherwise considered non contributory to current admission  PHYSICAL EXAM:    VS: BP 99/62   Pulse 85   Temp 98.5 °F (36.9 °C) (Oral)   Resp 16   Ht 5' 2\" (1.575 m)   Wt 135 lb (61.2 kg)   SpO2 94%   BMI 24.69 kg/m²     General Appearance:     no acute distress. Psych:  HEENT:    A.O. As per HPI details  NC/AT, PERRL, no pallor no icterus, lips/ext mucous membrane grossly dry       Resp:   Dec BS  No wheezes, No rhonchi   Chest wall:    No tenderness or deformity   Heart:    Regular rate and rhythm, S1 and S2 normal, no rub or gallop. Abdomen:     Soft, non-tender, bowel sounds active    no suspicious obvious masses/organomegaly   Genitalia & Rectal:    Deferred.    Extremities x4:   Extremities normal, atraumatic, no cyanosis, no clubbing   Musculoskeletal:      NO active synovitis    Skin: dry   Lymph nodes:    Neurologic:      LABS:  CBC:   Lab Results   Component Value Date    WBC 4.0 01/28/2021    RBC 3.23 01/28/2021    RBC 3.12 07/20/2019    HGB 11.6 01/28/2021    HCT 35.6 01/28/2021     01/28/2021    .2 01/28/2021     BMP:    Lab Results   Component Value Date     01/28/2021    K 4.0 01/28/2021    K 3.4 08/24/2019    CL 92 01/28/2021    CO2 24 01/28/2021    BUN 19 01/28/2021 CREATININE 1.2 01/28/2021    GLUCOSE 126 01/28/2021    GLUCOSE 101 12/19/2011    CALCIUM 9.0 01/28/2021     Hepatic Function Panel:    Lab Results   Component Value Date    ALKPHOS 99 01/28/2021    AST 26 01/28/2021    ALT 10 01/28/2021    PROT 8.5 01/28/2021    LABALBU 3.9 01/28/2021    LABALBU 4.6 12/19/2011    BILITOT 0.5 01/28/2021     Magnesium:    Lab Results   Component Value Date    MG 1.2 08/24/2019       PT/INR:    Lab Results   Component Value Date    PROTIME 12.3 04/18/2018    PROTIME 11.0 09/14/2011    INR 1.1 04/18/2018     U/A:   Lab Results   Component Value Date    NITRITE n 03/10/2015    LEUKOCYTESUR Negative 01/28/2021    PHUR 6.0 01/28/2021    WBCUA NONE 01/28/2021    RBCUA 0-1 01/28/2021    BACTERIA NONE SEEN 01/28/2021    SPECGRAV 1.020 01/28/2021    BLOODU SMALL 01/28/2021    GLUCOSEU Negative 01/28/2021     ABG:  No results found for: PHART, HRF8PKP, PO2ART, V8IAWYQK, VCY8BSR, BEART  TSH:    Lab Results   Component Value Date    TSH 0.013 01/28/2021     Cardiac Enzymes:   Lab Results   Component Value Date    CKTOTAL 74 04/18/2018    CKMB 1.2 04/18/2018    CKMB 1.3 04/18/2018    TROPONINI <0.01 01/28/2021    TROPONINI <0.01 04/18/2018    TROPONINI <0.01 04/18/2018       Radiology: Xr Chest (2 Vw)    Result Date: 1/28/2021  EXAMINATION: TWO XRAY VIEWS OF THE CHEST 1/28/2021 3:07 pm COMPARISON: None. HISTORY: ORDERING SYSTEM PROVIDED HISTORY: fever TECHNOLOGIST PROVIDED HISTORY: Reason for exam:->fever FINDINGS: Mild bibasilar opacities noted. The heart is prominent in size. The costophrenic angles are clear. No pneumothorax. Infusion port catheter noted with the tip in the region of cavoatrial junction. Suspect early basilar infiltrates.       EKG:  Sinus tachycardia  Cannot rule out Anterior infarct , age undetermined  Abnormal ECG  When compared with ECG of 16-OCT-2020 10:14,  No significant change was found   Assessment & Plan ACTIVE hospital problems being addressed/reassessed for this admission:  Principal Problem:    COVID-19 virus infection  Active Problems:    Secondary malignant neoplasm of other specified sites [facility])    Dehydration    Hypotension    Pulmonary infiltrates on CXR  Resolved Problems:    * No resolved hospital problems. *    Code status/DVT prophylaxis and PLAN --see orders   Note extensive time spent coordinating care between ER docs, ER and floor nurses, and transitioning care over to day providers  Plan of care/ clinical impressions/communication specifics detailed below:    65 y.o. female    Hx of peritoneal carcinomatosis /pelvic cancer  Of unknwonw source s/p resection, sp   maintenance chemotherapy at the [facility] every so often for her cancer--currently in remission. Also HTN, hypothyroid. --   She noted several days of fatigue moderately severe- but no fevers/chills, or cough+GUTIERRES --  She also has had very poor oral intake recently, with NO appetite. She called the [facility] with the symptoms, and they advised her to come to ER    +covid testing in ER with low BP noted- requested we observe overngith bc of tachycardia and hypotensive that responded in ER to initial resusistation    In ER  89/53 and she was tachycardic at 116. Sp IV fluids improved   +covid pneumonia- with cxr showing infiiltrates  cxr--  Suspect early basilar infiltrates. But no hypoxia in ER  Improved vitals- sp IV fluids- dehydration - rule out early sepsis  BP 99/62   Pulse 85   Temp 98.5 °F (36.9 °C) (Oral)   Resp 16   Ht 5' 2\" (1.575 m)   Wt 135 lb (61.2 kg)   SpO2 94%   BMI 24.69 kg/m²     Prone positioning  No hypoxia - defer decadron  ?remdesivir- ID consult  Will start colchcine and modereate intensity lovenox.         Vito Baum MD   Night Officer, overnight admitting doctor at [facility] call day time doctor   for questions after 7:30am    Covering for [address] Hospitalist Service If Qs please call [phone]  Electronically signed by Yi Moreno MD on 1/29/2021 at 1:38 AM

## 2021-01-29 NOTE — CONSULTS
Palliative Care Department  [phone]  Palliative Care Initial Consult  Provider Magnolia Beard MD      PATIENT: Teri Cortez  : 1950  MRN: [id number]  ADMISSION DATE: 2021  2:07 PM  Referring Provider: Barbi Womack was consulted on hospital day 0 for assistance with Goals of care, Code Status Discussion, Family support    HPI:     Vy Verma is a 65 y.o. 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 Received chemotherapy, currently on maintenance therapy who presented to [facility]) on 2021 after passing, she has no appetite for a few days, complains of fatigue. Covid positive, currently not on oxygen    ASSESSMENT/PLAN:     Pertinent Hospital Diagnoses     ? Covid infection  ? Dehydration  ? Pelvic cancer on maintenance therapy      Palliative Care Encounter / Counseling Regarding Goals of Care  Please see detailed goals of care discussion as below  ? At this time, Teri Cortez, Does have capacity for medical decision-making. Capacity is time limited and situation/question specific  ? Outcome of goals of care meeting: Spoke to [place] over the phone, she feels much better. She wants to change her CODE STATUS to DNR CCA. Wants to think about intubation. ? Code status DNR-CCA  ? Advanced Directives: no POA or living will in [facility]  ? Surrogate/Legal NOKWijaylen Sepulveda, Spouse, [phone]    Thank you for the opportunity to participate in the care of Teri Cortez.      Magnolia Beard MD  Palliative Medicine     SUBJECTIVE: Details of Conversation: [place] and saw [place] through the window of her room, she was speaking on her cell phone no oxygen connected to her. I called her and spoke to her over the phone. She told me she is doing much better than she previously was. I introduced palliative medicine to her. We discussed her goals, they are to go home. Spoke about healthcare power of  and how she should have a those paperwork completed. We then spoke about CODE STATUS, she wants to be DNR CCA. She wants to think about intubation and if that is something she would like to continue. She wants to speak it over with her . Prognosis: Guarded    OBJECTIVE:     /78 Comment: manual  Pulse 85   Temp 98.5 °F (36.9 °C) (Oral)   Resp 16   Ht 5' 2\" (1.575 m)   Wt 135 lb (61.2 kg)   SpO2 94%   BMI 24.69 kg/m²     Physical Examination:  Due to the current efforts to prevent transmission of COVID-19 and also the need to preserve PPE for other caregivers, a face-to-face encounter with the patient was not performed. That being said, all relevant records and diagnostic tests were reviewed, including laboratory results and imaging. Please reference any relevant documentation elsewhere. Care will be coordinated with the primary service and other specialties as appropriate. Gen: elderly,NAD, awake, alert   HEENT: normocephalic, atraumatic,   Lungs: respirations easy and not labored,   Heart: regular rate   Abdomen: deffered  Extremities: deffered  Skin: deferred  Neuro: awake, alert, oriented x 3, follows commands,     Objective data reviewed: labs, images, records, medication use, vitals and chart     Will Sign off, no further Code status or [address] discussions at this time. Please re-consult if needed. Time/Communication  Greater than 50% of time spent, total 45 minutes in counseling and coordination of care at the bedside regarding goals of care. Thank you for allowing Palliative Medicine to participate in the care of [place]. Note: This report was completed using computerFyusion voiced recognition software. Every effort has been made to ensure accuracy; however, inadvertent computerized transcription errors may be present.

## 2021-01-30 NOTE — PROGRESS NOTES
Pt seen and examined by night physician who admitted pt earlier today.   Chart reviewed and updated by nursing

## 2021-01-30 NOTE — PROGRESS NOTES
[address] [place] [address] [facility]  Progress Note  CC: feeling ok today   Face to face encounter  SUBJECTIVE:  Patient is tolerating medications. No reported adverse drug reactions. ROS: No nausea, vomiting, diarrhea. tmax- 99.9   On room air. No distress  Feeling ok today     Medications:  Scheduled Meds:   sodium chloride flush  10 mL Intravenous 2 times per day    colchicine  0.6 mg Oral BID    vitamin D  2,000 Units Oral Daily    escitalopram  10 mg Oral QAM    gabapentin  300 mg Oral TID    levothyroxine  150 mcg Oral Daily    atorvastatin  10 mg Oral Daily    pantoprazole  40 mg Oral Daily    Niraparib Tosylate  2 capsule Oral Nightly    vitamin B-6  100 mg Oral Daily    enoxaparin  40 mg Subcutaneous Daily     Continuous Infusions:  PRN Meds:sodium chloride flush, promethazine **OR** ondansetron, polyethylene glycol, acetaminophen **OR** acetaminophen, guaiFENesin-dextromethorphan, albuterol sulfate HFA, tiZANidine  OBJECTIVE:  Patient Vitals for the past 24 hrs:   BP Temp Temp src Pulse Resp SpO2   01/30/21 0900 109/65 99.2 °F (37.3 °C) Oral 91 19 96 %   01/29/21 1945 (!) 143/72 99.9 °F (37.7 °C) Oral 98 18 92 %   01/29/21 1545 133/79 99.5 °F (37.5 °C) Oral 91 18 98 %     Constitutional: The patient is awake, alert, and oriented. Skin: Warm and dry. No rashes were noted. Head: Eyes show round, and reactive pupils. No jaundice. Mouth: Moist mucous membranes, no ulcerations, no thrush. Neck: Supple to movements. No lymphadenopathy. Chest: No use of accessory muscles to breathe. Symmetrical expansion. Auscultation reveals no wheezing, crackles, or rhonchi. On room air. Cardiovascular: S1 and S2 are rhythmic and regular. No murmurs appreciated. Abdomen: Positive bowel sounds to auscultation. Benign to palpation. Extremities: No clubbing, no cyanosis, no edema.   Left chest mediport- no tenderness     Laboratory and Tests Review:  Lab Results   Component Value Date WBC 2.5 (L) 01/30/2021    WBC 2.6 (L) 01/29/2021    WBC 4.0 (L) 01/28/2021    HGB 9.5 (L) 01/30/2021    HCT 28.6 (L) 01/30/2021    .7 (H) 01/30/2021     (L) 01/30/2021     Lab Results   Component Value Date    NEUTROABS 1.60 (L) 01/30/2021    NEUTROABS 1.53 (L) 01/29/2021    NEUTROABS 3.20 01/28/2021     Lab Results   Component Value Date    CRP 5.7 (H) 01/30/2021    CRP 6.7 (H) 01/29/2021     No results found for: SEDRATE  Lab Results   Component Value Date    ALT 10 01/28/2021    AST 26 01/28/2021    ALKPHOS 99 01/28/2021    BILITOT 0.5 01/28/2021     Lab Results   Component Value Date     01/28/2021    K 4.0 01/28/2021    K 3.4 08/24/2019    CL 92 01/28/2021    CO2 24 01/28/2021    BUN 19 01/28/2021    CREATININE 1.2 01/28/2021    GFRAA 53 01/28/2021    LABGLOM 53 01/28/2021    GLUCOSE 126 01/28/2021    GLUCOSE 101 12/19/2011    PROT 8.5 01/28/2021    LABALBU 3.9 01/28/2021    LABALBU 4.6 12/19/2011    CALCIUM 9.0 01/28/2021    BILITOT 0.5 01/28/2021    ALKPHOS 99 01/28/2021    AST 26 01/28/2021    ALT 10 01/28/2021     Radiology:  Reviewed     Microbiology:   1/28/2021-blood cx- no growth  Urine cx- pending     ASSESSMENT:  · SARS-CoV-2 infection. We cannot say she is symptomatic since she was only hypotensive. She had one episode of loose stools today but she did receive antibiotics last night  · Hypotension. Possible adrenal insufficiency versus dehydration. Her creatinine on presentation was 1.2 and it was not repeated this morning. · Metastatic adenocarcinoma of unknown origin. Currently undergoing chemotherapy  · Immunocompromised host       PLAN:  · Continue to observe off any treatments  · Had low grade temp this am  · If remains stable can d/c from ID POV  · Check cultures  · Monitor labs  · Monitor respiratory status - no supplemental oxygen needed at this time- patient is feeling much improved.      Meg Villa  10:45 AM  1/30/2021 Patient seen and examined. I had a face to face encounter with the patient. Agree with exam.  Assessment and plan as outlined above and directed by me. Addition and corrections were done as deemed appropriate. My exam and plan include: The patient is feeling well. She is no longer hypotensive. She is not requiring oxygen. Remdesivir is not warranted. She can be discharged from ID point of view. Follow-up with PCP.     Zara Ross  1/30/2021  4:27 PM

## 2021-01-31 NOTE — PROGRESS NOTES
[facility] CARE AT [facility] Hospitalist   Progress Note    Admitting Date and Time: 1/28/2021  2:07 PM  Admit Dx: Dehydration [E86.0]    Subjective:    Patient was admitted with Dehydration [E86.0].  Patient denies fever, chills, cp, sob, n/v.     sodium chloride flush  10 mL Intravenous 2 times per day    colchicine  0.6 mg Oral BID    vitamin D  2,000 Units Oral Daily    escitalopram  10 mg Oral QAM    gabapentin  300 mg Oral TID    levothyroxine  150 mcg Oral Daily    atorvastatin  10 mg Oral Daily    pantoprazole  40 mg Oral Daily    Niraparib Tosylate  2 capsule Oral Nightly    vitamin B-6  100 mg Oral Daily    enoxaparin  40 mg Subcutaneous Daily         sodium chloride flush, 10 mL, PRN      promethazine, 12.5 mg, Q6H PRN    Or      ondansetron, 4 mg, Q6H PRN      polyethylene glycol, 17 g, Daily PRN      acetaminophen, 650 mg, Q6H PRN    Or      acetaminophen, 650 mg, Q6H PRN      guaiFENesin-dextromethorphan, 5 mL, Q4H PRN      albuterol sulfate HFA, 2 puff, Q6H PRN      tiZANidine, 2 mg, Q8H PRN         Objective:    /70   Pulse 93   Temp 98.6 °F (37 °C) (Oral)   Resp 18   Ht 5' 2\" (1.575 m)   Wt 135 lb (61.2 kg)   SpO2 94%   BMI 24.69 kg/m²   Skin: warm and dry, no rash or erythema  Pulmonary/Chest: clear to auscultation bilaterally- no wheezes, rales or rhonchi, normal air movement, no respiratory distress  Cardiovascular: rhythm reg at rate of 96  Abdomen: soft, non-tender, non-distended, normal bowel sounds, no masses or organomegaly  Extremities: no cyanosis, no clubbing and no edema      Recent Labs     01/28/21  1349 01/30/21  1801   * 133   K 4.0 3.5   CL 92* 99   CO2 24 25   BUN 19 11   CREATININE 1.2* 0.8   GLUCOSE 126* 94   CALCIUM 9.0 9.1       Recent Labs     01/28/21  1349 01/29/21  0231 01/30/21  0320   WBC 4.0* 2.6* 2.5*   RBC 3.23* 2.84* 2.63*   HGB 11.6 10.4* 9.5*   HCT 35.6 31.0* 28.6*   .2* 109.2* 108.7*   MCH 35.9* 36.6* 36.1* MCHC 32.6 33.5 33.2   RDW 14.6 14.7 14.5    113* 114*   MPV 11.3 11.1 11.0       CBC with Differential:    Lab Results   Component Value Date    WBC 2.5 01/30/2021    RBC 2.63 01/30/2021    RBC 3.12 07/20/2019    HGB 9.5 01/30/2021    HCT 28.6 01/30/2021     01/30/2021    .7 01/30/2021    MCH 36.1 01/30/2021    MCHC 33.2 01/30/2021    RDW 14.5 01/30/2021    NRBC 0.0 01/30/2021    METASPCT 4.3 08/24/2019    LYMPHOPCT 33.0 01/30/2021    MONOPCT 1.7 01/30/2021    MYELOPCT 0.9 01/30/2021    BASOPCT 0.0 01/30/2021    MONOSABS 0.05 01/30/2021    LYMPHSABS 0.83 01/30/2021    EOSABS 0.02 01/30/2021    BASOSABS 0.00 01/30/2021     BMP:    Lab Results   Component Value Date     01/30/2021    K 3.5 01/30/2021    K 3.4 08/24/2019    CL 99 01/30/2021    CO2 25 01/30/2021    BUN 11 01/30/2021    LABALBU 3.9 01/28/2021    LABALBU 4.6 12/19/2011    CREATININE 0.8 01/30/2021    CALCIUM 9.1 01/30/2021    GFRAA >60 01/30/2021    LABGLOM >60 01/30/2021    GLUCOSE 94 01/30/2021    GLUCOSE 101 12/19/2011        Radiology:   XR CHEST (2 VW)   Final Result   Suspect early basilar infiltrates. Assessment:    Principal Problem:    COVID-19 virus infection  Active Problems:    Secondary malignant neoplasm of other specified sites [facility])    Dehydration    Hypotension    Pulmonary infiltrates on CXR  Resolved Problems:    * No resolved hospital problems. *      Plan:  1. Infection due to covid 19 monitor off treatment   2. Pelvic cancer of unknown source undergoing chemo monitor  3. Immunocompromised host monitor closely  4. Thrombocytopenia/pancytopenia  Monitor  5. Hyponatremia monitor improved  6. tracy improved  7. Dehydration improving  8. Hyperlipidemia continue med  9. Hypothyroidism continue med  10. gerd continue ppi    Continue to take po fluids and if continues to improve, will plan for discharge tomorrow.     Electronically signed by Lexx Monroe DO on 1/30/2021 at 11:31 PM

## 2021-01-31 NOTE — DISCHARGE SUMMARY
[facility] [place] EMERGENCY SERVICE Physician Discharge Summary       No follow-up provider specified. Activity level: as brandon    Diet: DIET GENERAL;    Dispo:home    Condition at discharge: fair          Patient ID:  Mel Holbrook  [id number]  65 y.o.  1950    Admit date: 1/28/2021    Discharge date and time:  1/31/2021  5:51 PM    Admission Diagnoses: Principal Problem:    COVID-19 virus infection  Active Problems:    Secondary malignant neoplasm of other specified sites [facility])    Dehydration    Hypotension    Pulmonary infiltrates on CXR  Resolved Problems:    * No resolved hospital problems. *      Discharge Diagnoses: Principal Problem:    COVID-19 virus infection  Active Problems:    Secondary malignant neoplasm of other specified sites [facility])    Dehydration    Hypotension    Pulmonary infiltrates on CXR  Resolved Problems:    * No resolved hospital problems. *    Infection due to covid 19  Pelvic cancer of unknown source  Immunocompromised host  Thrombocytopenia/pancytopenia  Hyponatremia  tracy  Dehydration  Hyperlipidemia  Hypothyroidism  gerd      Consults:  IP CONSULT TO INFECTIOUS DISEASES  IP CONSULT TO PALLIATIVE CARE    Procedures: none    Hospital Course: Patient was admitted with Dehydration [E86.0]. Patient is a 65 y.o. female    Hx of peritoneal carcinomatosis /pelvic cancer  Of unknwonw source s/p resection, sp   maintenance chemotherapy at the [facility] every so often for her cancer--currently in remission.     Also HTN, hypothyroid.  --   She noted several days of fatigue moderately severe- but no fevers/chills, or cough+GUTIERRES --  She also has had very poor oral intake recently, with NO appetite.  She called the [facility] with the symptoms, and they advised her to come to ER     +covid testing in ER with low BP noted- requested we observe overngith bc of tachycardia and hypotensive that responded in ER to initial resusistation   In ER  89/53 and she was tachycardic at 116. Pt seen and examined by ID. Pt is immunocompromised and was monitored closely off of treatment and was able to improve. On day of discharge, pt denied fevers, chills,n/v. Discharge planning d/w pt. Time given for questions and all questions answered. Discussion about covid and association with possible blood clot. Risk/benefits of treatment options discussed with pt. Pt declines blood thinner and opts to stay active and self monitor for blood clot      Discharge Exam:  Vitals:    01/30/21 0900 01/30/21 1800 01/30/21 2035 01/31/21 0830   BP: 109/65 136/67 115/70 (!) 110/57   Pulse: 91 86 93 82   Resp: 19 18 18 17   Temp: 99.2 °F (37.3 °C) 98.7 °F (37.1 °C) 98.6 °F (37 °C) 98.4 °F (36.9 °C)   TempSrc: Oral Oral Oral Oral   SpO2: 96% 96% 94% 95%   Weight:       Height:           Skin: warm and dry, no rash or erythema  Pulmonary/Chest: clear to auscultation bilaterally- no wheezes, rales or rhonchi, normal air movement, no respiratory distress  Cardiovascular: rhythm reg at rate of 88  Abdomen: soft, non-tender, non-distended, normal bowel sounds, no masses or organomegaly  Extremities: no cyanosis, no clubbing and no edema  No intake/output data recorded. No intake/output data recorded. LABS:  Recent Labs     01/30/21  1801      K 3.5   CL 99   CO2 25   BUN 11   CREATININE 0.8   GLUCOSE 94   CALCIUM 9.1       Recent Labs     01/29/21  0231 01/30/21  0320 01/31/21  0520   WBC 2.6* 2.5* 2.3*   RBC 2.84* 2.63* 2.58*   HGB 10.4* 9.5* 9.3*   HCT 31.0* 28.6* 28.0*   .2* 108.7* 108.5*   MCH 36.6* 36.1* 36.0*   MCHC 33.5 33.2 33.2   RDW 14.7 14.5 14.5   * 114* 110*   MPV 11.1 11.0 10.5       No results for input(s): POCGLU in the last 72 hours.     CBC with Differential:    Lab Results   Component Value Date    WBC 2.3 01/31/2021    RBC 2.58 01/31/2021    RBC 3.12 07/20/2019    HGB 9.3 01/31/2021    HCT 28.0 01/31/2021     01/31/2021 .5 01/31/2021    MCH 36.0 01/31/2021    MCHC 33.2 01/31/2021    RDW 14.5 01/31/2021    NRBC 0.0 01/31/2021    METASPCT 4.3 08/24/2019    LYMPHOPCT 33.1 01/31/2021    MONOPCT 6.1 01/31/2021    MYELOPCT 0.9 01/30/2021    BASOPCT 0.0 01/31/2021    MONOSABS 0.14 01/31/2021    LYMPHSABS 1.01 01/31/2021    EOSABS 0.04 01/31/2021    BASOSABS 0.00 01/31/2021       Imaging:   XR CHEST (2 VW)   Final Result   Suspect early basilar infiltrates. Patient Instructions:      Medication List      CONTINUE taking these medications    albuterol sulfate  (90 Base) MCG/ACT inhaler  Commonly known as: Ventolin HFA  Inhale 2 puffs into the lungs every 6 hours as needed for Wheezing or Shortness of Breath     Calcium 600+D3 600-400 MG-UNIT Tabs per tab  Generic drug: calcium carbonate-vitamin D3     escitalopram 10 MG tablet  Commonly known as: LEXAPRO  Take 1 tablet by mouth every morning     gabapentin 300 MG capsule  Commonly known as: NEURONTIN  Take 1 capsule by mouth 3 times daily for 360 days. Handicap Placard Misc  by Does not apply route Cannot walk 200 ft.  Without stopping to rest  Duration: Lifetime  Exp:  4/23/2024     levothyroxine 150 MCG tablet  Commonly known as: SYNTHROID  Take 1 tablet by mouth Daily     lisinopril 20 MG tablet  Commonly known as: PRINIVIL;ZESTRIL  Take 1 tablet by mouth daily     lovastatin 40 MG tablet  Commonly known as: MEVACOR  Take 1 tablet by mouth nightly     ONE-A-DAY WOMENS 50 PLUS PO     pantoprazole 40 MG tablet  Commonly known as: PROTONIX  Take 1 tablet by mouth daily     tiZANidine 2 MG tablet  Commonly known as: ZANAFLEX  Take 1 tablet by mouth every 8 hours as needed (muscle spasms)     TYLENOL 500 MG tablet  Generic drug: acetaminophen     VITAMIN B 12 PO     vitamin B-6 100 MG tablet  Commonly known as: PYRIDOXINE     Zejula 100 MG Caps  Generic drug: Niraparib Tosylate        STOP taking these medications    bumetanide 0.5 MG tablet Commonly known as: BUMEX            Total time for discharge is 37 min    Signed:  Electronically signed by Yuliya Du DO on 1/31/2021 at 5:51 PM

## 2021-01-31 NOTE — DISCHARGE INSTR - DIET
? Good nutrition is important when healing from an illness, injury, or surgery. Follow any nutrition recommendations given to you during your hospital stay. ? If you were given an oral nutrition supplement while in the hospital, continue to take this supplement at home. You can take it with meals, in-between meals, and/or before bedtime. These supplements can be purchased at most local grocery stores, pharmacies, and chain [employer]-stores. ? If you have any questions about your diet or nutrition, call the hospital and ask for the dietitian.       DRINK LOTS OF WATER

## 2021-01-31 NOTE — PROGRESS NOTES
[facility]  [facility]  Progress Note  CC: feeling ok today   Face to face encounter  SUBJECTIVE:  Patient is tolerating medications. No reported adverse drug reactions. ROS: No nausea, vomiting, diarrhea. Has been afebrile. On room air. No distress  Feeling ok today   No new issues overnight     Medications:  Scheduled Meds:   sodium chloride flush  10 mL Intravenous 2 times per day    colchicine  0.6 mg Oral BID    vitamin D  2,000 Units Oral Daily    escitalopram  10 mg Oral QAM    gabapentin  300 mg Oral TID    levothyroxine  150 mcg Oral Daily    atorvastatin  10 mg Oral Daily    pantoprazole  40 mg Oral Daily    Niraparib Tosylate  2 capsule Oral Nightly    vitamin B-6  100 mg Oral Daily    enoxaparin  40 mg Subcutaneous Daily     Continuous Infusions:  PRN Meds:sodium chloride flush, promethazine **OR** ondansetron, polyethylene glycol, acetaminophen **OR** acetaminophen, guaiFENesin-dextromethorphan, albuterol sulfate HFA, tiZANidine  OBJECTIVE:  Patient Vitals for the past 24 hrs:   BP Temp Temp src Pulse Resp SpO2   01/31/21 0830 (!) 110/57 98.4 °F (36.9 °C) Oral 82 17 95 %   01/30/21 2035 115/70 98.6 °F (37 °C) Oral 93 18 94 %   01/30/21 1800 136/67 98.7 °F (37.1 °C) Oral 86 18 96 %     Constitutional: The patient is awake, alert, and oriented. Resting in bed. Skin: Warm and dry. No rashes were noted. Head: Eyes show round, and reactive pupils. No jaundice. Mouth: Moist mucous membranes, no ulcerations, no thrush. Neck: Supple to movements. No lymphadenopathy. Chest: No use of accessory muscles to breathe. Symmetrical expansion. Auscultation reveals no wheezing, crackles, or rhonchi. On room air. Cardiovascular: S1 and S2 are rhythmic and regular. No murmurs appreciated. Abdomen: Positive bowel sounds to auscultation. Benign to palpation. Extremities: No clubbing, no cyanosis, no edema.   Left chest mediport- no tenderness Laboratory and Tests Review:  Lab Results   Component Value Date    WBC 2.3 (L) 01/31/2021    WBC 2.5 (L) 01/30/2021    WBC 2.6 (L) 01/29/2021    HGB 9.3 (L) 01/31/2021    HCT 28.0 (L) 01/31/2021    .5 (H) 01/31/2021     (L) 01/31/2021     Lab Results   Component Value Date    NEUTROABS 1.13 (L) 01/31/2021    NEUTROABS 1.60 (L) 01/30/2021    NEUTROABS 1.53 (L) 01/29/2021     Lab Results   Component Value Date    CRP 5.4 (H) 01/31/2021    CRP 5.7 (H) 01/30/2021    CRP 6.7 (H) 01/29/2021     No results found for: SEDRATE  Lab Results   Component Value Date    ALT 10 01/28/2021    AST 26 01/28/2021    ALKPHOS 99 01/28/2021    BILITOT 0.5 01/28/2021     Lab Results   Component Value Date     01/30/2021    K 3.5 01/30/2021    K 3.4 08/24/2019    CL 99 01/30/2021    CO2 25 01/30/2021    BUN 11 01/30/2021    CREATININE 0.8 01/30/2021    GFRAA >60 01/30/2021    LABGLOM >60 01/30/2021    GLUCOSE 94 01/30/2021    GLUCOSE 101 12/19/2011    PROT 8.5 01/28/2021    LABALBU 3.9 01/28/2021    LABALBU 4.6 12/19/2011    CALCIUM 9.1 01/30/2021    BILITOT 0.5 01/28/2021    ALKPHOS 99 01/28/2021    AST 26 01/28/2021    ALT 10 01/28/2021     Radiology:  Reviewed     Microbiology:   1/28/2021-blood cx- no growth  Urine cx- pending     ASSESSMENT:  · SARS-CoV-2 infection. We cannot say she is symptomatic since she was only hypotensive. She had one episode of loose stools today but she did receive antibiotics last night  · Hypotension. Possible adrenal insufficiency versus dehydration. Her creatinine on presentation was 1.2 and it was not repeated this morning. · Metastatic adenocarcinoma of unknown origin. Currently undergoing chemotherapy  · Immunocompromised host     PLAN:  · Continue to observe off any treatments  · Check cultures  · Monitor labs  · Monitor respiratory status - no supplemental oxygen needed at this time- patient is feeling much improved.    · Patient can d/c from [address] [facility] 11:52 AM  1/31/2021     Patient seen and examined. I had a face to face encounter with the patient. Agree with exam.  Assessment and plan as outlined above and directed by me. Addition and corrections were done as deemed appropriate. My exam and plan include: The patient is doing well. She is not hypotensive or hypoxemic. She is currently not being treated for any ongoing infectious process. She can be discharged from ID standpoint. ID will sign off.     Ismael Casey  1/31/2021  2:41 PM

## 2021-02-01 NOTE — CARE COORDINATION
Covid-19 Outreach call, no answer. No VM .   Unable to leave  [address], [address] [facility] Coordination Transition

## 2021-02-02 NOTE — CARE COORDINATION
Covid-19 Outreach call,  Call went straight to busy signal.  Unable to leave msg.     Laz Manzo, [address] [facility] Coordination Transition

## 2021-02-04 NOTE — TELEPHONE ENCOUNTER
Azra called was just discharged from hosp with Covid. Was told to call and let you know, and get any further instructions.     Please advise

## 2021-02-09 NOTE — PROGRESS NOTES
Post-Discharge Transitional Care Management Services or Hospital Follow Up      Umm Varma   YOB: 1950    Date of Office Visit:  2/9/2021  Date of Hospital Admission: 1/28/21  Date of Hospital Discharge: 1/31/21  Readmission Risk Score(high >=14%.  Medium >=10%):No data recorded    Care management risk score Rising risk (score 2-5) and Complex Care (Scores >=6): 5     Non face to face  following discharge, date last encounter closed (first attempt may have been earlier): *No documented post hospital discharge outreach found in the last 14 days *No documented post hospital discharge outreach found in the last 14 days    Call initiated 2 business days of discharge: *No response recorded in the last 14 days     Patient Active Problem List   Diagnosis    HTN (hypertension)    DJD (degenerative joint disease) of knee    DJD (degenerative joint disease), lumbosacral    Hypothyroid    Hypercholesterolemia    GERD (gastroesophageal reflux disease)    Mild intermittent asthma without complication    Secondary malignant neoplasm of other specified sites ([facility] [address]    Depression    Dehydration    COVID-19 virus infection    Hypotension    Pulmonary infiltrates on CXR    Hyponatremia    ELIANA (acute kidney injury) ([facility] [place] [address]    Hyperlipidemia       No Known Allergies    Medications listed as ordered at the time of discharge from hospital   Rayne Núñez, [address] [place] [address] Medication Instructions LOLLY:    Printed on:02/09/21 1056   Medication Information                      acetaminophen (TYLENOL) 500 MG tablet  Take 500 mg by mouth every 6 hours as needed for Pain             albuterol sulfate HFA (VENTOLIN HFA) 108 (90 Base) MCG/ACT inhaler  Inhale 2 puffs into the lungs every 6 hours as needed for Wheezing or Shortness of Breath             calcium carbonate-vitamin D3 (CALCIUM 600+D3) 600-400 MG-UNIT TABS  Take by mouth daily              Cyanocobalamin (VITAMIN B 12 PO)  Take by mouth daily escitalopram (LEXAPRO) 10 MG tablet  Take 1 tablet by mouth every morning             gabapentin (NEURONTIN) 300 MG capsule  Take 1 capsule by mouth 3 times daily for 360 days. Handicap Placard MISC  by Does not apply route Cannot walk 200 ft.  Without stopping to rest  Duration: Lifetime  Exp:  4/23/2024             levothyroxine (SYNTHROID) 150 MCG tablet  Take 1 tablet by mouth Daily             lisinopril (PRINIVIL;ZESTRIL) 20 MG tablet  Take 1 tablet by mouth daily             lovastatin (MEVACOR) 40 MG tablet  Take 1 tablet by mouth nightly             Multiple Vitamins-Minerals (ONE-A-DAY WOMENS 50 PLUS PO)  Take by mouth daily              Niraparib Tosylate (ZEJULA) 100 MG CAPS  Take 2 tablets by mouth nightly              pantoprazole (PROTONIX) 40 MG tablet  Take 1 tablet by mouth daily             tiZANidine (ZANAFLEX) 2 MG tablet  Take 1 tablet by mouth every 8 hours as needed (muscle spasms)             vitamin B-6 (PYRIDOXINE) 100 MG tablet  Take 100 mg by mouth daily                   Medications marked \"taking\" at this time  Outpatient Medications Marked as Taking for the 2/9/21 encounter (Office Visit) with Monique Gaxiola PA-C   Medication Sig Dispense Refill    Cyanocobalamin (VITAMIN B 12 PO) Take by mouth daily      levothyroxine (SYNTHROID) 150 MCG tablet Take 1 tablet by mouth Daily 90 tablet 3    acetaminophen (TYLENOL) 500 MG tablet Take 500 mg by mouth every 6 hours as needed for Pain      Multiple Vitamins-Minerals (ONE-A-DAY WOMENS 50 PLUS PO) Take by mouth daily       Niraparib Tosylate (ZEJULA) 100 MG CAPS Take 2 tablets by mouth nightly       lovastatin (MEVACOR) 40 MG tablet Take 1 tablet by mouth nightly 90 tablet 3    pantoprazole (PROTONIX) 40 MG tablet Take 1 tablet by mouth daily 90 tablet 3    escitalopram (LEXAPRO) 10 MG tablet Take 1 tablet by mouth every morning 90 tablet 3  tiZANidine (ZANAFLEX) 2 MG tablet Take 1 tablet by mouth every 8 hours as needed (muscle spasms) 90 tablet 0    lisinopril (PRINIVIL;ZESTRIL) 20 MG tablet Take 1 tablet by mouth daily 90 tablet 3    gabapentin (NEURONTIN) 300 MG capsule Take 1 capsule by mouth 3 times daily for 360 days. 270 capsule 3    albuterol sulfate HFA (VENTOLIN HFA) 108 (90 Base) MCG/ACT inhaler Inhale 2 puffs into the lungs every 6 hours as needed for Wheezing or Shortness of Breath 1 Inhaler 11    vitamin B-6 (PYRIDOXINE) 100 MG tablet Take 100 mg by mouth daily      calcium carbonate-vitamin D3 (CALCIUM 600+D3) 600-400 MG-UNIT TABS Take by mouth daily           Medications patient taking as of now reconciled against medications ordered at time of hospital discharge: Yes    Chief Complaint   Patient presents with    [facility]     D/c from [place] [address] 1/31/21 for Covid, doing good     Inpatient course:   +covid testing in ER with low BP noted- requested we observe overngith bc of tachycardia and hypotensive that responded in ER to initial resusistation    In [place]  52/69 and she was tachycardic at 116.     Pt seen and examined by ID. Pt is immunocompromised and was monitored closely off of treatment and was able to improve. On day of discharge, pt denied fevers, chills,n/v. Discharge planning d/w pt. Time given for questions and all questions answered. Discussion about covid and association with possible blood clot. Risk/benefits of treatment options discussed with pt. Pt declines blood thinner and opts to stay active and self monitor for blood clot    Interval history/Current status: doing very well. No issues during hospital stay or after admission. She is asymptomatic at this time. She reports some fatigue after doing activities around the house but states that this is baseline for her due to history of cancer. Denies fever, chills, CP, SOB, leg swelling/redness, palpitations, NVD, loss of taste/smell, cough, or congestion. Review of Systems   Constitutional: Negative for chills and fever. HENT: Negative for congestion, ear pain and sore throat. Eyes: Negative for visual disturbance. Respiratory: Negative for cough and shortness of breath. Cardiovascular: Negative for chest pain, palpitations and leg swelling. Gastrointestinal: Negative for abdominal pain, diarrhea, nausea and vomiting. Genitourinary: Negative for dysuria and frequency. Skin: Negative for rash. Vitals:    02/09/21 1032 02/09/21 1045   BP: 130/68    Pulse: 115 102   Resp: 16    Temp: 96.5 °F (35.8 °C)    SpO2: 96% 96%   Weight: 140 lb 9.6 oz (63.8 kg)      Body mass index is 25.72 kg/m². Wt Readings from Last 3 Encounters:   02/09/21 140 lb 9.6 oz (63.8 kg)   01/28/21 135 lb (61.2 kg)   11/06/20 150 lb (68 kg)     BP Readings from Last 3 Encounters:   02/09/21 130/68   01/31/21 (!) 110/57   11/06/20 (!) 146/75       Physical Exam  Constitutional:       Appearance: She is well-developed. HENT:      Head: Normocephalic. Neck:      Musculoskeletal: Neck supple. Thyroid: No thyromegaly. Cardiovascular:      Rate and Rhythm: Normal rate and regular rhythm. Heart sounds: Normal heart sounds. No murmur. No friction rub. No gallop. Pulmonary:      Effort: Pulmonary effort is normal. No respiratory distress. Breath sounds: Normal breath sounds. No wheezing or rales. Abdominal:      General: Bowel sounds are normal. There is no distension. Palpations: Abdomen is soft. Tenderness: There is no abdominal tenderness. Skin:     General: Skin is warm and dry. Findings: No rash. Neurological:      Mental Status: She is alert and oriented to person, place, and time. Assessment/Plan:  1. COVID-19  Nearly resolved. Asymptomatic. Discussed red flag symptoms, especially in relation to DVT/PE. Patient voiced understanding.   - WI DISCHARGE MEDS RECONCILED W/ CURRENT OUTPATIENT MED LIST    2.  Tachycardia  Improving 3. Hypotension, unspecified hypotension type  Resolved    4. History of cancer  Going to the [address] [place] [address] Rd all hospital records, notes, labs, and imaging.        Medical Decision Making: moderate complexity

## 2021-02-28 PROBLEM — E86.0 DEHYDRATION: Status: RESOLVED | Noted: 2021-01-01 | Resolved: 2021-01-01

## 2021-03-02 NOTE — PROGRESS NOTES
 Uterine Cancer Neg Hx     Colon Cancer Neg Hx     Breast Cancer Neg Hx        CareTeam (Including outside providers/suppliers regularly involved in providing care):   Patient Care Team:  Catarino Aranda MD as PCP - General (Family Medicine)  Catarino Aranda MD as PCP - [facility] Empaneled Provider    Wt Readings from Last 3 Encounters:   03/02/21 141 lb (64 kg)   02/09/21 140 lb 9.6 oz (63.8 kg)   01/28/21 135 lb (61.2 kg)     Vitals:    03/02/21 1040   BP: 110/62   Pulse: 117   Resp: 16   Temp: 97 °F (36.1 °C)   SpO2: 98%   Weight: 141 lb (64 kg)   Height: 5' 2\" (1.575 m)     Body mass index is 25.79 kg/m². Based upon direct observation of the patient, evaluation of cognition reveals recent and remote memory intact. General Appearance: alert and oriented to person, place and time, well developed and well- nourished, in no acute distress  Skin: warm and dry, no rash or erythema  Head: normocephalic and atraumatic  Eyes: extraocular eye movements intact, conjunctivae normal  Neck: supple and non-tender without mass, no thyromegaly or thyroid nodules, no cervical lymphadenopathy  Pulmonary/Chest: clear to auscultation bilaterally- no wheezes, rales or rhonchi, normal air movement, no respiratory distress  Cardiovascular: normal rate, regular rhythm, normal S1 and S2, no murmurs, rubs, clicks, or gallops  Abdomen: soft, non-tender, non-distended  Extremities: no cyanosis, clubbing or edema  Musculoskeletal: normal range of motion, no joint swelling, deformity or tenderness  Neurologic: no focal deficits      Patient's complete Health Risk Assessment and screening values have been reviewed and are found in Flowsheets. The following problems were reviewed today and where indicated follow up appointments were made and/or referrals ordered.     Positive Risk Factor Screenings with Interventions:            General Health and ACP:  General  In general, how would you say your health is?: Good Do you always fasten your seatbelt when you are in a car?: Yes  Safety Interventions:  · Home safety tips provided     Personalized Preventive Plan   Current Health Maintenance Status  Immunization History   Administered Date(s) Administered    Pneumococcal Conjugate 13-valent ([id number]) 10/25/2016    Pneumococcal Polysaccharide ([id number]) 11/13/2017        Health Maintenance   Topic Date Due    COVID-19 Vaccine (1 of 2) Never done    DTaP/Tdap/Td vaccine (1 - Tdap) Never done    Shingles Vaccine (1 of 2) Never done   [facility] Visit (AWV)  Never done    Breast cancer screen  05/01/2021    Lipid screen  09/02/2021    TSH testing  01/28/2022    Potassium monitoring  01/30/2022    Creatinine monitoring  01/30/2022    Colon cancer screen colonoscopy  11/06/2030    Flu vaccine  Completed    Pneumococcal 65+ yrs at Risk Vaccine  Completed    Hepatitis C screen  Completed    DEXA (modify frequency per FRAX score)  Addressed    Hepatitis A vaccine  Aged Out    Hepatitis B vaccine  Aged Out    Hib vaccine  Aged Out    Meningococcal (ACWY) vaccine  Aged Out     Recommendations for [employer] Due: see orders and patient instructions/AVS.  . Recommended screening schedule for the next 5-10 years is provided to the patient in written form: see Patient Kin Lupis was seen today for medicare awv and leg pain. Diagnoses and all orders for this visit:    Routine general medical examination at a health care facility    Acquired hypothyroidism  -     TSH without Reflex; Future    Anemia, unspecified type  -     VITAMIN B12 & FOLATE; Future  -     Ferritin; Future  -     Iron and TIBC; Future  -     CBC WITH AUTO DIFFERENTIAL; Future    Leg cramp  -     COMPREHENSIVE METABOLIC PANEL; Future    Medication refill  -     tiZANidine (ZANAFLEX) 2 MG tablet;  Take 1 tablet by mouth every 8 hours as needed (muscle spasms)    Muscle spasm -     tiZANidine (ZANAFLEX) 2 MG tablet; Take 1 tablet by mouth every 8 hours as needed (muscle spasms)             Repeat labs; see prior labs, pancytopenia and anemia  Further recs pending lab results  Continue to f/u with [facility]  I agree she can stop the bumex  Leg cramping could be due to anemia  She can also try OTC hyaland's  Close f/u         Return for Medicare Annual Wellness Visit in 1 year + 3 months.     Electronically signed by Meghna Moreland MD on 3/2/2021 at 1:04 PM

## 2021-03-02 NOTE — PATIENT INSTRUCTIONS
Personalized Preventive Plan for Julia Devlin - 3/2/2021  Medicare offers a range of preventive health benefits. Some of the tests and screenings are paid in full while other may be subject to a deductible, co-insurance, and/or copay. Some of these benefits include a comprehensive review of your medical history including lifestyle, illnesses that may run in your family, and various assessments and screenings as appropriate. After reviewing your medical record and screening and assessments performed today your provider may have ordered immunizations, labs, imaging, and/or referrals for you. A list of these orders (if applicable) as well as your Preventive Care list are included within your After Visit Summary for your review. Other Preventive Recommendations:    · A preventive eye exam performed by an eye specialist is recommended every 1-2 years to screen for glaucoma; cataracts, macular degeneration, and other eye disorders. · A preventive dental visit is recommended every 6 months. · Try to get at least 150 minutes of exercise per week or 10,000 steps per day on a pedometer . · Order or download the FREE \"Exercise & Physical Activity: Your Everyday Guide\" from [employer]. Call 5-243.595.7781 or search [employer] online. · You need 7840-0863 mg of calcium and 3219-0539 IU of vitamin D per day. It is possible to meet your calcium requirement with diet alone, but a vitamin D supplement is usually necessary to meet this goal.  · When exposed to the sun, use a sunscreen that protects against both UVA and UVB radiation with an SPF of 30 or greater. Reapply every 2 to 3 hours or after sweating, drying off with a towel, or swimming. · Always wear a seat belt when traveling in a car. Always wear a helmet when riding a bicycle or motorcycle.

## 2021-03-08 NOTE — TELEPHONE ENCOUNTER
Patient called in regards to Synthroid does conformation. LVM for patient that Dr. Veronica Askew changed her dose from 150 mcg to 125mcg and the 125mcg was sent to her pharmacy. Told patient to return call with any further questions.

## 2021-04-02 NOTE — PROGRESS NOTES
Chief Complaint:  Hypothyroidism (lowered levothyroxine, feels fine) and Other (Discuss ACP)    History of Present Illness:  Source of history provided by:  patient. Patient presents to discuss advanced care planning packet. She states that  will make decisions as needed and she does not want CPR. She had a recent dose change of levothyroxine and is due for recheck TSH today. Will need refills based on this. Feels well on current dose. She reports history of muscle spasms and takes muscle relaxer at night PRN. She has been on Flexeril 10mg for many years and was switched to Zanaflex 2mg approximately 6 months ago. She states that this makes her very lightheaded and she cannot take it. Would like to go back to Flexeril but is willing to cut down dose. She had COVID in January and plans to get COVID vaccine this summer. She is open to shingles vaccine and will work around [employer]. She is due for a mammogram.     Review of Systems: Unless otherwise stated in this report or unable to obtain because of the patient's clinical or mental status as evidenced by the medical record, this patients's positive and negative responses for Review of Systems, constitutional, psych, eyes, ENT, cardiovascular, respiratory, gastrointestinal, neurological, genitourinary, musculoskeletal, integument systems and systems related to the presenting problem are either stated in the preceding or were not pertinent or were negative for the symptoms and/or complaints related to the medical problem. Past Medical History:  has a past medical history of Ankle swelling, Asthma, Cancer ([address] Depression, GERD (gastroesophageal reflux disease), Hyperlipidemia, Hypertension, Hypothyroidism, Osteoarthritis, Positive FIT (fecal immunochemical test), Vitamin D deficiency, and Wears partial dentures. Past Surgical History:  has a past surgical history that includes Hysterectomy (1984);  Tunneled venous port placement

## 2021-05-19 NOTE — TELEPHONE ENCOUNTER
Pt would like to know if it is okay for her to have the covid vaccine now. Had covid the end of January.     Please advise

## 2021-09-21 NOTE — PROGRESS NOTES
CC: Nader Kilpatrick is a 65 y.o. yo female is here for evaluation evaluation for the following acute & chronic medical concerns: Cough (clear sputum x 3 days) and Nasal Congestion (sinus)      HPI:    Symptoms started 3 days ago; nasal congestion and dry cough; has increased sputum; feels like sinuses; when lays down has more coughing; has been using benadryl; hx of covid and fully vaccinated    HTN -  on lisinopril  HLD - lovastatin  GERD - protonix. Mild asthma - stable  hypothyroidism - on synthroid  Depression- Lexapro, stable.   Osteopenia - on ca/d OTC  Peritoneal cancer - had the chemo; now on maint chemo; still follows with [facility], [place]); on maint zejula  Pancytopenia / Anemia - being followed by  [facility]; see las labs      Vitals:   /70   Pulse 105   Temp 98.1 °F (36.7 °C)   Resp 20   Ht 5' 2\" (1.575 m)   Wt 137 lb (62.1 kg)   SpO2 100%   BMI 25.06 kg/m²   Wt Readings from Last 3 Encounters:   09/21/21 137 lb (62.1 kg)   04/02/21 142 lb 6.4 oz (64.6 kg)   03/02/21 141 lb (64 kg)       PE:  Constitutional - alert, well appearing, and in no distress  Eyes - extraocular eye movements intact, left eye normal, right eye normal, no conjunctivitis noted  ENT - wnl  Neck - symmetric, no obvious masses noted  Respiratory- clear to auscultation, no wheezes, rales or rhonchi, symmetric air entry; no increased work of breathing  Cardiovascular - IRREGULARLY IRREGULAR, normal S1, S2, no murmurs, rubs, clicks or gallops  Extremities - no edema noted  Abdomen - soft, nontender, nondistended  Skin - normal coloration and turgor, no rashes, no suspicious skin lesions noted  Neurological - no obvious CN deficits or focal neurological deficits  Psychiatric - alert, oriented; normal mood, behavior, speech, dress    A / P:     Diagnosis Orders   1.  New onset atrial fibrillation [facility])  Erica Johnson MD, Electrophysiology, [place]    apixaban (ELIQUIS) 5 MG TABS tablet    ECHO Complete 2D W Doppler W Color   2. Mild intermittent asthma with acute exacerbation  predniSONE (DELTASONE) 20 MG tablet    DISCONTINUED: predniSONE (DELTASONE) 20 MG tablet   3. Mild intermittent asthma without complication  AKBOL-36 Ambulatory    albuterol sulfate HFA (VENTOLIN HFA) 108 (90 Base) MCG/ACT inhaler    DISCONTINUED: albuterol sulfate HFA (VENTOLIN HFA) 108 (90 Base) MCG/ACT inhaler   4. Hyperlipidemia, unspecified hyperlipidemia type  Lipid Panel   5. Acquired hypothyroidism  levothyroxine (SYNTHROID) 100 MCG tablet   6. Depression, unspecified depression type  escitalopram (LEXAPRO) 10 MG tablet   7. Medication refill  tiZANidine (ZANAFLEX) 2 MG tablet   8. Anxiety  escitalopram (LEXAPRO) 10 MG tablet   9. Muscle spasm  tiZANidine (ZANAFLEX) 2 MG tablet   10. Hypercholesterolemia  lovastatin (MEVACOR) 40 MG tablet   11. Gastroesophageal reflux disease without esophagitis  pantoprazole (PROTONIX) 40 MG tablet   12. Postmenopausal  escitalopram (LEXAPRO) 10 MG tablet   13. Essential hypertension, benign  lisinopril (PRINIVIL;ZESTRIL) 20 MG tablet    CBC Auto Differential    Comprehensive Metabolic Panel    TSH without Reflex    EKG 12 Lead    EKG 12 Lead   14. Vitamin D deficiency, unspecified  Vitamin D 25 Hydroxy   15. Shortness of breath  Atiya Mcintyre MD, Electrophysiology, [place]    ECHO Complete 2D W Doppler W Color       EKG today with new onset afib today; [id number] 3   She feels normal and prefers not to got to ED  We will start eliquis  Obtain 2D echo  Refer to EP as OP  Treat acute sinobronchitis as asthma ex; give steroid and bromfed  Labs as per prior, pancytopenia and anemia  Continue to f/u with [facility]  We stopped bumex last visit  Close f/u     RTO: Return in about 3 weeks (around 10/12/2021) for new onset Afib.     On this date 9/21/2021 I have spent 62 minutes reviewing previous notes, test results and face to face with the patient discussing the diagnosis and importance of

## 2021-09-24 NOTE — TELEPHONE ENCOUNTER
----- Message from [facility] sent at 9/23/2021  4:43 PM EDT -----  Subject: Message to Provider    QUESTIONS  Information for Provider? Rtn call from provider  ---------------------------------------------------------------------------  --------------  [address]  What is the best way for the office to contact you? OK to leave message on   voicemail  Preferred Call Back Phone Number? [phone]  ---------------------------------------------------------------------------  --------------  SCRIPT ANSWERS  Relationship to Patient?  Self

## 2021-09-30 NOTE — PROGRESS NOTES
CC: Mabel Costello is a 65 y.o. yo female is here for evaluation evaluation for the following acute medical concerns: Pharyngitis (yellowish sputum) and Cough      HPI:    Asthma ex f/u: given steroid at last f/u; she was also noted to have new onset Afib; she started her eliquis and on her toprol; she has no new cardiac symptoms just a lingering cough which is slightly improved with the steroid; she has yellow sputum; no fever, chills; no sob, no muñoz or orthopnea; she is double vaccinated and she had neg covid      Vitals:   /80   Pulse 100   Temp 98.2 °F (36.8 °C)   Resp 18   Ht 5' 2\" (1.575 m)   Wt 137 lb (62.1 kg)   SpO2 100%   BMI 25.06 kg/m²   Wt Readings from Last 3 Encounters:   09/30/21 137 lb (62.1 kg)   09/21/21 137 lb (62.1 kg)   04/02/21 142 lb 6.4 oz (64.6 kg)       PE:  Constitutional - alert, well appearing, and in no distress  Eyes - extraocular eye movements intact, left eye normal, right eye normal, no conjunctivitis noted  Neck - symmetric, no obvious masses noted  Respiratory- clear to auscultation, no wheezes, rales or rhonchi, symmetric air entry; no increased work of breathing  Cardiovascular - normal rate, regular rhythm, normal S1, S2, no murmurs, rubs, clicks or gallops  Extremities - no edema noted  Abdomen - soft, nontender, nondistended  Skin - normal coloration and turgor, no rashes, no suspicious skin lesions noted  Neurological - no obvious CN deficits or focal neurological deficits  Psychiatric - alert, oriented; normal mood, behavior, speech, dress    A / P:     Diagnosis Orders   1. Asthma with acute exacerbation, unspecified asthma severity, unspecified whether persistent  XR CHEST STANDARD (2 VW)   2. Mild intermittent asthma with acute exacerbation  predniSONE (DELTASONE) 20 MG tablet    azithromycin (ZITHROMAX) 250 MG tablet   3.  New onset atrial fibrillation (HCC)         Increase the metoprolol to 25mg daily  Repeat steroid burst  Add the azithromycin  CXR today      RTO: Return for per prior.       An electronic signature was used to authenticate this note.  ---- Nakia Lopez MD on 9/30/2021 at 12:53 PM

## 2021-09-30 NOTE — TELEPHONE ENCOUNTER
Finished Prednisone and cough medicine. She is coughing all the time, not better. Wondering about if she needs antibiotic?   Had a bad night, mucous, yellow    Uses [facility]  Or do you want her to come in?

## 2021-10-05 PROBLEM — I48.91 NEW ONSET ATRIAL FIBRILLATION (HCC): Status: ACTIVE | Noted: 2021-01-01

## 2021-10-05 NOTE — TELEPHONE ENCOUNTER
Amita Ross said Mom, [place], came home from office visit and she was getting ready to bring Cleopatra to her office visit at 10:15 today. Was standing by car, and got dizzy and was sitting on floor inside door of car.

## 2021-10-05 NOTE — TELEPHONE ENCOUNTER
Discussed with daughter. She is feeling improved. Offered for her to come back to office for evaluation v ED v wait to see cards in 2 days. She prefers to wait to see cards in 2 days. If she develops new symptoms, she will proceed to ED.

## 2021-10-05 NOTE — PROGRESS NOTES
CC: Radha Villarreal is a 65 y.o. yo female is here for evaluation evaluation for the following acute & chronic medical concerns: Cough (pharyngitis, feeling better)      HPI:    Asthma ex f/u: improving, slight lingering cough    New onset afib; noted 9/21 in office visit:  [id number] 3; started eliquis and recent increase of metoprolol 25mg; no new symptoms; planned visit with EP 10/7    HTN -  on lisinopril  HLD - lovastatin  GERD - protonix. Mild asthma - stable  hypothyroidism - on synthroid  Depression- Lexapro, stable.   Osteopenia - on ca/d OTC  Peritoneal cancer - had the chemo; now on maint chemo; still follows with [facility]; on maint zejula  Pancytopenia / Anemia - being followed by  [facility]; see las labs      Vitals:   /80   Pulse 94   Temp 97.8 °F (36.6 °C)   Resp 18   Ht 5' 2\" (1.575 m)   Wt 130 lb (59 kg)   SpO2 99%   BMI 23.78 kg/m²   Wt Readings from Last 3 Encounters:   10/05/21 130 lb (59 kg)   09/30/21 137 lb (62.1 kg)   09/21/21 137 lb (62.1 kg)       PE:  Constitutional - alert, well appearing, and in no distress  Eyes - extraocular eye movements intact, left eye normal, right eye normal, no conjunctivitis noted  ENT - wnl  Neck - symmetric, no obvious masses noted  Respiratory- clear to auscultation, no wheezes, rales or rhonchi, symmetric air entry; no increased work of breathing  Cardiovascular - IRREGULARLY IRREGULAR, normal S1, S2, no murmurs, rubs, clicks or gallops  Extremities - no edema noted  Abdomen - soft, nontender, nondistended  Skin - normal coloration and turgor, no rashes, no suspicious skin lesions noted  Neurological - no obvious CN deficits or focal neurological deficits  Psychiatric - alert, oriented; normal mood, behavior, speech, dress    A / P:     Diagnosis Orders   1. New onset atrial fibrillation ([address]     2. Asthma with acute exacerbation, unspecified asthma severity, unspecified whether persistent     3. Essential hypertension, benign     4. Hyperlipidemia, unspecified hyperlipidemia type     5. Need for vaccination  INFLUENZA, QUADV, ADJUVANTED, 65 YRS =, IM, PF, PREFILL SYR, 0.5ML (FLUAD)   6. Cough  brompheniramine-pseudoephedrine-DM (BROMFED DM) 2-30-10 MG/5ML syrup       [id number] 3; Continue eliquis  Stay on the increased metoprolol of 25mg daily  Continue with plans for 2D echo and f/u with EP  Labs as per prior, pancytopenia and anemia  Continue to f/u with [facility]  Close f/u       RTO: Return in about 2 months (around 12/5/2021) for afib; chronic f/u. An electronic signature was used to authenticate this note.  ---- Juan Petersen MD on 10/5/2021 at 9:05 AM    On this date 10/5/2021 I have spent 33 minutes reviewing previous notes, test results and face to face with the patient discussing the diagnosis and importance of compliance with the treatment plan as well as documenting on the day of the visit.

## 2021-10-07 NOTE — PROGRESS NOTES
Patient was seen in Office today to have 14 day zio xt monitor put on per Dr. Adelina Sierra. Pt tolerated placement and understood use and return of device number [id number].     Electronically signed by Columba Solomon MA on 10/7/2021 at 12:09 PM

## 2021-10-07 NOTE — PROGRESS NOTES
Cardiac Electrophysiology Outpatient Progress Note    Fabi Lopez  1950  Date of Service: 10/7/2021  Referring Provider/PCP: Juan Petersen MD  Chief Complaint:   Chief Complaint   Patient presents with    New Patient     Referred by Dr James Anne new onset AF, SOB, dizzy, lightheaded, near syncope on 10/05/2021 and 10/07/2021          HISTORY OF PRESENT ILLNESS    Fabi Lopez presents to the office today for the management of these Electrophysiology conditions: AF.      Ms. Nhung Kidd has a history of HTN, HLD, GERD, asthma, depression, hypothyroidism, and DJD, Peritoneal cancer - had the chemo; now on maint chemo; still follows with [facility]; on maintenance [employer]. Ms. Nhung Kidd was told she had atrial fibrillation during a routine office visit with her PCP in September 2021. At that time she complained of SOB, cough and was diagnosed acute asthma exacerbaton. She was started on Eliquis due to a [id number] of 3 and her metoprolol was increased. Upon review of the EKG, it showed SR w/ PAC. 10/7/2021: Ms. Nhung Kidd reports feeling overall well but is returning back to baseline after recovering from \"cold like symptoms and dehydration\". She reports she had a episode of fatigue and ?syncope while walking to the car. The episode was witness by her  and lasted a few seconds. He put a cold compress on. They did not seek medical attention after this episode. He goes on to report having another near syncopal episode last night. These episodes started after new medications of metoprolol, eliquis were added. Today she presents in SR. The patient denies any chest pain, dyspnea, palpitations, dizziness, syncope, orthopnea or paroxysmal nocturnal dyspnea. She is not very aggressive with her hydration.  Drinks mostly ginger ale all day       Patient Active Problem List    Diagnosis Date Noted    New onset atrial fibrillation ([facility] 10/05/2021    Hyponatremia     ELIANA (acute kidney injury) ([facility]     Hyperlipidemia     COVID-19 virus infection 01/29/2021    Hypotension 01/29/2021    Pulmonary infiltrates on CXR 01/29/2021    Secondary malignant neoplasm of other specified sites ([facility] 09/02/2020    Depression     Mild intermittent asthma without complication 32/98/1180    HTN (hypertension) 04/16/2012    DJD (degenerative joint disease) of knee 04/16/2012    DJD (degenerative joint disease), lumbosacral 04/16/2012    Hypothyroid 04/16/2012    Hypercholesterolemia 04/16/2012    GERD (gastroesophageal reflux disease) 04/16/2012       Family History   Problem Relation Age of Onset    Heart Disease Mother     High Blood Pressure Mother     Diabetes Father     Thyroid Disease Sister         2    Ovarian Cancer Neg Hx     Uterine Cancer Neg Hx     Colon Cancer Neg Hx     Breast Cancer Neg Hx        SOCIAL HISTORY   Social History     Socioeconomic History    Marital status:      Spouse name: Not on file    Number of children: Not on file    Years of education: Not on file    Highest education level: Not on file   Occupational History    Occupation: disability     Comment: ankle/low back   Tobacco Use    Smoking status: Never Smoker    Smokeless tobacco: Never Used   Vaping Use    Vaping Use: Never used   Substance and Sexual Activity    Alcohol use: No    Drug use: Never    Sexual activity: Not on file   Other Topics Concern    Not on file   Social History Narrative    Not on file     Social Determinants of Health     Financial Resource Strain: Medium Risk    Difficulty of Paying Living Expenses: Somewhat hard   Food Insecurity: Food Insecurity Present    Worried About Running Out of Food in the Last Year: Sometimes true    Nita of Food in the Last Year: Sometimes true   Transportation Needs: No Transportation Needs    Lack of Transportation (Medical): No    Lack of Transportation (Non-Medical):  No   Physical Activity:     Days of Exercise per Week:     Minutes of Exercise per Session:    Stress:     Feeling of Stress :    Social Connections:     Frequency of Communication with Friends and Family:     Frequency of Social Gatherings with Friends and Family:     Attends Faith Services:     Active Member of Clubs or Organizations:     Attends Club or Organization Meetings:     Marital Status:    Intimate Partner Violence:     Fear of Current or Ex-Partner:     Emotionally Abused:     Physically Abused:     Sexually Abused:          Past Surgical History:   Procedure Laterality Date    APPENDECTOMY  years ago    BREAST SURGERY  1960s    removal lump     SECTION      x 1    COLONOSCOPY N/A 2020    COLONOSCOPY DIAGNOSTIC performed by Umm Cornelius MD at [facility]  2019    debulking of pelvic mass, DR. Green Floor [facility]    LYMPH NODE DISSECTION      TUNNELED VENOUS PORT PLACEMENT         Current Outpatient Medications   Medication Sig Dispense Refill    brompheniramine-pseudoephedrine-DM (BROMFED DM) 2-30-10 MG/5ML syrup Take 5 mLs by mouth 4 times daily as needed for Congestion or Cough 240 mL 0    tiZANidine (ZANAFLEX) 2 MG tablet Take 1 tablet by mouth every 8 hours as needed (muscle spasms) 90 tablet 1    levothyroxine (SYNTHROID) 100 MCG tablet Take 1 tablet by mouth Daily 90 tablet 1    lovastatin (MEVACOR) 40 MG tablet Take 1 tablet by mouth nightly 90 tablet 1    pantoprazole (PROTONIX) 40 MG tablet Take 1 tablet by mouth daily 90 tablet 1    escitalopram (LEXAPRO) 10 MG tablet Take 1 tablet by mouth every morning 90 tablet 1    lisinopril (PRINIVIL;ZESTRIL) 20 MG tablet Take 1 tablet by mouth daily 90 tablet 1    albuterol sulfate HFA (VENTOLIN HFA) 108 (90 Base) MCG/ACT inhaler Inhale 2 puffs into the lungs every 6 hours as needed for Wheezing or Shortness of Breath 1 each 5    Cyanocobalamin (VITAMIN B 12 PO) Take by mouth daily      acetaminophen (TYLENOL) 500 MG tablet Take 500 mg by mouth every 6 hours as needed for Pain      Multiple Vitamins-Minerals (ONE-A-DAY WOMENS 50 PLUS PO) Take by mouth daily       Niraparib Tosylate (ZEJULA) 100 MG CAPS Take 2 tablets by mouth nightly       gabapentin (NEURONTIN) 300 MG capsule Take 1 capsule by mouth 3 times daily for 360 days. 270 capsule 3    vitamin B-6 (PYRIDOXINE) 100 MG tablet Take 100 mg by mouth daily      Handicap Placard MISC by Does not apply route Cannot walk 200 ft. Without stopping to rest  Duration: Lifetime  Exp:  4/23/2024 1 each 0    calcium carbonate-vitamin D3 (CALCIUM 600+D3) 600-400 MG-UNIT TABS Take by mouth daily        No current facility-administered medications for this visit. No Known Allergies        ROS:   Review of Systems   Constitutional: Negative for fatigue and fever. HENT: Negative for congestion, nosebleeds and sinus pressure. Eyes: Negative for redness and visual disturbance. Respiratory: Negative for cough, chest tightness, shortness of breath and wheezing. Cardiovascular: Negative for chest pain, palpitations and leg swelling. Gastrointestinal: Negative for abdominal distention, abdominal pain, diarrhea and nausea. Endocrine: Negative for cold intolerance, heat intolerance, polydipsia and polyphagia. Genitourinary: Negative for difficulty urinating, frequency and urgency. Musculoskeletal: Negative for arthralgias, joint swelling and myalgias. Skin: Negative for color change and wound. Neurological: Negative for dizziness, syncope, weakness and numbness. Psychiatric/Behavioral: Negative for agitation, behavioral problems, confusion, decreased concentration, hallucinations and suicidal ideas. The patient is not nervous/anxious.             PHYSICAL EXAM:  Vitals:    10/07/21 1114   BP: 122/86   Site: Left Upper Arm   Position: Sitting   Cuff Size: Medium Adult   Pulse: 89   Resp: 16   Weight: 131 lb (59.4 kg)   Height: 5' 2\" (1.575 m)     Physical Exam  Vitals reviewed. Constitutional:       Appearance: Normal appearance. HENT:      Head: Normocephalic. Mouth/Throat:      Mouth: Mucous membranes are moist.      Pharynx: Oropharynx is clear. Eyes:      Conjunctiva/sclera: Conjunctivae normal.   Neck:      Vascular: No carotid bruit. Cardiovascular:      Rate and Rhythm: Normal rate and regular rhythm. Pulses: Normal pulses. Heart sounds: Normal heart sounds. Pulmonary:      Effort: Pulmonary effort is normal.      Breath sounds: Normal breath sounds. No rales. Chest:      Chest wall: No tenderness. Abdominal:      General: Bowel sounds are normal.      Palpations: Abdomen is soft. Musculoskeletal:         General: Normal range of motion. Cervical back: Normal range of motion and neck supple. Skin:     General: Skin is warm. Neurological:      General: No focal deficit present. Mental Status: She is alert and oriented to person, place, and time. Psychiatric:         Mood and Affect: Mood normal.         Behavior: Behavior normal.         Thought Content:  Thought content normal.          Pertinent Labs:  CBC:   WBC (E9/L)   Date Value   03/02/2021 7.5   01/31/2021 2.3 (L)   01/30/2021 2.5 (L)     Hemoglobin (g/dL)   Date Value   03/02/2021 10.8 (L)   01/31/2021 9.3 (L)   01/30/2021 9.5 (L)     Hematocrit (%)   Date Value   03/02/2021 34.4   01/31/2021 28.0 (L)   01/30/2021 28.6 (L)     Platelets (N7/A)   Date Value   03/02/2021 341   01/31/2021 110 (L)   01/30/2021 114 (L)      BMP:   Sodium (mmol/L)   Date Value   03/02/2021 137   01/30/2021 133   01/28/2021 128 (L)     Potassium (mmol/L)   Date Value   03/02/2021 4.7   01/30/2021 3.5   01/28/2021 4.0     Potassium reflex Magnesium (mmol/L)   Date Value   08/24/2019 3.4 (L)     Magnesium (mg/dL)   Date Value   08/24/2019 1.2 (L)     Chloride (mmol/L)   Date Value   03/02/2021 97 (L)   01/30/2021 99   01/28/2021 92 (L)     CO2 (mmol/L)   Date Value   03/02/2021 25 01/30/2021 25   01/28/2021 24     BUN (mg/dL)   Date Value   03/02/2021 12   01/30/2021 11   01/28/2021 19     CREATININE (mg/dL)   Date Value   03/02/2021 1.0   01/30/2021 0.8   01/28/2021 1.2 (H)     Glucose (mg/dL)   Date Value   03/02/2021 101 (H)   01/30/2021 94   01/28/2021 126 (H)   12/19/2011 101   12/10/2010 85     Calcium (mg/dL)   Date Value   03/02/2021 9.9   01/30/2021 9.1   01/28/2021 9.0      INR:   INR (no units)   Date Value   04/18/2018 1.1   09/14/2011 1.1   08/23/2011 0.9      BNP: No results found for: BNP   TSH:   TSH (uIU/mL)   Date Value   04/02/2021 <0.010 (L)   03/02/2021 <0.010 (L)   01/28/2021 0.013 (L)      Cardiac Injury Profile: Total CK (U/L)   Date Value   04/18/2018 74     CK-MB (ng/mL)   Date Value   04/18/2018 1.2     Troponin (ng/mL)   Date Value   01/29/2021 <0.01     Lipid Profile:   Triglycerides (mg/dL)   Date Value   09/02/2020 146     HDL (mg/dL)   Date Value   09/02/2020 46     LDL Calculated (mg/dL)   Date Value   09/02/2020 91     Cholesterol, Total (mg/dL)   Date Value   09/02/2020 166      Hemoglobin A1C: No results found for: LABA1C        Pertinent Cardiac Testing:     TTE: pending    Stress: 2018:  1.   No evidence of left ventricular myocardial stress-induced   ischemia. ECG 10/7/2021: normal sinus rhythm, rate: 89 bpm     I have independently reviewed all of the ECGs and rhythm strips per above    I have personally reviewed the laboratory, cardiac diagnostic and radiographic testing as outlined above: We have requested previous records. 1. PAC (premature atrial contraction)    2. Syncope, unspecified syncope type    3. Other specified hypothyroidism    4. Primary hypertension         ASSESSMENT & PLAN    1.  Syncope  - 2 episodes of weakness, possible syncope  - ECG wnl  - ECG 9/21 showed NSR with frequent PACs, no clear Afib seen  - Adequate hydration emphasized  - 2 week Zio patch  - Ok to stop metoprolol and Eliquis, no clear indication at this time    2. PACs  - noted on EKG from PCP  - Asymptomatic    3. HTN    4. HLD    5. GERD    6. Asthma    7. Depression    8. Hypothyroidism    9. Peritoneal cancer   - had the chemo; now on maint chemo;    Plan  1. No clear indication for [address] at this time. 2. Will also stop Toprol. 3. Await TTE results that was already ordered. 4. Obtain a 14 day Zio to determine any arrhythmias. 5. Aggressive hydration and caffeine avoidance. 6. OV PRN. Encouraged the patient to call the office for any questions or concerns. Thank you for allowing me to participate in your patient's care. I have spent a total of 40 minutes with the patient and his/her family reviewing the above stated recommendations. A total of >50% of that time involved face-to-face time providing counseling and or coordination of care with the other providers.     Klapesh Elizalde MD  Cardiac Electrophysiology  [facility]

## 2021-10-19 PROBLEM — D61.818 PANCYTOPENIA (HCC): Status: ACTIVE | Noted: 2021-01-01

## 2021-10-19 NOTE — H&P
[place] [id number] [facility]   History and Physical      CHIEF COMPLAINT:  Syncope PTA - Which was witnessed & lasted for 5 min     History of Present Illness:    She is a 26-year-old female with past medical history of having had 3 syncopal episode over last 3 months, she was seen by PCP and was noted to have possible A. Fib, she was started on Eliquis and Toprol-XL and referred to EP - Dr Giorgio Jones, and the note indicates that there was no findings consistent with A. fib so Toprol and Eliquis were discontinued. She wears event monitor. She comes to ER with witnessed syncopal episode while sitting in chair-as per  at bedside. Denied any chest pain, tachycardia or palpitations, no diaphoresis, no nausea vomiting diarrhea or abdominal pain, no short of breath. No urinary problems. Work-up in ER -EKG with normal sinus rhythm no acute ST-T changes , CBC with pancytopenia-H&H 5.2/16, white blood count 1.8, platelets 78. She has history of metastatic ovarian cancer and is on Zejula-which can cause myelodysplastic syndrome. She follows with Dr. Freddie Choi. Per se she denies any bleeding history-no hematemesis, hematochezia, epistaxis or other problems. CMP unremarkable,   Chest x-ray negative. She is receiving 1 unit packed RBC. She is admitted for further work-up and observation. Plan is as below. REVIEW OF SYSTEMS:  no fevers, chills, cp, sob, n/v, ha, vision/hearing changes, wt changes, hot/cold flashes, other open skin lesions, diarrhea, constipation, dysuria/hematuria unless noted in HPI. Complete ROS performed with the patient and is otherwise negative.       PMH:  Past Medical History:   Diagnosis Date    Ankle swelling     takes diuretic prn    Asthma     mild    Cancer ([address] 07/2019    ovarian, treated with surgery and chemo    Depression     GERD (gastroesophageal reflux disease)     Hyperlipidemia     Hypertension     Hypothyroidism     Osteoarthritis     Positive FIT (fecal immunochemical test)     Vitamin D deficiency     Wears partial dentures     upper       Surgical History:  Past Surgical History:   Procedure Laterality Date    APPENDECTOMY  years ago    BREAST SURGERY  1960s    removal lump     SECTION      x 1    COLONOSCOPY N/A 2020    COLONOSCOPY DIAGNOSTIC performed by Leonila Gonsalves MD at [address] [facility]  2019    debulking of pelvic mass, DR. Wendie Jane [facility]    LYMPH NODE DISSECTION      TUNNELED VENOUS PORT PLACEMENT         Medications Prior to Admission:    Prior to Admission medications    Medication Sig Start Date End Date Taking?  Authorizing Provider   brompheniramine-pseudoephedrine-DM (BROMFED DM) 2-30-10 MG/5ML syrup Take 5 mLs by mouth 4 times daily as needed for Congestion or Cough 10/5/21 11/4/21  Abad Oswald MD   tiZANidine (ZANAFLEX) 2 MG tablet Take 1 tablet by mouth every 8 hours as needed (muscle spasms) 21  Abad Oswald MD   levothyroxine (SYNTHROID) 100 MCG tablet Take 1 tablet by mouth Daily 21   Abad Oswald MD   lovastatin (MEVACOR) 40 MG tablet Take 1 tablet by mouth nightly 21   Abad Oswald MD   pantoprazole (PROTONIX) 40 MG tablet Take 1 tablet by mouth daily 21   Abad Oswald MD   escitalopram (LEXAPRO) 10 MG tablet Take 1 tablet by mouth every morning 21   Abad Oswald MD   lisinopril (PRINIVIL;ZESTRIL) 20 MG tablet Take 1 tablet by mouth daily 21   Abad Oswald MD   albuterol sulfate HFA (VENTOLIN HFA) 108 (90 Base) MCG/ACT inhaler Inhale 2 puffs into the lungs every 6 hours as needed for Wheezing or Shortness of Breath 21   Abad Oswald MD   Cyanocobalamin (VITAMIN B 12 PO) Take by mouth daily    Historical Provider, MD   acetaminophen (TYLENOL) 500 MG tablet Take 500 mg by mouth every 6 hours as needed for Pain    Historical Provider, MD   Multiple Vitamins-Minerals (ONE-A-DAY WOMENS 50 PLUS PO) Take by mouth daily     Historical Provider, MD   Niraparib Tosylate (ZEJULA) 100 MG CAPS Take 2 tablets by mouth nightly     Historical Provider, MD   gabapentin (NEURONTIN) 300 MG capsule Take 1 capsule by mouth 3 times daily for 360 days. 9/2/20 10/7/21  Stephen Osorio MD   vitamin B-6 (PYRIDOXINE) 100 MG tablet Take 100 mg by mouth daily    Historical Provider, MD   Handicap Placard MISC by Does not apply route Cannot walk 200 ft. Without stopping to rest  Duration: Lifetime  Exp:  4/23/2024 4/23/19   Jonathan Ferrer MD   calcium carbonate-vitamin D3 (CALCIUM 600+D3) 600-400 MG-UNIT TABS Take by mouth daily     Historical Provider, MD       Allergies:    Patient has no known allergies. Social History:    reports that she has never smoked. She has never used smokeless tobacco. She reports that she does not drink alcohol and does not use drugs.     Family History:       Problem Relation Age of Onset    Heart Disease Mother     High Blood Pressure Mother     Diabetes Father     Thyroid Disease Sister         2    Ovarian Cancer Neg Hx     Uterine Cancer Neg Hx     Colon Cancer Neg Hx     Breast Cancer Neg Hx        PHYSICAL EXAM:  Vitals:  BP (!) 141/76   Pulse 97   Temp 98.3 °F (36.8 °C) (Temporal)   Resp 14   Ht 5' 2\" (1.575 m)   Wt 130 lb (59 kg)   SpO2 100%   BMI 23.78 kg/m²   General Appearance: alert and oriented to person, place and time,in no acute distress  Skin: warm and dry  Head: normocephalic and atraumatic  Eyes: pupils equal, round, and reactive to light, extraocular eye movements intact, conjunctivae normal  Neck: supple and non-tender without mass  Pulmonary/Chest: clear to auscultation bilaterally  Cardiovascular: normal rate, regular rhythm, normal S1 and S2  Abdomen: soft, non-tender, non-distended, normal bowel sounds, no masses or organomegaly  Extremities: no cyanosis, clubbing Musculoskeletal: normal range of motion Neurologic: no cranial nerve deficit,  speech normal      LABS:  Recent Labs     10/19/21  1152 10/19/21  1309     --    K 3.8  --    CL 96*  --    CO2 27  --    BUN 11  --    CREATININE 1.0  --    GLUCOSE 126* 88   CALCIUM 9.2  --        Recent Labs     10/19/21  1152 10/19/21  1240   WBC 2.1* 1.8*   RBC 1.48* 1.29*   HGB 6.0* 5.2*   HCT 18.0* 16.0*   .6* 124.0*   MCH 40.5* 40.3*   MCHC 33.3 32.5   RDW 15.1* 14.9   PLT 84* 78*   MPV 11.3 11.5       No results for input(s): POCGLU in the last 72 hours. Labs and images reviewed    Radiology: XR CHEST (2 VW)    Result Date: 10/19/2021  EXAMINATION: TWO XRAY VIEWS OF THE CHEST 10/19/2021 12:23 pm COMPARISON: 09/30/2021 and previous CT scan of the chest of 06/03/2021 HISTORY: ORDERING SYSTEM PROVIDED HISTORY: syncope TECHNOLOGIST PROVIDED HISTORY: Reason for exam:->syncope FINDINGS: The lungs are without acute focal process. There is no effusion or pneumothorax. The cardiomediastinal silhouette is without acute process. The osseous structures are without acute process. There is a left-sided MediPort catheter. No acute process. EKG: Reviewed & Interpreted by me , NSR , ST , PACs , No acute ST T changes seen     ASSESSMENT:      Active Problems:    Pancytopenia ([address]  Resolved Problems:    * No resolved hospital problems. *      PLAN:    Syncope -had syncopal episode lasting 5 minutes prior to arrival, she was sitting in the chair, witnessed by her . Has history of 3 syncopal episode over last 3 months. Seen by PCP and referred to EP-for A. Fib. She was told that she does not have A. fib and was taken off of Toprol XL/Eliquis. She wears event monitor. For now monitor on telemetry floor. If has A. fib will consult cardiology. Otherwise will follow as outpatient. The syncope could be because of anemia/pancytopenia. Monitor on telemetry floor. EKG over ER with normal sinus rhythm, PACs, no acute ST-T changes.     Pancytopenia / Symptomatic Anemia -she has history of metastatic ovarian cancer, and is on Zejula. This can cause myelodysplasia and pancytopenia. She is receiving 1 packed RBC. Will monitor and transfuse as necessary. Denies any bleeding history. Heme-onc is consulted. Cardiac Arrhythmia  -she follows with EP as above Dr Osvaldo Romero. Recently taken off of beta-blockers and Eliquis. Wears Holter monitor. As above if remains in normal sinus rhythm on telemetry we will follow as outpatient. Otherwise will consult cardiology. Metastatic Ovarian cancer - Follows with dr Erendira Fuentes & is on Miles Amas . Has pancytopenia so hold for now. Dr. Erendira Fuentes is consulted. HTN -on lisinopril as per home    HPL -on statin as per home    Hypothyroidism -on supplements as per home    Asthma  -on inhalers as per home. GERd - on ppi     Depression -on Lexapro as per. Code Status: Full  DVT prophylaxis: SCDs , chemical prophylaxis CI sec to pancytopenia       Electronically signed by Richard Brice MD on 10/19/2021 at 2:46 PM      NOTE: This report was transcribed using voice recognition software. Every effort was made to ensure accuracy; however, inadvertent computerized transcription errors may be present.

## 2021-10-19 NOTE — ED PROVIDER NOTES
This patient reports that she has had 3 syncopal episodes over the last 3 months. She recently saw her primary care physician and was referred to Dr Marlon Peraza, electrophysiology, for possible atrial fibrillation. She saw Dr. Marlon Peraza and the note indicates that there was no findings consistent with A. fib at that time. She was removed from oral anticoagulations and her Toprol was also stopped. This visit occurred in the first week of this month. Today, patient states that she had another episode of syncope. She states that she becomes very weak and fatigued. She sat down and woke up several minutes later with her  attempting to awaken her. She states she has no chest pain, shortness of breath or other symptoms at the time. She states following the event, she feels fairly weak for the rest of the day. No recent trauma. No fever or chills. No other complaints. Patient's chart is been reviewed. 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 Most recent notes indicate no recurrence following treatment. The history is provided by the patient. Loss of Consciousness  Episode history:  Single  Most recent episode: Today  Timing:  Constant  Progression:  Resolved  Chronicity:  New  Witnessed: yes    Relieved by:  Nothing  Ineffective treatments:  None tried  Associated symptoms: no chest pain, no fever, no focal weakness, no headaches, no nausea, no shortness of breath, no vomiting and no weakness         Review of Systems   Constitutional: Positive for fatigue. Negative for chills and fever. Eyes: Negative for pain, discharge and redness. Respiratory: Negative for cough, shortness of breath and wheezing. Cardiovascular: Positive for syncope. Negative for chest pain. Gastrointestinal: Negative for abdominal distention, diarrhea, nausea and vomiting. Genitourinary: Negative for dysuria and frequency.    Musculoskeletal: Negative for arthralgias and back pain. Skin: Negative for rash and wound. Neurological: Positive for syncope. Negative for focal weakness, weakness and headaches. Hematological: Negative for adenopathy. All other systems reviewed and are negative. Physical Exam  Vitals and nursing note reviewed. Constitutional:       Appearance: She is well-developed. HENT:      Head: Normocephalic and atraumatic. Eyes:      Pupils: Pupils are equal, round, and reactive to light. Cardiovascular:      Rate and Rhythm: Regular rhythm. Tachycardia present. Heart sounds: Normal heart sounds. No murmur heard. Pulmonary:      Effort: Pulmonary effort is normal. No respiratory distress. Breath sounds: Normal breath sounds. No wheezing or rales. Abdominal:      General: Bowel sounds are normal.      Palpations: Abdomen is soft. Tenderness: There is no abdominal tenderness. There is no guarding or rebound. Genitourinary:     Rectum: Guaiac result negative. Comments: Rectal exam performed with nurse present  Musculoskeletal:      Cervical back: Normal range of motion and neck supple. Skin:     General: Skin is warm and dry. Neurological:      Mental Status: She is alert and oriented to person, place, and time. Cranial Nerves: No cranial nerve deficit. Coordination: Coordination normal.          Procedures     MDM     EKG: This EKG is signed and interpreted by me.     Rate: 103  Rhythm: Sinus and occasional PACs  Interpretation: no acute changes, non-specific EKG and sinus tachycardia  Comparison: stable as compared to patient's most recent EKG    --------------------------------------------- PAST HISTORY ---------------------------------------------  Past Medical History:  has a past medical history of Ankle swelling, Asthma, Cancer (HCC), Depression, GERD (gastroesophageal reflux disease), Hyperlipidemia, Hypertension, Hypothyroidism, Osteoarthritis, Positive FIT (fecal immunochemical 8.3 g/dL    Albumin 4.4 3.5 - 5.2 g/dL    Alkaline Phosphatase 110 (H) 35 - 104 U/L    ALT 8 0 - 32 U/L    AST 13 0 - 31 U/L    Total Bilirubin 0.7 0.0 - 1.2 mg/dL    Bilirubin, Direct 0.2 0.0 - 0.3 mg/dL    Bilirubin, Indirect 0.5 0.0 - 1.0 mg/dL   Urinalysis   Result Value Ref Range    Color, UA Yellow Straw/Yellow    Clarity, UA Clear Clear    Glucose, Ur Negative Negative mg/dL    Bilirubin Urine Negative Negative    Ketones, Urine Negative Negative mg/dL    Specific Gravity, UA 1.010 1.005 - 1.030    Blood, Urine TRACE-INTACT Negative    pH, UA 7.0 5.0 - 9.0    Protein, UA Negative Negative mg/dL    Urobilinogen, Urine 0.2 <2.0 E.U./dL    Nitrite, Urine Negative Negative    Leukocyte Esterase, Urine Negative Negative   TSH without Reflex   Result Value Ref Range    TSH 0.252 (L) 0.270 - 4.200 uIU/mL   Brain Natriuretic Peptide   Result Value Ref Range    Pro-BNP 96 0 - 125 pg/mL   Platelet Confirmation   Result Value Ref Range    Platelet Confirmation CONFIRMED    CBC Auto Differential   Result Value Ref Range    WBC 1.8 (L) 4.5 - 11.5 E9/L    RBC 1.29 (L) 3.50 - 5.50 E12/L    Hemoglobin 5.2 (LL) 11.5 - 15.5 g/dL    Hematocrit 16.0 (L) 34.0 - 48.0 %    .0 (H) 80.0 - 99.9 fL    MCH 40.3 (H) 26.0 - 35.0 pg    MCHC 32.5 32.0 - 34.5 %    RDW 14.9 11.5 - 15.0 fL    Platelets 78 (L) 429 - 450 E9/L    MPV 11.5 7.0 - 12.0 fL   POCT Glucose   Result Value Ref Range    Glucose 88 mg/dL    QC OK? pass    POCT Glucose   Result Value Ref Range    Meter Glucose 88 74 - 99 mg/dL   EKG 12 Lead   Result Value Ref Range    Ventricular Rate 103 BPM    Atrial Rate 103 BPM    P-R Interval 138 ms    QRS Duration 70 ms    Q-T Interval 372 ms    QTc Calculation (Bazett) 487 ms    P Axis 53 degrees    R Axis 1 degrees    T Axis 60 degrees   TYPE AND SCREEN   Result Value Ref Range    ABO/Rh O POS     Antibody Screen NEG        RADIOLOGY:  XR CHEST (2 VW)   Final Result   No acute process.                ------------------------- NURSING NOTES AND VITALS REVIEWED ---------------------------  Date / Time Roomed:  10/19/2021 11:35 AM  ED Bed Assignment:  [id number]    The nursing notes within the ED encounter and vital signs as below have been reviewed. Patient Vitals for the past 24 hrs:   BP Temp Temp src Pulse Resp SpO2 Height Weight   10/19/21 1315 118/64   107 16 100 %     10/19/21 1312 (!) 143/79   101 16 98 %     10/19/21 1124 123/60 98.3 °F (36.8 °C) Temporal 109 16 99 % 5' 2\" (1.575 m) 130 lb (59 kg)       Oxygen Saturation Interpretation: Normal    ------------------------------------------ PROGRESS NOTES ------------------------------------------  Re-evaluation(s):  Time: 5148  Patients symptoms show no change  Repeat physical examination is not changed    Counseling:  I have spoken with the patient and discussed todays results, in addition to providing specific details for the plan of care and counseling regarding the diagnosis and prognosis. Their questions are answered at this time and they are agreeable with the plan of admission.    --------------------------------- ADDITIONAL PROVIDER NOTES ---------------------------------  Consultations:  Time: 5094. Spoke with Dr. Kevon Callander. Discussed case. She will admit the patient. This patient's ED course included: a personal history and physicial examination, multiple bedside re-evaluations, cardiac monitoring and continuous pulse oximetry    This patient has remained hemodynamically stable during their ED course. Diagnosis:  1. Pancytopenia ([address]    2. Anemia requiring transfusions    3. Syncope and collapse    4. Orthostasis        Disposition:  Patient's disposition: Admit to telemetry  Patient's condition is stable.          Cadence Jane DO  10/19/21 1400

## 2021-10-19 NOTE — CARE COORDINATION
Spoke with Patient to inform her that we do not carry Carvel Primes in house. The medication would have to be Patient's home supply.  Patient noted that her  is coming back this evening and she will tell him to bring it for continued use while at the hospital. Electronically signed by Romario Martin on 10/19/21 at 4:50 PM EDT

## 2021-10-19 NOTE — ED NOTES
Unit of blood infusing, patient tolerating well, no signs of reaction at this time.       William Simeon RN  10/19/21 5250

## 2021-10-19 NOTE — ED NOTES
Critical hgb of 6 reported to Dr Bette Lopez, no orders given to this RN.       Alon Romero, RN  10/19/21 5468

## 2021-10-19 NOTE — ED NOTES
Bed:  [place] [address]  Expected date:   Expected time:   Means of arrival:   Comments:  Ivania Schwab RN  10/19/21 1137

## 2021-10-19 NOTE — ED NOTES
Lab called and stated that pts hemoglobin is 5.2. Dr Ganga Pena notified.      Leta Pedro RN  10/19/21 4765

## 2021-10-19 NOTE — ED NOTES
Bed: 25  Expected date:   Expected time:   Means of arrival:   Comments:  [address] [facility]  10/19/21 8250

## 2021-10-20 NOTE — PROGRESS NOTES
Subjective:  No acute events overnight. Denies any pain. Resting comfortably and watching tv. Tolerating diet. No n/v/d/c. Denies any CP, palp, sob. Objective:    /72   Pulse 75   Temp 98.1 °F (36.7 °C) (Oral)   Resp 14   Ht 5' 2\" (1.575 m)   Wt 130 lb (59 kg)   SpO2 98%   BMI 23.78 kg/m²     NAD, AAOX3  EOMI, anicteric  NSR, S1S2, no murmurs  CTAB, no wheezing or rales  No c/c/e.   CN II-XII grossly intact.       CBC with Differential:    Lab Results   Component Value Date    WBC 2.5 10/20/2021    RBC 2.28 10/20/2021    RBC 3.12 07/20/2019    HGB 8.0 10/20/2021    HCT 23.7 10/20/2021    PLT 67 10/20/2021    .9 10/20/2021    MCH 35.1 10/20/2021    MCHC 33.8 10/20/2021    RDW 23.5 10/20/2021    NRBC 0.0 10/19/2021    METASPCT 0.9 03/02/2021    LYMPHOPCT 52.8 10/20/2021    MONOPCT 12.2 10/20/2021    MYELOPCT 0.9 03/02/2021    BASOPCT 0.8 10/20/2021    MONOSABS 0.31 10/20/2021    LYMPHSABS 1.34 10/20/2021    EOSABS 0.03 10/20/2021    BASOSABS 0.02 10/20/2021     CMP:    Lab Results   Component Value Date     10/20/2021    K 4.0 10/20/2021    CL 98 10/20/2021    CO2 23 10/20/2021    BUN 9 10/20/2021    CREATININE 0.8 10/20/2021    GFRAA >60 10/20/2021    LABGLOM >60 10/20/2021    GLUCOSE 143 10/20/2021    GLUCOSE 101 12/19/2011    PROT 6.8 10/20/2021    LABALBU 3.7 10/20/2021    LABALBU 4.6 12/19/2011    CALCIUM 8.5 10/20/2021    BILITOT 0.7 10/20/2021    ALKPHOS 94 10/20/2021    AST 12 10/20/2021    ALT 7 10/20/2021      KI:[id number]}     Current Facility-Administered Medications:     sodium chloride flush 0.9 % injection 10 mL, 10 mL, IntraVENous, PRN, Namon Kindraer, DO    sodium chloride flush 0.9 % injection 10 mL, 10 mL, IntraVENous, PRN, Jaswinder Vyas, DO    acetaminophen (TYLENOL) tablet 500 mg, 500 mg, Oral, Q6H PRN, Ever Tillman MD    escitalopram (LEXAPRO) tablet 10 mg, 10 mg, Oral, QAM, Ever Tillman MD    gabapentin (NEURONTIN) capsule 300 mg, 300 mg, Oral, TID, Dillan Croft MD, 300 mg at 10/19/21 2133    levothyroxine (SYNTHROID) tablet 100 mcg, 100 mcg, Oral, Daily, Dillan Croft MD, 100 mcg at 10/20/21 0703    lisinopril (PRINIVIL;ZESTRIL) tablet 20 mg, 20 mg, Oral, Daily, Dillan Croft MD, 20 mg at 10/19/21 1817    atorvastatin (LIPITOR) tablet 10 mg, 10 mg, Oral, Nightly, Dillan Croft MD, 10 mg at 10/19/21 2133    pantoprazole (PROTONIX) tablet 40 mg, 40 mg, Oral, Daily, Dillan Croft MD, 40 mg at 10/20/21 0703    tiZANidine (ZANAFLEX) tablet 2 mg, 2 mg, Oral, Q8H PRN, Dillan Croft MD    vitamin B-6 (PYRIDOXINE) tablet 100 mg, 100 mg, Oral, Daily, Dillan Croft MD, 100 mg at 10/19/21 2133    sodium chloride flush 0.9 % injection 5-40 mL, 5-40 mL, IntraVENous, 2 times per day, Dillan Croft MD, 10 mL at 10/19/21 2132    sodium chloride flush 0.9 % injection 5-40 mL, 5-40 mL, IntraVENous, PRN, Dillan Croft MD, 10 mL at 10/19/21 1817    0.9 % sodium chloride infusion, 25 mL, IntraVENous, PRN, Dillan Croft MD    ondansetron (ZOFRAN-ODT) disintegrating tablet 4 mg, 4 mg, Oral, Q8H PRN **OR** ondansetron (ZOFRAN) injection 4 mg, 4 mg, IntraVENous, Q6H PRN, Dillan Croft MD    polyethylene glycol (GLYCOLAX) packet 17 g, 17 g, Oral, Daily PRN, Dillan Croft MD    acetaminophen (TYLENOL) tablet 650 mg, 650 mg, Oral, Q6H PRN **OR** acetaminophen (TYLENOL) suppository 650 mg, 650 mg, Rectal, Q6H PRN, Dillan Croft MD    albuterol (PROVENTIL) nebulizer solution 2.5 mg, 2.5 mg, Nebulization, Q6H PRN, Dillan Croft MD    calcium-vitamin D (OSCAL-500) 500-200 MG-UNIT per tablet 1 tablet, 1 tablet, Oral, Daily, Dillan Croft MD, 1 tablet at 10/19/21 1817    vitamin B-12 (CYANOCOBALAMIN) tablet 250 mcg, 250 mcg, Oral, Daily, Dillan Croft MD, 250 mcg at 10/19/21 1817    multivitamin 1 tablet, 1 tablet, Oral, Daily, Dillan Croft MD, 1 tablet at 10/19/21 1817    XR CHEST (2 VW)   Final Result   No acute process. Assessment:    Active Problems:    Pancytopenia ([address]  Resolved Problems:    * No resolved hospital problems. *    64 yo female known to me with metastatic primary peritoneal/ovarian adenocarcinoma. Treated with Carboplatin and Paclitaxel. Had biopsy proven recurrence s/p excisional resection on 7/19/19. Treated with Carbo/ Paclitaxel from Aug to Oct 2019. Followed by maintenance Holly Copeland from 12/11/19. Dose reduced to 200 mg daily due to cytopenias. Recently had recurrent syncope and concern for parox AFib, trial of BB and Eliquis for about a week, these were recently discontinued by EP. Also recent URI and was treated with abx about a week earlier. Admitted with syncope. Plan:  Has acute on chronic anemia with baseline around 10-10.9. Her platelets and WBC have also decreased on hospital labs. Previously were WNL since dose reduction of Zejula. Cindy Aguiar for now. If no improvement in counts will consider outpatient bone marrow biopsy evaluation. Hgb has improved after 2 units so far. Check FOBT  Monitor CBC w diff daily.      Electronically signed by Devon Anthony MD on 10/20/2021 at 8:12 AM

## 2021-10-20 NOTE — PROGRESS NOTES
P Quality Flow/Interdisciplinary Rounds Progress Note        Quality Flow Rounds held on October 20, 2021    Disciplines Attending:  Bedside Nurse,  and     Helga Sagastume was admitted on 10/19/2021 11:35 AM    Anticipated Discharge Date:  Expected Discharge Date: 10/22/21    Disposition:    Byron Score:  Byron Scale Score: 22    Readmission Risk              Risk of Unplanned Readmission:  16           Discussed patient goal for the day, patient clinical progression, and barriers to discharge. The following Goal(s) of the Day/Commitment(s) have been identified:  monitor platelet levels, echo today.       Alessandra Montejo RN  October 20, 2021

## 2021-10-20 NOTE — CARE COORDINATION
Case Management: Social Work Case Management Initial Assessment  Jessi Alvarado,         Met with:patient  Information verified: address, contacts, phone number, , insurance Yes  PCP: Bernabe Foy MD  Pharmacy:   [facility], [address] [place] [address] [address]  [address] [address]  Phone: [phone] Fax: [address] [facility], [address]  [address], [place] [address]  Phone: [phone] Fax: [phone]         Discharge Planning  Patient Status Order: Inpatient Date: 10-20-21  Readmission within last 30 days:  no  Current Residence: Private Residence  Living Arrangements:  Spouse/Significant Other   Home Access: 2-STORY HOME/HALF BATH ON , BED/BATH UPSTAIRS  Entrance Stairs-Number of Steps: N/A  Support Systems:  Spouse/Significant Other, Children, Family Members  Current Services PTA: None Supplier: N/A  Patient able to perform ADL's: NO NEEDS  DME used to aid ambulation prior to admission: N/A    Potential Assistance Needed:  N/A  Potential Assistance Purchasing Medications:  No  Does patient want to participate in local refill/meds to beds program?: Yes    Patient agreeable to home care: NO  Type of Home Care Services:  None  Patient expects to be discharged to: N/A    Prior SNF/Rehab Placement and Facility: N/A  Agreeable to SNF/Rehab: No    Choices given to patient: NO    Expected Discharge date:  10/22/21  Follow Up Appointment: Best Day/ Time:      Social Work Assessment/Plan: Social service met with Aaron Costello, advised her about  &  roles, and discussed discharge planning. Aaron Costello resides independently with her . She has no DME, never used HHC, or snf and has no present discharge concerns. Social work to follow in case needs arise.       Electronically signed by PARTHA Leger on 10/20/21 at 11:11 AM EDT

## 2021-10-20 NOTE — PROGRESS NOTES
Physician Progress Note      Jeancarlos Frey  CSN #:                  [id number]  :                       1950  ADMIT DATE:       10/19/2021 11:35 AM  [address] [place] DATE:  RESPONDING  PROVIDER #:        Robbie Schrader TEXT:    Patient admitted with syncope and has documented pancytopenia. If possible,   please document in progress notes and discharge summary further specificity   regarding pancytopenia:    The medical record reflects the following:  Risk Factors: ovarian cancer  Clinical Indicators: labs on admission: WBC 2.1, H/H 18, platelets 84, per   hemonc \". ..acute on chronic anemia with baseline around 10-10.9. Her   platelets and WBC have also decreased on hospital labs. Previously were WNL   since dose reduction of Zejula. Izabel Jennings for now. Nilsa Gull Nilsa Gull \", per H&P   \". Nilsa Gull Nilsa Gull Pancytopenia / Symptomatic Anemia -she has history of metastatic ovarian   cancer, and is on Zejula. This can cause myelodysplasia and pancytopenia. Nilsa Gull Nilsa Gull \"  Treatment: serial lab monitoring, hemonc following, PRBCs    Thank you,  Montse Sorensen RN, BSN, CDIS  Clinical Documentation Improvement  [email or web address]  Options provided:  -- Antineoplastic (chemotherapy) induced pancytopenia  -- Pancytopenia due to ovarian cancer  -- Other - I will add my own diagnosis  -- Disagree - Not applicable / Not valid  -- Disagree - Clinically unable to determine / Unknown  -- Refer to Clinical Documentation Reviewer    PROVIDER RESPONSE TEXT:    Patient has antineoplastic (chemotherapy) induced pancytopenia.     Query created by: Natalie Cook on 10/20/2021 11:09 AM      Electronically signed by:  Jayson Turpin 10/20/2021 2:00 PM

## 2021-10-20 NOTE — PROGRESS NOTES
Pt here with mom and dad. Pt got finger caught in house door around 1450. Tip of 3rd digit of R hand partially intact. Finger martha taped with gauze and coban.    Ulisses Kapadia Hospitalist   Progress Note    Admitting Date and Time: 10/19/2021 11:35 AM  Admit Dx: Syncope and collapse [R55]  Orthostasis [I95.1]  Pancytopenia ([address] [D61.818]  Anemia requiring transfusions [D64.9]    Subjective:    Patient was admitted with Syncope and collapse [R55]  Orthostasis [I95.1]  Pancytopenia ([address] [D61.818]  Anemia requiring transfusions [D64.9]. Feels good. Patient resting. Patient sat up immediately but suffered from no symptoms at all. She has not had any chest pain, shortness of breath, presyncope. ROS: denies fever, chills, cp, sob, n/v, HA unless stated above.      escitalopram  10 mg Oral QAM    gabapentin  300 mg Oral TID    levothyroxine  100 mcg Oral Daily    lisinopril  20 mg Oral Daily    atorvastatin  10 mg Oral Nightly    pantoprazole  40 mg Oral Daily    vitamin B-6  100 mg Oral Daily    sodium chloride flush  5-40 mL IntraVENous 2 times per day    calcium-vitamin D  1 tablet Oral Daily    vitamin B-12  250 mcg Oral Daily    multivitamin  1 tablet Oral Daily     perflutren lipid microspheres, 1.5 mL, ONCE PRN  sodium chloride flush, 10 mL, PRN  sodium chloride flush, 10 mL, PRN  acetaminophen, 500 mg, Q6H PRN  tiZANidine, 2 mg, Q8H PRN  sodium chloride flush, 5-40 mL, PRN  sodium chloride, 25 mL, PRN  ondansetron, 4 mg, Q8H PRN   Or  ondansetron, 4 mg, Q6H PRN  polyethylene glycol, 17 g, Daily PRN  acetaminophen, 650 mg, Q6H PRN   Or  acetaminophen, 650 mg, Q6H PRN  albuterol, 2.5 mg, Q6H PRN         Objective:    /65   Pulse 84   Temp 98.1 °F (36.7 °C) (Oral)   Resp 16   Ht 5' 2\" (1.575 m)   Wt 130 lb (59 kg)   SpO2 99%   BMI 23.78 kg/m²   General Appearance: alert and oriented to person, place and time, well developed and well- nourished, in no acute distress  Skin: warm and dry, no rash or erythema  Head: normocephalic and atraumatic  Neck: supple and non-tender   Pulmonary/Chest: clear to auscultation bilaterally  Cardiovascular: normal rate, regular rhythm no murmurs  Abdomen: soft, non-tender, non-distended  Extremities: no edema      Recent Labs     10/19/21  1152 10/19/21  1309 10/20/21  0245     --  135   K 3.8  --  4.0   CL 96*  --  98   CO2 27  --  23   BUN 11  --  9   CREATININE 1.0  --  0.8   GLUCOSE 126* 88 143*   CALCIUM 9.2  --  8.5*       Recent Labs     10/19/21  1152 10/19/21  1152 10/19/21  1240 10/19/21  1450 10/20/21  0245   WBC 2.1*  --  1.8*  --  2.5*   RBC 1.48*  --  1.29*  --  2.28*   HGB 6.0*   < > 5.2* 5.3* 8.0*   HCT 18.0*   < > 16.0* 15.8* 23.7*   .6*  --  124.0*  --  103.9*   MCH 40.5*  --  40.3*  --  35.1*   MCHC 33.3  --  32.5  --  33.8   RDW 15.1*  --  14.9  --  23.5*   PLT 84*  --  78*  --  67*   MPV 11.3  --  11.5  --  11.9    < > = values in this interval not displayed. Radiology:   XR CHEST (2 VW)   Final Result   No acute process. Assessment:    Active Problems:    Pancytopenia ([address]  Resolved Problems:    * No resolved hospital problems. *      Plan:    1. Syncope -had syncopal episode lasting 5 minutes prior to arrival, she was sitting in the chair, witnessed by her . Has history of 3 syncopal episode over last 3 months. Seen by PCP and referred to EP-for A. Fib. She was told that she does not have A. fib and was taken off of Toprol XL/Eliquis. She now has event monitor. Will get Echo done. Cardiology consulted. Follow hemoglobin and telemetry. Patient symptom free at this time.      2.  Pancytopenia / Symptomatic Anemia  Appreciate oncology help. Holding zejula. Watch counts. Hgb up to 8 after transfusion.     3. Metastatic Ovarian cancer - Follows with dr Kalie Corral & is on Jessika Miguel. Has pancytopenia so hold for now.       Electronically signed by Carley Suh MD on 10/20/2021 at 11:00 AM

## 2021-10-21 NOTE — PROGRESS NOTES
[facility] Progress Note    Admitting Date and Time: 10/19/2021 11:35 AM  Admit Dx: Syncope and collapse [R55]  Orthostasis [I95.1]  Pancytopenia ([address] [D61.818]  Anemia requiring transfusions [D64.9]    Subjective:  Patient is being followed for Syncope and collapse [R55]  Orthostasis [I95.1]  Pancytopenia ([address] [D61.818]  Anemia requiring transfusions [D64.9]   Pt feels improvement. Per RN: No major concerns. Globin improved. ROS: denies fever, chills, cp, sob, n/v, HA unless stated above.       escitalopram  10 mg Oral QAM    gabapentin  300 mg Oral TID    levothyroxine  100 mcg Oral Daily    lisinopril  20 mg Oral Daily    atorvastatin  10 mg Oral Nightly    pantoprazole  40 mg Oral Daily    vitamin B-6  100 mg Oral Daily    sodium chloride flush  5-40 mL IntraVENous 2 times per day    calcium-vitamin D  1 tablet Oral Daily    vitamin B-12  250 mcg Oral Daily    multivitamin  1 tablet Oral Daily     perflutren lipid microspheres, 1.5 mL, ONCE PRN  sodium chloride flush, 10 mL, PRN  sodium chloride flush, 10 mL, PRN  acetaminophen, 500 mg, Q6H PRN  tiZANidine, 2 mg, Q8H PRN  sodium chloride flush, 5-40 mL, PRN  sodium chloride, 25 mL, PRN  ondansetron, 4 mg, Q8H PRN   Or  ondansetron, 4 mg, Q6H PRN  polyethylene glycol, 17 g, Daily PRN  acetaminophen, 650 mg, Q6H PRN   Or  acetaminophen, 650 mg, Q6H PRN  albuterol, 2.5 mg, Q6H PRN         Objective:    /62   Pulse 90   Temp 98.3 °F (36.8 °C) (Oral)   Resp 16   Ht 5' 2\" (1.575 m)   Wt 130 lb (59 kg)   SpO2 99%   BMI 23.78 kg/m²     General Appearance: alert and oriented to person, place and time and in no acute distress  Skin: warm and dry  Head: normocephalic and atraumatic  Eyes: pupils equal, round, and reactive to light, extraocular eye movements intact, conjunctivae normal  Neck: neck supple and non tender without mass   Pulmonary/Chest: clear to auscultation bilaterally- no wheezes, rales or rhonchi, normal air movement, no respiratory distress  Cardiovascular: normal rate, normal S1 and S2 and no carotid bruits  Abdomen: soft, non-tender, non-distended, normal bowel sounds, no masses or organomegaly  Extremities: no cyanosis, no clubbing and no edema  Neurologic: no cranial nerve deficit and speech normal        Recent Labs     10/19/21  1152 10/19/21  1152 10/19/21  1309 10/20/21  0245 10/21/21  1025     --   --  135 134   K 3.8  --   --  4.0 3.9   CL 96*  --   --  98 99   CO2 27  --   --  23 24   BUN 11  --   --  9 10   CREATININE 1.0  --   --  0.8 0.7   GLUCOSE 126*   < > 88 143* 114*   CALCIUM 9.2  --   --  8.5* 8.9    < > = values in this interval not displayed. Recent Labs     10/19/21  1240 10/19/21  1240 10/19/21  1450 10/20/21  0245 10/21/21  1025   WBC 1.8*  --   --  2.5* 2.2*   RBC 1.29*  --   --  2.28* 2.73*   HGB 5.2*   < > 5.3* 8.0* 9.6*   HCT 16.0*   < > 15.8* 23.7* 28.7*   .0*  --   --  103.9* 105.1*   MCH 40.3*  --   --  35.1* 35.2*   MCHC 32.5  --   --  33.8 33.4   RDW 14.9  --   --  23.5* 22.6*   PLT 78*  --   --  67* 71*   MPV 11.5  --   --  11.9 11.6    < > = values in this interval not displayed.        Micro:  No components found for: [facility])    Radiology:   Echo Complete    Result Date: 10/20/2021  Transthoracic Echocardiography Report (TTE)  Demographics   Patient Name    Ami Bear Gender            Female   Medical Record  [id number]     Room Number       9977  Number   Account #       [de-identified]    Procedure Date    10/20/2021   Corporate ID                 Ordering                               Physician   Accession       [id number]   Referring         Angela Chin MD  Number                       Physician         Orland Najjar, MD   Date of Birth   1950   Sonographer       Kaela Schmitz RCS   Age             65 year(s)   Interpreting      [address]                               Physician         Physician Cardiology Vlad Hanson MD                                Any Other  Procedure Type of Study   TTE procedure:Echo Complete W/Doppler & Color Flow. Procedure Date Date: 10/20/2021 Start: 11:04 AM Study Location: Echo Lab Technical Quality: Adequate visualization Indications:Syncope. Patient Status: Routine Height: 62 inches Weight: 130 pounds BSA: 1.59 m^2 BMI: 23.78 kg/m^2 HR: 80 bpm BP: 129/65 mmHg  Findings   Left Ventricle  Normal left ventricle size and systolic function. Ejection fraction is visually estimated at 55-60%. No regional wall motion abnormalities seen. Normal left ventricle wall thickness. Normal diastolic function. Right Ventricle  Normal right ventricular size and function. TAPSE 20 mm. Left Atrium  Left atrium is of normal size. Right Atrium  Normal right atrium size. Mitral Valve  Normal mitral valve structure and function. Physiologic and/or trace mitral regurgitation is present. No evidence of mitral valve stenosis. Tricuspid Valve  The tricuspid valve appears structurally normal.  Mild tricuspid regurgitation. RVSP is 34 mmHg. Normal estimated PA systolic function. Aortic Valve  The aortic valve appears mildly sclerotic. The aortic valve is trileaflet. No hemodynamically significant aortic stenosis is present. No evidence of aortic valve regurgitation. Pulmonic Valve  The pulmonic valve was not well visualized. Physiologic and/or trace pulmonic regurgitation present. No evidence of pulmonic valve stenosis. Pericardial Effusion  No evidence for hemodynamically significant pericardial effusion. Pleural Effusion  No evidence of pleural effusion. Aorta  Normal aortic root and ascending aorta. Miscellaneous  The inferior vena cava diameter is normal with normal respiratory  variation. Conclusions   Summary  Normal left ventricle size and systolic function. Ejection fraction is visually estimated at 55-60%. No regional wall motion abnormalities seen.   Normal left ventricle wall thickness. Normal diastolic function. Normal right ventricular size and function. TAPSE 20 mm. No hemodynamically significant aortic stenosis is present. Mild tricuspid regurgitation. RVSP is 34 mmHg. Normal estimated PA systolic function. No evidence for hemodynamically significant pericardial effusion.    Signature   ----------------------------------------------------------------  Electronically signed by Alia Seaman MD(Interpreting  physician) on 10/20/2021 08:52 PM  ----------------------------------------------------------------  M-Mode/2D Measurements & Calculations   LV Diastolic   LV Systolic Dimension: 2.8   AV Cusp Separation: 1.4 cmLA  Dimension: 4   cm                           Dimension: 3.7 cmAO Root  cm             LV Volume Diastolic: 86.5 ml Dimension: 2.1 cm  LV FS:30 %     LV Volume Systolic: 75.3 ml  LV PW          LV EDV/LV EDV Index: 81.4  Diastolic: 1   RS/31 AI/F^7MP ESV/LV ESV  cm             Index: 29.7 ml/19ml/ m^2     RV Diastolic Dimension: 2.8 cm  LV PW          EF Calculated: 28.7 %  Systolic: 1.4  LV Mass Index: 80 l/min*m^2  LA/Aorta: 1.76  cm                                          Ascending Aorta: 2.7 cm  Septum                                      LA volume/Index: 51 ml  Diastolic: 1   LVOT: 2 cm                   /32ml/m^2  cm                                          RA Area: 13.3 cm^2  Septum  Systolic: 1.3                               IVC Expiration: 0.6 cm  cm  CO: 5.65 l/min  CI: 3.55  l/m*m^2  LV Mass:  127.06 g  Doppler Measurements & Calculations   MV Peak E-Wave:   AV Peak Velocity: 1.62 m/s    LVOT Peak Velocity: 1.09  0.72 m/s          AV Peak Gradient: 10.45 mmHg  m/s  MV Peak A-Wave:   AV Mean Velocity: 1.16 m/s    LVOT Mean Velocity: 0.68  0.96 m/s          AV Mean Gradient: 5.9 mmHg    m/s  MV E/A Ratio:     AV VTI: 31.5 cm               LVOT Peak Gradient: 4.8  0.75              AV Area (Continuity):2.24     mmHgLVOT Mean Gradient:  MV Peak Gradient: cm^2                          2.3 mmHg  4.3 mmHg                                        Estimated RVSP: 33.8 mmHg  MV Mean Gradient: LVOT VTI: 22.5 cm             Estimated RAP:3 mmHg  2 mmHg            IVRT: 87.7 msec  MV Mean Velocity: Estimated PASP: 33.82 mmHg  0.66 m/s          Pulm. Vein A Reversal         TR Velocity:2.78 m/s  MV Deceleration   Duration:87.7 msec            TR Gradient:30.82 mmHg  Time: 248.6 msec  Pulm. Vein D Velocity:0.36    PV Peak Velocity: 1.16 m/s  MV P1/2t: 88.6    m/sPulm. Vein A Reversal      PV Peak Gradient: 5.41  msec              Velocity:0.33 m/s             mmHg  MVA by PHT:2.48   Pulm. Vein S Velocity: 0.51   PV Mean Velocity: 0.83 m/s  cm^2              m/s                           PV Mean Gradient: 3.1 mmHg  MV Area  (continuity): 2.7  cm^2  MV E' Septal  Velocity: 0.05  m/s  MV E' Lateral  Velocity: 10 m/s  [id number]      Assessment:    Active Problems:    Pancytopenia ([address]  Resolved Problems:    * No resolved hospital problems. *      Plan:  1. Syncope - possibly related to underlying anemia, had syncopal episode lasting 5 minutes prior to arrival, she was sitting in the chair, witnessed by her .  Has history of 3 syncopal episode over last 3 months.  Seen by PCP and referred to EP-for A. Fib.  She was told that she does not have A. fib and was taken off of Toprol XL/Eliquis.  She now has event monitor.  F/U Echo, Cardiology consulted. Follow hemoglobin, C/W telemetry. 2.  Pancytopenia / Symptomatic Anemia likely related to chemotherapy- this post transfusion, oncology on board. Counts improved. Holding zejula. Watch counts. 3.  Metastatic Ovarian cancer - Follows with dr Michael Chavez & is on Kalpana Gola. 4.  Hyperlipidemia on statin    5. Anxiety/depression -on Lexapro    6. Hypertension on lisinopril    7.   Neuropathy -on Neurontin, B12, B6, multivitamin replacement    8. Hypothyroidism -on thyroid replacement    9. GERD/gastritis on PPI    DVT prophylaxis  -SCDs, has pancytopenia so hold for now.        NOTE: This report was transcribed using voice recognition software. Every effort was made to ensure accuracy; however, inadvertent computerized transcription errors may be present.   Electronically signed by Moira Casiano MD on 10/21/2021 at 1:19 PM

## 2021-10-22 NOTE — PROGRESS NOTES
Subjective:  No acute events overnight. Denies any pain. Resting comfortably and watching tv. Tolerating diet. No n/v/d/c. Denies any CP, palp, sob. Objective:    /65   Pulse 90   Temp 98.3 °F (36.8 °C) (Oral)   Resp 16   Ht 5' 2\" (1.575 m)   Wt 130 lb (59 kg)   SpO2 99%   BMI 23.78 kg/m²     NAD, AAOX3  EOMI, anicteric  NSR, S1S2, no murmurs  CTAB, no wheezing or rales  No c/c/e.    CN II-XII grossly intact.       CBC with Differential:    Lab Results   Component Value Date    WBC 2.2 10/22/2021    RBC 2.84 10/22/2021    RBC 3.12 07/20/2019    HGB 9.9 10/22/2021    HCT 30.1 10/22/2021    PLT 73 10/22/2021    .0 10/22/2021    MCH 34.9 10/22/2021    MCHC 32.9 10/22/2021    RDW 21.8 10/22/2021    NRBC 0.0 10/22/2021    METASPCT 0.9 03/02/2021    LYMPHOPCT 43.9 10/22/2021    MONOPCT 6.1 10/22/2021    MYELOPCT 0.9 10/22/2021    BASOPCT 1.7 10/22/2021    MONOSABS 0.13 10/22/2021    LYMPHSABS 1.01 10/22/2021    EOSABS 0.06 10/22/2021    BASOSABS 0.04 10/22/2021     CMP:    Lab Results   Component Value Date     10/22/2021    K 3.8 10/22/2021    K 4.0 10/20/2021     10/22/2021    CO2 24 10/22/2021    BUN 15 10/22/2021    CREATININE 0.8 10/22/2021    GFRAA >60 10/22/2021    LABGLOM >60 10/22/2021    GLUCOSE 185 10/22/2021    GLUCOSE 101 12/19/2011    PROT 6.8 10/20/2021    LABALBU 3.7 10/20/2021    LABALBU 4.6 12/19/2011    CALCIUM 9.2 10/22/2021    BILITOT 0.7 10/20/2021    ALKPHOS 94 10/20/2021    AST 12 10/20/2021    ALT 7 10/20/2021      DKPI:[id number]}     Current Facility-Administered Medications:     lisinopril (PRINIVIL;ZESTRIL) tablet 10 mg, 10 mg, Oral, Daily, Andrey Negvielka ALBARRAN PA, 10 mg at 10/22/21 1119    perflutren lipid microspheres (DEFINITY) injection 1.65 mg, 1.5 mL, IntraVENous, ONCE PRN, Princess Lorie MD    sodium chloride flush 0.9 % injection 10 mL, 10 mL, IntraVENous, PRN, Angela Ramirez DO    sodium chloride flush 0.9 % injection 10 mL, 10 mL, IntraVENous, PRN, Gloria Olivia DO    acetaminophen (TYLENOL) tablet 500 mg, 500 mg, Oral, Q6H PRN, Jd Dye MD, 500 mg at 10/21/21 2240    escitalopram (LEXAPRO) tablet 10 mg, 10 mg, Oral, QAM, Jd Dye MD, 10 mg at 10/22/21 1119    gabapentin (NEURONTIN) capsule 300 mg, 300 mg, Oral, TID, Jd Dye MD, 300 mg at 10/22/21 1449    levothyroxine (SYNTHROID) tablet 100 mcg, 100 mcg, Oral, Daily, Jd Dye MD, 100 mcg at 10/22/21 0605    atorvastatin (LIPITOR) tablet 10 mg, 10 mg, Oral, Nightly, Jd Dye MD, 10 mg at 10/21/21 2240    pantoprazole (PROTONIX) tablet 40 mg, 40 mg, Oral, Daily, Jd Dye MD, 40 mg at 10/22/21 0605    tiZANidine (ZANAFLEX) tablet 2 mg, 2 mg, Oral, Q8H PRN, Jd Dye MD    vitamin B-6 (PYRIDOXINE) tablet 100 mg, 100 mg, Oral, Daily, Jd Dye MD, 100 mg at 10/22/21 1119    sodium chloride flush 0.9 % injection 5-40 mL, 5-40 mL, IntraVENous, 2 times per day, Jd Dye MD, 10 mL at 10/22/21 1119    sodium chloride flush 0.9 % injection 5-40 mL, 5-40 mL, IntraVENous, PRN, Jd Dye MD, 10 mL at 10/19/21 1817    0.9 % sodium chloride infusion, 25 mL, IntraVENous, PRN, Jd Dye MD    ondansetron (ZOFRAN-ODT) disintegrating tablet 4 mg, 4 mg, Oral, Q8H PRN **OR** ondansetron (ZOFRAN) injection 4 mg, 4 mg, IntraVENous, Q6H PRN, Jd Dye MD    polyethylene glycol (GLYCOLAX) packet 17 g, 17 g, Oral, Daily PRN, Jd Dye MD    acetaminophen (TYLENOL) tablet 650 mg, 650 mg, Oral, Q6H PRN **OR** acetaminophen (TYLENOL) suppository 650 mg, 650 mg, Rectal, Q6H PRN, Jd Dye MD    albuterol (PROVENTIL) nebulizer solution 2.5 mg, 2.5 mg, Nebulization, Q6H PRN, Jd Dye MD    calcium-vitamin D (OSCAL-500) 500-200 MG-UNIT per tablet 1 tablet, 1 tablet, Oral, Daily, Jd Dye MD, 1 tablet at 10/22/21 1118    vitamin B-12 (CYANOCOBALAMIN) tablet 250 mcg, 250 mcg, Oral, Daily, Jenell Gowers, MD, 250 mcg at 10/22/21 1119    multivitamin 1 tablet, 1 tablet, Oral, Daily, Jenell Gowers, MD, 1 tablet at 10/22/21 1118    XR CHEST (2 VW)   Final Result   No acute process. Assessment:    Active Problems:    Pancytopenia ([address]  Resolved Problems:    * No resolved hospital problems. *    62 yo female known to me with metastatic primary peritoneal/ovarian adenocarcinoma. Treated with Carboplatin and Paclitaxel. Had biopsy proven recurrence s/p excisional resection on 7/19/19. Treated with Carbo/ Paclitaxel from Aug to Oct 2019. Followed by maintenance Martinez Certain from 12/11/19. Dose reduced to 200 mg daily due to cytopenias. Recently had recurrent syncope and concern for parox AFib, trial of BB and Eliquis for about a week, these were recently discontinued by EP. Also recent URI and was treated with abx about a week earlier. Admitted with syncope. Plan:  Okay for discharge. Follow up with me next week as scheduled 10/26. Hold Zejula until outpatient f/u. Counts are stable. Does not require further transfusion support. Has acute on chronic anemia with baseline around 10-10.9. Her platelets and WBC have also decreased on hospital labs. Previously were WNL since dose reduction of Zejula. If no improvement in counts will consider outpatient bone marrow biopsy evaluation. Hgb has improved after 2 units so far.       Electronically signed by Kevin Abernathy MD on 10/22/2021 at 4:52 PM

## 2021-10-22 NOTE — PLAN OF CARE
Problem: Falls - Risk of:  Goal: Will remain free from falls  Description: Will remain free from falls  10/22/2021 1621 by Bernabe Ratliff RN  Outcome: Completed  10/22/2021 1621 by Bernabe Ratliff RN  Outcome: Met This Shift  Goal: Absence of physical injury  Description: Absence of physical injury  10/22/2021 1621 by Bernabe Ratliff RN  Outcome: Completed  10/22/2021 1621 by Bernabe Ratliff RN  Outcome: Met This Shift     Problem: Pain:  Goal: Pain level will decrease  Description: Pain level will decrease  10/22/2021 1621 by Bernabe Ratliff RN  Outcome: Completed  10/22/2021 1621 by Bernabe Ratliff RN  Outcome: Met This Shift  Goal: Control of acute pain  Description: Control of acute pain  10/22/2021 1621 by Bernabe Ratliff RN  Outcome: Completed  10/22/2021 1621 by Bernabe Ratliff RN  Outcome: Met This Shift  Goal: Control of chronic pain  Description: Control of chronic pain  10/22/2021 1621 by Bernabe Ratliff RN  Outcome: Completed  10/22/2021 1621 by Bernabe Ratliff RN  Outcome: Met This Shift

## 2021-10-22 NOTE — DISCHARGE SUMMARY
[facility] Physician Discharge Summary       Abad Oswald MD  [address] [facility], [place], operated by [facility] [address]  [address] 91 21 06    Schedule an appointment as soon as possible for a visit  for follow up from hospital admission    Marisol Sorto MD  [address] [facility] [address] [phone]    Schedule an appointment as soon as possible for a visit  for follow up from hospital admission    Meghana Jeffers MD  [address] [facility] [address] [phone]    Schedule an appointment as soon as possible for a visit  for follow up from hospital admission      Activity level: As tolerated     Dispo: Home    Condition on discharge: Stable     Patient ID:  Lorenzo Castro  [id number]  65 y.o.  1950    Admit date: 10/19/2021    Discharge date and time:  10/22/2021  2:23 PM    Admission Diagnoses: Active Problems:    Pancytopenia ([address]  Resolved Problems:    * No resolved hospital problems. *      Discharge Diagnoses: Active Problems:    Pancytopenia ([address]  Resolved Problems:    * No resolved hospital problems.  *      Consults:  IP CONSULT TO HEM/ONC  IP CONSULT TO CARDIOLOGY    Hospital Course:   Patient Lorenzo Castro is a 65 y.o. presented with Syncope and collapse [R55]  Orthostasis [I95.1]  Pancytopenia ([address] [D61.818]  Anemia requiring transfusions [D64.9]  Patient was admitted and managed for Syncope - possibly related to underlying anemia, had syncopal episode lasting 5 minutes prior to arrival, she was sitting in the chair, witnessed by her .  Has history of 3 syncopal episode over last 3 months.  Seen by PCP and referred to EP-for A. Fib.  She was told that she does not have A. fib and was taken off of Toprol XL/Eliquis.  She now has event monitor. Echo reviewed, Cardiology was consulted, decreased dose of lisinopril, recommended avoiding beta-blockers, recommended following with EP outpatient after discharge.  Pancytopenia / Symptomatic Anemia likely related to chemotherapy-hb improved post transfusion, oncology was on board. Holding zejula.     3.  Metastatic Ovarian cancer - Follows with dr Aniceto Avitia is on [address]. Discharge Exam:  General Appearance: alert and oriented to person, place and time and in no acute distress  Skin: warm and dry  Head: normocephalic and atraumatic  Eyes: pupils equal, round, and reactive to light, extraocular eye movements intact, conjunctivae normal  Neck: neck supple and non tender without mass   Pulmonary/Chest: clear to auscultation bilaterally- no wheezes, rales or rhonchi, normal air movement, no respiratory distress  Cardiovascular: normal rate, normal S1 and S2 and no carotid bruits  Abdomen: soft, non-tender, non-distended, normal bowel sounds, no masses or organomegaly  Extremities: no cyanosis, no clubbing and no edema  Neurologic: no cranial nerve deficit and speech normal    No intake/output data recorded. No intake/output data recorded. LABS:  Recent Labs     10/20/21  0245 10/21/21  1025 10/22/21  0847    134 135   K 4.0 3.9 3.8   CL 98 99 100   CO2 23 24 24   BUN 9 10 15   CREATININE 0.8 0.7 0.8   GLUCOSE 143* 114* 185*   CALCIUM 8.5* 8.9 9.2       Recent Labs     10/20/21  0245 10/21/21  1025 10/22/21  0847   WBC 2.5* 2.2* 2.2*   RBC 2.28* 2.73* 2.84*   HGB 8.0* 9.6* 9.9*   HCT 23.7* 28.7* 30.1*   .9* 105.1* 106.0*   MCH 35.1* 35.2* 34.9   MCHC 33.8 33.4 32.9   RDW 23.5* 22.6* 21.8*   PLT 67* 71* 73*   MPV 11.9 11.6 11.8       No results for input(s): POCGLU in the last 72 hours.     Imaging:  Echo Complete    Result Date: 10/20/2021  Transthoracic Echocardiography Report (TTE)  Demographics   Patient Name    Will Washington Gender            Female   Medical Record  [id number]     Room Number       5031  Number   Account #       [de-identified]    Procedure Date    10/20/2021   Corporate ID                 Ordering                               Physician   Accession       [id number]   Referring Kasandra Medina MD  Number                       Physician         Delfino Puente MD   Date of Birth   1950   Sonographer       Kaela Mason [facility]   Age             65 year(s)   Interpreting      [address] [place] [address]                               Physician         Physician Cardiology                                                 Vlad Hanson MD                                Any Other  Procedure Type of Study   TTE procedure:Echo Complete W/Doppler & Color Flow. Procedure Date Date: 10/20/2021 Start: 11:04 AM Study Location: Echo Lab Technical Quality: Adequate visualization Indications:Syncope. Patient Status: Routine Height: 62 inches Weight: 130 pounds BSA: 1.59 m^2 BMI: 23.78 kg/m^2 HR: 80 bpm BP: 129/65 mmHg  Findings   Left Ventricle  Normal left ventricle size and systolic function. Ejection fraction is visually estimated at 55-60%. No regional wall motion abnormalities seen. Normal left ventricle wall thickness. Normal diastolic function. Right Ventricle  Normal right ventricular size and function. TAPSE 20 mm. Left Atrium  Left atrium is of normal size. Right Atrium  Normal right atrium size. Mitral Valve  Normal mitral valve structure and function. Physiologic and/or trace mitral regurgitation is present. No evidence of mitral valve stenosis. Tricuspid Valve  The tricuspid valve appears structurally normal.  Mild tricuspid regurgitation. RVSP is 34 mmHg. Normal estimated PA systolic function. Aortic Valve  The aortic valve appears mildly sclerotic. The aortic valve is trileaflet. No hemodynamically significant aortic stenosis is present. No evidence of aortic valve regurgitation. Pulmonic Valve  The pulmonic valve was not well visualized. Physiologic and/or trace pulmonic regurgitation present. No evidence of pulmonic valve stenosis. Pericardial Effusion  No evidence for hemodynamically significant pericardial effusion.    Pleural Effusion  No evidence of pleural 1. 09  0.72 m/s          AV Peak Gradient: 10.45 mmHg  m/s  MV Peak A-Wave:   AV Mean Velocity: 1.16 m/s    LVOT Mean Velocity: 0.68  0.96 m/s          AV Mean Gradient: 5.9 mmHg    m/s  MV E/A Ratio:     AV VTI: 31.5 cm               LVOT Peak Gradient: 4.8  0.75              AV Area (Continuity):2.24     mmHgLVOT Mean Gradient:  MV Peak Gradient: cm^2                          2.3 mmHg  4.3 mmHg                                        Estimated RVSP: 33.8 mmHg  MV Mean Gradient: LVOT VTI: 22.5 cm             Estimated RAP:3 mmHg  2 mmHg            IVRT: 87.7 msec  MV Mean Velocity: Estimated PASP: 33.82 mmHg  0.66 m/s          Pulm. Vein A Reversal         TR Velocity:2.78 m/s  MV Deceleration   Duration:87.7 msec            TR Gradient:30.82 mmHg  Time: 248.6 msec  Pulm. Vein D Velocity:0.36    PV Peak Velocity: 1.16 m/s  MV P1/2t: 88.6    m/sPulm. Vein A Reversal      PV Peak Gradient: 5.41  msec              Velocity:0.33 m/s             mmHg  MVA by PHT:2.48   Pulm. Vein S Velocity: 0.51   PV Mean Velocity: 0.83 m/s  cm^2              m/s                           PV Mean Gradient: 3.1 mmHg  MV Area  (continuity): 2.7  cm^2  MV E' Septal  Velocity: 0.05  m/s  MV E' Lateral  Velocity: 10 m/s  [email or web address] [id number]    XR CHEST (2 VW)    Result Date: 10/19/2021  EXAMINATION: TWO XRAY VIEWS OF THE CHEST 10/19/2021 12:23 pm COMPARISON: 09/30/2021 and previous CT scan of the chest of 06/03/2021 HISTORY: ORDERING SYSTEM PROVIDED HISTORY: syncope TECHNOLOGIST PROVIDED HISTORY: Reason for exam:->syncope FINDINGS: The lungs are without acute focal process. There is no effusion or pneumothorax. The cardiomediastinal silhouette is without acute process. The osseous structures are without acute process. There is a left-sided MediPort catheter. No acute process.        Patient Instructions:      Medication List      START taking these medications    calcium-vitamin D 500-200 MG-UNIT per tablet  Commonly known as: OSCAL-500  Take 1 tablet by mouth daily  Start taking on: October 23, 2021        CHANGE how you take these medications    lisinopril 10 MG tablet  Commonly known as: PRINIVIL;ZESTRIL  Take 1 tablet by mouth daily  Start taking on: October 23, 2021  What changed:   · medication strength  · how much to take        CONTINUE taking these medications    albuterol sulfate  (90 Base) MCG/ACT inhaler  Commonly known as: Ventolin HFA  Inhale 2 puffs into the lungs every 6 hours as needed for Wheezing or Shortness of Breath     brompheniramine-pseudoephedrine-DM 2-30-10 MG/5ML syrup  Commonly known as: Bromfed DM  Take 5 mLs by mouth 4 times daily as needed for Congestion or Cough     escitalopram 10 MG tablet  Commonly known as: LEXAPRO  Take 1 tablet by mouth every morning     gabapentin 300 MG capsule  Commonly known as: NEURONTIN  Take 1 capsule by mouth 3 times daily for 360 days.      levothyroxine 100 MCG tablet  Commonly known as: SYNTHROID  Take 1 tablet by mouth Daily     lovastatin 40 MG tablet  Commonly known as: MEVACOR  Take 1 tablet by mouth nightly     pantoprazole 40 MG tablet  Commonly known as: PROTONIX  Take 1 tablet by mouth daily     tiZANidine 2 MG tablet  Commonly known as: ZANAFLEX  Take 1 tablet by mouth every 8 hours as needed (muscle spasms)     TYLENOL 500 MG tablet  Generic drug: acetaminophen     VITAMIN B 12 PO     vitamin B-6 100 MG tablet  Commonly known as: PYRIDOXINE  Take 1 tablet by mouth daily        STOP taking these medications    Zejula 100 MG Caps  Generic drug: Niraparib Tosylate           Where to Get Your Medications      These medications were sent to [address] [place] [address] 8, [address] [facility] [address]    Phone: [phone]   · calcium-vitamin D 500-200 MG-UNIT per tablet  · lisinopril 10 MG tablet  · vitamin B-6 100 MG tablet           Note that more than 30 minutes was spent in preparing discharge papers, discussing discharge with patient, medication review, etc.    Signed:  Electronically signed by Sana Harris MD on 10/22/2021 at 2:23 PM

## 2021-10-22 NOTE — PROGRESS NOTES
[facility] Quality Flow/Interdisciplinary Rounds Progress Note        Quality Flow Rounds held on October 22, 2021    Disciplines Attending:  Bedside Nurse, ,  and Nursing Unit [address] was admitted on 10/19/2021 11:35 AM    Anticipated Discharge Date:  Expected Discharge Date: 10/22/21    Disposition:    Byron Score:  Byron Scale Score: 22    Readmission Risk              Risk of Unplanned Readmission:  15           Discussed patient goal for the day, patient clinical progression, and barriers to discharge.   The following Goal(s) of the Day/Commitment(s) have been identified:  check discharge with consults/primary      Elyse Briscoe RN  October 22, 2021

## 2021-10-22 NOTE — PROGRESS NOTES
INPATIENT CARDIOLOGY FOLLOW-UP    Name: Meghan Larsonelor    Age: 65 y.o. Date of Admission: 10/19/2021 11:35 AM    Date of Service: 10/22/2021    Chief Complaint: Follow-up for syncope    Interim History:  No new overnight cardiac complaints. No episodes of syncope. Currently with no complaints of CP, SOB, palpitations, dizziness, or lightheadedness. SR on telemetry.     Review of Systems:   Cardiac: As per HPI  General: No fever, chills  Pulmonary: As per HPI  HEENT: No visual disturbances, difficult swallowing  GI: No nausea, vomiting  Endocrine: No thyroid disease or DM  Musculoskeletal: MCGARRY x 4, no focal motor deficits  Skin: Intact, no rashes  Neuro/Psych: No headache or seizures    Problem List:  Patient Active Problem List   Diagnosis    HTN (hypertension)    DJD (degenerative joint disease) of knee    DJD (degenerative joint disease), lumbosacral    Hypothyroid    Hypercholesterolemia    GERD (gastroesophageal reflux disease)    Mild intermittent asthma without complication    Secondary malignant neoplasm of other specified sites ([facility]    Depression    COVID-19 virus infection    Hypotension    Pulmonary infiltrates on CXR    Hyponatremia    ELIANA (acute kidney injury) ([facility]    Hyperlipidemia    New onset atrial fibrillation ([facility]    Pancytopenia (HCC)       Allergies:  No Known Allergies    Current Medications:  Current Facility-Administered Medications   Medication Dose Route Frequency Provider Last Rate Last Admin    lisinopril (PRINIVIL;ZESTRIL) tablet 10 mg  10 mg Oral Daily Andrey Negrescu V, PA   10 mg at 10/22/21 1119    perflutren lipid microspheres (DEFINITY) injection 1.65 mg  1.5 mL IntraVENous ONCE PRN Lurdes Blount MD        sodium chloride flush 0.9 % injection 10 mL  10 mL IntraVENous PRN Jaswinder Vyas DO        sodium chloride flush 0.9 % injection 10 mL  10 mL IntraVENous PRN Jaswinder Vyas DO        acetaminophen (TYLENOL) tablet 500 mg  500 mg Oral Q6H PRN Reina Bird MD   500 mg at 10/21/21 2240    escitalopram (LEXAPRO) tablet 10 mg  10 mg Oral QAM Reina Bird MD   10 mg at 10/22/21 1119    gabapentin (NEURONTIN) capsule 300 mg  300 mg Oral TID Reina Bird MD   300 mg at 10/22/21 1118    levothyroxine (SYNTHROID) tablet 100 mcg  100 mcg Oral Daily Reina Bird MD   100 mcg at 10/22/21 9784    atorvastatin (LIPITOR) tablet 10 mg  10 mg Oral Nightly Reina Bird MD   10 mg at 10/21/21 2240    pantoprazole (PROTONIX) tablet 40 mg  40 mg Oral Daily Reina Bird MD   40 mg at 10/22/21 6727    tiZANidine (ZANAFLEX) tablet 2 mg  2 mg Oral Q8H PRN Reina iBrd MD        vitamin B-6 (PYRIDOXINE) tablet 100 mg  100 mg Oral Daily Reina Bird MD   100 mg at 10/22/21 1119    sodium chloride flush 0.9 % injection 5-40 mL  5-40 mL IntraVENous 2 times per day Reina Bird MD   10 mL at 10/22/21 1119    sodium chloride flush 0.9 % injection 5-40 mL  5-40 mL IntraVENous PRN Reina Bird MD   10 mL at 10/19/21 1817    0.9 % sodium chloride infusion  25 mL IntraVENous PRN Reina Bird MD        ondansetron (ZOFRAN-ODT) disintegrating tablet 4 mg  4 mg Oral Q8H PRN Reina Bird MD        Or    ondansetron [facility]) injection 4 mg  4 mg IntraVENous Q6H PRN Reina Bird MD        polyethylene glycol (GLYCOLAX) packet 17 g  17 g Oral Daily PRN Reina Bird MD        acetaminophen (TYLENOL) tablet 650 mg  650 mg Oral Q6H PRN Reina Bird MD        Or    acetaminophen (TYLENOL) suppository 650 mg  650 mg Rectal Q6H PRN Reina Bird MD        albuterol (PROVENTIL) nebulizer solution 2.5 mg  2.5 mg Nebulization Q6H PRN Reina Bird MD        calcium-vitamin D (OSCAL-500) 500-200 MG-UNIT per tablet 1 tablet  1 tablet Oral Daily Reina Bird MD   1 tablet at 10/22/21 1118    vitamin B-12 (CYANOCOBALAMIN) tablet 250 mcg  250 mcg Oral Daily Reina Bird MD   250 mcg at 10/22/21 1114  multivitamin 1 tablet  1 tablet Oral Daily Dillan Croft MD   1 tablet at 10/22/21 1118      sodium chloride         Physical Exam:  /60   Pulse 92   Temp 98.3 °F (36.8 °C) (Oral)   Resp 18   Ht 5' 2\" (1.575 m)   Wt 130 lb (59 kg)   SpO2 97%   BMI 23.78 kg/m²   Wt Readings from Last 3 Encounters:   10/19/21 130 lb (59 kg)   10/07/21 131 lb (59.4 kg)   10/05/21 130 lb (59 kg)     Appearance: Awake, alert, no acute respiratory distress  Skin: Intact, no rash  Head: Normocephalic, atraumatic  Eyes: EOMI, no conjunctival erythema  ENMT: No pharyngeal erythema, MMM, no rhinorrhea  Neck: Supple, no elevated JVP, no carotid bruits  Lungs: Clear to auscultation bilaterally. No wheezes, rales, or rhonchi. Cardiac: Regular rate and rhythm, +S1S2, no murmurs apparent  Abdomen: Soft, nontender, +bowel sounds  Extremities: Moves all extremities x 4, no lower extremity edema  Neurologic: No focal motor deficits apparent, normal mood and affect  Peripheral Pulses: Intact posterior tibial pulses bilaterally    Intake/Output:  No intake or output data in the 24 hours ending 10/22/21 1402  No intake/output data recorded.     Laboratory Tests:  Recent Labs     10/20/21  0245 10/21/21  1025 10/22/21  0847    134 135   K 4.0 3.9 3.8   CL 98 99 100   CO2 23 24 24   BUN 9 10 15   CREATININE 0.8 0.7 0.8   GLUCOSE 143* 114* 185*   CALCIUM 8.5* 8.9 9.2     Lab Results   Component Value Date    MG 1.6 10/19/2021     Recent Labs     10/20/21  0245   ALKPHOS 94   ALT 7   AST 12   PROT 6.8   BILITOT 0.7   LABALBU 3.7     Recent Labs     10/20/21  0245 10/21/21  1025 10/22/21  0847   WBC 2.5* 2.2* 2.2*   RBC 2.28* 2.73* 2.84*   HGB 8.0* 9.6* 9.9*   HCT 23.7* 28.7* 30.1*   .9* 105.1* 106.0*   MCH 35.1* 35.2* 34.9   MCHC 33.8 33.4 32.9   RDW 23.5* 22.6* 21.8*   PLT 67* 71* 73*   MPV 11.9 11.6 11.8     Lab Results   Component Value Date    CKTOTAL 74 04/18/2018    CKMB 1.2 04/18/2018    TROPONINI <0.01 01/29/2021 TROPONINI <0.01 01/28/2021    TROPONINI <0.01 04/18/2018     Lab Results   Component Value Date    INR 1.1 04/18/2018    INR 1.1 09/14/2011    INR 0.9 08/23/2011    PROTIME 12.3 04/18/2018    PROTIME 11.0 09/14/2011    PROTIME 10.0 08/23/2011     Lab Results   Component Value Date    TSH 0.252 (L) 10/19/2021     No results found for: LABA1C  No results found for: EAG  Lab Results   Component Value Date    CHOL 166 09/02/2020    CHOL 176 03/21/2019    CHOL 195 10/26/2017     Lab Results   Component Value Date    TRIG 146 09/02/2020    TRIG 144 03/21/2019    TRIG 80 10/26/2017     Lab Results   Component Value Date    HDL 46 09/02/2020    HDL 37 03/21/2019    HDL 51 10/26/2017     Lab Results   Component Value Date    LDLCALC 91 09/02/2020    LDLCALC 110 (H) 03/21/2019    LDLCALC 128 (H) 10/26/2017     Lab Results   Component Value Date    LABVLDL 29 09/02/2020    LABVLDL 29 03/21/2019    LABVLDL 16 10/26/2017     No results found for: CHOLHDLRATIO    Cardiac Tests:  Telemetry findings reviewed: SR at rate 100s with frequent PACs no new tachy/bradyarrhythmias overnight     EKG: Sinus tachycardia, premature atrial complexes, LVH, borderline EKG     Vitals and labs were reviewed: Blood pressure 128/60 heart rate of 92 sats 97%.     LabsBMP normal.  proBNP 96 high-sensitivity troponin VIII liver function normal, TSH 0.252 WBC>> 1.8, 2.8>> 2.2, H&H 5.3/15.8>> 8/23.7>> 9.6/28.7>>9.9/30.1, platelets 10>> 27>> 07>>09.     TTE10/20/2021:  Summary   Normal left ventricle size and systolic function.   Ejection fraction is visually estimated at 55-60%.   No regional wall motion abnormalities seen.   Normal left ventricle wall thickness.   Normal diastolic function.   Normal right ventricular size and function. TAPSE 20 mm.   No hemodynamically significant aortic stenosis is present.   Mild tricuspid regurgitation.  RVSP is 34 mmHg.  Normal estimated PA systolic function.   No evidence for hemodynamically significant pericardial effusion.        Assessment:  · Syncope secondary to severe anemia and hypotension, improved after blood transfusion, no further episodes and doing well. · Frequent premature atrial complexes. >> improved. · Sinus tachycardia predominantly from severe anemia, improved. · Pancytopenia secondary to chemo improving  · Questionable history of atrial fibrillation off Eliquis and metoprolol and currently caring Zio patch to document any episodes of A. Fib. · History of metastatic ovarian cancer on chemo  · Normal LV size and function no significant valvular heart disease. · Hypertension well controlled  · Hyperlipidemia  · Bronchial asthma  · Hypothyroidism, TSH is low        Plan:   · Echo results were reviewed and showed normal LV systolic and diastolic function, normal RV function no significant valvular heart disease. Cardiac rhythm showed no evidence of any significant abnormalities. · Management of anemia as per primary service  · No further cardiac work-up is planned at this time  · She needs to follow-up with EP service upon discharge as scheduled. .  · Avoid any beta-blockers. · Continue  lisinopril to 10 mg p.o. daily due to soft blood pressures    She is stable for discharge from the cardiology standpoint for discharge, follow up with EP scheduled. Will sign off, please call us if you have any further questions or concerns. Mark Echols MD., Mily Smith.   [facility]) Cardiology

## 2021-10-22 NOTE — PROGRESS NOTES
Secure message sent to Lizzette CHRISTIAN with [facility] Cardiology re: discharge. Awaiting response. Call place Dr. Jose Howell re: discharge, awaiting call back.

## 2021-10-25 NOTE — TELEPHONE ENCOUNTER
Sandra [facility] Transitions Initial Follow Up Call    Outreach made within 2 business days of discharge: Yes    Patient: Ganga Miles Patient : 1950   MRN: [id number]  Reason for Admission: Pancytopenia  Discharge Date: 10/22/21       Spoke with: Erica Ramírez     Discharge department/facility: [facility] Patient Contact:  Was patient able to fill all prescriptions: Yes  Was patient instructed to bring all medications to the follow-up visit: Yes  Is patient taking all medications as directed in the discharge summary?  Yes  Does patient understand their discharge instructions: Yes  Does patient have questions or concerns that need addressed prior to 7-14 day follow up office visit: No    Scheduled appointment with PCP within 7-14 days    Follow Up  Future Appointments   Date Time Provider Brad Yu   10/27/2021 11:45 AM MD MANSI Loving [facility]   2021  9:45 AM MD MANSI Loving [facility]   3/9/2022  9:30 AM [facility]

## 2021-10-27 PROBLEM — D64.9 ANEMIA: Status: ACTIVE | Noted: 2021-01-01

## 2021-10-27 NOTE — H&P
[facility] History and Physical    Pcp= Dr Chasity Pleitez  Patient admitted to the hospital October 19/2021 under the [facility]) hospitalist group    CHIEF COMPLAINT: Syncope    History of Present Illness: Patient experienced a syncopal event earlier today  Patient stated that she had been feeling weak  As well as fatigue  She has not had any recent episodes of fevers  Patient has a diagnosis of pancytopenia  2/2 chemorz for metasta ovarian ca  Was admitted earlier this month   For similar event  Patient was transfused 2 units  Hemoglobin on October 22 9.9  Informant(s) for H&P: The patient and the medical record    REVIEW OF SYSTEMS:  A comprehensive review of systems was negative except for: what is in the HPI  Review of Systems   Constitutional: Positive for fatigue. Negative for appetite change, chills, diaphoresis and fever. HENT: Negative for nosebleeds, sinus pressure, sinus pain and sore throat. Eyes: Negative for pain, discharge and itching. Respiratory: Negative for cough, chest tightness and wheezing. Cardiovascular: Negative for chest pain, palpitations and leg swelling. Gastrointestinal: Negative for abdominal pain, constipation, diarrhea, nausea and vomiting. Genitourinary: Negative for difficulty urinating, dysuria and hematuria. Musculoskeletal: Negative for arthralgias, back pain and joint swelling. Neurological: Positive for syncope and headaches. Negative for dizziness and numbness. Hematological: Negative for adenopathy. Does not bruise/bleed easily. Psychiatric/Behavioral: Negative for confusion and hallucinations.          PMH:  Past Medical History:   Diagnosis Date    Ankle swelling     takes diuretic prn    Asthma     mild    Cancer ([facility] [address] 07/2019    ovarian, treated with surgery and chemo    Depression     GERD (gastroesophageal reflux disease)     Hyperlipidemia     Hypertension     Hypothyroidism     Osteoarthritis     Positive FIT (fecal immunochemical test)     Vitamin D deficiency     Wears partial dentures     upper       Surgical History:  Past Surgical History:   Procedure Laterality Date    APPENDECTOMY  years ago    BREAST SURGERY  1960s    removal lump     SECTION      x 1    COLONOSCOPY N/A 2020    COLONOSCOPY DIAGNOSTIC performed by Lashell Rowan MD at [facility]  2019    debulking of pelvic mass, DR. Subhash ROMOA    LYMPH NODE DISSECTION      TUNNELED VENOUS PORT PLACEMENT         Medications Prior to Admission:    Prior to Admission medications    Medication Sig Start Date End Date Taking?  Authorizing Provider   lisinopril (PRINIVIL;ZESTRIL) 10 MG tablet Take 1 tablet by mouth daily 10/23/21   Denise Monet MD   vitamin B-6 (PYRIDOXINE) 100 MG tablet Take 1 tablet by mouth daily 10/22/21   Denise Monet MD   calcium-vitamin D (Elias Amaris) 500-200 MG-UNIT per tablet Take 1 tablet by mouth daily 10/23/21   Denise Monet MD   brompheniramine-pseudoephedrine-DM (BROMFED DM) 2-30-10 MG/5ML syrup Take 5 mLs by mouth 4 times daily as needed for Congestion or Cough 10/5/21 11/4/21  Geena Leyva MD   tiZANidine (ZANAFLEX) 2 MG tablet Take 1 tablet by mouth every 8 hours as needed (muscle spasms) 21  Geena Leyva MD   levothyroxine (SYNTHROID) 100 MCG tablet Take 1 tablet by mouth Daily 21   Geena Leyva MD   lovastatin (MEVACOR) 40 MG tablet Take 1 tablet by mouth nightly 21   Geena Leyva MD   pantoprazole (PROTONIX) 40 MG tablet Take 1 tablet by mouth daily 21   Geena Leyva MD   escitalopram (LEXAPRO) 10 MG tablet Take 1 tablet by mouth every morning 21   Geena Leyva MD   albuterol sulfate HFA (VENTOLIN HFA) 108 (90 Base) MCG/ACT inhaler Inhale 2 puffs into the lungs every 6 hours as needed for Wheezing or Shortness of Breath 21   Geena Leyva MD Cyanocobalamin (VITAMIN B 12 PO) Take 1 tablet by mouth daily     Historical Provider, MD   acetaminophen (TYLENOL) 500 MG tablet Take 500 mg by mouth every 6 hours as needed for Pain    Historical Provider, MD   gabapentin (NEURONTIN) 300 MG capsule Take 1 capsule by mouth 3 times daily for 360 days. 9/2/20 10/19/21  Valeriy Maria MD       Allergies:    Patient has no known allergies. Social History:    reports that she has never smoked. She has never used smokeless tobacco. She reports that she does not drink alcohol and does not use drugs. Family History:   family history includes Diabetes in her father; Heart Disease in her mother; High Blood Pressure in her mother; Thyroid Disease in her sister. PHYSICAL EXAM:  Vitals:  /65   Pulse 113   Temp 97.7 °F (36.5 °C)   Resp 16   Ht 5' 2\" (1.575 m)   Wt 135 lb (61.2 kg)   SpO2 95%   BMI 24.69 kg/m²   Physical Exam  Vitals reviewed. Constitutional:       Appearance: She is not diaphoretic. HENT:      Right Ear: External ear normal.      Left Ear: External ear normal.      Nose: Nose normal.      Mouth/Throat:      Mouth: Mucous membranes are moist.      Pharynx: No oropharyngeal exudate. Eyes:      Extraocular Movements: Extraocular movements intact. Cardiovascular:      Rate and Rhythm: Regular rhythm. Heart sounds: Normal heart sounds. Pulmonary:      Effort: Pulmonary effort is normal.      Breath sounds: Normal breath sounds. Abdominal:      General: Bowel sounds are normal. There is no distension. Palpations: Abdomen is soft. There is no mass. Tenderness: There is no guarding. Musculoskeletal:         General: No swelling or tenderness. Cervical back: No rigidity or tenderness. Skin:     General: Skin is warm. Capillary Refill: Capillary refill takes less than 2 seconds. Neurological:      Mental Status: She is alert and oriented to person, place, and time.       Coordination: Coordination normal.   Psychiatric:         Mood and Affect: Mood normal.         Behavior: Behavior normal.         Thought Content: Thought content normal.         Judgment: Judgment normal.         General Appearance: alert and oriented to person, place and time and in no acute distress  Skin: warm and dry  Head: normocephalic and atraumatic  Eyes: pupils equal, round, and reactive to light, extraocular eye movements intact, conjunctivae normal  Neck: neck supple and non tender without mass   Pulmonary/Chest: clear to auscultation bilaterally- no wheezes, rales or rhonchi, normal air movement, no respiratory distress  Cardiovascular: normal rate, normal S1 and S2 and no carotid bruits  Abdomen: soft, non-tender, non-distended, normal bowel sounds, no masses or organomegaly  Extremities: no cyanosis, no clubbing and no edema  Neurologic: no cranial nerve deficit and speech normal        LABS:  Recent Labs     10/27/21  1212      K 4.5   CL 94*   CO2 24   BUN 24*   CREATININE 0.7   GLUCOSE 147*   CALCIUM 9.3       Recent Labs     10/27/21  1212   WBC 4.5   RBC 1.36*   HGB 5.7*   HCT 16.8*   .5*   MCH 41.9*   MCHC 33.9   RDW 17.0*   PLT 52*   MPV 12.7*     Spherocytes not mentioned    Radiology:   CT ABDOMEN PELVIS W IV CONTRAST Additional Contrast? None   Final Result   No obvious etiology for the patient's jaundice. Specifically, there is no   evidence of intrahepatic or extrahepatic biliary ductal dilatation. There   are innumerable punctate gallstones within the nondilated gallbladder. No   obvious choledocholithiasis. Trace free fluid in the pelvis, of uncertain etiology but an abnormal finding   in a female patient of this age. An occult inflammatory process cannot be   excluded. Additional, incidental findings as above. EKG:  Sinus tachycardia with occasional premature ventricular complexes  Nl axis  Rate 110    ASSESSMENT:  Syncope likely from anemia   volume depletion   Macrocytic anemia with high reticulocyte count and elevated totlal bilirubin suggestive of hemolytic anemia    Active Problems:    Anemia  Resolved Problems:    * No resolved hospital problems. *    Pancytopenia  metastat ovar ca- patient had been on [place]  Elevated bilirubin likely unconjugated -2/2 hemolysis    PLAN:    1. Admit to the hospital  2 IV access/-order periph smear order ldh/haptoglobin/QUINCY  3 consult hematology  4 IV fluids  5 hold anticoagulants at this time    Code Status: full code  DVT prophylaxis: epc/scd      NOTE: This report was transcribed using voice recognition software. Every effort was made to ensure accuracy; however, inadvertent computerized transcription errors may be present.   Electronically signed by So Johnson DO on 10/27/2021 at 3:57 PM

## 2021-10-27 NOTE — ED PROVIDER NOTES
HPI:  10/27/21,   Time: 1:06 PM EDT         Michael Raphael is a 65 y.o. female presenting to the ED for a syncopal episode with some weakness and no associated chest pain palpitations or other complaints but a known low hemoglobin yesterday, beginning few hours ago. The complaint has been intermittent, mild in severity, and worsened by nothing. Patient had her hemoglobin measured at the [facility] yesterday was low but was not low enough to require transfusion patient was sitting in a chair became weak and had an episode of syncope with no associated fall. Patient denies chest pain palpitations shortness of breath. ROS:   Pertinent positives and negatives are stated within HPI, all other systems reviewed and are negative.  --------------------------------------------- PAST HISTORY ---------------------------------------------  Past Medical History:  has a past medical history of Ankle swelling, Asthma, Cancer ([facility] [address] Depression, GERD (gastroesophageal reflux disease), Hyperlipidemia, Hypertension, Hypothyroidism, Osteoarthritis, Positive FIT (fecal immunochemical test), Vitamin D deficiency, and Wears partial dentures. Past Surgical History:  has a past surgical history that includes Hysterectomy (); Tunneled venous port placement (); lymph node dissection;  section; laparotomy (2019); Appendectomy (years ago); Breast surgery (); and Colonoscopy (N/A, 2020). Social History:  reports that she has never smoked. She has never used smokeless tobacco. She reports that she does not drink alcohol and does not use drugs. Family History: family history includes Diabetes in her father; Heart Disease in her mother; High Blood Pressure in her mother; Thyroid Disease in her sister. The patients home medications have been reviewed. Allergies: Patient has no known allergies.     -------------------------------------------------- RESULTS -------------------------------------------------  All laboratory and radiology results have been personally reviewed by myself   LABS:  Results for orders placed or performed during the hospital encounter of 10/27/21   CBC Auto Differential   Result Value Ref Range    WBC 4.5 4.5 - 11.5 E9/L    RBC 1.36 (L) 3.50 - 5.50 E12/L    Hemoglobin 5.7 (LL) 11.5 - 15.5 g/dL    Hematocrit 16.8 (L) 34.0 - 48.0 %    .5 (H) 80.0 - 99.9 fL    MCH 41.9 (H) 26.0 - 35.0 pg    MCHC 33.9 32.0 - 34.5 %    RDW 17.0 (H) 11.5 - 15.0 fL    Platelets 52 (L) 247 - 450 E9/L    MPV 12.7 (H) 7.0 - 12.0 fL    Neutrophils % 48.0 43.0 - 80.0 %    Lymphocytes % 31.0 20.0 - 42.0 %    Monocytes % 14.0 (H) 2.0 - 12.0 %    Eosinophils % 0.0 0.0 - 6.0 %    Basophils % 0.0 0.0 - 2.0 %    Neutrophils Absolute 2.48 1.80 - 7.30 E9/L    Lymphocytes Absolute 1.40 (L) 1.50 - 4.00 E9/L    Monocytes Absolute 0.63 0.10 - 0.95 E9/L    Eosinophils Absolute 0.00 (L) 0.05 - 0.50 E9/L    Basophils Absolute 0.00 0.00 - 0.20 E9/L    Metamyelocytes Relative 3.0 (H) 0.0 - 1.0 %    Myelocyte Percent 4.0 0 - 0 %    nRBC 2.0 /100 WBC    Anisocytosis 1+     Polychromasia 1+     Poikilocytes 1+     Ovalocytes 1+     Tear Drop Cells 1+    Comprehensive Metabolic Panel w/ Reflex to MG   Result Value Ref Range    Sodium 134 132 - 146 mmol/L    Potassium reflex Magnesium 4.5 3.5 - 5.0 mmol/L    Chloride 94 (L) 98 - 107 mmol/L    CO2 24 22 - 29 mmol/L    Anion Gap 16 7 - 16 mmol/L    Glucose 147 (H) 74 - 99 mg/dL    BUN 24 (H) 6 - 23 mg/dL    CREATININE 0.7 0.5 - 1.0 mg/dL    GFR Non-African American >60 >=60 mL/min/1.73    GFR African American >60     Calcium 9.3 8.6 - 10.2 mg/dL    Total Protein 8.1 6.4 - 8.3 g/dL    Albumin 4.2 3.5 - 5.2 g/dL    Total Bilirubin 9.5 (H) 0.0 - 1.2 mg/dL    Alkaline Phosphatase 95 35 - 104 U/L    ALT 13 0 - 32 U/L    AST 50 (H) 0 - 31 U/L   Platelet Confirmation   Result Value Ref Range    Platelet Confirmation CONFIRMED    Lipase   Result EXAM--------------------------------------      Constitutional/General: Alert and oriented x3, well appearing, non toxic in NAD  Head: NC/AT  Eyes: PERRL, EOMI icteric sclera  Mouth: Oropharynx clear, handling secretions, no trismus  Neck: Supple, full ROM, no meningeal signs  Pulmonary: Lungs clear to auscultation bilaterally, no wheezes, rales, or rhonchi. Not in respiratory distress  Cardiovascular:  Regular echocardiac rate and rhythm, no murmurs, gallops, or rubs. 2+ distal pulses  Abdomen: Soft, non tender, non distended,   Extremities: Moves all extremities x 4. Warm and well perfused  Skin: warm and dry without rash  Neurologic: GCS 15,  Psych: Normal Affect      ------------------------------ ED COURSE/MEDICAL DECISION MAKING----------------------  Medications   0.9 % sodium chloride infusion (has no administration in time range)   0.9 % sodium chloride bolus (0 mLs IntraVENous Stopped 10/27/21 1446)   iopamidol (ISOVUE-370) 76 % injection 75 mL (75 mLs IntraVENous Given 10/27/21 1415)   sodium chloride flush 0.9 % injection 10 mL (10 mLs IntraVENous Given 10/27/21 1412)         Medical Decision Making:    Patient was given IV fluids transfusion was ordered her initial work-up was negative and patient was found to have multiple antibodies consistent with a delayed transfusion reaction. Consult was placed to hematology oncology and internal medicine patient was admitted to the hospitalist service and additional lab work is currently pending    Counseling: The emergency provider has spoken with the patient and discussed todays results, in addition to providing specific details for the plan of care and counseling regarding the diagnosis and prognosis. Questions are answered at this time and they are agreeable with the plan. Please note that the withdrawal or failure to initiate urgent interventions for this patient would likely result in a life threatening deterioration or permanent disability. Accordingly this patient received 30 minutes of critical care time, excluding separately billable procedures. --------------------------------- IMPRESSION AND DISPOSITION ---------------------------------    IMPRESSION  1. Delayed hemolytic transfusion reaction, initial encounter    2. Syncope and collapse    3.  Pancytopenia ([address]    4. Jaundice as manifestation of blood transfusion reaction, initial encounter        DISPOSITION  Disposition: Admit to telemetry  Patient condition is serious                  Benjamin Acuña MD  10/27/21 6060

## 2021-10-27 NOTE — ED NOTES
FIRST PROVIDER CONTACT ASSESSMENT NOTE      Department of Emergency Medicine   10/27/21  12:04 PM EDT    Chief Complaint: Loss of Consciousness (today per family member. ) and Abnormal Lab (hgb at [facility] yesterday was low )      History of Present Illness:   Mikki Mccrary is a 65 y.o. female who presents to the ED for low blood count. She had a blood work taken at the [facility] yesterday and came back in the sevens. She had a syncopal episode this morning. Denies any head strike. Is any shortness of breath or chest pain at this time. [place] any anticoagulations. Last blood transfusion was 1 week ago today. Medical History:  has a past medical history of Ankle swelling, Asthma, Cancer ([address] Depression, GERD (gastroesophageal reflux disease), Hyperlipidemia, Hypertension, Hypothyroidism, Osteoarthritis, Positive FIT (fecal immunochemical test), Vitamin D deficiency, and Wears partial dentures. Surgical History:  has a past surgical history that includes Hysterectomy (); Tunneled venous port placement (); lymph node dissection;  section; laparotomy (2019); Appendectomy (years ago); Breast surgery (1960s); and Colonoscopy (N/A, 2020). Social History:  reports that she has never smoked. She has never used smokeless tobacco. She reports that she does not drink alcohol and does not use drugs. Family History: family history includes Diabetes in her father; Heart Disease in her mother; High Blood Pressure in her mother; Thyroid Disease in her sister. *ALLERGIES*     Patient has no known allergies.      Physical Exam:      VS:  BP (!) 85/54   Pulse 110   Temp 97.7 °F (36.5 °C)   Resp 16   Ht 5' 2\" (1.575 m)   Wt 135 lb (61.2 kg)   SpO2 97%   BMI 24.69 kg/m²      Initial Plan of Care:  Initiate Treatment-Testing, Proceed toTreatment Area When Bed Available for ED Attending/MLP to Continue Care    -----------------END OF FIRST PROVIDER CONTACT ASSESSMENT NOTE--------------  Electronically signed by JAVIER Gudino CNP   DD: 10/27/21             JAVIER Gudino CNP  10/27/21 121

## 2021-10-27 NOTE — TELEPHONE ENCOUNTER
Pts  called to cancel appt today. Pt is passing out so he is taking her to [facility] ER. Wanted to let doctor know.

## 2021-10-28 NOTE — PROGRESS NOTES
Subjective: The patient is awake and alert. She reports of having dark gold colored urine. Has increased fatigue. Denies any cardiopulmonary symptoms. Denies chest pain, angina, abdominal discomfort. No nausea or vomiting. Tolerating diet. Objective:    BP (!) 120/55   Pulse 102   Temp 98.2 °F (36.8 °C) (Oral)   Resp 16   Ht 5' 2\" (1.575 m)   Wt 135 lb (61.2 kg)   SpO2 97%   BMI 24.69 kg/m²     General: NAD  HEENT: +Icteric sclera, No thrush or mucositis, EOMI  Heart:  Sinus tachy, no murmurs, gallops, or rubs.   Lungs:  CTA bilaterally, no wheeze, rales or rhonchi  Abd: BS present, nontender, nondistended, no masses  Extrem:  No clubbing, cyanosis, or edema  Lymphatics: No palpable adenopathy in cervical and supraclavicular regions  Skin: Intact, no petechia or purpura    CBC with Differential:    Lab Results   Component Value Date    WBC 3.0 10/28/2021    RBC 1.18 10/28/2021    RBC 3.12 07/20/2019    HGB 4.9 10/28/2021    HCT 14.5 10/28/2021    PLT 35 10/28/2021    .9 10/28/2021    MCH 41.5 10/28/2021    MCHC 33.8 10/28/2021    RDW 16.3 10/28/2021    NRBC 2.0 10/27/2021    METASPCT 3.0 10/27/2021    LYMPHOPCT 31.0 10/27/2021    MONOPCT 14.0 10/27/2021    MYELOPCT 4.0 10/27/2021    BASOPCT 0.0 10/27/2021    MONOSABS 0.63 10/27/2021    LYMPHSABS 1.40 10/27/2021    EOSABS 0.00 10/27/2021    BASOSABS 0.00 10/27/2021     CMP:    Lab Results   Component Value Date     10/27/2021    K 4.5 10/27/2021    CL 94 10/27/2021    CO2 24 10/27/2021    BUN 24 10/27/2021    CREATININE 0.7 10/27/2021    GFRAA >60 10/27/2021    LABGLOM >60 10/27/2021    GLUCOSE 147 10/27/2021    GLUCOSE 101 12/19/2011    PROT 8.1 10/27/2021    LABALBU 4.2 10/27/2021    LABALBU 4.6 12/19/2011    CALCIUM 9.3 10/27/2021    BILITOT 1.9 10/28/2021    ALKPHOS 95 10/27/2021    AST 50 10/27/2021    ALT 13 10/27/2021      [id number]}     Current Facility-Administered Medications:     dexamethasone (DECADRON) injection 4 mg, 4 mg, IntraVENous, Q6H, Deja Haney MD    0.9 % sodium chloride infusion, , IntraVENous, PRN, Nancy Prince MD    acetaminophen (TYLENOL) tablet 500 mg, 500 mg, Oral, Q6H PRN, Rock Hosaima, DO    escitalopram (LEXAPRO) tablet 10 mg, 10 mg, Oral, QAM, Sukhdev Ta, DO, 10 mg at 10/28/21 5506    gabapentin (NEURONTIN) capsule 300 mg, 300 mg, Oral, TID, Rock Hover, DO, 300 mg at 10/28/21 1426    levothyroxine (SYNTHROID) tablet 100 mcg, 100 mcg, Oral, Daily, Rock Hover, DO, 100 mcg at 10/28/21 0518    lisinopril (PRINIVIL;ZESTRIL) tablet 10 mg, 10 mg, Oral, Daily, Rock Hover, DO, 10 mg at 10/27/21 2316    atorvastatin (LIPITOR) tablet 10 mg, 10 mg, Oral, Daily, Sukhdev Ta, DO, 10 mg at 10/27/21 2318    pantoprazole (PROTONIX) tablet 40 mg, 40 mg, Oral, Daily, Sukhdev Ta, DO, 40 mg at 10/28/21 0946    tiZANidine (ZANAFLEX) tablet 2 mg, 2 mg, Oral, Q8H PRN, Rock Hover, DO    vitamin B-6 (PYRIDOXINE) tablet 100 mg, 100 mg, Oral, Daily, Sukhdev Ta, DO, 100 mg at 10/28/21 0946    sodium chloride flush 0.9 % injection 5-40 mL, 5-40 mL, IntraVENous, 2 times per day, Sukhdev Ta, DO, 10 mL at 10/28/21 0946    sodium chloride flush 0.9 % injection 5-40 mL, 5-40 mL, IntraVENous, PRN, Sukhdev Ta, DO    0.9 % sodium chloride infusion, 25 mL, IntraVENous, PRN, Selma Holstein Ta, DO    ondansetron (ZOFRAN-ODT) disintegrating tablet 4 mg, 4 mg, Oral, Q8H PRN **OR** ondansetron (ZOFRAN) injection 4 mg, 4 mg, IntraVENous, Q6H PRN, Rock Aurora DO    polyethylene glycol (GLYCOLAX) packet 17 g, 17 g, Oral, Daily PRN, Rock Aurora DO    acetaminophen (TYLENOL) tablet 650 mg, 650 mg, Oral, Q6H PRN, 650 mg at 10/28/21 0954 **OR** acetaminophen (TYLENOL) suppository 650 mg, 650 mg, Rectal, Q6H PRN, Rock Aurora DO    folic acid (FOLVITE) tablet 1 mg, 1 mg, Oral, Daily, Nancy Prince MD, 1 mg at 10/28/21 0946  

## 2021-10-28 NOTE — PROGRESS NOTES
[facility] Progress [facility]    Admitting Date and Time: 10/27/2021 12:08 PM  Admit Dx: Syncope and collapse [R55]  Anemia [D64.9]  Pancytopenia ([address] [D61.818]  Delayed hemolytic transfusion reaction, initial encounter [T80.911A]  Jaundice as manifestation of blood transfusion reaction, initial encounter [T80.89XA, R17]    Subjective:  Patient is being followed for Syncope and collapse [R55]  Anemia [D64.9]  Pancytopenia ([address] [D61.818]  Delayed hemolytic transfusion reaction, initial encounter [T80.911A]  Jaundice as manifestation of blood transfusion reaction, initial encounter Annett Fleischer, R17]   Pt feels improved as compared to yesterday  Per RN: No new reports per RN    ROS: denies fever, chills, cp, sob, n/v, HA unless stated above. Furthermore denies diarrhea denies abdominal pain denies lightheadedness or dizziness on ambulation     escitalopram  10 mg Oral QAM    gabapentin  300 mg Oral TID    levothyroxine  100 mcg Oral Daily    lisinopril  10 mg Oral Daily    atorvastatin  10 mg Oral Daily    pantoprazole  40 mg Oral Daily    vitamin B-6  100 mg Oral Daily    sodium chloride flush  5-40 mL IntraVENous 2 times per day    folic acid  1 mg Oral Daily     sodium chloride, , PRN  acetaminophen, 500 mg, Q6H PRN  tiZANidine, 2 mg, Q8H PRN  sodium chloride flush, 5-40 mL, PRN  sodium chloride, 25 mL, PRN  ondansetron, 4 mg, Q8H PRN   Or  ondansetron, 4 mg, Q6H PRN  polyethylene glycol, 17 g, Daily PRN  acetaminophen, 650 mg, Q6H PRN   Or  acetaminophen, 650 mg, Q6H PRN  albuterol, 2.5 mg, Q6H PRN         Objective:mews based on vital signs and respiratory rate    /64   Pulse 98   Temp 98.2 °F (36.8 °C) (Oral)   Resp 16   Ht 5' 2\" (1.575 m)   Wt 135 lb (61.2 kg)   SpO2 97%   BMI 24.69 kg/m²   Physical Exam  Vitals reviewed. Constitutional:       General: She is not in acute distress. Appearance: She is not diaphoretic.    HENT:      Head:      Comments: Sinuses are not tender to percussion or palpation     Nose: Nose normal.      Mouth/Throat:      Mouth: Mucous membranes are moist.      Pharynx: No oropharyngeal exudate. Cardiovascular:      Comments: s1s2 audible pulse was regular  Pulmonary:      Effort: Pulmonary effort is normal.      Breath sounds: No stridor. No wheezing or rhonchi. Abdominal:      General: There is no distension. Palpations: Abdomen is soft. Tenderness: There is no guarding. Musculoskeletal:      Comments: There is no clubbing   Skin:     General: Skin is warm. Neurological:      Mental Status: She is alert.       Comments: Patient is awake speech is clear   Psychiatric:      Comments: Mood and affect match  She is calm  Patient is in good spirits  Patient does not display bizarre or inappropriate behaviors  Patient displays appropriate thought content and reasonable judgment               Recent Labs     10/27/21  1212      K 4.5   CL 94*   CO2 24   BUN 24*   CREATININE 0.7   GLUCOSE 147*   CALCIUM 9.3       Recent Labs     10/27/21  1212 10/27/21  1554   WBC 4.5  --    RBC 1.36*  --    HGB 5.7*  --    HCT 16.8* 15.9*   .5*  --    MCH 41.9*  --    MCHC 33.9  --    RDW 17.0*  --    PLT 52*  --    MPV 12.7*  --    Total bilirubin was high yesterday  Direct bilirubin this morning slightly elevated but does not account for the elevation in total bilirubin      Bilirubin elevation secondary to indirect levels    Radiology: No new radiographic studies today  CT scan of the abdomen yesterday did not reveal any evidence of biliary ductal dilatation    Assessment: 65years of age patient admitted for syncope  Thought to be due to orthostatic hypotension from severe anemia  Patient has a known history of recent blood transfusion October 19  Blood transfusion was provided for treatment of anemia     Patient once again presented with anemia yesterday     Given the increase in bilirubin along with a number of antibodies detected Current diagnosis appears to be consistent with delayed hemolytic transfusion reaction  Active Problems:    HTN (hypertension)    Hypothyroid    Hypercholesterolemia    Pancytopenia (HCC)    Anemia  Resolved Problems:    * No resolved hospital problems. *  History of metastatic ovarian cancer-previously treated with Zejula    Plan:  1. Discontinue IV fluids  2 await further recommendations from hematology  3 repeat hemoglobin repeat bilirubin  4 EPC cuffs for DVT prophylaxis      NOTE: This report was transcribed using voice recognition software. Every effort was made to ensure accuracy; however, inadvertent computerized transcription errors may be present.   Electronically signed by Maria A Guevara DO on 10/28/2021 at 11:56 AM

## 2021-10-28 NOTE — PROGRESS NOTES
TRANSFUSION REACTION REPORT    65y.o. year old female patient admitted to [facility] with the diagnosis of syncopal episodeand history of  has a past medical history of Ankle swelling, Asthma, Cancer ([address] Depression, GERD (gastroesophageal reflux disease), Hyperlipidemia, Hypertension, Hypothyroidism, Osteoarthritis, Positive FIT (fecal immunochemical test), Vitamin D deficiency, and Wears partial dentures. S/P transfusion on 10/19/21 who received RBC component transfusion for anemia. All vital signs stable and unchanged pre- to post-transfusion. Initial type and screen specimen was negative for irregular, unexpected antibodies. All transfused units were compatible and completely transfused. CBC evaluation on 10/27/21 revealed a hemoglobin of 5.7 which represented a decrease from 9.9 measured on 10/22/21. Current post-transfusion specimen demonstrated anti-C, E, Fya, and S antibody. Increase in bilirubin over the previous 8 days was noted. Pattern best fits with DELAYED HEMOLYTIC TRANSFUSION REACTION.

## 2021-10-28 NOTE — ED NOTES
SBAR faxed to [facility]W. Staff confirmed SBAR was received. Pt ready for transport to [facility].      Vini Roldan RN  10/28/21 3039

## 2021-10-28 NOTE — CARE COORDINATION
Brought to ED 10/27 by  for LOC at home; HGB 5.7 on admit. Currently waiting on blood to be transfused. Patient follows with [facility] for Metastatic Ovarian Ca. Patient recently discharged from hospital (10/22) for LOC. Patient is independent with ADL's and lives with her  Michael Daniel, daughter/granddaughter. PCP is Dr. Emiliana Garcia. No hx KRYSTLE/ [facility]. Plan is to discharge back home once medically ready. Will follow along with  and assist with discharge planning as necessary.        Dieudonne Davis RN  Case Management

## 2021-10-29 NOTE — PROGRESS NOTES
[facility] Progress Note    Admitting Date and Time: 10/27/2021 12:08 PM  Admit Dx: Syncope and collapse [R55]  Anemia [D64.9]  Pancytopenia ([address] [D61.818]  Delayed hemolytic transfusion reaction, initial encounter [T80.911A]  Jaundice as manifestation of blood transfusion reaction, initial encounter [T80.89XA, R17]    Subjective:  Patient is being followed for Syncope and collapse [R55]  Anemia [D64.9]  Pancytopenia ([address] [D61.818]  Delayed hemolytic transfusion reaction, initial encounter [T80.911A]  Jaundice as manifestation of blood transfusion reaction, initial encounter [T80.89XA, R17]   Pt denies lightheadedness, further syncopal episodes, bruising, bleeding episodes. Feels better symptomatically. Per RN: Hemoglobin stable. ROS: denies fever, chills, cp, sob, n/v, HA unless stated above.       dexamethasone  4 mg IntraVENous Q6H    escitalopram  10 mg Oral QAM    gabapentin  300 mg Oral TID    levothyroxine  100 mcg Oral Daily    lisinopril  10 mg Oral Daily    atorvastatin  10 mg Oral Daily    pantoprazole  40 mg Oral Daily    vitamin B-6  100 mg Oral Daily    sodium chloride flush  5-40 mL IntraVENous 2 times per day    folic acid  1 mg Oral Daily     sodium chloride, , PRN  acetaminophen, 500 mg, Q6H PRN  tiZANidine, 2 mg, Q8H PRN  sodium chloride flush, 5-40 mL, PRN  sodium chloride, 25 mL, PRN  ondansetron, 4 mg, Q8H PRN   Or  ondansetron, 4 mg, Q6H PRN  polyethylene glycol, 17 g, Daily PRN  acetaminophen, 650 mg, Q6H PRN   Or  acetaminophen, 650 mg, Q6H PRN  albuterol, 2.5 mg, Q6H PRN         Objective:    /61   Pulse 81   Temp 98 °F (36.7 °C) (Oral)   Resp 16   Ht 5' 2\" (1.575 m)   Wt 138 lb 8 oz (62.8 kg)   SpO2 100%   BMI 25.33 kg/m²     General Appearance: alert and oriented to person, place and time and in no acute distress  Skin: warm and dry  Head: normocephalic and atraumatic, dentures+  Eyes: pupils equal, round, and reactive to light, Condition (MA) FINDINGS: Lower Chest: Heart size is felt to be top normal to mildly enlarged. Coronary artery calcifications are noted, potentially a marker for coronary artery disease. Trace pericardial fluid is present. The lung bases are clear. Organs: The liver enhances homogeneously. The portal vein is patent and there is no evidence of intrahepatic biliary ductal dilatation. The gallbladder is distended without obvious wall thickening. There are innumerable tiny gallstones within the gallbladder. No obvious choledocholithiasis. There is no intrahepatic or extrahepatic biliary ductal dilatation. The spleen and adrenal glands are normal in size. The pancreas enhances homogeneously. The kidneys are without hydronephrosis. There is a 3.6 cm simple appearing cyst in the right kidney. Multiple punctate nonobstructing right renal calculi are present. No definite left renal calculi. GI/Bowel: No evidence of a bowel obstruction, free air or pneumatosis. Portions the colon are decompressed, limiting assessment for mucosal based abnormalities. There is diverticulosis without evidence of diverticulitis. The appendix is not confidently visualized. No obvious pericecal inflammatory changes. There is a ventral abdominal wall hernia involving the midline of the inferior abdominal wall on axial image 148. This contains a nonobstructed portion of the small bowel. The stomach and duodenal sweep are under distended, limiting assessment. A tiny hiatal hernia is present. There is a large duodenal diverticulum. Pelvis: The urinary bladder is distended without obvious abnormality. The uterus and ovaries are either surgically absent or severely atrophic. Trace free fluid is present in the pelvis. No pelvic lymphadenopathy. Peritoneum/Retroperitoneum: The abdominal aorta is mildly calcified but normal in caliber. No retroperitoneal lymphadenopathy or mass.  Bones/Soft Tissues: No acute osseous abnormality or evidence of an aggressive osseous lesion. There are advanced degenerative changes of the lower lumbar spine with multilevel laminectomy and posterior fusion. No obvious hardware complication. No obvious etiology for the patient's jaundice. Specifically, there is no evidence of intrahepatic or extrahepatic biliary ductal dilatation. There are innumerable punctate gallstones within the nondilated gallbladder. No obvious choledocholithiasis. Trace free fluid in the pelvis, of uncertain etiology but an abnormal finding in a female patient of this age. An occult inflammatory process cannot be excluded. Additional, incidental findings as above. Assessment:    Active Problems:    HTN (hypertension)    Hypothyroid    Hypercholesterolemia    Pancytopenia (HCC)    Anemia  Resolved Problems:    * No resolved hospital problems. *      Plan:  1. Delayed hemolytic transfusion reaction - s/p 2 units blood transfusion on this admission. Hemoglobin currently stable. On admission reported syncope, fatigue & dark colored urine - Likely due to orthostatic hypotension from severe anemia, history of recent blood transfusion October 19,  increase in bilirubin along with a number of antibodies detected. Hematology on board. Monitor cbc, br, LDH and other lab. Continuing dexamethasone 4 mg IV every 6 hrs, s/p IVIG 30 g per hematology. Monitor H&H. Transfuse for hemoglobin less than 7.    2. Hypertension  3. Hypothyroidism  4. Hypercholesterolemia  5. Pancytopenia  6. Chr Anemia  7. History of metastatic ovarian cancer with peritoneal mets -previously treated with Zejula s/p resection. 8. Paroxysmal Afib ? - was on BB & eliquis for a week then dced per EP eval.  9.  Gallstones -asymptomatic no evidence of choledocholithiasis per imaging  10. Vitamin D deficiency  11. Documentation of positive fecal FIT immunohistochemical test, ports never had melena/bloody stools.   OP colonoscopies or endoscopies in the past yr unremarkable. 12.  GERD  13. Depression  14. Asthma     DVT prophylaxis  - SCDs    NOTE: This report was transcribed using voice recognition software. Every effort was made to ensure accuracy; however, inadvertent computerized transcription errors may be present.   Electronically signed by Katie Parada MD on 10/29/2021 at 8:13 AM

## 2021-10-29 NOTE — PROGRESS NOTES
Subjective: The patient is awake and alert. Family is at bedside. No problems overnight. Has some improvement in her energy. She has had dark gold colored urine. Denies any cardiopulmonary symptoms. Denies chest pain, angina, abdominal discomfort. No nausea or vomiting. Tolerating diet. Objective:    /78   Pulse 72   Temp 98.1 °F (36.7 °C)   Resp 16   Ht 5' 2\" (1.575 m)   Wt 138 lb 8 oz (62.8 kg)   SpO2 100%   BMI 25.33 kg/m²     General: NAD  HEENT: +Icteric sclera, No thrush or mucositis, EOMI  Heart:  Sinus tachy, no murmurs, gallops, or rubs.   Lungs:  CTA bilaterally, no wheeze, rales or rhonchi  Abd: BS present, nontender, nondistended, no masses  Extrem:  No clubbing, cyanosis, or edema  Lymphatics: No palpable adenopathy in cervical and supraclavicular regions  Skin: Intact, no petechia or purpura    CBC with Differential:    Lab Results   Component Value Date    WBC 2.5 10/29/2021    RBC 2.31 10/29/2021    RBC 3.12 07/20/2019    HGB 7.9 10/29/2021    HCT 23.2 10/29/2021    PLT 32 10/29/2021    .4 10/29/2021    MCH 34.2 10/29/2021    MCHC 34.1 10/29/2021    RDW 23.8 10/29/2021    NRBC 1.0 10/29/2021    METASPCT 3.0 10/27/2021    LYMPHOPCT 33.0 10/29/2021    MONOPCT 7.0 10/29/2021    MYELOPCT 4.0 10/27/2021    BASOPCT 0.0 10/29/2021    MONOSABS 0.18 10/29/2021    LYMPHSABS 0.85 10/29/2021    EOSABS 0.00 10/29/2021    BASOSABS 0.00 10/29/2021     CMP:    Lab Results   Component Value Date     10/29/2021    K 4.4 10/29/2021    K 4.5 10/27/2021    CL 97 10/29/2021    CO2 21 10/29/2021    BUN 23 10/29/2021    CREATININE 0.7 10/29/2021    GFRAA >60 10/29/2021    LABGLOM >60 10/29/2021    GLUCOSE 193 10/29/2021    GLUCOSE 101 12/19/2011    PROT 8.1 10/29/2021    LABALBU 3.6 10/29/2021    LABALBU 4.6 12/19/2011    CALCIUM 8.3 10/29/2021    BILITOT 2.1 10/29/2021    ALKPHOS 82 10/29/2021    AST 22 10/29/2021    ALT 10 10/29/2021      [id number]}     Current Facility-Administered Medications:     dexamethasone (DECADRON) injection 4 mg, 4 mg, IntraVENous, Q6H, Rhea Kimball MD, 4 mg at 10/29/21 1216    0.9 % sodium chloride infusion, , IntraVENous, PRN, Margaret Cerna MD    acetaminophen (TYLENOL) tablet 500 mg, 500 mg, Oral, Q6H PRN, Lizbeth Mccormick DO, 500 mg at 10/29/21 1339    escitalopram (LEXAPRO) tablet 10 mg, 10 mg, Oral, QAM, Sukhdev Ta, DO, 10 mg at 10/29/21 0908    gabapentin (NEURONTIN) capsule 300 mg, 300 mg, Oral, TID, Lizbeth Mccormick DO, 300 mg at 10/29/21 1340    levothyroxine (SYNTHROID) tablet 100 mcg, 100 mcg, Oral, Daily, Sukhdev Ta, DO, 100 mcg at 10/29/21 0523    lisinopril (PRINIVIL;ZESTRIL) tablet 10 mg, 10 mg, Oral, Daily, Lizbeth Mccormick DO, 10 mg at 10/29/21 0908    atorvastatin (LIPITOR) tablet 10 mg, 10 mg, Oral, Daily, Lizbeth Mccormick DO, 10 mg at 10/28/21 2014    pantoprazole (PROTONIX) tablet 40 mg, 40 mg, Oral, Daily, Sukhdev Chings, DO, 40 mg at 10/29/21 0908    tiZANidine (ZANAFLEX) tablet 2 mg, 2 mg, Oral, Q8H PRN, Lizbeth Mccormick DO    vitamin B-6 (PYRIDOXINE) tablet 100 mg, 100 mg, Oral, Daily, Sukhdev Plunkettentes, DO, 100 mg at 10/29/21 0908    sodium chloride flush 0.9 % injection 5-40 mL, 5-40 mL, IntraVENous, 2 times per day, Sukhdev Ta, DO, 10 mL at 10/29/21 0909    sodium chloride flush 0.9 % injection 5-40 mL, 5-40 mL, IntraVENous, PRN, Sukhdev Mcfaddenfuentes, DO    0.9 % sodium chloride infusion, 25 mL, IntraVENous, PRN, Bharti Vianey Chings, DO    ondansetron (ZOFRAN-ODT) disintegrating tablet 4 mg, 4 mg, Oral, Q8H PRN **OR** ondansetron (ZOFRAN) injection 4 mg, 4 mg, IntraVENous, Q6H PRN, Lizbeth Mccormick DO    polyethylene glycol (GLYCOLAX) packet 17 g, 17 g, Oral, Daily PRN, Lizbeth Mccormick DO    acetaminophen (TYLENOL) tablet 650 mg, 650 mg, Oral, Q6H PRN, 650 mg at 10/28/21 0954 **OR** acetaminophen (TYLENOL) suppository 650 mg, 650 mg, Rectal, Q6H PRN, Bharti Winters Ta, DO    folic acid (FOLVITE) tablet 1 mg, 1 mg, Oral, Daily, Dominga Melo MD, 1 mg at 10/29/21 0908    albuterol (PROVENTIL) nebulizer solution 2.5 mg, 2.5 mg, Nebulization, Q6H PRN, Rehana Maki,     CT ABDOMEN PELVIS W IV CONTRAST Additional Contrast? None   Final Result   No obvious etiology for the patient's jaundice. Specifically, there is no   evidence of intrahepatic or extrahepatic biliary ductal dilatation. There   are innumerable punctate gallstones within the nondilated gallbladder. No   obvious choledocholithiasis. Trace free fluid in the pelvis, of uncertain etiology but an abnormal finding   in a female patient of this age. An occult inflammatory process cannot be   excluded. Additional, incidental findings as above. Assessment:    Active Problems:    HTN (hypertension)    Hypothyroid    Hypercholesterolemia    Pancytopenia (HCC)    Anemia  Resolved Problems:    * No resolved hospital problems. *    64 yo female known to me with metastatic primary peritoneal/ovarian adenocarcinoma. Treated with Carboplatin and Paclitaxel. Had biopsy proven recurrence s/p excisional resection on 7/19/19. Treated with Carbo/ Paclitaxel from Aug to Oct 2019. Followed by maintenance Estel Netter from 12/11/19. Dose reduced to 200 mg daily due to cytopenias. Recently had recurrent syncope and concern for parox AFib, trial of BB and Eliquis for about a week, these were recently discontinued by EP. Had recent URI earlier in Oct and was treated with abx about a week prior. At the time received 2 units pRBC on 64/84/96 w/o any complications at the time. Hgb 9.9 upon discharge on 10/22/21.    -Admitted 10/27/21 with syncope and found to have severe hemolytic anemia decreased to 4.9 g/dL with thrombocytopenia/leukopenia and jaundice. Found to have anti-C, E, Fya and S antibody. Consistent with delayed hemolytic transfusion reaction.   Treated with steroids from 10/27/21 and IVIG on 10/28/21. Plan:  She is still hemolyzing but bili is trending down and her hemoglobin has improved with 2 units of the most compatible PRBC transfusions yesterday. I have discussed with the blood bank yesterday, she has DHTR. No evidence of DIC. Will need to avoiding antigen exposure should she need more blood. Will consider Rituximab as outpatient if necessary. Decreased dxamethasone 4 mg iv every 8 hrs  Hold off on further IVIG, received 1 dose on 10/28/21. Monitor CBC along with hemolytic parameters daily, LDH, retic, hapto, indirect bili daily. Will need to be observed as inpatient for a few days. No anticoagulation for afib for now due to thrombocytopenia, which could be due to BM suprression from [place]. Melvenia Muckle. Does not require further transfusion today. Goal to maintain Hgb > 7g/dL.     Electronically signed by John Barraza MD on 10/29/2021 at 4:47 PM

## 2021-10-29 NOTE — PROGRESS NOTES
Occupational Therapy      Occupational Therapy referral received. Spoke with patient,  present. Explained reason for visit - patient reports she is feeling much better, walking to/from bathroom, able to complete own self care. At this time feels no need for OT services.     OT order discontinued - patient agreed

## 2021-10-30 NOTE — PROGRESS NOTES
S:Patient states she feels well, no bleeding, tolerating steroids    O:  Temp 97.7 P-63 R-16 Bp107/56  Gen- alert, talking, non d istressed,  at bedside  HEENT- slightly dry  Lungs- clear  Cardiac- RRR  Abd- soft/nt/nd +BS  Ext- warm and no edema    Labs:  Wbc=1.9 hb=8.1 plt=33,000 with 56% segs ANC 1060 bilirubin=1.5  Yesterday SLI=674 (was 733), wbc=2.5 hb=7.9 plt=32, bilie=1.2    Meds:  Decadron 4mg tid, lipitor, gabapentin, folic acid, synthroid, lisinopril, protonix 40mg daily, zanafelx prn    A/P:  66 yo female known to Dr. Shiloh Mann with metastatic primary peritoneal/ovarian adenocarcinoma.  T  Treated with Carbo/ Paclitaxel from Aug to Oct 2019.  Followed by maintenance Dallis Sames from 12/11/19. Dose reduced to 200 mg daily due to cytopenias.   She received 2 units pRBC on 05/80/24 w/o any complications at the time. Hgb 9.9 upon discharge on 10/22/21.    -Admitted 10/27/21 with syncope and found to have severe hemolytic anemia decreased to 4.9 g/dL with thrombocytopenia/leukopenia and jaundice. Found to have anti-C, E, Fya and S antibody. Consistent with delayed hemolytic transfusion reaction. Treated with steroids from 10/27/21 currently on decadron 4mg tid and IVIG x1 given on 10/28/21.      1. Hb is stable and improving, continue decadron 4mg IV tid   2. Monitor CBC along with hemolytic parameters daily, LDH, bili daily. 3. If hb stable tomorrow and she feels well it would be okay to discharge on oral decadorn 4mg tid with f/u CBC [facility]   4. No anticoagulation for afib for now due to thrombocytopenia   5.  Leukopenia and thrombocytopenia felt likely due to zejula which has been held    Steffany Carlson MD

## 2021-10-30 NOTE — PROGRESS NOTES
[facility] Progress Note    Admitting Date and Time: 10/27/2021 12:08 PM  Admit Dx: Syncope and collapse [R55]  Anemia [D64.9]  Pancytopenia ([address] [D61.818]  Delayed hemolytic transfusion reaction, initial encounter [T80.911A]  Jaundice as manifestation of blood transfusion reaction, initial encounter [T80.89XA, R17]    Subjective:  Patient is being followed for Syncope and collapse [R55]  Anemia [D64.9]  Pancytopenia ([address] [D61.818]  Delayed hemolytic transfusion reaction, initial encounter [T80.911A]  Jaundice as manifestation of blood transfusion reaction, initial encounter [T80.89XA, R17]   Pt denies lightheadedness, further syncopal episodes, bruising, bleeding episodes. Feels better symptomatically. Per RN: Hemoglobin stable. ROS: denies fever, chills, cp, sob, n/v, HA unless stated above.       dexamethasone  4 mg IntraVENous Q8H    escitalopram  10 mg Oral QAM    gabapentin  300 mg Oral TID    levothyroxine  100 mcg Oral Daily    lisinopril  10 mg Oral Daily    atorvastatin  10 mg Oral Daily    pantoprazole  40 mg Oral Daily    vitamin B-6  100 mg Oral Daily    sodium chloride flush  5-40 mL IntraVENous 2 times per day    folic acid  1 mg Oral Daily     HYDROmorphone, 0.5 mg, Q4H PRN  simethicone, 125 mg, Q6H PRN  calcium carbonate, 1,000 mg, TID PRN  sodium chloride, , PRN  acetaminophen, 500 mg, Q6H PRN  tiZANidine, 2 mg, Q8H PRN  sodium chloride flush, 5-40 mL, PRN  sodium chloride, 25 mL, PRN  ondansetron, 4 mg, Q8H PRN   Or  ondansetron, 4 mg, Q6H PRN  polyethylene glycol, 17 g, Daily PRN  acetaminophen, 650 mg, Q6H PRN   Or  acetaminophen, 650 mg, Q6H PRN  albuterol, 2.5 mg, Q6H PRN         Objective:    /83   Pulse 80   Temp 98.1 °F (36.7 °C) (Oral)   Resp 18   Ht 5' 2\" (1.575 m)   Wt 138 lb 4.8 oz (62.7 kg)   SpO2 99%   BMI 25.30 kg/m²     General Appearance: alert and oriented to person, place and time and in no acute distress  Skin: warm and dry  Head: normocephalic and atraumatic, dentures+  Eyes: pupils equal, round, and reactive to light, extraocular eye movements intact, conjunctivae Icteric +  Neck: neck supple and non tender without mass   Pulmonary/Chest: clear to auscultation bilaterally- no wheezes, rales or rhonchi, normal air movement, no respiratory distress  Cardiovascular:tachycardiac, normal S1 and S2 and no carotid bruits  Abdomen: soft, non-tender, non-distended, normal bowel sounds, no masses or organomegaly  Extremities: no cyanosis, no clubbing and no edema  Neurologic: no cranial nerve deficit and speech normal        Recent Labs     10/29/21  0419 10/30/21  0400   * 131*   K 4.4 4.5   CL 97* 97*   CO2 21* 22   BUN 23 23   CREATININE 0.7 0.7   GLUCOSE 193* 153*   CALCIUM 8.3* 8.9       Recent Labs     10/28/21  1640 10/29/21  0419 10/30/21  0400   WBC 3.0* 2.5* 1.9*   RBC 1.18* 2.31* 2.35*   HGB 4.9* 7.9* 8.1*   HCT 14.5* 23.2* 24.1*   .9* 100.4* 102.6*   MCH 41.5* 34.2 34.5   MCHC 33.8 34.1 33.6   RDW 16.3* 23.8* 24.4*   PLT 35* 32* 33*   MPV 12.7* NOT CALC 12.8*       Micro:  No components found for: [facility])    Radiology:   CT ABDOMEN PELVIS W IV CONTRAST Additional Contrast? None    Result Date: 10/27/2021  EXAMINATION: CT OF THE ABDOMEN AND PELVIS WITH CONTRAST 10/27/2021 2:12 pm TECHNIQUE: CT of the abdomen and pelvis was performed with the administration of intravenous contrast. Multiplanar reformatted images are provided for review. Dose modulation, iterative reconstruction, and/or weight based adjustment of the mA/kV was utilized to reduce the radiation dose to as low as reasonably achievable.  COMPARISON: [place] 3, 2021 HISTORY: ORDERING SYSTEM PROVIDED HISTORY: painless jaundice TECHNOLOGIST PROVIDED HISTORY: Additional Contrast?->None Reason for exam:->painless jaundice Decision Support Exception - unselect if not a suspected or confirmed emergency medical condition->Emergency Medical Condition (MA) FINDINGS: Lower Chest: Heart size is felt to be top normal to mildly enlarged. Coronary artery calcifications are noted, potentially a marker for coronary artery disease. Trace pericardial fluid is present. The lung bases are clear. Organs: The liver enhances homogeneously. The portal vein is patent and there is no evidence of intrahepatic biliary ductal dilatation. The gallbladder is distended without obvious wall thickening. There are innumerable tiny gallstones within the gallbladder. No obvious choledocholithiasis. There is no intrahepatic or extrahepatic biliary ductal dilatation. The spleen and adrenal glands are normal in size. The pancreas enhances homogeneously. The kidneys are without hydronephrosis. There is a 3.6 cm simple appearing cyst in the right kidney. Multiple punctate nonobstructing right renal calculi are present. No definite left renal calculi. GI/Bowel: No evidence of a bowel obstruction, free air or pneumatosis. Portions the colon are decompressed, limiting assessment for mucosal based abnormalities. There is diverticulosis without evidence of diverticulitis. The appendix is not confidently visualized. No obvious pericecal inflammatory changes. There is a ventral abdominal wall hernia involving the midline of the inferior abdominal wall on axial image 148. This contains a nonobstructed portion of the small bowel. The stomach and duodenal sweep are under distended, limiting assessment. A tiny hiatal hernia is present. There is a large duodenal diverticulum. Pelvis: The urinary bladder is distended without obvious abnormality. The uterus and ovaries are either surgically absent or severely atrophic. Trace free fluid is present in the pelvis. No pelvic lymphadenopathy. Peritoneum/Retroperitoneum: The abdominal aorta is mildly calcified but normal in caliber. No retroperitoneal lymphadenopathy or mass.  Bones/Soft Tissues: No acute osseous abnormality or evidence of an aggressive osseous lesion. There are advanced degenerative changes of the lower lumbar spine with multilevel laminectomy and posterior fusion. No obvious hardware complication. No obvious etiology for the patient's jaundice. Specifically, there is no evidence of intrahepatic or extrahepatic biliary ductal dilatation. There are innumerable punctate gallstones within the nondilated gallbladder. No obvious choledocholithiasis. Trace free fluid in the pelvis, of uncertain etiology but an abnormal finding in a female patient of this age. An occult inflammatory process cannot be excluded. Additional, incidental findings as above. Assessment:    Active Problems:    HTN (hypertension)    Hypothyroid    Hypercholesterolemia    Pancytopenia (HCC)    Anemia  Resolved Problems:    * No resolved hospital problems. *      Plan:  1. Delayed hemolytic transfusion reaction - s/p 2 units blood transfusion on this admission. Hemoglobin currently stable. On admission reported syncope, fatigue & dark colored urine - Likely due to orthostatic hypotension from severe anemia, history of recent blood transfusion October 19,  increase in bilirubin along with a number of antibodies detected. Hematology on board. Monitor cbc, br, LDH and other lab. Continuing dexamethasone 4 mg IV every 6 hrs, s/p IVIG 30 g per hematology. Monitor H&H. Transfuse for hemoglobin less than 7.    2. Hypertension  3. Hypothyroidism  4. Hypercholesterolemia  5. Pancytopenia  6. Chr Anemia  7. History of metastatic ovarian cancer with peritoneal mets -previously treated with Zejula s/p resection. 8. Paroxysmal Afib ? - was on BB & eliquis for a week then dced per EP eval.  9.  Gallstones -asymptomatic no evidence of choledocholithiasis per imaging  10. Vitamin D deficiency  11. Documentation of positive fecal FIT immunohistochemical test, ports never had melena/bloody stools. OP colonoscopies or endoscopies in the past yr was unremarkable. 12.  GERD  13. Depression  14. Asthma     DVT prophylaxis  - SCDs    NOTE: This report was transcribed using voice recognition software. Every effort was made to ensure accuracy; however, inadvertent computerized transcription errors may be present.   Electronically signed by Ana Valenzuela MD on 10/30/2021 at 1:39 PM

## 2021-10-31 NOTE — DISCHARGE SUMMARY
[facility] Physician Discharge Summary       Kay Kirk, Abdirahman Flor [phone]    In 2 days  lab draw - CBC (complete blood count)      Activity level: As tolerated     Dispo: Home      Condition on discharge: Stable     Patient ID:  Jessi Alvarado  [id number]  65 y.o.  1950    Admit date: 10/27/2021    Discharge date and time:  10/31/2021  12:27 PM    Admission Diagnoses: Active Problems:    HTN (hypertension)    Hypothyroid    Hypercholesterolemia    Pancytopenia (HCC)    Anemia  Resolved Problems:    * No resolved hospital problems. *      Discharge Diagnoses: Active Problems:    HTN (hypertension)    Hypothyroid    Hypercholesterolemia    Pancytopenia (HCC)    Anemia  Resolved Problems:    * No resolved hospital problems. *      Consults:  IP CONSULT TO HEM/ONC    Hospital Course:   Patient Jessi Alvarado is a 65 y.o. presented with Syncope and collapse [R55]  Anemia [D64.9]  Pancytopenia ([address] [D61.818]  Delayed hemolytic transfusion reaction, initial encounter [T80.911A]  Jaundice as manifestation of blood transfusion reaction, initial encounter [T43.31KN, R17]  Patient was admitted and managed for Delayed hemolytic transfusion reaction - s/p 2 units blood transfusion on this admission. Hemoglobin currently stable. On admission reported syncope, fatigue & dark colored urine - Likely due to orthostatic hypotension from severe anemia, history of recent blood transfusion October 19,  increase in bilirubin along with a number of antibodies detected. Hematology wason board. Monitored cbc, br, LDH and other lab. s/p dexamethasone 4 mg IV every 6 hrs, s/p IVIG 30 g per hematology, being discharged on p.o. steroids with follow-up with hematology in 2 days with CBC. Monitor H&H. Transfuse for hemoglobin less than 7.     Discharge Exam:  General Appearance: alert and oriented to person, place and time and in no acute distress  Skin: warm and dry  Head: normocephalic and atraumatic  Eyes: pupils equal, round, and reactive to light, extraocular eye movements intact, conjunctivae normal  Neck: neck supple and non tender without mass   Pulmonary/Chest: clear to auscultation bilaterally- no wheezes, rales or rhonchi, normal air movement, no respiratory distress  Cardiovascular: normal rate, normal S1 and S2 and no carotid bruits  Abdomen: soft, non-tender, non-distended, normal bowel sounds, no masses or organomegaly  Extremities: no cyanosis, no clubbing and no edema  Neurologic: no cranial nerve deficit and speech normal    I/O last 3 completed shifts: In: 360 [P.O.:360]  Out: -   No intake/output data recorded. LABS:  Recent Labs     10/29/21  0419 10/30/21  0400 10/31/21  0450   * 131* 130*   K 4.4 4.5 4.3   CL 97* 97* 98   CO2 21* 22 23   BUN 23 23 24*   CREATININE 0.7 0.7 0.7   GLUCOSE 193* 153* 163*   CALCIUM 8.3* 8.9 8.7       Recent Labs     10/29/21  0419 10/30/21  0400 10/31/21  0450   WBC 2.5* 1.9* 2.3*   RBC 2.31* 2.35* 2.32*   HGB 7.9* 8.1* 8.1*   HCT 23.2* 24.1* 24.0*   .4* 102.6* 103.4*   MCH 34.2 34.5 34.9   MCHC 34.1 33.6 33.8   RDW 23.8* 24.4* 23.1*   PLT 32* 33* 37*   MPV NOT CALC 12.8* 12.8*       No results for input(s): POCGLU in the last 72 hours. Imaging:  CT ABDOMEN PELVIS W IV CONTRAST Additional Contrast? None    Result Date: 10/27/2021  EXAMINATION: CT OF THE ABDOMEN AND PELVIS WITH CONTRAST 10/27/2021 2:12 pm TECHNIQUE: CT of the abdomen and pelvis was performed with the administration of intravenous contrast. Multiplanar reformatted images are provided for review. Dose modulation, iterative reconstruction, and/or weight based adjustment of the mA/kV was utilized to reduce the radiation dose to as low as reasonably achievable.  COMPARISON: [place] 3, 2021 HISTORY: ORDERING SYSTEM PROVIDED HISTORY: painless jaundice TECHNOLOGIST PROVIDED HISTORY: Additional Contrast?->None Reason for exam:->painless jaundice Decision Support Exception - unselect if not a suspected or confirmed emergency medical condition->Emergency Medical Condition (MA) FINDINGS: Lower Chest: Heart size is felt to be top normal to mildly enlarged. Coronary artery calcifications are noted, potentially a marker for coronary artery disease. Trace pericardial fluid is present. The lung bases are clear. Organs: The liver enhances homogeneously. The portal vein is patent and there is no evidence of intrahepatic biliary ductal dilatation. The gallbladder is distended without obvious wall thickening. There are innumerable tiny gallstones within the gallbladder. No obvious choledocholithiasis. There is no intrahepatic or extrahepatic biliary ductal dilatation. The spleen and adrenal glands are normal in size. The pancreas enhances homogeneously. The kidneys are without hydronephrosis. There is a 3.6 cm simple appearing cyst in the right kidney. Multiple punctate nonobstructing right renal calculi are present. No definite left renal calculi. GI/Bowel: No evidence of a bowel obstruction, free air or pneumatosis. Portions the colon are decompressed, limiting assessment for mucosal based abnormalities. There is diverticulosis without evidence of diverticulitis. The appendix is not confidently visualized. No obvious pericecal inflammatory changes. There is a ventral abdominal wall hernia involving the midline of the inferior abdominal wall on axial image 148. This contains a nonobstructed portion of the small bowel. The stomach and duodenal sweep are under distended, limiting assessment. A tiny hiatal hernia is present. There is a large duodenal diverticulum. Pelvis: The urinary bladder is distended without obvious abnormality. The uterus and ovaries are either surgically absent or severely atrophic. Trace free fluid is present in the pelvis. No pelvic lymphadenopathy.  Peritoneum/Retroperitoneum: The abdominal aorta is mildly calcified but PROTONIX  Take 1 tablet by mouth daily     tiZANidine 2 MG tablet  Commonly known as: ZANAFLEX  Take 1 tablet by mouth every 8 hours as needed (muscle spasms)     TYLENOL 500 MG tablet  Generic drug: acetaminophen     VITAMIN B 12 PO     vitamin B-6 100 MG tablet  Commonly known as: PYRIDOXINE  Take 1 tablet by mouth daily           Where to Get Your Medications      These medications were sent to [address] [place] [address] 8, [address] [address]    Phone: [phone]   · dexamethasone 4 MG tablet  · folic acid 1 MG tablet           Note that more than 30 minutes was spent in preparing discharge papers, discussing discharge with patient, medication review, etc.    Signed:  Electronically signed by Larua Garcias MD on 10/31/2021 at 12:27 PM

## 2021-10-31 NOTE — PROGRESS NOTES
S:Patient states she feels well, no bleeding, tolerating steroids     O:  Temp 97.3 P59 R-16 /71  Gen- alert, talking, non d istressed,  at bedside  HEENT- mmm  Lungs- clear  Cardiac- RRR  Abd- soft/nt/nd +BS  Ext- warm and no edema     Labs: Today wbc=2.3 hb=8.1 plt=37,000 iUG=831 indirect bili 0.9, total bilie 1.2  Yesterday Wbc=1.9 hb=8.1 plt=33,000 with 56% segs ANC 1060 bilirubin=1.5  10-29 XNU=460 (was 733), wbc=2.5 hb=7.9 plt=32, bilie=1.2     Meds:  Decadron 4mg tid, lipitor, gabapentin, folic acid, synthroid, lisinopril, protonix 40mg daily, zanafelx prn     A/P:  64 yo female known to Dr. Amelia Geller with metastatic primary peritoneal/ovarian adenocarcinoma.  T  Treated with Carbo/ Paclitaxel from Aug to Oct 2019.  Followed by maintenance Laquetta Fat from 12/11/19. Dose reduced to 200 mg daily due to cytopenias.   She received 2 units pRBC on 53/18/46 w/o any complications at the time.  Hgb 9.9 upon discharge on 10/22/21.    -Admitted 10/27/21 with syncope and found to have severe hemolytic anemia decreased to 4.9 g/dL with thrombocytopenia/leukopenia and jaundice.  Found to have anti-C, E, Fya and S antibody.  Consistent with delayed hemolytic transfusion reaction.  Treated with steroids from 10/27/21 currently on decadron 4mg tid and IVIG x1 given on 10/28/21 with stable Hb and LDH trending down.      1. Hb is stable and improving, continue decadron change from IV to oral 4mg po tid  2. It would be okay to discharge on oral decadron 4mg tid with f/u CBC Tuesday at [facility] , would continue GI prophylaxis with protonix  4. No anticoagulation for afib for now due to thrombocytopenia   5.  Leukopenia and thrombocytopenia felt likely due to zejula which has been held and are improving    Jill Bence, MD

## 2021-11-02 NOTE — TELEPHONE ENCOUNTER
[facility] Transitions Initial Follow Up Call    Outreach made within 2 business days of discharge: Yes    Patient: Jerson Ayers Patient : 1950   MRN: [id number]  Reason for Admission: Delayed Hemolytic transfusion reaction   Discharge Date: 10/31/21       Spoke with: Rondi Epley     Discharge department/facility: [address] [place] [address]     TCM Interactive Patient Contact:  Was patient able to fill all prescriptions: Yes  Was patient instructed to bring all medications to the follow-up visit: Yes  Is patient taking all medications as directed in the discharge summary?  Yes  Does patient understand their discharge instructions: Yes  Does patient have questions or concerns that need addressed prior to 7-14 day follow up office visit: no    Scheduled appointment with PCP within 7-14 days    Follow Up  Future Appointments   Date Time Provider Brad Yu   2021  9:00 AM Ayala NAZARIO [facility]   2021  9:45 AM MD MANSI Waldrop [facility]   3/9/2022  9:30 AM MD MANSI Waldrop [facility]       Kaela Stark

## 2021-11-04 NOTE — TELEPHONE ENCOUNTER
Spoke to the patient and informed her of her results and educated her to make sure to stay hydrated. She verbalized understanding.

## 2021-11-09 NOTE — ED NOTES
Bed: 28  Expected date:   Expected time:   Means of arrival:   Comments:  Ems- syncope     Garima Medina RN  11/09/21 1661

## 2021-11-09 NOTE — ED TRIAGE NOTES
Home was using restroom, had syncope eposide.  lowered to floor. Reports no pain. Call light in reach. Bed low position.

## 2021-11-10 NOTE — ED NOTES
Notified from lab of critical Hgb of 6.8, Dr. Rowland Lanes notified.        Fernando Leon RN  11/09/21 2005

## 2021-11-10 NOTE — ED PROVIDER NOTES
HPI:  11/9/21,   Time: 7:31 PM LENO Pleitez is a 65 y.o. female presenting to the ED for fatigue and syncope, beginning 1 hour ago. The complaint has been intermittent, mild in severity, and worsened by standing. Is pleasant 38-year-old female has a history peritoneal cancer. She states she just finished chemo 2 weeks ago she is not currently on any chemotherapy she follows at the [facility]. She states she has been having issues with anemia and was last transfused 2 weeks ago. She is not had any dark or bloody stool. She had increased fatigue over the course of this week. She is been sleeping little more the last couple of days no fevers or chills no chest pain or shortness of breath no abdominal pain no vomiting no diarrhea. She states she got up to go to the bathroom her  helped her walk towards the bathroom she was sitting on the toilet and he states she briefly passed out. She was unconscious for about 90 seconds. She came to he made sure that she did not fall off the toilet. This is happened before about a week ago with similar issues. She states she has not been eating or drinking as well this week. She is concerned she may be dehydrated. No headache no numbness or weakness extremities no aphasia no dysarthria    Review of Systems:   Pertinent positives and negatives are stated within HPI, all other systems reviewed and are negative.          --------------------------------------------- PAST HISTORY ---------------------------------------------  Past Medical History:  has a past medical history of Ankle swelling, Asthma, Cancer ([address] Depression, GERD (gastroesophageal reflux disease), Hyperlipidemia, Hypertension, Hypothyroidism, Osteoarthritis, Positive FIT (fecal immunochemical test), Vitamin D deficiency, and Wears partial dentures. Past Surgical History:  has a past surgical history that includes Hysterectomy (1984);  Tunneled venous port placement (2019); lymph node dissection;  section; laparotomy (2019); Appendectomy (years ago); Breast surgery (1960s); and Colonoscopy (N/A, 2020). Social History:  reports that she has never smoked. She has never used smokeless tobacco. She reports that she does not drink alcohol and does not use drugs. Family History: family history includes Diabetes in her father; Heart Disease in her mother; High Blood Pressure in her mother; Thyroid Disease in her sister. The patients home medications have been reviewed. Allergies: Patient has no known allergies. ---------------------------------------------------PHYSICAL EXAM--------------------------------------    Constitutional/General: Alert and oriented x3, well appearing, non toxic in NAD  Head: Normocephalic and atraumatic  Eyes: PERRL, EOMI, conjunctive normal, sclera non icteric  Mouth: Oropharynx clear, handling secretions, no trismus, no asymmetry of the posterior oropharynx or uvular edema  Neck: Supple, full ROM, non tender to palpation in the midline, no stridor, no crepitus, no meningeal signs  Respiratory: Lungs clear to auscultation bilaterally, no wheezes, rales, or rhonchi. Not in respiratory distress  Cardiovascular:  Regular rate. Regular rhythm. No murmurs, gallops, or rubs. 2+ distal pulses  Chest: No chest wall tenderness  GI:  Abdomen Soft, Non tender, Non distended. +BS. No organomegaly, no palpable masses,  No rebound, guarding, or rigidity. Musculoskeletal: Moves all extremities x 4. Warm and well perfused, no clubbing, cyanosis, or edema. Capillary refill <3 seconds  Integument: skin warm and dry. No rashes.    Lymphatic: no lymphadenopathy noted  Neurologic: GCS 15, no focal deficits, symmetric strength 5/5 in the upper and lower extremities bilaterally  Psychiatric: Normal Affect    -------------------------------------------------- RESULTS -------------------------------------------------  I have personally reviewed all laboratory and imaging results for this patient. Results are listed below.      LABS:  Results for orders placed or performed during the hospital encounter of 11/09/21   CBC Auto Differential   Result Value Ref Range    WBC 5.0 4.5 - 11.5 E9/L    RBC 1.84 (L) 3.50 - 5.50 E12/L    Hemoglobin 6.8 (LL) 11.5 - 15.5 g/dL    Hematocrit 19.5 (L) 34.0 - 48.0 %    .0 (H) 80.0 - 99.9 fL    MCH 37.0 (H) 26.0 - 35.0 pg    MCHC 34.9 (H) 32.0 - 34.5 %    RDW 23.2 (H) 11.5 - 15.0 fL    Platelets 52 (L) 925 - 450 E9/L    MPV 11.9 7.0 - 12.0 fL    Neutrophils % 64.0 43.0 - 80.0 %    Lymphocytes % 23.0 20.0 - 42.0 %    Monocytes % 10.0 2.0 - 12.0 %    Eosinophils % 0.0 0.0 - 6.0 %    Basophils % 0.0 0.0 - 2.0 %    Neutrophils Absolute 3.30 1.80 - 7.30 E9/L    Lymphocytes Absolute 1.20 (L) 1.50 - 4.00 E9/L    Monocytes Absolute 0.50 0.10 - 0.95 E9/L    Eosinophils Absolute 0.00 (L) 0.05 - 0.50 E9/L    Basophils Absolute 0.00 0.00 - 0.20 E9/L    Atypical Lymphocytes Relative 1.0 0.0 - 4.0 %    Metamyelocytes Relative 2.0 (H) 0.0 - 1.0 %    nRBC 5.0 /100 WBC    Anisocytosis 2+     Poikilocytes 1+     Ovalocytes 1+    Comprehensive Metabolic Panel   Result Value Ref Range    Sodium 123 (L) 132 - 146 mmol/L    Potassium 4.7 3.5 - 5.0 mmol/L    Chloride 88 (L) 98 - 107 mmol/L    CO2 20 (L) 22 - 29 mmol/L    Anion Gap 15 7 - 16 mmol/L    Glucose 183 (H) 74 - 99 mg/dL    BUN 33 (H) 6 - 23 mg/dL    CREATININE 1.1 (H) 0.5 - 1.0 mg/dL    GFR Non-African American 59 >=60 mL/min/1.73    GFR African American 59     Calcium 8.8 8.6 - 10.2 mg/dL    Total Protein 7.3 6.4 - 8.3 g/dL    Albumin 3.3 (L) 3.5 - 5.2 g/dL    Total Bilirubin 1.2 0.0 - 1.2 mg/dL    Alkaline Phosphatase 87 35 - 104 U/L    ALT 12 0 - 32 U/L    AST 19 0 - 31 U/L   Lactate, Sepsis   Result Value Ref Range    Lactic Acid, Sepsis 2.6 (H) 0.5 - 1.9 mmol/L   Troponin   Result Value Ref Range    Troponin, High Sensitivity 11 (H) 0 - 9 ng/L   Urinalysis   Result Value Ref Range    Color, UA Yellow Straw/Yellow    Clarity, UA Clear Clear    Glucose, Ur Negative Negative mg/dL    Bilirubin Urine Negative Negative    Ketones, Urine Negative Negative mg/dL    Specific Gravity, UA 1.015 1.005 - 1.030    Blood, Urine Negative Negative    pH, UA 6.5 5.0 - 9.0    Protein, UA Negative Negative mg/dL    Urobilinogen, Urine 2.0 (A) <2.0 E.U./dL    Nitrite, Urine Negative Negative    Leukocyte Esterase, Urine Negative Negative   Platelet Confirmation   Result Value Ref Range    Platelet Confirmation CONFIRMED    Doctor Notification   Result Value Ref Range    DR. ALVARES COMPL    SPECIMEN REJECTION   Result Value Ref Range    Rejected Test trp5     Reason for Rejection see below    EKG 12 Lead   Result Value Ref Range    Ventricular Rate 61 BPM    Atrial Rate 61 BPM    P-R Interval 128 ms    QRS Duration 80 ms    Q-T Interval 472 ms    QTc Calculation (Bazett) 475 ms    P Axis 48 degrees    R Axis -5 degrees    T Axis 63 degrees   TYPE AND SCREEN   Result Value Ref Range    ABO/Rh O POS     Antibody Screen POS    DIRECT ANTIGLOBULIN TEST   Result Value Ref Range    Polyspecific Trevon POS        RADIOLOGY:  Interpreted by Radiologist.  CT HEAD WO CONTRAST   Final Result   No acute intracranial abnormality. Age-related loss of brain volume and   chronic white matter ischemic changes. XR CHEST PORTABLE   Final Result   No acute process. US RETROPERITONEAL COMPLETE    (Results Pending)       EKG:   Patient EKG shows sinus rhythm 61 beats minute no signs of any ST changes. QTc 475. No change from previous EKG. EKG interpreted by myself.      ------------------------- NURSING NOTES AND VITALS REVIEWED ---------------------------   The nursing notes within the ED encounter and vital signs as below have been reviewed by myself.   BP (!) 102/57   Pulse 68   Temp 97.8 °F (36.6 °C) (Oral)   Resp 16   Wt 138 lb (62.6 kg)   SpO2 96%   BMI 25.24 kg/m²   Oxygen Saturation Interpretation: Normal    The patients available past medical records and past encounters were reviewed. ------------------------------ ED COURSE/MEDICAL DECISION MAKING----------------------  Medications   0.9 % sodium chloride infusion (has no administration in time range)   albuterol (PROVENTIL) nebulizer solution 2.5 mg (has no administration in time range)   calcium-vitamin D (OSCAL-500) 500-200 MG-UNIT per tablet 1 tablet (has no administration in time range)   Vitamin B 12 LOZG 1 tablet (has no administration in time range)   dexamethasone (DECADRON) tablet 4 mg (has no administration in time range)   escitalopram (LEXAPRO) tablet 10 mg (has no administration in time range)   levothyroxine (SYNTHROID) tablet 100 mcg (has no administration in time range)   gabapentin (NEURONTIN) capsule 300 mg (has no administration in time range)   vitamin B-6 (PYRIDOXINE) tablet 100 mg (has no administration in time range)   tiZANidine (ZANAFLEX) tablet 2 mg (has no administration in time range)   pantoprazole (PROTONIX) tablet 40 mg (has no administration in time range)   atorvastatin (LIPITOR) tablet 10 mg (has no administration in time range)   amLODIPine (NORVASC) tablet 10 mg (has no administration in time range)   0.9 % sodium chloride bolus (0 mLs IntraVENous Stopped 11/9/21 2227)   0.9 % sodium chloride bolus (1,000 mLs IntraVENous New Bag 11/9/21 2227)         ED COURSE:  ED Course as of 11/09/21 2342 Tue Nov 09, 2021 2326 Spoke to Dr. Barry Mckinley, the hospitalist who agreed accept patient to his service patient be admitted to monitored bed. [KK]      ED Course User Index  [KK] Ajit Aleman MD       Medical Decision Making:    Is a very pleasant 78-year-old female history unfortunately of peritoneal cancer. Patient admits to poor p.o. fluid intake increased weakness she has had recent anemia need of transfusion 2 weeks ago. Patient denies any dark or bloody stool she had a syncopal episode while on the toilet today no head injury. addition to providing specific details for the plan of care and counseling regarding the diagnosis and prognosis. Questions are answered at this time and they are agreeable with the plan.       --------------------------------- IMPRESSION AND DISPOSITION ---------------------------------    IMPRESSION  1. Syncope and collapse    2. Anemia, unspecified type    3. Hyponatremia    4. Hypotension, unspecified hypotension type    5. Cancer ([facility]        DISPOSITION  Disposition: Admit to telemetry  Patient condition is fair    NOTE: This report was transcribed using voice recognition software.  Every effort was made to ensure accuracy; however, inadvertent computerized transcription errors may be present        Michelina Carrel, MD  11/09/21 2137

## 2021-11-10 NOTE — PLAN OF CARE
Problem: Falls - Risk of:  Goal: Will remain free from falls  Description: Will remain free from falls  Outcome: Ongoing  Goal: Absence of physical injury  Description: Absence of physical injury  Outcome: Ongoing     Problem: Discharge Planning:  Goal: Participates in care planning  Description: Participates in care planning  Outcome: Ongoing  Goal: Discharged to appropriate level of care  Description: Discharged to appropriate level of care  Outcome: Ongoing     Problem: Activity Intolerance:  Goal: Ability to tolerate increased activity will improve  Description: Ability to tolerate increased activity will improve  Outcome: Ongoing     Problem: Anxiety/Stress:  Goal: Level of anxiety will decrease  Description: Level of anxiety will decrease  Outcome: Ongoing     Problem:  Bowel Function - Altered:  Goal: Bowel elimination is within specified parameters  Description: Bowel elimination is within specified parameters  Outcome: Ongoing     Problem: Fluid Volume - Deficit:  Goal: Absence of fluid volume deficit signs and symptoms  Description: Absence of fluid volume deficit signs and symptoms  Outcome: Ongoing  Goal: Electrolytes within specified parameters  Description: Electrolytes within specified parameters  Outcome: Ongoing     Problem: Mental Status - Impaired:  Goal: Absence of physical injury  Description: Absence of physical injury  Outcome: Ongoing  Goal: Absence of continued neurological deterioration signs and symptoms  Description: Absence of continued neurological deterioration signs and symptoms  Outcome: Ongoing  Goal: Mental status will be restored to baseline  Description: Mental status will be restored to baseline  Outcome: Ongoing     Problem: Mobility - Impaired:  Goal: Mobility will improve to maximum level  Description: Mobility will improve to maximum level  Outcome: Ongoing     Problem: Nutrition Deficit:  Goal: Ability to achieve adequate nutritional intake will improve  Description: Ability to achieve adequate nutritional intake will improve  Outcome: Ongoing     Problem: Pain:  Goal: Pain level will decrease  Description: Pain level will decrease  Outcome: Ongoing  Goal: Ability to notify healthcare provider of pain before it becomes unmanageable or unbearable will improve  Description: Ability to notify healthcare provider of pain before it becomes unmanageable or unbearable will improve  Outcome: Ongoing  Goal: Control of acute pain  Description: Control of acute pain  Outcome: Ongoing  Goal: Control of chronic pain  Description: Control of chronic pain  Outcome: Ongoing     Problem: Serum Glucose Level - Abnormal:  Goal: Ability to maintain appropriate glucose levels will improve to within specified parameters  Description: Ability to maintain appropriate glucose levels will improve to within specified parameters  Outcome: Ongoing     Problem: Skin Integrity - Impaired:  Goal: Will show no infection signs and symptoms  Description: Will show no infection signs and symptoms  Outcome: Ongoing  Goal: Absence of new skin breakdown  Description: Absence of new skin breakdown  Outcome: Ongoing     Problem: Sleep Pattern Disturbance:  Goal: Appears well-rested  Description: Appears well-rested  Outcome: Ongoing     Problem: Falls - Risk of:  Goal: Will remain free from falls  Description: Will remain free from falls  Outcome: Ongoing  Goal: Absence of physical injury  Description: Absence of physical injury  Outcome: Ongoing     Problem: Discharge Planning:  Goal: Participates in care planning  Description: Participates in care planning  Outcome: Ongoing  Goal: Discharged to appropriate level of care  Description: Discharged to appropriate level of care  Outcome: Ongoing     Problem:  Activity Intolerance:  Goal: Ability to tolerate increased activity will improve  Description: Ability to tolerate increased activity will improve  Outcome: Ongoing     Problem: Anxiety/Stress:  Goal: Level of anxiety will decrease  Description: Level of anxiety will decrease  Outcome: Ongoing     Problem:  Bowel Function - Altered:  Goal: Bowel elimination is within specified parameters  Description: Bowel elimination is within specified parameters  Outcome: Ongoing     Problem: Fluid Volume - Deficit:  Goal: Absence of fluid volume deficit signs and symptoms  Description: Absence of fluid volume deficit signs and symptoms  Outcome: Ongoing  Goal: Electrolytes within specified parameters  Description: Electrolytes within specified parameters  Outcome: Ongoing     Problem: Mental Status - Impaired:  Goal: Absence of physical injury  Description: Absence of physical injury  Outcome: Ongoing  Goal: Absence of continued neurological deterioration signs and symptoms  Description: Absence of continued neurological deterioration signs and symptoms  Outcome: Ongoing  Goal: Mental status will be restored to baseline  Description: Mental status will be restored to baseline  Outcome: Ongoing     Problem: Mobility - Impaired:  Goal: Mobility will improve to maximum level  Description: Mobility will improve to maximum level  Outcome: Ongoing     Problem: Nutrition Deficit:  Goal: Ability to achieve adequate nutritional intake will improve  Description: Ability to achieve adequate nutritional intake will improve  Outcome: Ongoing     Problem: Pain:  Goal: Pain level will decrease  Description: Pain level will decrease  Outcome: Ongoing  Goal: Ability to notify healthcare provider of pain before it becomes unmanageable or unbearable will improve  Description: Ability to notify healthcare provider of pain before it becomes unmanageable or unbearable will improve  Outcome: Ongoing  Goal: Control of acute pain  Description: Control of acute pain  Outcome: Ongoing  Goal: Control of chronic pain  Description: Control of chronic pain  Outcome: Ongoing     Problem: Serum Glucose Level - Abnormal:  Goal: Ability to maintain appropriate glucose levels will improve to within specified parameters  Description: Ability to maintain appropriate glucose levels will improve to within specified parameters  Outcome: Ongoing     Problem: Skin Integrity - Impaired:  Goal: Will show no infection signs and symptoms  Description: Will show no infection signs and symptoms  Outcome: Ongoing  Goal: Absence of new skin breakdown  Description: Absence of new skin breakdown  Outcome: Ongoing     Problem: Sleep Pattern Disturbance:  Goal: Appears well-rested  Description: Appears well-rested  Outcome: Ongoing

## 2021-11-10 NOTE — PROGRESS NOTES
P Quality Flow/Interdisciplinary Rounds Progress Note        Quality Flow Rounds held on November 10, 2021    Disciplines Attending:  Bedside Nurse, ,  and Nursing Unit Leadership    Callum Jimenez was admitted on 11/9/2021  6:29 PM    Anticipated Discharge Date:       Disposition:    Byron Score:       Readmission Risk              Risk of Unplanned Readmission:  24           Discussed patient goal for the day, patient clinical progression, and barriers to discharge.   The following Goal(s) of the Day/Commitment(s) have been identified:  blood transfusion today         Suzanne Haley RN  November 10, 2021

## 2021-11-10 NOTE — CARE COORDINATION
Met with patient and spouse about diagnosis and discharge plan of care. Pt readmit with syncope this admission. hgb low, will transfuse 1 unit PRBC's. Pt lives with spouse 2 story home, bath on 1st floor. Independent prior to admit . No DME or home care services. Plan is home with no needes. PCP Kaitlyn. Follows at [facility] for ovarian ca.  Will follow-o

## 2021-11-10 NOTE — PROGRESS NOTES
[facility] Progress Note    Admitting Date and Time: 11/9/2021  6:29 PM  Admit Dx: Syncope and collapse [R55]  Hyponatremia [E87.1]  Cancer (HCC) [C80.1]  Hypotension, unspecified hypotension type [I95.9]  Anemia, unspecified type [D64.9]    Subjective:  Patient is being followed for Syncope and collapse [R55]  Hyponatremia [E87.1]  Cancer ([address] [C80.1]  Hypotension, unspecified hypotension type [I95.9]  Anemia, unspecified type [D64.9]     Patient was lying in bed in no acute distress. Denies any new concerns. ROS: denies fever, chills, cp, sob, n/v, HA unless stated above.       oyster shell calcium w/D  1 tablet Oral Daily with breakfast    vitamin B-12  100 mcg Oral Daily    dexamethasone  4 mg Oral TID    levothyroxine  100 mcg Oral Daily    gabapentin  300 mg Oral TID    vitamin B-6  100 mg Oral Daily    atorvastatin  10 mg Oral Nightly    amLODIPine  10 mg Oral Daily    sodium chloride flush  5-40 mL IntraVENous 2 times per day    famotidine  20 mg Oral Daily     sodium chloride, , PRN  albuterol, 2.5 mg, Q1H PRN  tiZANidine, 2 mg, Q8H PRN  sodium chloride flush, 5-40 mL, PRN  sodium chloride, 25 mL, PRN  ondansetron, 4 mg, Q8H PRN   Or  ondansetron, 4 mg, Q6H PRN  acetaminophen, 650 mg, Q6H PRN   Or  acetaminophen, 650 mg, Q6H PRN         Objective:    BP (!) 121/57   Pulse 70   Temp 98.1 °F (36.7 °C)   Resp 16   Wt 138 lb (62.6 kg)   SpO2 100%   BMI 25.24 kg/m²   General Appearance: alert and oriented to person, place and time and in no acute distress  Skin: warm and dry  Head: normocephalic and atraumatic  Eyes: pupils equal, round, and reactive to light, extraocular eye movements intact, conjunctivae normal  Neck: neck supple and non tender without mass   Pulmonary/Chest: clear to auscultation bilaterally- no wheezes, rales or rhonchi, normal air movement, no respiratory distress  Cardiovascular: normal rate, normal S1 and S2 and no carotid bruits  Abdomen: soft, non-tender, non-distended, normal bowel sounds, no masses or organomegaly  Extremities: no cyanosis, no clubbing and no edema  Neurologic: no cranial nerve deficit and speech normal        Recent Labs     11/09/21 1944   *   K 4.7   CL 88*   CO2 20*   BUN 33*   CREATININE 1.1*   GLUCOSE 183*   CALCIUM 8.8       Recent Labs     11/09/21 1944   WBC 5.0   RBC 1.84*   HGB 6.8*   HCT 19.5*   .0*   MCH 37.0*   MCHC 34.9*   RDW 23.2*   PLT 52*   MPV 11.9        Radiology:  EXAMINATION:  RETROPERITONEAL ULTRASOUND OF THE KIDNEYS AND URINARY BLADDER     11/10/2021     COMPARISON:  None     HISTORY:  ORDERING SYSTEM PROVIDED HISTORY: ELIANA  TECHNOLOGIST PROVIDED HISTORY:     Reason for exam:->ELIANA  What reading provider will be dictating this exam?->CRC     FINDINGS:  Right kidney measures 9.0 x 3.7 x 5.2 cm     Left kidney is 9.4 x 5.4 x 5.1 cm     No evidence for hydronephrosis. Normal renal echogenicity     At the upper pole the right kidney there is a 2.7 x 3.0 x 3.0 cm simple  appearing cyst     There is a Patel within the urinary bladder     IMPRESSION:  Right renal cyst otherwise negative study    Assessment:    Active Problems:    Hyponatremia    ELIANA (acute kidney injury) ([facility]  Resolved Problems:    * No resolved hospital problems. *      Plan:  1. ELIANA: Cr 1.1. Given fluid bolus in ED. Avoid nephrotoxins. Nephrology consult pending. Renal US 11/10 showed right renal cyst.     2. Acute on chronic hyponatremia: Patient Na found to be 123. Basline is around 130. Nephrology consulted. 3. Acute on chronic anemia: Baseline hgb in 8s. Hgb today 6.8. Given 1U RBCs 11/10. Check occult stool. 4. Thrombocytopenia: Suggest outpatient hematology consult    5. Syncope: CT head 11/9 was unremarkable. Orthostatics, B12, TSH and ammonia ordered. PT/OT to evaluate. 6. Hypothyroidism: Continue synthroid    7. HTN: Holding lisinopril    8.  HLD: Continue Lipitor     DVT prophylaxis: SCDs     NOTE: This report was transcribed using voice recognition software. Every effort was made to ensure accuracy; however, inadvertent computerized transcription errors may be present. Electronically signed by Dior Trevino PA-C on 11/10/2021 at 2:23 PM     Addendum: I have personally participated in the history, exam, medical decision making with Radha Bautista on the date of service and I agree with all of the pertinent clinical information unless otherwise noted. I have also reviewed and agree with the past medical, family, and social history unless otherwise noted. Patient was admitted with syncope    PHYSICAL EXAM:  Vitals:  BP (!) 121/57   Pulse 70   Temp 98.1 °F (36.7 °C)   Resp 16   Wt 138 lb (62.6 kg)   SpO2 100%   BMI 25.24 kg/m²   Gen: awake, alert, NAD  Lungs: clear to auscultation bilaterally no crackles no wheezing. Heart: RRR, no murmur   Abdomen: soft nontender nondistended positive bowel sounds. Extremities: full range of motion no peripheral edema. Impression:  Active Problems:    Hyponatremia    ELIANA (acute kidney injury) ([facility]  Resolved Problems:    * No resolved hospital problems. *      My findings/plan include:    1. Nonoliguric ELIANA on CKD - Avoid nephrotoxins. F/U with nephrology  2. Acute on chronic hypotonic Hyponatremia - Nephrology on board  3. Acute on chronic macrocytic anemia - Check stool occult and transfuse 1 unit PRBC  4. Acute on chronic thrombocytopenia - Suggest outpatient hematology consult  5. Syncope - CT head is unremarkable. Check orthostats, B12, TSH, and ammonia. PT/OT  6. Hypothyroidism - Continue synthroid  7. HTN - Hold lisinopril  8. HLD - Continue lovastatin  9. DVT prophylaxis - Heparin    NOTE: This report was transcribed using voice recognition software. Every effort was made to ensure accuracy; however, inadvertent computerized transcription errors may be present.   Electronically signed by Felipe Braun DO on 11/10/2021 at 3:20 PM

## 2021-11-10 NOTE — ED NOTES
Multiple attempts for blood culture collection with no success. Provider notified.       Jailene Mcgrath RN  11/10/21 4703

## 2021-11-10 NOTE — H&P
Patient Information:  Patient: Petra Delgadillo  MRN: [id number]   [place]: [de-identified]  YOB: 1950  Admit Date: 11/9/2021      Primary Care Physician: Stephen Osorio MD  Advance Directive: Full Code  Health Care Proxy: her , Mr. Odette Harrell, [phone] (land) and [phone] (mobile)        SUBJECTIVE:    Chief Complaint   Patient presents with    Loss of Consciousness     on toleit and had syncope eposide,  managed to get to floor with no fall    Hypotension     78/40     EP Sign Out:  ELIANA  Hyponatremia    HPI:  Mrs. Petra Delgadillo is a pleasant 65year old  lady from home. She passed out on the toilet. She has done this before a couple of times. Her  states that she had passed out today byt that he was able to wake her with a cold compress to the neck. He had called EMS as he could not get her down the stairs himself. She had her first two doses of COVID vaccine, but has not yet gotten her booster. Review of Systems:   Review of Systems   Constitutional: Positive for fatigue. Negative for chills, diaphoresis and fever. HENT: Negative for sneezing. Respiratory: Negative for cough and shortness of breath. Cardiovascular: Negative for chest pain, palpitations and leg swelling. Denies chest pressure   Gastrointestinal: Negative for abdominal distention, constipation, diarrhea and nausea. Genitourinary: Negative for dysuria. Musculoskeletal: Negative for arthralgias and myalgias. Neurological: Positive for weakness. Denied changed to sense of smell or taste.      Past Medical History:   Diagnosis Date    Ankle swelling     takes diuretic prn    Asthma     mild    Cancer ([facility] [address] 07/2019    ovarian, treated with surgery and chemo    Depression     GERD (gastroesophageal reflux disease)     Hyperlipidemia     Hypertension     Hypothyroidism     Osteoarthritis     Positive FIT (fecal immunochemical test)     Vitamin D deficiency     Wears partial dentures     upper     Past Psychiatric History:  As per above    Past Surgical History:   Procedure Laterality Date    APPENDECTOMY  years ago    BREAST SURGERY  1960s    removal lump from right     SECTION      x 1    COLONOSCOPY N/A 2020    COLONOSCOPY DIAGNOSTIC performed by Anna Valentine MD at [address] [place] [address]    abdominal - has menopause the  day    LAPAROTOMY  2019    debulking of pelvic mass, DR. Nazario LOCK ACH [place]    LYMPH NODE DISSECTION      TUNNELED VENOUS PORT PLACEMENT       Social History     Tobacco History     Smoking Status  Never Smoker    Smokeless Tobacco Use  Never Used          Alcohol History     Alcohol Use Status  No          Drug Use     Drug Use Status  Never          Sexual Activity     Sexually Active  Had a son, he has since passed         CODE STATUS: Full Code  HEALTH CARE PROXY: her , Mr. Nasir Gordon, [phone] (land) and [phone] (mobile)  AMBULATES: independently   DOMICILED: has stairs in the home which she uses, her  lives with her, they are allowing her daughter and grand daughter to live there at the moment    Family History   Problem Relation Age of Onset    Heart Disease Mother     High Blood Pressure Mother     Diabetes Father     Thyroid Disease Sister         2    No Known Problems Sister     No Known Problems Brother     Colon Cancer Son     Ovarian Cancer Neg Hx     Uterine Cancer Neg Hx     Breast Cancer Neg Hx      Allergies:   No Known Allergies    Home Medications:  Prior to Admission medications    Medication Sig Start Date End Date Taking?  Authorizing Provider   dexamethasone (DECADRON) 4 MG tablet Take 1 tablet by mouth 3 times daily for 10 days 10/31/21 11/10/21  Jennifer San MD   folic acid (FOLVITE) 1 MG tablet Take 1 tablet by mouth daily 21   Jennifer San MD   lisinopril (PRINIVIL;ZESTRIL) 10 MG tablet Take 1 tablet by mouth daily 10/23/21   Laura Garcias MD   vitamin B-6 (PYRIDOXINE) 100 MG tablet Take 1 tablet by mouth daily 10/22/21   Laura Garcias MD   calcium-vitamin D ([place]) 500-200 MG-UNIT per tablet Take 1 tablet by mouth daily 10/23/21   Laura Garcias MD   tiZANidine (ZANAFLEX) 2 MG tablet Take 1 tablet by mouth every 8 hours as needed (muscle spasms) 9/21/21 12/20/21  Abbie Herrera MD   levothyroxine (SYNTHROID) 100 MCG tablet Take 1 tablet by mouth Daily 9/21/21   Abbie Herrera MD   lovastatin (MEVACOR) 40 MG tablet Take 1 tablet by mouth nightly 9/21/21   Abbie Herrera MD   pantoprazole (PROTONIX) 40 MG tablet Take 1 tablet by mouth daily 9/21/21   Abbie Herrera MD   escitalopram (LEXAPRO) 10 MG tablet Take 1 tablet by mouth every morning 9/21/21   Abbie Herrera MD   albuterol sulfate HFA (VENTOLIN HFA) 108 (90 Base) MCG/ACT inhaler Inhale 2 puffs into the lungs every 6 hours as needed for Wheezing or Shortness of Breath 9/21/21   Abbie Herrera MD   Cyanocobalamin (VITAMIN B 12 PO) Take 1 tablet by mouth daily     Historical Provider, MD   acetaminophen (TYLENOL) 500 MG tablet Take 500 mg by mouth every 6 hours as needed for Pain    Historical Provider, MD   gabapentin (NEURONTIN) 300 MG capsule Take 1 capsule by mouth 3 times daily for 360 days. 9/2/20 10/19/21  Abbie Herrera MD         OBJECTIVE:    Vitals:    11/09/21 2320   BP: (!) 102/57   Pulse: 68   Resp: 16   Temp:    SpO2: 96%   breathing on room air    BP (!) 102/57   Pulse 68   Temp 97.8 °F (36.6 °C) (Oral)   Resp 16   Wt 138 lb (62.6 kg)   SpO2 96%   BMI 25.24 kg/m²     No intake or output data in the 24 hours ending 11/09/21 2338    Physical Exam  Vitals reviewed. Constitutional:       General: She is not in acute distress. Appearance: Normal appearance. She is normal weight. She is not ill-appearing or toxic-appearing. HENT:      Head: Normocephalic and atraumatic.       Nose: No congestion or rhinorrhea. Eyes:      General:         Right eye: No discharge. Left eye: No discharge. Neck:      Comments: Supple, trachea appears mifline  Cardiovascular:      Rate and Rhythm: Normal rate and regular rhythm. Heart sounds: No murmur heard. No friction rub. No gallop. Pulmonary:      Effort: No respiratory distress. Breath sounds: No stridor. No wheezing, rhonchi or rales. Chest:      Chest wall: No tenderness. Abdominal:      General: Bowel sounds are normal.      Palpations: Abdomen is soft. Tenderness: There is no abdominal tenderness. There is no guarding or rebound. Musculoskeletal:         General: No tenderness. Right lower leg: No edema. Left lower leg: No edema. Skin:     General: Skin is warm. Comments: nondiaphoretic   Neurological:      Mental Status: She is alert and oriented to person, place, and time. Psychiatric:         Mood and Affect: Mood normal.         Behavior: Behavior normal.        LABORATORY DATA:    CBC:   Recent Labs     11/09/21 1944   WBC 5.0   HGB 6.8*   HCT 19.5*   PLT 52*     BMP:   Recent Labs     11/09/21 1944   *   K 4.7   CL 88*   CO2 20*   BUN 33*   CREATININE 1.1*   CALCIUM 8.8     Hepatic Profile:   Recent Labs     11/09/21 1944   AST 19   ALT 12   BILITOT 1.2   ALKPHOS 87       Urinalysis:   Lab Results   Component Value Date    NITRU Negative 11/09/2021    WBCUA NONE 10/19/2021    BACTERIA FEW 10/19/2021    RBCUA NONE 10/19/2021    BLOODU Negative 11/09/2021    SPECGRAV 1.015 11/09/2021    GLUCOSEU Negative 11/09/2021       IMAGING:  CT HEAD WO CONTRAST  Result Date: 11/9/2021  No acute intracranial abnormality. Age-related loss of brain volume and chronic white matter ischemic changes. XR CHEST PORTABLE  Result Date: 11/9/2021  No acute process. ASESSMENTS & PLANS:    ELIANA (Cr baseline 0.7 now at 1.1) withut CKD (baseline GFR \">60\") withOUT hyperkalemia (Potassium 4.7 PoA):   Hyponatremia: baseline 131/130 on 30-31OCT21, now at 123 today PoA  Admit to [address], Nephrology area preferred if available  Strict Is and Os  Daily Weights  Added on to UA Urine OSM/EOS/Na/Cr  Avoid Nephrotoxins as able: NSAIDs/ACEi/ARB/Diuretic/Aminoglycosides/IodinatedContrast etc  Renal US in AM   Nephrology Consultation in AM  IVF bolused in ED  Gentle IVF  Elevate HoB & Legs (Legs to prevent sliding down in bed)  HOLD SSRI  HOLD PPI and place on H2 blocker     Chronic Medical Problems:  Continue Home regimen as able   [START ON 11/10/2021] calcium-vitamin D  1 tablet Oral Daily    [START ON 11/10/2021] Vitamin B 12  1 tablet Oral Daily    dexamethasone  4 mg Oral TID    [START ON 11/10/2021] escitalopram  10 mg Oral QAM    [START ON 11/10/2021] levothyroxine  100 mcg Oral Daily    gabapentin  300 mg Oral TID    [START ON 11/10/2021] vitamin B-6  100 mg Oral Daily    [START ON 11/10/2021] pantoprazole  40 mg Oral Daily    atorvastatin  10 mg Oral Nightly    [START ON 11/10/2021] amLODIPine  10 mg Oral Daily   last day of decadron  HOLD SSRI as per above  HOLD PPI as per above    Supportive and Prophylactic Txx:  DVT PPx: Lovenox SQ  GI (PUD) PPx: no indicated as such, is on H2 blocker  PT: indicated as per age >=65 with admitted status to prevent deconditioning  ADULT DIET; Regular; Low Fat/Low Chol/High Fiber/NICOLASA; No Added Salt (3-4 gm)   sodium chloride, albuterol, tiZANidine, sodium chloride flush, sodium chloride, ondansetron **OR** ondansetron, acetaminophen **OR** acetaminophen       Care time of >45 minutes  Pt seen/examined and admitted to inpatient status. Inpatient status is used for patients with an expected LOS extending past two midnights due to medical therapy and or critical care needs, otherwise patients are placed to OBServation status. Signed:  Electronically signed by Azar Olvera MD on 11/10/21 at 00:58 EST.

## 2021-11-11 NOTE — CONSULTS
Consult Note  NEPHROLOGY    Reason for Consult: Evaluation of hyponatremia    Requesting Physician: Naomi Kumar MD    Chief Complaint: Admitted status post syncopal episode    History Obtained From:  patient, electronic medical record    History of Present Ilness:  Mrs. Pedro Swartz is a 49-year-old -American female with history of hypertension, hyperlipidemia, GERD asthma, depression, hypothyroidism, DJD history of metastatic ovarian cancer with mets to peritoneum and has been on maintenance chemo with [place] Fat and questionable history of atrial fibrillation diagnosed in September 2021 and now presents to the ED on November 9, 2021 with fatigue and syncope. Vitals upon arrival included   BP (!) 102/57   Pulse 68   Temp 97.8 °F (36.6 °C) (Oral)   Resp 16   Wt 138 lb (62.6 kg)   SpO2 96%   BMI 25.24 kg/m² Oxygen Saturation Interpretation: Normal.  Pertinent labs included WBC 5.0, hemoglobin 6.8, hematocrit 19.5 and platelet count 52. Initial BMP showed sodium of 123, potassium 4.7, chloride 88, CO2 20, BUN 33 and creatinine 1.1.  UA showed clear yellow urine, 1.015, negative protein, negative leukocytes. Chest x-ray showed no acute process. Patient was subsequently admitted with dehydration, anemia, pancytopenia, and hypotension. Currently upon exam pt is in no acute distress: she states she just finished chemo 2 weeks ago she is not currently on any chemotherapy (follows at the [facility]). She states she has been having issues with anemia and was last transfused 2 weeks ago. She has had increased fatigue over the course of this week. She denies fevers or chills no chest pain or shortness of breath no abdominal pain no vomiting no diarrhea. She states she got up to go to the bathroom her  helped her walk towards the bathroom she was sitting on the toilet and he states she briefly passed out. She was unconscious for about 90 seconds.   She states she has not been eating or drinking as well mg, 4 mg, Oral, Q8H PRN **OR** ondansetron (ZOFRAN) injection 4 mg, 4 mg, IntraVENous, Q6H PRN  acetaminophen (TYLENOL) tablet 650 mg, 650 mg, Oral, Q6H PRN **OR** acetaminophen (TYLENOL) suppository 650 mg, 650 mg, Rectal, Q6H PRN  famotidine (PEPCID) tablet 20 mg, 20 mg, Oral, Daily    Allergies:  Patient has no known allergies. Social History:      reports that she has never smoked. She has never used smokeless tobacco. She reports that she does not drink alcohol and does not use drugs. Family History:     Family History   Problem Relation Age of Onset    Heart Disease Mother     High Blood Pressure Mother     Diabetes Father     Thyroid Disease Sister         2    No Known Problems Sister     No Known Problems Brother     Colon Cancer Son     Ovarian Cancer Neg Hx     Uterine Cancer Neg Hx     Breast Cancer Neg Hx          Review of Systems:         Pertinent positives stated above in HPI. All other systems were reviewed and were negative.     Physical exam:   Constitutional:  VITALS:  BP (!) 99/58   Pulse 89   Temp 98.2 °F (36.8 °C)   Resp 16   Ht 5' 2\" (1.575 m)   Wt 138 lb (62.6 kg)   SpO2 99%   BMI 25.24 kg/m²   CURRENT TEMPERATURE:  Temp: 98.2 °F (36.8 °C)  CURRENT RESPIRATORY RATE:  Resp: 16  CURRENT PULSE:  Pulse: 89  CURRENT BLOOD PRESSURE:  BP: (!) 99/58  24HR BLOOD PRESSURE RANGE:  Systolic (21EPM), ASX:797 , Min:97 , LQS:632   ; Diastolic (73BJA), SLM:33, Min:57, Max:68    24HR INTAKE/OUTPUT:      Intake/Output Summary (Last 24 hours) at 11/11/2021 0835  Last data filed at 11/11/2021 0029  Gross per 24 hour   Intake    Output 800 ml   Net -800 ml     Gen: alert, awake, nad  Skin: no rash, turgor wnl  Heent:  eomi, mmm  Neck: No bruits or jvd noted  Cardiovascular:  S1, S2 without m/r/g  Respiratory: Clear  Abdomen:  +bs, soft, nt, nd  Ext: No edema  Psychiatric: mood and affect appropriate  Musculoskeletal:  Rom, muscular strength intact    DATA:      CBC:   Lab Results Component Value Date    WBC 5.7 11/10/2021    RBC 1.82 11/10/2021    RBC 3.12 07/20/2019    HGB 6.7 11/10/2021    HCT 19.3 11/10/2021    .0 11/10/2021    MCH 36.8 11/10/2021    MCHC 34.7 11/10/2021    RDW 23.3 11/10/2021    PLT 58 11/10/2021    MPV 10.9 11/10/2021     BMP:    Lab Results   Component Value Date     11/11/2021    K 3.9 11/11/2021    K 4.1 11/10/2021    CL 92 11/11/2021    CO2 24 11/11/2021    BUN 20 11/11/2021    LABALBU 3.5 11/10/2021    LABALBU 4.6 12/19/2011    CREATININE 0.7 11/11/2021    CALCIUM 8.5 11/11/2021    GFRAA >60 11/11/2021    LABGLOM >60 11/11/2021    GLUCOSE 125 11/11/2021    GLUCOSE 101 12/19/2011       RAD:  CT HEAD WO CONTRAST    Result Date: 11/9/2021  EXAMINATION: CT OF THE HEAD WITHOUT CONTRAST  11/9/2021 7:16 pm TECHNIQUE: CT of the head was performed without the administration of intravenous contrast. Dose modulation, iterative reconstruction, and/or weight based adjustment of the mA/kV was utilized to reduce the radiation dose to as low as reasonably achievable. COMPARISON: None. HISTORY: ORDERING SYSTEM PROVIDED HISTORY: HEAD TRAUMA, CLOSED, MILD, GCS >= 13, NO RISK FACTORS, NEURO EXAM NORMAL TECHNOLOGIST PROVIDED HISTORY: Has a \"code stroke\" or \"stroke alert\" been called? ->No Reason for exam:->syncope Decision Support Exception - unselect if not a suspected or confirmed emergency medical condition->Emergency Medical Condition (MA) FINDINGS: BRAIN/VENTRICLES: There is no acute intracranial hemorrhage, mass effect or midline shift. No abnormal extra-axial fluid collection. The gray-white differentiation is maintained without evidence of an acute infarct. There is prominence of the ventricles and sulci due to global parenchymal volume loss. There are nonspecific areas of hypoattenuation within the periventricular and subcortical white matter, which likely represent chronic microvascular ischemic change.  ORBITS: The visualized portion of the orbits demonstrate no acute abnormality. SINUSES: The visualized paranasal sinuses and mastoid air cells demonstrate no acute abnormality. SOFT TISSUES/SKULL: No acute abnormality of the visualized skull or soft tissues. No acute intracranial abnormality. Age-related loss of brain volume and chronic white matter ischemic changes. XR CHEST PORTABLE    Result Date: 11/9/2021  EXAMINATION: ONE XRAY VIEW OF THE CHEST 11/9/2021 6:41 pm COMPARISON: October 19 HISTORY: ORDERING SYSTEM PROVIDED HISTORY: syncope TECHNOLOGIST PROVIDED HISTORY: Reason for exam:->syncope FINDINGS: The lungs are without acute focal process. There is no effusion or pneumothorax. The cardiomediastinal silhouette is without acute process. The osseous structures are without acute process. MediPort line in the SVC. No acute process. US RETROPERITONEAL COMPLETE    Result Date: 11/10/2021  EXAMINATION: RETROPERITONEAL ULTRASOUND OF THE KIDNEYS AND URINARY BLADDER 11/10/2021 COMPARISON: None HISTORY: ORDERING SYSTEM PROVIDED HISTORY: ELIANA TECHNOLOGIST PROVIDED HISTORY: Reason for exam:->ELIANA What reading provider will be dictating this exam?->CRC FINDINGS: Right kidney measures 9.0 x 3.7 x 5.2 cm Left kidney is 9.4 x 5.4 x 5.1 cm No evidence for hydronephrosis. Normal renal echogenicity At the upper pole the right kidney there is a 2.7 x 3.0 x 3.0 cm simple appearing cyst There is a Patel within the urinary bladder     Right renal cyst otherwise negative study         Assessment/Plan    1. Hyponatremia  Likely hypovolemic in a patient with very little fluid intake past few days  UA showed clear yellow urine, 1.015, negative protein, negative leukocytes. Initial urine electrolytes: Na 77, Osmo 447 and creatinine 49  Urine electrolytes (11/11/21): Sodium 119, potassium 34, Osmo 582, creatinine 66  Na: 123, 127, 123, 125, 124  PLAN:   1. Follow on current fluid restriction of 1200 cc  2. Check every 4 hours serial labs  3.   Check TSH, cortisol and uric acid    2. Syncope  Likely secondary to severe anemia and hypotension    3. Pancytopenia  Has been managed by hematology in the outpatient setting    4. Essential hypertension  Now admitted with hypotension  PLAN:   1.   Hold parameters for amlodipine - likely will need a lower dose upon discharge        Thank you for allowing us to participate in care of Ms Yolanda Zavala, JAVIER - CNP  11/11/2021  8:35 AM

## 2021-11-11 NOTE — PROGRESS NOTES
[facility] Progress Note    Admitting Date and Time: 11/9/2021  6:29 PM  Admit Dx: Syncope and collapse [R55]  Hyponatremia [E87.1]  Cancer (HCC) [C80.1]  Hypotension, unspecified hypotension type [I95.9]  Anemia, unspecified type [D64.9]    Subjective:  Patient is being followed for Syncope and collapse [R55]  Hyponatremia [E87.1]  Cancer ([address] [C80.1]  Hypotension, unspecified hypotension type [I95.9]  Anemia, unspecified type [D64.9]   Pt feels fine, denied any headache or blurry vision no lightheadedness    ROS: denies fever, chills, cp, sob, n/v, HA unless stated above.       sodium chloride  2 g Oral TID WC    oyster shell calcium w/D  1 tablet Oral Daily with breakfast    vitamin B-12  100 mcg Oral Daily    levothyroxine  100 mcg Oral Daily    gabapentin  300 mg Oral TID    vitamin B-6  100 mg Oral Daily    atorvastatin  10 mg Oral Nightly    amLODIPine  10 mg Oral Daily    sodium chloride flush  5-40 mL IntraVENous 2 times per day    famotidine  20 mg Oral Daily     sodium chloride, , PRN  albuterol, 2.5 mg, Q1H PRN  [Held by provider] tiZANidine, 2 mg, Q8H PRN  sodium chloride flush, 5-40 mL, PRN  sodium chloride, 25 mL, PRN  ondansetron, 4 mg, Q8H PRN   Or  ondansetron, 4 mg, Q6H PRN  acetaminophen, 650 mg, Q6H PRN   Or  acetaminophen, 650 mg, Q6H PRN         Objective:    BP (!) 99/58   Pulse 89   Temp 98.2 °F (36.8 °C)   Resp 16   Ht 5' 2\" (1.575 m)   Wt 138 lb (62.6 kg)   SpO2 99%   BMI 25.24 kg/m²     General Appearance: alert and oriented to person, place and time and in no acute distress  Skin: warm and dry  Head: normocephalic and atraumatic  Eyes: pupils equal, round, and reactive to light, extraocular eye movements intact, conjunctivae normal  Neck: neck supple and non tender without mass   Pulmonary/Chest: clear to auscultation bilaterally- no wheezes, rales or rhonchi, normal air movement, no respiratory distress  Cardiovascular: normal rate, normal S1 and S2 and no carotid bruits  Abdomen: soft, non-tender, non-distended, normal bowel sounds, no masses or organomegaly  Extremities: no cyanosis, no clubbing and no edema  Neurologic: no cranial nerve deficit and speech normal        Recent Labs     11/11/21  0445 11/11/21  0900 11/11/21  1327   * 124* 124*   K 3.9 3.6 3.5   CL 92* 90* 90*   CO2 24 22 25   BUN 20 20 19   CREATININE 0.7 0.7 0.7   GLUCOSE 125* 210* 133*   CALCIUM 8.5* 8.4* 8.7       Recent Labs     11/09/21  1944 11/10/21  1940 11/11/21  0900   WBC 5.0 5.7 5.7   RBC 1.84* 1.82* 1.77*   HGB 6.8* 6.7* 6.7*   HCT 19.5* 19.3* 19.3*   .0* 106.0* 109.0*   MCH 37.0* 36.8* 37.9*   MCHC 34.9* 34.7* 34.7*   RDW 23.2* 23.3* 23.2*   PLT 52* 58* 66*   MPV 11.9 10.9 11.7         Assessment:    Active Problems:    Hyponatremia    ELIANA (acute kidney injury) ([address]  Resolved Problems:    * No resolved hospital problems. *      Plan:  Acute renal failure on CKD  Present with creatinine 1.1, baseline below 1, resolved    Hyponatremia  Serum awesome is low, urine awesome is elevated. Fluid restriction, still not improving  We will discuss with nephrology, patient might need to be on salt tabs. Anemia  Acute on chronic  Hemoglobin down to 6.7 still awaiting [place] [facility]    Thrombocytopenia  Chronic. Platelets stable at 77  Consider consulting hematology    Hypothyroidism  Patient is on levothyroxine, suppressed TSH and elevated free T4  We need to decrease the dose of levothyroxine    NOTE: This report was transcribed using voice recognition software. Every effort was made to ensure accuracy; however, inadvertent computerized transcription errors may be present.   Electronically signed by Robinson Garcia MD on 11/11/2021 at 4:02 PM

## 2021-11-11 NOTE — PLAN OF CARE
Problem: Falls - Risk of:  Goal: Will remain free from falls  Description: Will remain free from falls  11/11/2021 0412 by Nikolay Hughes RN  Outcome: Met This Shift     Problem: Falls - Risk of:  Goal: Absence of physical injury  Description: Absence of physical injury  11/11/2021 0412 by Nikolay Hughes RN  Outcome: Met This Shift     Problem: Discharge Planning:  Goal: Participates in care planning  Description: Participates in care planning  11/11/2021 0412 by Nikolay Hughes RN  Outcome: Ongoing     Problem: Activity Intolerance:  Goal: Ability to tolerate increased activity will improve  Description: Ability to tolerate increased activity will improve  11/11/2021 0412 by Nikolay Hughes RN  Outcome: Met This Shift     Problem: Anxiety/Stress:  Goal: Level of anxiety will decrease  Description: Level of anxiety will decrease  11/11/2021 0412 by Nikolay Hughes RN  Outcome: Met This Shift     Problem:  Bowel Function - Altered:  Goal: Bowel elimination is within specified parameters  Description: Bowel elimination is within specified parameters  11/11/2021 0412 by Nikolay Hughes RN  Outcome: Met This Shift     Problem: Fluid Volume - Deficit:  Goal: Absence of fluid volume deficit signs and symptoms  Description: Absence of fluid volume deficit signs and symptoms  11/11/2021 0412 by Nikolay Hughes RN  Outcome: Ongoing     Problem: Fluid Volume - Deficit:  Goal: Electrolytes within specified parameters  Description: Electrolytes within specified parameters  11/11/2021 0412 by Nikolay Hughes RN  Outcome: Ongoing     Problem: Mental Status - Impaired:  Goal: Absence of physical injury  Description: Absence of physical injury  11/11/2021 0412 by Nikolay Hughes RN  Outcome: Met This Shift     Problem: Mobility - Impaired:  Goal: Mobility will improve to maximum level  Description: Mobility will improve to maximum level  11/11/2021 0412 by Nikolay Hughes RN  Outcome: Met This Shift

## 2021-11-11 NOTE — CARE COORDINATION
Updated plan of care. Renal consult for ELIANA and hyponatemia.  Hgb low again this am. Plan remains home with spouse, no needs-michelleo

## 2021-11-12 NOTE — PLAN OF CARE
Problem: Falls - Risk of:  Goal: Will remain free from falls  Description: Will remain free from falls  Outcome: Met This Shift     Problem: Discharge Planning:  Goal: Participates in care planning  Description: Participates in care planning  Outcome: Met This Shift     Problem: Activity Intolerance:  Goal: Ability to tolerate increased activity will improve  Description: Ability to tolerate increased activity will improve  Outcome: Met This Shift     Problem: Anxiety/Stress:  Goal: Level of anxiety will decrease  Description: Level of anxiety will decrease  Outcome: Met This Shift     Problem:  Bowel Function - Altered:  Goal: Bowel elimination is within specified parameters  Description: Bowel elimination is within specified parameters  Outcome: Ongoing     Problem: Fluid Volume - Deficit:  Goal: Absence of fluid volume deficit signs and symptoms  Description: Absence of fluid volume deficit signs and symptoms  Outcome: Met This Shift     Problem: Mental Status - Impaired:  Goal: Absence of physical injury  Description: Absence of physical injury  Outcome: Met This Shift     Problem: Mobility - Impaired:  Goal: Mobility will improve to maximum level  Description: Mobility will improve to maximum level  Outcome: Met This Shift

## 2021-11-12 NOTE — PROGRESS NOTES
[facility] Progress Note    Admitting Date and Time: 11/9/2021  6:29 PM  Admit Dx: Syncope and collapse [R55]  Hyponatremia [E87.1]  Cancer (HCC) [C80.1]  Hypotension, unspecified hypotension type [I95.9]  Anemia, unspecified type [D64.9]    Subjective:  Patient is being followed for Syncope and collapse [R55]  Hyponatremia [E87.1]  Cancer ([address] [C80.1]  Hypotension, unspecified hypotension type [I95.9]  Anemia, unspecified type [D64.9]   Pt feels fine, denied any headache or blurry vision no lightheadedness    ROS: denies fever, chills, cp, sob, n/v, HA unless stated above.       sodium chloride  2 g Oral TID WC    oyster shell calcium w/D  1 tablet Oral Daily with breakfast    vitamin B-12  100 mcg Oral Daily    levothyroxine  100 mcg Oral Daily    gabapentin  300 mg Oral TID    vitamin B-6  100 mg Oral Daily    atorvastatin  10 mg Oral Nightly    amLODIPine  10 mg Oral Daily    sodium chloride flush  5-40 mL IntraVENous 2 times per day    famotidine  20 mg Oral Daily     sodium chloride, , PRN  albuterol, 2.5 mg, Q1H PRN  [Held by provider] tiZANidine, 2 mg, Q8H PRN  sodium chloride flush, 5-40 mL, PRN  sodium chloride, 25 mL, PRN  ondansetron, 4 mg, Q8H PRN   Or  ondansetron, 4 mg, Q6H PRN  acetaminophen, 650 mg, Q6H PRN   Or  acetaminophen, 650 mg, Q6H PRN         Objective:    BP (!) 94/56   Pulse 98   Temp 97.9 °F (36.6 °C)   Resp 16   Ht 5' 2\" (1.575 m)   Wt 138 lb (62.6 kg)   SpO2 100%   BMI 25.24 kg/m²     General Appearance: alert and oriented to person, place and time and in no acute distress  Skin: warm and dry  Head: normocephalic and atraumatic  Eyes: pupils equal, round, and reactive to light, extraocular eye movements intact, conjunctivae normal  Neck: neck supple and non tender without mass   Pulmonary/Chest: clear to auscultation bilaterally- no wheezes, rales or rhonchi, normal air movement, no respiratory distress  Cardiovascular: normal rate, normal S1 and S2 and no carotid bruits  Abdomen: soft, non-tender, non-distended, normal bowel sounds, no masses or organomegaly  Extremities: no cyanosis, no clubbing and no edema  Neurologic: no cranial nerve deficit and speech normal        Recent Labs     11/11/21 2203 11/12/21 0220 11/12/21  0632   * 123* 125*   K 3.4* 3.6 3.6   CL 87* 88* 90*   CO2 23 22 26   BUN 16 14 14   CREATININE 0.6 0.6 0.6   GLUCOSE 135* 118* 120*   CALCIUM 8.2* 8.2* 8.4*       Recent Labs     11/09/21  1944 11/09/21  1944 11/10/21  1940 11/11/21  0900 11/12/21  0632   WBC 5.0  --  5.7 5.7  --    RBC 1.84*  --  1.82* 1.77*  --    HGB 6.8*   < > 6.7* 6.7* 8.0*   HCT 19.5*   < > 19.3* 19.3* 22.9*   .0*  --  106.0* 109.0*  --    MCH 37.0*  --  36.8* 37.9*  --    MCHC 34.9*  --  34.7* 34.7*  --    RDW 23.2*  --  23.3* 23.2*  --    PLT 52*  --  58* 66*  --    MPV 11.9  --  10.9 11.7  --     < > = values in this interval not displayed. Assessment:    Active Problems:    Hyponatremia    ELIANA (acute kidney injury) ([facility] [facility]  Resolved Problems:    * No resolved hospital problems. *      Plan:  Acute renal failure on CKD  Present with creatinine 1.1, baseline below 1, resolved    Hyponatremia  Serum osmolality  is low, urine osm is elevated. Fluid restriction, still not improving  Started salt tabs. Na is still at 125    Anemia  Acute on chronic  S/p transfusion  Hemoglobin up to 8    Thrombocytopenia  Chronic. Platelets stable at 77  Consider consulting hematology    Hypothyroidism  Patient is on levothyroxine, suppressed TSH and elevated free T4  We need to decrease the dose of levothyroxine    NOTE: This report was transcribed using voice recognition software. Every effort was made to ensure accuracy; however, inadvertent computerized transcription errors may be present.   Electronically signed by Cassi Germain MD on 11/12/2021 at 11:10 AM

## 2021-11-12 NOTE — PROGRESS NOTES
Please call nephro with urine oslmols and BMP results. Also call Nephro prior to morning urea-Na dose.  Electronically signed by Summer Bonilla RN on 11/12/2021 at 6:28 PM

## 2021-11-12 NOTE — PROGRESS NOTES
NEPHROLOGY Attending   Progress Note  11/12/2021 8:13 AM  Subjective:   Admit Date: 11/9/2021  PCP: Isaac Cruz MD    History of Present Ilness:  Mrs. Mariza Centeno is a 68-year-old -American female with history of hypertension, hyperlipidemia, GERD asthma, depression, hypothyroidism, DJD history of metastatic ovarian cancer with mets to peritoneum and has been on maintenance chemo with Mando Berumen and questionable history of atrial fibrillation diagnosed in September 2021 and now presents to the ED on November 9, 2021 with fatigue and syncope. Vitals upon arrival included   BP (!) 102/57   Pulse 68   Temp 97.8 °F (36.6 °C) (Oral)   Resp 16   Wt 138 lb (62.6 kg)   SpO2 96%   BMI 25.24 kg/m² Oxygen Saturation Interpretation: Normal.  Pertinent labs included WBC 5.0, hemoglobin 6.8, hematocrit 19.5 and platelet count 52. Initial BMP showed sodium of 123, potassium 4.7, chloride 88, CO2 20, BUN 33 and creatinine 1.1.  UA showed clear yellow urine, 1.015, negative protein, negative leukocytes. Chest x-ray showed no acute process. Patient was subsequently admitted with dehydration, anemia, pancytopenia, and hypotension. Interval History:      11/12/2021: No adverse events overnight - alert and oriented - stable vital signs - fair appetite        Diet: ADULT DIET;  Regular; Low Fat/Low Chol/High Fiber/NICOLASA; 1200 ml    Data:   Scheduled Meds:   sodium chloride  2 g Oral TID     oyster shell calcium w/D  1 tablet Oral Daily with breakfast    vitamin B-12  100 mcg Oral Daily    levothyroxine  100 mcg Oral Daily    gabapentin  300 mg Oral TID    vitamin B-6  100 mg Oral Daily    atorvastatin  10 mg Oral Nightly    amLODIPine  10 mg Oral Daily    sodium chloride flush  5-40 mL IntraVENous 2 times per day    famotidine  20 mg Oral Daily     Continuous Infusions:   sodium chloride      sodium chloride       PRN Meds:sodium chloride, albuterol, [Held by provider] tiZANidine, sodium chloride flush, sodium chloride, ondansetron **OR** ondansetron, acetaminophen **OR** acetaminophen    Intake/Output Summary (Last 24 hours) at 11/12/2021 0813  Last data filed at 11/12/2021 0500  Gross per 24 hour   Intake 291.67 ml   Output 700 ml   Net -408.33 ml     CBC:   Recent Labs     11/09/21  1944 11/09/21  1944 11/10/21  1940 11/11/21  0900 11/12/21  0632   WBC 5.0  --  5.7 5.7  --    HGB 6.8*   < > 6.7* 6.7* 8.0*   PLT 52*  --  58* 66*  --     < > = values in this interval not displayed. BMP:    Recent Labs     11/11/21 2203 11/12/21  0220 11/12/21  0632   * 123* 125*   K 3.4* 3.6 3.6   CL 87* 88* 90*   CO2 23 22 26   BUN 16 14 14   CREATININE 0.6 0.6 0.6   GLUCOSE 135* 118* 120*     Hepatic:   Recent Labs     11/09/21  1944 11/10/21  1940   AST 19 13   ALT 12 11   BILITOT 1.2 1.1   ALKPHOS 87 88     Troponin: No results for input(s): TROPONINI in the last 72 hours. BNP: No results for input(s): BNP in the last 72 hours. Lipids: No results for input(s): CHOL, HDL in the last 72 hours. Invalid input(s): LDLCALCU  ABGs: No results found for: PHART, PO2ART, [id number]  INR: No results for input(s): INR in the last 72 hours. -----------------------------------------------------------------  RAD: CT HEAD WO CONTRAST    Result Date: 11/9/2021  EXAMINATION: CT OF THE HEAD WITHOUT CONTRAST  11/9/2021 7:16 pm TECHNIQUE: CT of the head was performed without the administration of intravenous contrast. Dose modulation, iterative reconstruction, and/or weight based adjustment of the mA/kV was utilized to reduce the radiation dose to as low as reasonably achievable. COMPARISON: None. HISTORY: ORDERING SYSTEM PROVIDED HISTORY: HEAD TRAUMA, CLOSED, MILD, GCS >= 13, NO RISK FACTORS, NEURO EXAM NORMAL TECHNOLOGIST PROVIDED HISTORY: Has a \"code stroke\" or \"stroke alert\" been called? ->No Reason for exam:->syncope Decision Support Exception - unselect if not a suspected or confirmed emergency medical condition->Emergency Medical Condition (MA) FINDINGS: BRAIN/VENTRICLES: There is no acute intracranial hemorrhage, mass effect or midline shift. No abnormal extra-axial fluid collection. The gray-white differentiation is maintained without evidence of an acute infarct. There is prominence of the ventricles and sulci due to global parenchymal volume loss. There are nonspecific areas of hypoattenuation within the periventricular and subcortical white matter, which likely represent chronic microvascular ischemic change. ORBITS: The visualized portion of the orbits demonstrate no acute abnormality. SINUSES: The visualized paranasal sinuses and mastoid air cells demonstrate no acute abnormality. SOFT TISSUES/SKULL: No acute abnormality of the visualized skull or soft tissues. No acute intracranial abnormality. Age-related loss of brain volume and chronic white matter ischemic changes. XR CHEST PORTABLE    Result Date: 11/9/2021  EXAMINATION: ONE XRAY VIEW OF THE CHEST 11/9/2021 6:41 pm COMPARISON: October 19 HISTORY: ORDERING SYSTEM PROVIDED HISTORY: syncope TECHNOLOGIST PROVIDED HISTORY: Reason for exam:->syncope FINDINGS: The lungs are without acute focal process. There is no effusion or pneumothorax. The cardiomediastinal silhouette is without acute process. The osseous structures are without acute process. MediPort line in the SVC. No acute process. US RETROPERITONEAL COMPLETE    Result Date: 11/10/2021  EXAMINATION: RETROPERITONEAL ULTRASOUND OF THE KIDNEYS AND URINARY BLADDER 11/10/2021 COMPARISON: None HISTORY: ORDERING SYSTEM PROVIDED HISTORY: ELIANA TECHNOLOGIST PROVIDED HISTORY: Reason for exam:->ELIANA What reading provider will be dictating this exam?->CRC FINDINGS: Right kidney measures 9.0 x 3.7 x 5.2 cm Left kidney is 9.4 x 5.4 x 5.1 cm No evidence for hydronephrosis.   Normal renal echogenicity At the upper pole the right kidney there is a 2.7 x 3.0 x 3.0 cm simple appearing cyst There is a Patel within the urinary bladder     Right renal cyst otherwise negative study         Objective:   Vitals:   Vitals:    11/12/21 0505   BP: 100/60   Pulse: 82   Resp: 16   Temp: 98.3 °F (36.8 °C)   SpO2: 100%     Patient Vitals for the past 24 hrs:   BP Temp Temp src Pulse Resp SpO2   11/12/21 0505 100/60 98.3 °F (36.8 °C) Oral 82 16 100 %   11/12/21 0220 (!) 97/56 98.2 °F (36.8 °C) Oral 79 16 100 %   11/12/21 0201 98/62 98.3 °F (36.8 °C) Oral 85 16 98 %   11/11/21 2355 (!) 97/52 98.4 °F (36.9 °C) Oral 84 17 99 %   11/11/21 1937 100/63 99 °F (37.2 °C)  88  97 %   11/11/21 1634 (!) 112/58 97.5 °F (36.4 °C) Oral 86 16 100 %     General appearance: alert, appears stated age and cooperative  Skin: Skin color, texture, turgor normal. No rashes or lesions  HEENT: Head: Normocephalic, no lesions, without obvious abnormality. Neck: no adenopathy, no carotid bruit, no JVD, supple, symmetrical, trachea midline and thyroid not enlarged, symmetric, no tenderness/mass/nodules  Lungs: Clear  Heart: regular rate and rhythm, S1, S2 normal, no murmur, click, rub or gallop  Abdomen: soft, non-tender; bowel sounds normal; no masses,  no organomegaly  Extremities: extremities normal, atraumatic, no cyanosis or edema  Neurologic: Mental status: Alert, oriented, thought content appropriate      Assessment/Plan:   1. 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 Agree with holding the escitalopram  2. Continue the FR  3. Started NaCl tablets  4. With no improvement, will trial dose of Ure-Na this morning     2. Syncope which may have related to the hyponatremia also possible due to the tizanidine  PLAN:  1. Agree with holding  the tizanidine  2. Follow orthostatic BPs     3. Hypomagnesemia  PLAN:  1. IV supplement this a.m.   2. Recheck in the AM     4.  Pancytopenia  Has been managed by hematology in the outpatient setting     5. Essential hypertension  Now admitted with hypotension  PLAN:   1. Hold parameters for amlodipine - likely will need a lower dose upon discharge           Thank you for allowing us to participate in care of Ms Jacque Lozano, JAVIER - CNP   Patient seen and examined. I had a face to face encounter with the patient. Pt awake alert and answering all questions. Her  is at the bedside and states he believes her mentation is improved but not yet to baseline  Agree with exam.    Agree with  formulation, assessment and plan as outlined above and directed by me. Addition and corrections were done as deemed appropriate. My exam and plan include:     A/P:  1. Hyponatremia-pt Na+ plateaued at [id number] earlier today with [id number] dose this PM is not to be given discussed with RN, and will await labs to be done at 2200    Continue current treatment.     MITA Yeung MD

## 2021-11-13 NOTE — PROGRESS NOTES
Per Dr. Mick Mcdermott changing the BMP's to every 8 hours, still notify of results and cancel the every 4 hour urine for osmolality.

## 2021-11-13 NOTE — PROGRESS NOTES
NEPHROLOGY Attending   Progress Note  11/13/2021 11:37 AM  Subjective:   Admit Date: 11/9/2021  PCP: Lizzy Franco MD    History of Present Ilness:  Mrs. Sirena Mccarthy is a 75-year-old -American female with history of hypertension, hyperlipidemia, GERD asthma, depression, hypothyroidism, DJD history of metastatic ovarian cancer with mets to peritoneum and has been on maintenance chemo with Jessika Miguel and questionable history of atrial fibrillation diagnosed in September 2021 and now presents to the ED on November 9, 2021 with fatigue and syncope. Vitals upon arrival included   BP (!) 102/57   Pulse 68   Temp 97.8 °F (36.6 °C) (Oral)   Resp 16   Wt 138 lb (62.6 kg)   SpO2 96%   BMI 25.24 kg/m² Oxygen Saturation Interpretation: Normal.  Pertinent labs included WBC 5.0, hemoglobin 6.8, hematocrit 19.5 and platelet count 52. Initial BMP showed sodium of 123, potassium 4.7, chloride 88, CO2 20, BUN 33 and creatinine 1.1.  UA showed clear yellow urine, 1.015, negative protein, negative leukocytes. Chest x-ray showed no acute process. Patient was subsequently admitted with dehydration, anemia, pancytopenia, and hypotension. Interval History:      11/13/2021- she is awake and alert in the bed, no overnight issues. She is comfortable. Diet: ADULT DIET;  Regular; Low Fat/Low Chol/High Fiber/NICOLASA; 1200 ml    Data:   Scheduled Meds:   levothyroxine  75 mcg Oral Daily    urea  15 g Oral Daily    sodium chloride  2 g Oral TID     oyster shell calcium w/D  1 tablet Oral Daily with breakfast    vitamin B-12  100 mcg Oral Daily    gabapentin  300 mg Oral TID    vitamin B-6  100 mg Oral Daily    atorvastatin  10 mg Oral Nightly    amLODIPine  10 mg Oral Daily    sodium chloride flush  5-40 mL IntraVENous 2 times per day    famotidine  20 mg Oral Daily     Continuous Infusions:   sodium chloride      sodium chloride 25 mL (11/12/21 2240)     PRN Meds:sodium chloride, albuterol, [Held by provider] tiZANidine, sodium chloride flush, sodium chloride, ondansetron **OR** ondansetron, acetaminophen **OR** acetaminophen    Intake/Output Summary (Last 24 hours) at 11/13/2021 1137  Last data filed at 11/12/2021 1543  Gross per 24 hour   Intake    Output 750 ml   Net -750 ml     CBC:   Recent Labs     11/10/21  1940 11/11/21  0900 11/12/21  0632   WBC 5.7 5.7  --    HGB 6.7* 6.7* 8.0*   PLT 58* 66*  --      BMP:    Recent Labs     11/12/21  2337 11/13/21  0325 11/13/21  0800   * 126* 126*   K 3.6 4.3 3.7   CL 88* 93* 92*   CO2 24 26 25   BUN 28* 22 18   CREATININE 0.6 0.6 0.6   GLUCOSE 153* 124* 159*     Hepatic:   Recent Labs     11/10/21  1940   AST 13   ALT 11   BILITOT 1.1   ALKPHOS 88     Troponin: No results for input(s): TROPONINI in the last 72 hours. BNP: No results for input(s): BNP in the last 72 hours. Lipids: No results for input(s): CHOL, HDL in the last 72 hours. Invalid input(s): LDLCALCU  ABGs: No results found for: PHART, PO2ART, [id number]  INR: No results for input(s): INR in the last 72 hours. -----------------------------------------------------------------  RAD: CT HEAD WO CONTRAST    Result Date: 11/9/2021  EXAMINATION: CT OF THE HEAD WITHOUT CONTRAST  11/9/2021 7:16 pm TECHNIQUE: CT of the head was performed without the administration of intravenous contrast. Dose modulation, iterative reconstruction, and/or weight based adjustment of the mA/kV was utilized to reduce the radiation dose to as low as reasonably achievable. COMPARISON: None. HISTORY: ORDERING SYSTEM PROVIDED HISTORY: HEAD TRAUMA, CLOSED, MILD, GCS >= 13, NO RISK FACTORS, NEURO EXAM NORMAL TECHNOLOGIST PROVIDED HISTORY: Has a \"code stroke\" or \"stroke alert\" been called? ->No Reason for exam:->syncope Decision Support Exception - unselect if not a suspected or confirmed emergency medical condition->Emergency Medical Condition (MA) FINDINGS: BRAIN/VENTRICLES: There is no acute intracranial hemorrhage, mass effect or midline likely will need a lower dose upon discharge        Thank you for allowing us to participate in care of Ms Colonel Braxton, APRN - CNP   Patient seen and examined. I had a face to face encounter with the patient. She is awake alert and appropriate no new complaints  Agree with exam.    Agree with  formulation, assessment and plan as outlined above and directed by me. Addition and corrections were done as deemed appropriate. My exam and plan include:     A/P:  1. Hyponatremia-Na+has appeared to plateau-will follow with the initiation of the [id number]-[id number] labs to q8hrs    2. Essn Hypertension-BP have been low  PLAN:  1. Will hold the amlodipine  2. Check Orthostatic BP  BP low  Continue current treatment.     MITA Yeung MD

## 2021-11-13 NOTE — PROGRESS NOTES
[facility] Progress Note    Admitting Date and Time: 11/9/2021  6:29 PM  Admit Dx: Syncope and collapse [R55]  Hyponatremia [E87.1]  Cancer (HCC) [C80.1]  Hypotension, unspecified hypotension type [I95.9]  Anemia, unspecified type [D64.9]    Subjective:  Patient is being followed for Syncope and collapse [R55]  Hyponatremia [E87.1]  Cancer ([address] [C80.1]  Hypotension, unspecified hypotension type [I95.9]  Anemia, unspecified type [D64.9]   Pt feels fine, denied any headache or blurry vision no lightheadedness    ROS: denies fever, chills, cp, sob, n/v, HA unless stated above.       levothyroxine  75 mcg Oral Daily    urea  15 g Oral Daily    sodium chloride  2 g Oral TID WC    oyster shell calcium w/D  1 tablet Oral Daily with breakfast    vitamin B-12  100 mcg Oral Daily    gabapentin  300 mg Oral TID    vitamin B-6  100 mg Oral Daily    atorvastatin  10 mg Oral Nightly    amLODIPine  10 mg Oral Daily    sodium chloride flush  5-40 mL IntraVENous 2 times per day    famotidine  20 mg Oral Daily     sodium chloride, , PRN  albuterol, 2.5 mg, Q1H PRN  [Held by provider] tiZANidine, 2 mg, Q8H PRN  sodium chloride flush, 5-40 mL, PRN  sodium chloride, 25 mL, PRN  ondansetron, 4 mg, Q8H PRN   Or  ondansetron, 4 mg, Q6H PRN  acetaminophen, 650 mg, Q6H PRN   Or  acetaminophen, 650 mg, Q6H PRN         Objective:    BP (!) 93/53   Pulse 84   Temp 98.2 °F (36.8 °C) (Oral)   Resp 16   Ht 5' 2\" (1.575 m)   Wt 138 lb (62.6 kg)   SpO2 99%   BMI 25.24 kg/m²     General Appearance: alert and oriented to person, place and time and in no acute distress  Skin: warm and dry  Head: normocephalic and atraumatic  Eyes: pupils equal, round, and reactive to light, extraocular eye movements intact, conjunctivae normal  Neck: neck supple and non tender without mass   Pulmonary/Chest: clear to auscultation bilaterally- no wheezes, rales or rhonchi, normal air movement, no respiratory

## 2021-11-14 NOTE — PROGRESS NOTES
Dr. Rosanne Goncalves notified of consult, he is covering for Dr. Judy Oden , they will see patient tomorrow.

## 2021-11-14 NOTE — PLAN OF CARE
Problem: Falls - Risk of:  Goal: Will remain free from falls  Description: Will remain free from falls  Outcome: Met This Shift  Goal: Absence of physical injury  Description: Absence of physical injury  Outcome: Met This Shift     Problem: Discharge Planning:  Goal: Participates in care planning  Description: Participates in care planning  Outcome: Met This Shift     Problem: Mental Status - Impaired:  Goal: Absence of physical injury  Description: Absence of physical injury  Outcome: Met This Shift

## 2021-11-14 NOTE — PROGRESS NOTES
Critical hemoglobin of 6.7 reported to Dr. Sharif Gilmore, new orders received will continue to monitor.

## 2021-11-14 NOTE — PROGRESS NOTES
NEPHROLOGY Attending   Progress Note  11/14/2021 11:35 AM  Subjective:   Admit Date: 11/9/2021  PCP: Abbie Herrera MD    History of Present Ilness:  Mrs. Kinjal Bello is a 73-year-old -American female with history of hypertension, hyperlipidemia, GERD asthma, depression, hypothyroidism, DJD history of metastatic ovarian cancer with mets to peritoneum and has been on maintenance chemo with Irving Netter and questionable history of atrial fibrillation diagnosed in September 2021 and now presents to the ED on November 9, 2021 with fatigue and syncope. Vitals upon arrival included   BP (!) 102/57   Pulse 68   Temp 97.8 °F (36.6 °C) (Oral)   Resp 16   Wt 138 lb (62.6 kg)   SpO2 96%   BMI 25.24 kg/m² Oxygen Saturation Interpretation: Normal.  Pertinent labs included WBC 5.0, hemoglobin 6.8, hematocrit 19.5 and platelet count 52. Initial BMP showed sodium of 123, potassium 4.7, chloride 88, CO2 20, BUN 33 and creatinine 1.1.  UA showed clear yellow urine, 1.015, negative protein, negative leukocytes. Chest x-ray showed no acute process. Patient was subsequently admitted with dehydration, anemia, pancytopenia, and hypotension. Interval History:      11/14/2021- laying in bed, doing well this afternoon. Diet: ADULT DIET;  Regular; Low Fat/Low Chol/High Fiber/NICOLASA; 1200 ml    Data:   Scheduled Meds:   levothyroxine  75 mcg Oral Daily    urea  15 g Oral Daily    sodium chloride  2 g Oral TID     oyster shell calcium w/D  1 tablet Oral Daily with breakfast    vitamin B-12  100 mcg Oral Daily    gabapentin  300 mg Oral TID    vitamin B-6  100 mg Oral Daily    atorvastatin  10 mg Oral Nightly    [Held by provider] amLODIPine  10 mg Oral Daily    sodium chloride flush  5-40 mL IntraVENous 2 times per day    famotidine  20 mg Oral Daily     Continuous Infusions:   sodium chloride      sodium chloride 25 mL (11/12/21 2240)     PRN Meds:sodium chloride, albuterol, [Held by provider] tiZANidine, sodium chloride flush, sodium chloride, ondansetron **OR** ondansetron, acetaminophen **OR** acetaminophen    Intake/Output Summary (Last 24 hours) at 11/14/2021 1135  Last data filed at 11/14/2021 0950  Gross per 24 hour   Intake 240 ml   Output 1200 ml   Net -960 ml     CBC:   Recent Labs     11/12/21  0632   HGB 8.0*     BMP:    Recent Labs     11/13/21  1221 11/13/21  1621 11/14/21  0453   * 125* 127*   K 4.0 4.0 3.7   CL 92* 91* 92*   CO2 26 26 25   BUN 37* 29* 14   CREATININE 0.6 0.7 0.6   GLUCOSE 116* 147* 119*     Hepatic:   No results for input(s): AST, ALT, ALB, BILITOT, ALKPHOS in the last 72 hours. Troponin: No results for input(s): TROPONINI in the last 72 hours. BNP: No results for input(s): BNP in the last 72 hours. Lipids: No results for input(s): CHOL, HDL in the last 72 hours. Invalid input(s): LDLCALCU  ABGs: No results found for: PHART, PO2ART, VLO5CMW  INR: No results for input(s): INR in the last 72 hours. -----------------------------------------------------------------  RAD: CT HEAD WO CONTRAST    Result Date: 11/9/2021  EXAMINATION: CT OF THE HEAD WITHOUT CONTRAST  11/9/2021 7:16 pm TECHNIQUE: CT of the head was performed without the administration of intravenous contrast. Dose modulation, iterative reconstruction, and/or weight based adjustment of the mA/kV was utilized to reduce the radiation dose to as low as reasonably achievable. COMPARISON: None. HISTORY: ORDERING SYSTEM PROVIDED HISTORY: HEAD TRAUMA, CLOSED, MILD, GCS >= 13, NO RISK FACTORS, NEURO EXAM NORMAL TECHNOLOGIST PROVIDED HISTORY: Has a \"code stroke\" or \"stroke alert\" been called? ->No Reason for exam:->syncope Decision Support Exception - unselect if not a suspected or confirmed emergency medical condition->Emergency Medical Condition (MA) FINDINGS: BRAIN/VENTRICLES: There is no acute intracranial hemorrhage, mass effect or midline shift. No abnormal extra-axial fluid collection.   The gray-white differentiation is maintained without evidence of an acute infarct. There is prominence of the ventricles and sulci due to global parenchymal volume loss. There are nonspecific areas of hypoattenuation within the periventricular and subcortical white matter, which likely represent chronic microvascular ischemic change. ORBITS: The visualized portion of the orbits demonstrate no acute abnormality. SINUSES: The visualized paranasal sinuses and mastoid air cells demonstrate no acute abnormality. SOFT TISSUES/SKULL: No acute abnormality of the visualized skull or soft tissues. No acute intracranial abnormality. Age-related loss of brain volume and chronic white matter ischemic changes. XR CHEST PORTABLE    Result Date: 11/9/2021  EXAMINATION: ONE XRAY VIEW OF THE CHEST 11/9/2021 6:41 pm COMPARISON: October 19 HISTORY: ORDERING SYSTEM PROVIDED HISTORY: syncope TECHNOLOGIST PROVIDED HISTORY: Reason for exam:->syncope FINDINGS: The lungs are without acute focal process. There is no effusion or pneumothorax. The cardiomediastinal silhouette is without acute process. The osseous structures are without acute process. MediPort line in the SVC. No acute process. US RETROPERITONEAL COMPLETE    Result Date: 11/10/2021  EXAMINATION: RETROPERITONEAL ULTRASOUND OF THE KIDNEYS AND URINARY BLADDER 11/10/2021 COMPARISON: None HISTORY: ORDERING SYSTEM PROVIDED HISTORY: ELIANA TECHNOLOGIST PROVIDED HISTORY: Reason for exam:->ELIANA What reading provider will be dictating this exam?->CRC FINDINGS: Right kidney measures 9.0 x 3.7 x 5.2 cm Left kidney is 9.4 x 5.4 x 5.1 cm No evidence for hydronephrosis.   Normal renal echogenicity At the upper pole the right kidney there is a 2.7 x 3.0 x 3.0 cm simple appearing cyst There is a Patel within the urinary bladder     Right renal cyst otherwise negative study         Objective:   Vitals:   Vitals:    11/14/21 0750   BP: (!) 92/58   Pulse: 80   Resp: 16   Temp: 98.1 °F (36.7 °C)   SpO2: 100%     Patient Vitals for the past 24 hrs:   BP Temp Temp src Pulse Resp SpO2   11/14/21 0750 (!) 92/58 98.1 °F (36.7 °C) Oral 80 16 100 %   11/14/21 0404 101/60 97.9 °F (36.6 °C) Oral 81 16 96 %   11/14/21 0014 (!) 92/59 99 °F (37.2 °C)  82 16 100 %   11/13/21 2110 (!) 94/56 99 °F (37.2 °C) Oral 86 16 97 %   11/13/21 1804      100 %   11/13/21 1612 98/60 98.6 °F (37 °C) Oral 101 16 100 %   11/13/21 1228 115/63 98.6 °F (37 °C) Oral 91 16      General appearance: alert, appears stated age and cooperative  Skin: Skin color, texture, turgor normal. No rashes or lesions  HEENT: Head: Normocephalic, no lesions, without obvious abnormality. Neck: no adenopathy, no carotid bruit, no JVD, supple, symmetrical, trachea midline and thyroid not enlarged, symmetric, no tenderness/mass/nodules  Lungs: Clear  Heart: regular rate and rhythm, S1, S2 normal, no murmur, click, rub or gallop  Abdomen: soft, non-tender; bowel sounds normal; no masses,  no organomegaly  Extremities: extremities normal, atraumatic, no cyanosis or edema  Neurologic: Mental status: Alert, oriented, thought content appropriate      Assessment/Plan:   1. 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 Agree with holding the escitalopram  2. Continue the FR  3. Started NaCl tablets without effect  4. Halle Mendoza started with Na at plateau      2. Syncope which may have related to the hyponatremia also possible due to the tizanidine  PLAN:  1. Agree with holding  the tizanidine  2. Follow orthostatic BPs     3. Hypomagnesemia  PLAN:  1. IV supplement for Mg++ 1.7  2. Recheck in the AM     4.  Pancytopenia  Has been managed by hematology in the outpatient setting     5. Essential hypertension  Now admitted with hypotension  Remains orthostatic  PLAN:   1.   Holding amlodipine         Thank you for allowing us to participate in care of Ms [address] [facility], [facility]   Patient seen and examined. I had a face to face encounter with the patient. Pt states  She feels well, no new complaints. Her Hgb 6.7 and is awaiting PRBC  Agree with exam.    Agree with  formulation, assessment and plan as outlined above and directed by me. Addition and corrections were done as deemed appropriate. My exam and plan include:     A/P:  1. Hyponatremia-slow improvement in the Na+ now up to 128 will change BMP to QD-she will not take the NaCl tablets-will D/C and continue the Ure-Na    Continue current treatment.     MITA Yeung MD

## 2021-11-14 NOTE — PROGRESS NOTES
[facility] Progress Note    Admitting Date and Time: 11/9/2021  6:29 PM  Admit Dx: Syncope and collapse [R55]  Hyponatremia [E87.1]  Cancer (HCC) [C80.1]  Hypotension, unspecified hypotension type [I95.9]  Anemia, unspecified type [D64.9]    Subjective:  Patient is being followed for Syncope and collapse [R55]  Hyponatremia [E87.1]  Cancer ([address] [C80.1]  Hypotension, unspecified hypotension type [I95.9]  Anemia, unspecified type [D64.9]   Pt feels fine, denied any headache or blurry vision no lightheadedness  No BMs for 3 days    ROS: denies fever, chills, cp, sob, n/v, HA unless stated above.       magnesium sulfate  2,000 mg IntraVENous Once    levothyroxine  75 mcg Oral Daily    urea  15 g Oral Daily    sodium chloride  2 g Oral TID WC    oyster shell calcium w/D  1 tablet Oral Daily with breakfast    vitamin B-12  100 mcg Oral Daily    gabapentin  300 mg Oral TID    vitamin B-6  100 mg Oral Daily    atorvastatin  10 mg Oral Nightly    [Held by provider] amLODIPine  10 mg Oral Daily    sodium chloride flush  5-40 mL IntraVENous 2 times per day    famotidine  20 mg Oral Daily     sodium chloride, , PRN  albuterol, 2.5 mg, Q1H PRN  [Held by provider] tiZANidine, 2 mg, Q8H PRN  sodium chloride flush, 5-40 mL, PRN  sodium chloride, 25 mL, PRN  ondansetron, 4 mg, Q8H PRN   Or  ondansetron, 4 mg, Q6H PRN  acetaminophen, 650 mg, Q6H PRN   Or  acetaminophen, 650 mg, Q6H PRN         Objective:    BP (!) 92/58 Comment: manual  Pulse 80   Temp 98.1 °F (36.7 °C) (Oral)   Resp 16   Ht 5' 2\" (1.575 m)   Wt 138 lb (62.6 kg)   SpO2 100%   BMI 25.24 kg/m²     General Appearance: alert and oriented to person, place and time and in no acute distress  Skin: warm and dry  Head: normocephalic and atraumatic  Eyes: pupils equal, round, and reactive to light, extraocular eye movements intact, conjunctivae normal  Neck: neck supple and non tender without mass   Pulmonary/Chest: clear to auscultation bilaterally- no wheezes, rales or rhonchi, normal air movement, no respiratory distress  Cardiovascular: normal rate, normal S1 and S2 and no carotid bruits  Abdomen: soft, non-tender, non-distended, normal bowel sounds, no masses or organomegaly  Extremities: no cyanosis, no clubbing and no edema  Neurologic: no cranial nerve deficit and speech normal        Recent Labs     11/13/21  1221 11/13/21  1621 11/14/21  0453   * 125* 127*   K 4.0 4.0 3.7   CL 92* 91* 92*   CO2 26 26 25   BUN 37* 29* 14   CREATININE 0.6 0.7 0.6   GLUCOSE 116* 147* 119*   CALCIUM 8.6 8.5* 8.2*       Recent Labs     11/12/21  0632   HGB 8.0*   HCT 22.9*         Assessment:    Active Problems:    Hyponatremia    ELIANA (acute kidney injury) ([facility] [address]  Resolved Problems:    * No resolved hospital problems. *      Plan:  Acute renal failure on CKD  Present with creatinine 1.1, baseline below 1, resolved    Hyponatremia  Serum osmolality  is low, urine osm is elevated. Fluid restriction, still not improving  Started salt tabs. Na is still at 127    Anemia  Acute on chronic  S/p transfusion  Hemoglobin up to 8    Thrombocytopenia  Chronic. Platelets stable at 77  Consider consulting hematology    Hypothyroidism  Patient is on levothyroxine, suppressed TSH and elevated free T4  We need to decrease the dose of levothyroxine    Constipation  Miralax one dose    NOTE: This report was transcribed using voice recognition software. Every effort was made to ensure accuracy; however, inadvertent computerized transcription errors may be present.   Electronically signed by Rosanne Vega MD on 11/14/2021 at 11:48 AM

## 2021-11-14 NOTE — PROGRESS NOTES
Dr. Alycia Sylvester notified of am BMP results as well as am VS, orders received will continue to monitor.

## 2021-11-14 NOTE — PLAN OF CARE
Problem: Falls - Risk of:  Goal: Will remain free from falls  Description: Will remain free from falls  Outcome: Met This Shift  Goal: Absence of physical injury  Description: Absence of physical injury  Outcome: Met This Shift     Problem:  Activity Intolerance:  Goal: Ability to tolerate increased activity will improve  Description: Ability to tolerate increased activity will improve  Outcome: Met This Shift     Problem: Anxiety/Stress:  Goal: Level of anxiety will decrease  Description: Level of anxiety will decrease  Outcome: Met This Shift     Problem: Mental Status - Impaired:  Goal: Absence of physical injury  Description: Absence of physical injury  Outcome: Met This Shift

## 2021-11-14 NOTE — PROGRESS NOTES
Dr. Christie Marmolejo notified that patient is refusing her sodium tablets, no new orders will continue to follow.

## 2021-11-15 NOTE — CONSULTS
30     Department of Medicine  Division of Hematology/Oncology  Attending Consult Note        Reason for Consult:  Recurrent low hemoglobin  Requesting Physician:  Kathie Lee MD     CHIEF COMPLAINT:  Fatigue/syncope     History Obtained From:  Patient     HISTORY OF PRESENT ILLNESS:       The patient is a 65 y.o. female with significant past medical history of primary peritoneal/ovarian adenocarcinoma status post carboplatin/paclitaxel and maintenance [place], hemolytic anemia/transfusion reaction status post steroids and IVIG, hypertension, hyperlipidemia, hypothyroidism who presents with complaints of fatigue and syncope. She was admitted to the hospital 11/9 with diagnoses of syncope and collapse, anemia, hyponatremia, hypotension and cancer. She was home from the hospital for about a week with continued fatigue and she experienced a syncopal episode. On presentation in ED, H/H 6.8/19.5 with platelets 42,250. She received 1 uinit PRBC 11/11 with H/H improvement to 8/22.9 temporarily. H/H yesterday 6.7/19.4. Platelets have continued to decline since admission 11/14/2021 down to 36,000. Patient is lying in bed without complaints currently and feels better. She specifically denies fever, chills, night sweat, GI upset, nosebleeds or abnormal bleeding, SOB/CP. She is constipated and has been treated with miralax.   Dexamethasone was continued on hospital admission.     Past Medical History:    Past Medical History             Diagnosis Date    Ankle swelling       takes diuretic prn    Asthma       mild    Cancer (HCC) 07/2019     ovarian, treated with surgery and chemo    Depression      GERD (gastroesophageal reflux disease)      Hyperlipidemia      Hypertension      Hypothyroidism      Osteoarthritis      Positive FIT (fecal immunochemical test)      Vitamin D deficiency      Wears partial dentures       upper            Past Surgical History:    Past Surgical History             Procedure Laterality Date    APPENDECTOMY   years ago    BREAST SURGERY   1960s     removal lump from right     SECTION         x 1    COLONOSCOPY N/A 2020     COLONOSCOPY DIAGNOSTIC performed by Petra Veloz MD at [address]      abdominal - has menopause the  day    LAPAROTOMY   2019     debulking of pelvic mass, DR. Ronny LOCK [facility]    LYMPH NODE DISSECTION        TUNNELED VENOUS PORT PLACEMENT               Current Medications:      Current Hospital Medications   Current Facility-Administered Medications: 0.9 % sodium chloride infusion, , IntraVENous, PRN  levothyroxine (SYNTHROID) tablet 75 mcg, 75 mcg, Oral, Daily  urea (URE-NA) packet 15 g, 15 g, Oral, Daily  oyster shell calcium w/D 500-200 MG-UNIT tablet 1 tablet, 1 tablet, Oral, Daily with breakfast  0.9 % sodium chloride infusion, , IntraVENous, PRN  albuterol (PROVENTIL) nebulizer solution 2.5 mg, 2.5 mg, Nebulization, Q1H PRN  vitamin B-12 (CYANOCOBALAMIN) tablet 100 mcg, 100 mcg, Oral, Daily  gabapentin (NEURONTIN) capsule 300 mg, 300 mg, Oral, TID  vitamin B-6 (PYRIDOXINE) tablet 100 mg, 100 mg, Oral, Daily  [Held by provider] tiZANidine (ZANAFLEX) tablet 2 mg, 2 mg, Oral, Q8H PRN  atorvastatin (LIPITOR) tablet 10 mg, 10 mg, Oral, Nightly  [Held by provider] amLODIPine (NORVASC) tablet 10 mg, 10 mg, Oral, Daily  sodium chloride flush 0.9 % injection 5-40 mL, 5-40 mL, IntraVENous, 2 times per day  sodium chloride flush 0.9 % injection 5-40 mL, 5-40 mL, IntraVENous, PRN  0.9 % sodium chloride infusion, 25 mL, IntraVENous, PRN  ondansetron (ZOFRAN-ODT) disintegrating tablet 4 mg, 4 mg, Oral, Q8H PRN **OR** ondansetron (ZOFRAN) injection 4 mg, 4 mg, IntraVENous, Q6H PRN  acetaminophen (TYLENOL) tablet 650 mg, 650 mg, Oral, Q6H PRN **OR** acetaminophen (TYLENOL) suppository 650 mg, 650 mg, Rectal, Q6H PRN  famotidine (PEPCID) tablet 20 mg, 20 mg, Oral, Daily        Allergies:  Patient has no known allergies.     Social History:   Social History               Socioeconomic History    Marital status:        Spouse name: Mr. Glynn Randall Number of children: 1    Years of education: Not on file    Highest education level: Not on file   Occupational History    Occupation: disability       Comment: ankle/low back    Occupation: CNA       Comment: disabled   Tobacco Use    Smoking status: Never Smoker    Smokeless tobacco: Never Used   Vaping Use    Vaping Use: Never used   Substance and Sexual Activity    Alcohol use: No    Drug use: Never    Sexual activity: Not on file       Comment: hsd a , had given birth to a son who has passed since   Other Topics Concern    Not on file   Social History Narrative     CODE STATUS: Full Code     HEALTH CARE PROXY: her , Mr. Alissa San, [phone] (land) and [phone] (mobile)     AMBULATES: independently      DOMICILED: has stairs in the home which she uses, her  lives with her, they are allowing her daughter and grand daughter to live there at the moment      Social Determinants of Health          Financial Resource Strain: Medium Risk    Difficulty of Paying Living Expenses: Somewhat hard   Food Insecurity: Food Insecurity Present    Worried About [address] in the Last Year: Sometimes true    [address] in the Last Year: Sometimes true   Transportation Needs: No Transportation Needs    Lack of Transportation (Medical): No    Lack of Transportation (Non-Medical):  No   Physical Activity:     Days of Exercise per Week: Not on file    Minutes of Exercise per Session: Not on file   Stress:     Feeling of Stress : Not on file   Social Connections:     Frequency of Communication with Friends and Family: Not on file    Frequency of Social Gatherings with Friends and Family: Not on file    Attends Yarsanism Services: Not on file    Active Member of Clubs or Organizations: Not on file   [facility] Attends Club or Organization Meetings: Not on file    Marital Status: Not on file   Intimate Partner Violence:     Fear of Current or Ex-Partner: Not on file    Emotionally Abused: Not on file    Physically Abused: Not on file    Sexually Abused: Not on file   Housing Stability:     Unable to Pay for Housing in the Last Year: Not on file    Number of Places Lived in the Last Year: Not on file    Unstable Housing in the Last Year: Not on file            Family History:     Family History             Problem Relation Age of Onset    Heart Disease Mother      High Blood Pressure Mother      Diabetes Father      Thyroid Disease Sister           2    No Known Problems Sister      No Known Problems Brother      Colon Cancer Son      Ovarian Cancer Neg Hx      Uterine Cancer Neg Hx      Breast Cancer Neg Hx              REVIEW OF SYSTEMS:    CONSTITUTIONAL:  negative for fevers, chills, sweats, fatigue and weight loss  HEENT:  negative for hearing loss, epistaxis and sore throat  RESPIRATORY:  negative for cough with sputum, dyspnea, wheezing, hemoptysis and chest pain  CARDIOVASCULAR:  negative for chest pain, dyspnea, palpitations, PND, edema, syncope  GASTROINTESTINAL:  POSITIVE CONSTIPATION, negative for nausea, vomiting, abdominal pain, jaundice, reflux, hematemesis and hemtochezia  GENITOURINARY:  negative for frequency, dysuria and urinary incontinence  INTEGUMENT/BREAST:  negative for rash and pruritus  HEMATOLOGIC/LYMPHATIC:  negative for easy bruising, bleeding, lymphadenopathy and petechiae  MUSCULOSKELETAL:  negative for myalgias, arthralgias and pain  NEUROLOGICAL:  negative for headaches, dizziness, seizures, gait problems and syncope  BEHAVIOR/PSYCH:  negative for depressed mood and anxiety     PHYSICAL EXAM:       Vitals:  /63   Pulse 93   Temp 98.9 °F (37.2 °C) (Oral)   Resp 16   Ht 5' 2\" (1.575 m)   Wt 138 lb (62.6 kg)   SpO2 100%   BMI 25.24 kg/m²      CONSTITUTIONAL: awake, alert, cooperative, no apparent distress, and appears stated age  HEENT:  Normocepalic, atraumatic, no obvious abnormality. Lids and lashes normal, PERRLA, EOMI, sclera clear, conjunctiva normal  NECK:  Supple, full ROM, symmetrical, trachea midline, no adenopathy, thyroid symmetric, not enlarged and no tenderness, skin normal  HEMATOLOGIC/LYMPHATICS:  no cervical or supraclavicular lymphadenopathy  LUNGS:  No increased work of breathing, good air exchange, clear to auscultation bilaterally, no crackles or wheezing  CARDIOVASCULAR:  Normal apical impulse, regular rate and rhythm, normal S1 and S2, no S3 or S4, and no murmur noted  ABDOMEN:  No scars, normal BS, soft, non-distended, non-tender, no masses palpated, no hepatosplenomegaly  MUSCULOSKELETAL:  there is no redness, warmth, or swelling of the joints; motor strength is 5 out of 5 in all extremities bilaterally; tone is normal  NEUROLOGIC:  Awake, alert, oriented to name, place and time. No gross neurologic deficits     DATA:     CMP:          Lab Results   Component Value Date      11/15/2021     K 4.3 11/15/2021     K 4.1 11/10/2021     CL 94 11/15/2021     CO2 26 11/15/2021     BUN 14 11/15/2021     PROT 7.0 11/10/2021         CBC:        Recent Labs     11/14/21  1313   WBC 4.6   HGB 6.7*   PLT 36*         Radiology:     US RETROPERITONEAL COMPLETE   Final Result   Right renal cyst otherwise negative study           CT HEAD WO CONTRAST   Final Result   No acute intracranial abnormality.   Age-related loss of brain volume and   chronic white matter ischemic changes.           XR CHEST PORTABLE   Final Result   No acute process.                 IMPRESSION:   66 yo female known to Dr. Griselda Isbell with metastatic primary peritoneal/ovarian adenocarcinoma.  Treated with Carboplatin and Paclitaxel.  Had biopsy proven recurrence s/p excisional resection on 7/19/19.  Treated with Carbo/ Paclitaxel from Aug to Oct 2019.  Followed by brian Wayne from 12/11/19. Dose reduced to 200 mg daily due to cytopenias.  Recently had recurrent syncope and concern for parox AFib, trial of BB and Eliquis for about a week, these were recently discontinued by EP.  Had recent URI earlier in Oct and was treated with abx about a week prior.  At the time received 2 units pRBC on 46/83/79 w/o any complications at the time.  Hgb 9.9 upon discharge on 10/22/21.    Admitted 10/27/21 with syncope and found to have severe hemolytic anemia decreased to 4.9 g/dL with thrombocytopenia/leukopenia and jaundice.  Found to have anti-C, E, Fya and S antibody.  Consistent with delayed hemolytic transfusion reaction.  Treated with steroids from 10/27/21 and IVIG on 10/28/21.   Patient arrived to the hospital for hyponatremia and syncope. Patient still has significant anemia, macrocytic. She has progressive thrombocytopenia. Reports no bleeding from nose or gums. No change in the color of the stools or urine. RECOMMENDATIONS:     Anemia: Keep hemoglobin above 7 g/dL. Waiting for compatible unit due to multiple antibodies. Patient is nearly symptomatic at rest.  So far there is no clear evidence of hemolysis. I will obtain a new hemolysis markers including LDH, reticulocyte site count, haptoglobin and LFTs. Dr. Mary Rios was planning to start her on rituximab in the outpatient settings. Treatment can be started here if she is showing evidence of significant hemolysis. Keep platelet count above 10. Transfuse as needed. Ovarian cancer on maintenance therapy with Serene Burow is on hold. Continue current supportive care.     Electronically signed by GINO Hollis on 11/15/2021 at 11:15 AM    Patient was seen and examined, discussed with PA. I agree with above documentation but I made some changes in the impression and recommendation sections to reflect my opinion.

## 2021-11-15 NOTE — CARE COORDINATION
Updated plan of care. Continue to monitor labs, fluid restriction, hgb low. Awaiting blood. Pt to receive 1 unit PRBC. Plan remains home with spouse. Pt does not want home care.  Will continue to follow-mjo

## 2021-11-15 NOTE — PLAN OF CARE
Problem: Falls - Risk of:  Goal: Will remain free from falls  Description: Will remain free from falls  11/14/2021 2339 by Cal Leija RN  Outcome: Met This Shift     Problem: Falls - Risk of:  Goal: Absence of physical injury  Description: Absence of physical injury  11/14/2021 2339 by Cal Leija RN  Outcome: Met This Shift     Problem: Discharge Planning:  Goal: Participates in care planning  Description: Participates in care planning  Outcome: Met This Shift     Problem: Activity Intolerance:  Goal: Ability to tolerate increased activity will improve  Description: Ability to tolerate increased activity will improve  11/14/2021 2339 by Cal Leija RN  Outcome: Met This Shift     Problem: Anxiety/Stress:  Goal: Level of anxiety will decrease  Description: Level of anxiety will decrease  11/14/2021 2339 by Cal Leija RN  Outcome: Met This Shift     Problem:  Bowel Function - Altered:  Goal: Bowel elimination is within specified parameters  Description: Bowel elimination is within specified parameters  Outcome: Ongoing     Problem: Fluid Volume - Deficit:  Goal: Absence of fluid volume deficit signs and symptoms  Description: Absence of fluid volume deficit signs and symptoms  Outcome: Met This Shift     Problem: Mobility - Impaired:  Goal: Mobility will improve to maximum level  Description: Mobility will improve to maximum level  Outcome: Met This Shift     Problem: Pain:  Goal: Pain level will decrease  Description: Pain level will decrease  Outcome: Met This Shift     Problem: Pain:  Goal: Pain level will decrease  Description: Pain level will decrease  Outcome: Met This Shift

## 2021-11-15 NOTE — PROGRESS NOTES
Per transfusion department the [facility]

## 2021-11-15 NOTE — PROGRESS NOTES
[facility] Progress Note    Admitting Date and Time: 11/9/2021  6:29 PM  Admit Dx: Syncope and collapse [R55]  Hyponatremia [E87.1]  Cancer (HCC) [C80.1]  Hypotension, unspecified hypotension type [I95.9]  Anemia, unspecified type [D64.9]    Subjective:  Patient is being followed for Syncope and collapse [R55]  Hyponatremia [E87.1]  Cancer ([address] [C80.1]  Hypotension, unspecified hypotension type [I95.9]  Anemia, unspecified type [D64.9]   Pt feels fine, denied any headache or blurry vision no lightheadedness    ROS: denies fever, chills, cp, sob, n/v, HA unless stated above.       levothyroxine  75 mcg Oral Daily    urea  15 g Oral Daily    oyster shell calcium w/D  1 tablet Oral Daily with breakfast    vitamin B-12  100 mcg Oral Daily    gabapentin  300 mg Oral TID    vitamin B-6  100 mg Oral Daily    atorvastatin  10 mg Oral Nightly    [Held by provider] amLODIPine  10 mg Oral Daily    sodium chloride flush  5-40 mL IntraVENous 2 times per day    famotidine  20 mg Oral Daily     sodium chloride, , PRN  sodium chloride, , PRN  albuterol, 2.5 mg, Q1H PRN  [Held by provider] tiZANidine, 2 mg, Q8H PRN  sodium chloride flush, 5-40 mL, PRN  sodium chloride, 25 mL, PRN  ondansetron, 4 mg, Q8H PRN   Or  ondansetron, 4 mg, Q6H PRN  acetaminophen, 650 mg, Q6H PRN   Or  acetaminophen, 650 mg, Q6H PRN         Objective:    /63   Pulse 93   Temp 98.9 °F (37.2 °C) (Oral)   Resp 16   Ht 5' 2\" (1.575 m)   Wt 138 lb (62.6 kg)   SpO2 100%   BMI 25.24 kg/m²     General Appearance: alert and oriented to person, place and time and in no acute distress  Skin: warm and dry  Head: normocephalic and atraumatic  Eyes: pupils equal, round, and reactive to light, extraocular eye movements intact, conjunctivae normal  Neck: neck supple and non tender without mass   Pulmonary/Chest: clear to auscultation bilaterally- no wheezes, rales or rhonchi, normal air movement, no respiratory distress  Cardiovascular: normal rate, normal S1 and S2 and no carotid bruits  Abdomen: soft, non-tender, non-distended, normal bowel sounds, no masses or organomegaly  Extremities: no cyanosis, no clubbing and no edema  Neurologic: no cranial nerve deficit and speech normal        Recent Labs     11/14/21  0453 11/14/21  1313 11/15/21  0520   * 128* 129*   K 3.7 3.8 4.3   CL 92* 93* 94*   CO2 25 25 26   BUN 14 32* 14   CREATININE 0.6 0.6 0.6   GLUCOSE 119* 152* 118*   CALCIUM 8.2* 8.6 8.3*       Recent Labs     11/14/21  1313   WBC 4.6   RBC 1.92*   HGB 6.7*   HCT 19.4*   .0*   MCH 34.9   MCHC 34.5   RDW 24.1*   PLT 36*   MPV 12.4*         Assessment:    Active Problems:    Hyponatremia    ELIANA (acute kidney injury) ([facility]  Resolved Problems:    * No resolved hospital problems. *      Plan:  Acute renal failure on CKD  Present with creatinine 1.1, baseline below 1, resolved    Hyponatremia  Serum osmolality  is low, urine osm is elevated. Fluid restriction, still not improving  Started salt tabs. Na is 129    Anemia  Acute on chronic  S/p transfusion  Hemoglobin up to 8  Hemoglobin is down again to 6.7 we will attempt to transfuse. Patient has antibodies awaiting for blood    Thrombocytopenia  Chronic. Platelets down to 39  Consulted hematology    Hypothyroidism  Patient is on levothyroxine, suppressed TSH and elevated free T4  We need to decrease the dose of levothyroxine    Constipation  Miralax one dose    NOTE: This report was transcribed using voice recognition software. Every effort was made to ensure accuracy; however, inadvertent computerized transcription errors may be present.   Electronically signed by Venancio Reed MD on 11/15/2021 at 9:25 AM

## 2021-11-16 NOTE — PROGRESS NOTES
[facility] Progress Note    Admitting Date and Time: 11/9/2021  6:29 PM  Admit Dx: Syncope and collapse [R55]  Hyponatremia [E87.1]  Cancer (HCC) [C80.1]  Hypotension, unspecified hypotension type [I95.9]  Anemia, unspecified type [D64.9]    Subjective:  Patient is being followed for Syncope and collapse [R55]  Hyponatremia [E87.1]  Cancer ([address] [C80.1]  Hypotension, unspecified hypotension type [I95.9]  Anemia, unspecified type [D64.9]   Pt feels fine, denied any headache or blurry vision no lightheadedness    ROS: denies fever, chills, cp, sob, n/v, HA unless stated above.       polyethylene glycol  17 g Oral Daily    levothyroxine  75 mcg Oral Daily    urea  15 g Oral Daily    oyster shell calcium w/D  1 tablet Oral Daily with breakfast    vitamin B-12  100 mcg Oral Daily    gabapentin  300 mg Oral TID    vitamin B-6  100 mg Oral Daily    atorvastatin  10 mg Oral Nightly    [Held by provider] amLODIPine  10 mg Oral Daily    sodium chloride flush  5-40 mL IntraVENous 2 times per day    famotidine  20 mg Oral Daily     sodium chloride, , PRN  sodium chloride, , PRN  albuterol, 2.5 mg, Q1H PRN  [Held by provider] tiZANidine, 2 mg, Q8H PRN  sodium chloride flush, 5-40 mL, PRN  sodium chloride, 25 mL, PRN  ondansetron, 4 mg, Q8H PRN   Or  ondansetron, 4 mg, Q6H PRN  acetaminophen, 650 mg, Q6H PRN   Or  acetaminophen, 650 mg, Q6H PRN         Objective:    BP (!) 94/56   Pulse 91   Temp 98 °F (36.7 °C) (Oral)   Resp 15   Ht 5' 2\" (1.575 m)   Wt 138 lb (62.6 kg)   SpO2 95%   BMI 25.24 kg/m²     General Appearance: alert and oriented to person, place and time and in no acute distress  Skin: warm and dry  Head: normocephalic and atraumatic  Eyes: pupils equal, round, and reactive to light, extraocular eye movements intact, conjunctivae normal  Neck: neck supple and non tender without mass   Pulmonary/Chest: clear to auscultation bilaterally- no wheezes, rales or rhonchi, normal air movement, no respiratory distress  Cardiovascular: normal rate, normal S1 and S2 and no carotid bruits  Abdomen: soft, non-tender, non-distended, normal bowel sounds, no masses or organomegaly  Extremities: no cyanosis, no clubbing and no edema  Neurologic: no cranial nerve deficit and speech normal        Recent Labs     11/14/21  1313 11/15/21  0520 11/16/21  0710   * 129* 130*   K 3.8 4.3 4.1   CL 93* 94* 95*   CO2 25 26 25   BUN 32* 14 15   CREATININE 0.6 0.6 0.7   GLUCOSE 152* 118* 116*   CALCIUM 8.6 8.3* 8.6       Recent Labs     11/14/21  1313 11/15/21  1640 11/16/21  0600   WBC 4.6 4.7 4.2*   RBC 1.92* 1.70* 2.31*   HGB 6.7* 6.0* 7.2*   HCT 19.4* 17.6* 21.9*   .0* 103.5* 94.8   MCH 34.9 35.3* 31.2   MCHC 34.5 34.1 32.9   RDW 24.1* 23.7* 24.5*   PLT 36* 35* 25*   MPV 12.4* 12.2* 12.6*         Assessment:    Active Problems:    Hyponatremia    ELIANA (acute kidney injury) ([facility] [address]  Resolved Problems:    * No resolved hospital problems. *      Plan:  Acute renal failure on CKD  Present with creatinine 1.1, baseline below 1, resolved    Hyponatremia  Serum osmolality  is low, urine osm is elevated. Fluid restriction, still not improving  Started salt tabs. Na is 130    Anemia  Acute on chronic  Ruling out hemolysis, if proven as hemolysis then might start rituximab as inpatient  S/p transfusion  Hemoglobin up to 8  Hemoglobin is down again to 6.7 we will attempt to transfuse. Patient has antibodies awaiting for blood    Thrombocytopenia  Chronic. Platelets down to 25  Consulted hematology    Hypothyroidism  Patient is on levothyroxine, suppressed TSH and elevated free T4  We need to decrease the dose of levothyroxine    Constipation  Miralax one dose    NOTE: This report was transcribed using voice recognition software. Every effort was made to ensure accuracy; however, inadvertent computerized transcription errors may be present.   Electronically signed by Lisseth Harrell MD on 11/16/2021 at 9:18 AM

## 2021-11-16 NOTE — PROGRESS NOTES
NEPHROLOGY Attending   Progress Note  11/16/2021 9:54 AM  Subjective:   Admit Date: 11/9/2021  PCP: Heather Bonilla MD    History of Present Ilness:  Mrs. Van Berkowitz is a 68-year-old -American female with history of hypertension, hyperlipidemia, GERD asthma, depression, hypothyroidism, DJD history of metastatic ovarian cancer with mets to peritoneum and has been on maintenance chemo with Astrid Fast and questionable history of atrial fibrillation diagnosed in September 2021 and now presents to the ED on November 9, 2021 with fatigue and syncope. Vitals upon arrival included   BP (!) 102/57   Pulse 68   Temp 97.8 °F (36.6 °C) (Oral)   Resp 16   Wt 138 lb (62.6 kg)   SpO2 96%   BMI 25.24 kg/m² Oxygen Saturation Interpretation: Normal.  Pertinent labs included WBC 5.0, hemoglobin 6.8, hematocrit 19.5 and platelet count 52. Initial BMP showed sodium of 123, potassium 4.7, chloride 88, CO2 20, BUN 33 and creatinine 1.1.  UA showed clear yellow urine, 1.015, negative protein, negative leukocytes. Chest x-ray showed no acute process. Patient was subsequently admitted with dehydration, anemia, pancytopenia, and hypotension. Interval History:      11/16/21: no adverse events overnight - up and about in room - no syncope - improving appetite         Diet: ADULT DIET;  Regular; Low Fat/Low Chol/High Fiber/NICOLASA; 1200 ml    Data:   Scheduled Meds:   polyethylene glycol  17 g Oral Daily    levothyroxine  75 mcg Oral Daily    urea  15 g Oral Daily    oyster shell calcium w/D  1 tablet Oral Daily with breakfast    vitamin B-12  100 mcg Oral Daily    gabapentin  300 mg Oral TID    vitamin B-6  100 mg Oral Daily    atorvastatin  10 mg Oral Nightly    [Held by provider] amLODIPine  10 mg Oral Daily    sodium chloride flush  5-40 mL IntraVENous 2 times per day    famotidine  20 mg Oral Daily     Continuous Infusions:   sodium chloride      sodium chloride      sodium chloride 25 mL (11/12/21 1350)     PRN Meds:sodium chloride, sodium chloride, albuterol, [Held by provider] tiZANidine, sodium chloride flush, sodium chloride, ondansetron **OR** ondansetron, acetaminophen **OR** acetaminophen    Intake/Output Summary (Last 24 hours) at 11/16/2021 0954  Last data filed at 11/16/2021 0428  Gross per 24 hour   Intake 331.67 ml   Output 850 ml   Net -518.33 ml     CBC:   Recent Labs     11/14/21  1313 11/15/21  1640 11/16/21  0600   WBC 4.6 4.7 4.2*   HGB 6.7* 6.0* 7.2*   PLT 36* 35* 25*     BMP:    Recent Labs     11/14/21  1313 11/15/21  0520 11/16/21  0710   * 129* 130*   K 3.8 4.3 4.1   CL 93* 94* 95*   CO2 25 26 25   BUN 32* 14 15   CREATININE 0.6 0.6 0.7   GLUCOSE 152* 118* 116*     Hepatic:   Recent Labs     11/15/21  1640   AST 15   ALT 10   BILITOT 0.8   ALKPHOS 91     Troponin: No results for input(s): TROPONINI in the last 72 hours. BNP: No results for input(s): BNP in the last 72 hours. Lipids: No results for input(s): CHOL, HDL in the last 72 hours. Invalid input(s): LDLCALCU  ABGs: No results found for: PHART, PO2ART, FAU4QSM  INR: No results for input(s): INR in the last 72 hours. -----------------------------------------------------------------  RAD: CT HEAD WO CONTRAST    Result Date: 11/9/2021  EXAMINATION: CT OF THE HEAD WITHOUT CONTRAST  11/9/2021 7:16 pm TECHNIQUE: CT of the head was performed without the administration of intravenous contrast. Dose modulation, iterative reconstruction, and/or weight based adjustment of the mA/kV was utilized to reduce the radiation dose to as low as reasonably achievable. COMPARISON: None. HISTORY: ORDERING SYSTEM PROVIDED HISTORY: HEAD TRAUMA, CLOSED, MILD, GCS >= 13, NO RISK FACTORS, NEURO EXAM NORMAL TECHNOLOGIST PROVIDED HISTORY: Has a \"code stroke\" or \"stroke alert\" been called? ->No Reason for exam:->syncope Decision Support Exception - unselect if not a suspected or confirmed emergency medical condition->Emergency Medical Condition (MA) FINDINGS: BRAIN/VENTRICLES: There is no acute intracranial hemorrhage, mass effect or midline shift. No abnormal extra-axial fluid collection. The gray-white differentiation is maintained without evidence of an acute infarct. There is prominence of the ventricles and sulci due to global parenchymal volume loss. There are nonspecific areas of hypoattenuation within the periventricular and subcortical white matter, which likely represent chronic microvascular ischemic change. ORBITS: The visualized portion of the orbits demonstrate no acute abnormality. SINUSES: The visualized paranasal sinuses and mastoid air cells demonstrate no acute abnormality. SOFT TISSUES/SKULL: No acute abnormality of the visualized skull or soft tissues. No acute intracranial abnormality. Age-related loss of brain volume and chronic white matter ischemic changes. XR CHEST PORTABLE    Result Date: 11/9/2021  EXAMINATION: ONE XRAY VIEW OF THE CHEST 11/9/2021 6:41 pm COMPARISON: October 19 HISTORY: ORDERING SYSTEM PROVIDED HISTORY: syncope TECHNOLOGIST PROVIDED HISTORY: Reason for exam:->syncope FINDINGS: The lungs are without acute focal process. There is no effusion or pneumothorax. The cardiomediastinal silhouette is without acute process. The osseous structures are without acute process. MediPort line in the SVC. No acute process. US RETROPERITONEAL COMPLETE    Result Date: 11/10/2021  EXAMINATION: RETROPERITONEAL ULTRASOUND OF THE KIDNEYS AND URINARY BLADDER 11/10/2021 COMPARISON: None HISTORY: ORDERING SYSTEM PROVIDED HISTORY: ELIANA TECHNOLOGIST PROVIDED HISTORY: Reason for exam:->ELIANA What reading provider will be dictating this exam?->CRC FINDINGS: Right kidney measures 9.0 x 3.7 x 5.2 cm Left kidney is 9.4 x 5.4 x 5.1 cm No evidence for hydronephrosis.   Normal renal echogenicity At the upper pole the right kidney there is a 2.7 x 3.0 x 3.0 cm simple appearing cyst There is a Patel within the urinary bladder     Right renal cyst otherwise negative study         Objective:   Vitals:   Vitals:    11/16/21 0730   BP: (!) 94/56   Pulse: 91   Resp: 15   Temp: 98 °F (36.7 °C)   SpO2: 95%     Patient Vitals for the past 24 hrs:   BP Temp Temp src Pulse Resp SpO2 Height   11/16/21 0730 (!) 94/56 98 °F (36.7 °C) Oral 91 15 95 %    11/16/21 0717       5' 2\" (1.575 m)   11/16/21 0428 98/62 98 °F (36.7 °C) Oral 92 16 99 %    11/16/21 0129 96/60 98.3 °F (36.8 °C) Oral 90 16 99 %    11/16/21 0059 (!) 98/55 98.5 °F (36.9 °C) Oral 90 16 97 %    11/16/21 0053 (!) 83/57 98.2 °F (36.8 °C) Oral 96 16 100 %    11/15/21 2018 (!) 108/57 98.9 °F (37.2 °C) Oral 100 16 94 %      General appearance: alert, appears stated age and cooperative  Skin: Skin color, texture, turgor normal. No rashes or lesions  HEENT: Head: Normocephalic, no lesions, without obvious abnormality. Neck: no adenopathy, no carotid bruit, no JVD, supple, symmetrical, trachea midline and thyroid not enlarged, symmetric, no tenderness/mass/nodules  Lungs: Clear  Heart: regular rate and rhythm, S1, S2 normal, no murmur, click, rub or gallop  Abdomen: soft, non-tender; bowel sounds normal; no masses,  no organomegaly  Extremities: extremities normal, atraumatic, no cyanosis or edema  Neurologic: Mental status: Alert, oriented, thought content appropriate      Assessment/Plan:   1. 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 Continue the FR  2. Continue Ure-Na with slow improvement in Na levels      2. Syncope which may have related to the hyponatremia also possible due to the tizanidine  PLAN:  1. Agree with holding the tizanidine     3.  Hypomagnesemia  1.5 (11/16)  PLAN:  1. IV supplement this am      4.  Pancytopenia  Has been managed by hematology in the outpatient setting   PLAN:   1. Hem Onc now following      5. Essential hypertension  Now admitted with hypotension  PLAN:   1. Holding amlodipine w/ BP readings in the 90s        Thank you for allowing us to participate in care of Ms Parish ArambulaJAVIER - CNP   Patient seen and examined. I had a face to face encounter with the patient. Pt states she feels better post transfusion  Agree with exam.    Agree with  formulation, assessment and plan as outlined above and directed by me. Addition and corrections were done as deemed appropriate. My exam and plan include:     A/P:  1. Hyponatremia-Na+ 130 this Am post the Ure-Na-will continue same for present    Agree with the remainder as above  Continue current treatment.     MITA Yeung MD

## 2021-11-16 NOTE — PLAN OF CARE
Problem: Falls - Risk of:  Goal: Will remain free from falls  Description: Will remain free from falls  11/16/2021 0120 by Jas Smith RN  Outcome: Met This Shift     Problem: Falls - Risk of:  Goal: Absence of physical injury  Description: Absence of physical injury  11/16/2021 0120 by Jas Smith RN  Outcome: Met This Shift     Problem: Discharge Planning:  Goal: Participates in care planning  Description: Participates in care planning  Outcome: Met This Shift     Problem: Activity Intolerance:  Goal: Ability to tolerate increased activity will improve  Description: Ability to tolerate increased activity will improve  11/16/2021 0120 by Jas Smith RN  Outcome: Met This Shift     Problem: Anxiety/Stress:  Goal: Level of anxiety will decrease  Description: Level of anxiety will decrease  11/16/2021 0120 by Jas Smith RN  Outcome: Met This Shift     Problem:  Bowel Function - Altered:  Goal: Bowel elimination is within specified parameters  Description: Bowel elimination is within specified parameters  Outcome: Ongoing     Problem: Mental Status - Impaired:  Goal: Absence of physical injury  Description: Absence of physical injury  11/16/2021 0120 by Jas Smith RN  Outcome: Met This Shift     Problem: Mobility - Impaired:  Goal: Mobility will improve to maximum level  Description: Mobility will improve to maximum level  Outcome: Met This Shift     Problem: Nutrition Deficit:  Goal: Ability to achieve adequate nutritional intake will improve  Description: Ability to achieve adequate nutritional intake will improve  Outcome: Met This Shift     Problem: Pain:  Goal: Pain level will decrease  Description: Pain level will decrease  Outcome: Met This Shift

## 2021-11-16 NOTE — PROGRESS NOTES
Subjective: The patient is awake and alert. No problems overnight. She received 1 unit PRBC  BM 11/15, negative for occult stool  Denies bleeding gums, nose bleeds, chest pain, angina, dyspnea or abdominal discomfort. No nausea or vomiting. Tolerating diet. Objective:    BP (!) 94/56   Pulse 91   Temp 98 °F (36.7 °C) (Oral)   Resp 15   Ht 5' 2\" (1.575 m)   Wt 138 lb (62.6 kg)   SpO2 95%   BMI 25.24 kg/m²     General: NAD  HEENT: No thrush or mucositis, EOMI, PERRLA  Heart:  RRR with occasional ectopic beats, no murmurs, gallops, or rubs.   Lungs:  CTA bilaterally, no wheeze, rales or rhonchi  Abd: BS present, nontender, nondistended, no masses  Extrem:  No clubbing, cyanosis, or edema  Lymphatics: No palpable adenopathy in cervical and supraclavicular regions  Skin: Intact, no petechia or purpura    CBC with Differential:    Lab Results   Component Value Date    WBC 4.2 11/16/2021    RBC 2.31 11/16/2021    RBC 3.12 07/20/2019    HGB 7.2 11/16/2021    HCT 21.9 11/16/2021    PLT 25 11/16/2021    MCV 94.8 11/16/2021    MCH 31.2 11/16/2021    MCHC 32.9 11/16/2021    RDW 24.5 11/16/2021    NRBC 0.0 11/15/2021    METASPCT 2.0 11/11/2021    LYMPHOPCT 46.5 11/15/2021    MONOPCT 14.7 11/15/2021    MYELOPCT 0.0 11/11/2021    BASOPCT 0.0 11/15/2021    MONOSABS 0.70 11/15/2021    LYMPHSABS 2.21 11/15/2021    EOSABS 0.00 11/15/2021    BASOSABS 0.00 11/15/2021     CMP:    Lab Results   Component Value Date     11/16/2021    K 4.1 11/16/2021    K 4.1 11/10/2021    CL 95 11/16/2021    CO2 25 11/16/2021    BUN 15 11/16/2021    CREATININE 0.7 11/16/2021    GFRAA >60 11/16/2021    LABGLOM >60 11/16/2021    GLUCOSE 116 11/16/2021    GLUCOSE 101 12/19/2011    PROT 6.7 11/15/2021    LABALBU 3.3 11/15/2021    LABALBU 4.6 12/19/2011    CALCIUM 8.6 11/16/2021    BILITOT 0.8 11/15/2021    ALKPHOS 91 11/15/2021    AST 15 11/15/2021    ALT 10 11/15/2021   HAPTOGLOBIN 41    Component      Latest Ref Rng & Units 5-40 mL, IntraVENous, PRN, Naomi Kumar MD    0.9 % sodium chloride infusion, 25 mL, IntraVENous, PRN, Naomi Kumar MD, Last Rate: 100 mL/hr at 11/12/21 2240, 25 mL at 11/12/21 2240    ondansetron (ZOFRAN-ODT) disintegrating tablet 4 mg, 4 mg, Oral, Q8H PRN **OR** ondansetron (ZOFRAN) injection 4 mg, 4 mg, IntraVENous, Q6H PRN, Naomi Kumar MD, 4 mg at 11/12/21 1232    acetaminophen (TYLENOL) tablet 650 mg, 650 mg, Oral, Q6H PRN, 650 mg at 11/13/21 2117 **OR** acetaminophen (TYLENOL) suppository 650 mg, 650 mg, Rectal, Q6H PRN, Naomi Kumar MD    famotidine (PEPCID) tablet 20 mg, 20 mg, Oral, Daily, Naomi Kumar MD, 20 mg at 11/16/21 0836      Assessment:  1. Anemia - H/H 7.2 after 1 unit PRBC  2. Ovarian cancer - Treated with Carboplatin and Paclitaxel.  Had biopsy proven recurrence s/p excisional resection on 7/19/19.  Treated with Carbo/ Paclitaxel from Aug to Oct 2019.  Followed by maintenance Laquetta Fat from 12/11/19. Dose reduced to 200 mg daily due to cytopenias  3. Constipation - BM 11/15  4.  Thrombocytopenia -  Continued decline    Plan:  Continued hemolysis  IVIG 30 gm IV x 1 11/16/2021  Daily LDH, haptoglobin, retic count, indirect/direct bilirubin  Continue to monitor H/H, platelets and maintain HGB above 7 g/dL and PLT above 10,0000        Electronically signed by Drake Holstein, PA on 11/16/2021 at 10:05 AM

## 2021-11-16 NOTE — PROGRESS NOTES
Comprehensive Nutrition Assessment    Type and Reason for Visit:  Initial, Positive Nutrition Screen    Nutrition Recommendations/Plan: Continue current diet. Nutrition Assessment:  Pt passed out on the toilet. She has done this before a couple of times per EMR.  could not pick wife up from floor and called EMS. Pt adm with syncope adn hgb low and given blood transfuse 1 unit. Pt tolerating diet well with no N/V/D. Malnutrition Assessment:  Malnutrition Status:  No malnutrition    Context:  Chronic Illness     Findings of the 6 clinical characteristics of malnutrition:  Energy Intake:  No significant decrease in energy intake  Weight Loss:  No significant weight loss     Body Fat Loss:  No significant body fat loss     Muscle Mass Loss:  No significant muscle mass loss    Fluid Accumulation:  No significant fluid accumulation     Strength:  Not Performed    Estimated Daily Nutrient Needs:  Energy (kcal):   ; Weight Used for Energy Requirements:        Protein (g):   ; Weight Used for Protein Requirements:           Fluid (ml/day):   ; Method Used for Fluid Requirements:         Nutrition Related Findings:  A&Ox4, BS active, Abd Soft, -I/Os, no Edema      Wounds:  None       Current Nutrition Therapies:    ADULT DIET; Regular; Low Fat/Low Chol/High Fiber/NICOLASA; 1200 ml    Anthropometric Measures:  · Height: 5' 2\" (157.5 cm)  · Current Body Weight: 138 lb (62.6 kg) (10/30)       · Usual Body Weight: 142 lb 6.4 oz (64.6 kg) (4/2/21 bed)     · Ideal Body Weight: 110 lbs; % Ideal Body Weight 125.5 %   · BMI: 25.2  · Adjusted Body Weight:  ; No Adjustment       · BMI Categories: Overweight (BMI 25.0-29. 9)       Nutrition Diagnosis:   · No nutrition diagnosis at this time related to   as evidenced by      Nutrition Interventions:   Food and/or Nutrient Delivery:  Continue Current Diet  Nutrition Education/Counseling:  No recommendation at this time   Coordination of Nutrition Care:  Continue to monitor while inpatient    Goals:  >75% of meals and ONS consumed       Nutrition Monitoring and Evaluation:   Behavioral-Environmental Outcomes:  None Identified   Food/Nutrient Intake Outcomes:  Food and Nutrient Intake, Supplement Intake  Physical Signs/Symptoms Outcomes:  None Identified     Discharge Planning:     Too soon to determine     Electronically signed by Madie Harper RD, LD on 11/16/21 at 7:47 AM EST    Contact: [phone]

## 2021-11-17 NOTE — PROGRESS NOTES
NEPHROLOGY Attending   Progress Note  11/17/2021 8:54 AM  Subjective:   Admit Date: 11/9/2021  PCP: Gonzalo Ngo MD    History of Present Ilness:  Mrs. Gabrielle Mckinley is a 64-year-old -American female with history of hypertension, hyperlipidemia, GERD asthma, depression, hypothyroidism, DJD history of metastatic ovarian cancer with mets to peritoneum and has been on maintenance chemo with Dallis Sames and questionable history of atrial fibrillation diagnosed in September 2021 and now presents to the ED on November 9, 2021 with fatigue and syncope. Vitals upon arrival included   BP (!) 102/57   Pulse 68   Temp 97.8 °F (36.6 °C) (Oral)   Resp 16   Wt 138 lb (62.6 kg)   SpO2 96%   BMI 25.24 kg/m² Oxygen Saturation Interpretation: Normal.  Pertinent labs included WBC 5.0, hemoglobin 6.8, hematocrit 19.5 and platelet count 52. Initial BMP showed sodium of 123, potassium 4.7, chloride 88, CO2 20, BUN 33 and creatinine 1.1.  UA showed clear yellow urine, 1.015, negative protein, negative leukocytes. Chest x-ray showed no acute process. Patient was subsequently admitted with dehydration, anemia, pancytopenia, and hypotension. Interval History:      11/17/21: no significant changes overnight -good appetite -cooperative with current fluid restriction -denies any dizziness or syncope when up and about        Diet: ADULT DIET;  Regular; Low Fat/Low Chol/High Fiber/NICOLASA; 1200 ml    Data:   Scheduled Meds:   polyethylene glycol  17 g Oral Daily    levothyroxine  75 mcg Oral Daily    urea  15 g Oral Daily    oyster shell calcium w/D  1 tablet Oral Daily with breakfast    vitamin B-12  100 mcg Oral Daily    gabapentin  300 mg Oral TID    vitamin B-6  100 mg Oral Daily    atorvastatin  10 mg Oral Nightly    [Held by provider] amLODIPine  10 mg Oral Daily    sodium chloride flush  5-40 mL IntraVENous 2 times per day    famotidine  20 mg Oral Daily     Continuous Infusions:   sodium chloride      sodium chloride  sodium chloride 25 mL (11/12/21 2240)     PRN Meds:sodium chloride, sodium chloride, albuterol, [Held by provider] tiZANidine, sodium chloride flush, sodium chloride, ondansetron **OR** ondansetron, acetaminophen **OR** acetaminophen    Intake/Output Summary (Last 24 hours) at 11/17/2021 0854  Last data filed at 11/17/2021 0251  Gross per 24 hour   Intake 220 ml   Output    Net 220 ml     CBC:   Recent Labs     11/14/21  1313 11/15/21  1640 11/16/21  0600   WBC 4.6 4.7 4.2*   HGB 6.7* 6.0* 7.2*   PLT 36* 35* 25*     BMP:    Recent Labs     11/15/21  0520 11/16/21  0710 11/17/21  0250   * 130* 132   K 4.3 4.1 4.5   CL 94* 95* 98   CO2 26 25 26   BUN 14 15 17   CREATININE 0.6 0.7 0.6   GLUCOSE 118* 116* 119*     Hepatic:   Recent Labs     11/15/21  1640   AST 15   ALT 10   BILITOT 0.8   ALKPHOS 91     Troponin: No results for input(s): TROPONINI in the last 72 hours. BNP: No results for input(s): BNP in the last 72 hours. Lipids: No results for input(s): CHOL, HDL in the last 72 hours. Invalid input(s): LDLCALCU  ABGs: No results found for: PHART, PO2ART, [id number]  INR: No results for input(s): INR in the last 72 hours. -----------------------------------------------------------------  RAD: CT HEAD WO CONTRAST    Result Date: 11/9/2021  EXAMINATION: CT OF THE HEAD WITHOUT CONTRAST  11/9/2021 7:16 pm TECHNIQUE: CT of the head was performed without the administration of intravenous contrast. Dose modulation, iterative reconstruction, and/or weight based adjustment of the mA/kV was utilized to reduce the radiation dose to as low as reasonably achievable. COMPARISON: None. HISTORY: ORDERING SYSTEM PROVIDED HISTORY: HEAD TRAUMA, CLOSED, MILD, GCS >= 13, NO RISK FACTORS, NEURO EXAM NORMAL TECHNOLOGIST PROVIDED HISTORY: Has a \"code stroke\" or \"stroke alert\" been called? ->No Reason for exam:->syncope Decision Support Exception - unselect if not a suspected or confirmed emergency medical condition->Emergency Medical Condition (MA) FINDINGS: BRAIN/VENTRICLES: There is no acute intracranial hemorrhage, mass effect or midline shift. No abnormal extra-axial fluid collection. The gray-white differentiation is maintained without evidence of an acute infarct. There is prominence of the ventricles and sulci due to global parenchymal volume loss. There are nonspecific areas of hypoattenuation within the periventricular and subcortical white matter, which likely represent chronic microvascular ischemic change. ORBITS: The visualized portion of the orbits demonstrate no acute abnormality. SINUSES: The visualized paranasal sinuses and mastoid air cells demonstrate no acute abnormality. SOFT TISSUES/SKULL: No acute abnormality of the visualized skull or soft tissues. No acute intracranial abnormality. Age-related loss of brain volume and chronic white matter ischemic changes. XR CHEST PORTABLE    Result Date: 11/9/2021  EXAMINATION: ONE XRAY VIEW OF THE CHEST 11/9/2021 6:41 pm COMPARISON: October 19 HISTORY: ORDERING SYSTEM PROVIDED HISTORY: syncope TECHNOLOGIST PROVIDED HISTORY: Reason for exam:->syncope FINDINGS: The lungs are without acute focal process. There is no effusion or pneumothorax. The cardiomediastinal silhouette is without acute process. The osseous structures are without acute process. MediPort line in the SVC. No acute process. US RETROPERITONEAL COMPLETE    Result Date: 11/10/2021  EXAMINATION: RETROPERITONEAL ULTRASOUND OF THE KIDNEYS AND URINARY BLADDER 11/10/2021 COMPARISON: None HISTORY: ORDERING SYSTEM PROVIDED HISTORY: ELIANA TECHNOLOGIST PROVIDED HISTORY: Reason for exam:->ELIANA What reading provider will be dictating this exam?->CRC FINDINGS: Right kidney measures 9.0 x 3.7 x 5.2 cm Left kidney is 9.4 x 5.4 x 5.1 cm No evidence for hydronephrosis.   Normal renal echogenicity At the upper pole the right kidney there is a 2.7 x 3.0 x 3.0 cm simple appearing cyst There is a Patel within the urinary bladder     Right renal cyst otherwise negative study         Objective:   Vitals:   Vitals:    11/17/21 0745   BP: 108/64   Pulse: 85   Resp: 15   Temp: 98.1 °F (36.7 °C)   SpO2: 100%     Patient Vitals for the past 24 hrs:   BP Temp Temp src Pulse Resp SpO2   11/17/21 0745 108/64 98.1 °F (36.7 °C) Oral 85 15 100 %   11/16/21 2044 110/65 98.2 °F (36.8 °C) Oral 102 16 100 %     General appearance: alert, appears stated age and cooperative  Skin: Skin color, texture, turgor normal. No rashes or lesions  HEENT: Head: Normocephalic, no lesions, without obvious abnormality. Neck: no adenopathy, no carotid bruit, no JVD, supple, symmetrical, trachea midline and thyroid not enlarged, symmetric, no tenderness/mass/nodules  Lungs: Clear  Heart: regular rate and rhythm, S1, S2 normal, no murmur, click, rub or gallop  Abdomen: soft, non-tender; bowel sounds normal; no masses,  no organomegaly  Extremities: extremities normal, atraumatic, no cyanosis or edema  Neurologic: Mental status: Alert, oriented, thought content appropriate      Assessment/Plan:   1. 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 He is current fluid restriction  2. Continue Ure-Na with slow improvement in Na levels      2. Syncope which may have related to the hyponatremia also possible due to the tizanidine  PLAN:  1. Agree with holding the tizanidine     3. Hypomagnesemia  1.7 (11/17)  PLAN:  1. IV supplement this am for goal 2.0     4. Pancytopenia  Has been managed by hematology in the outpatient setting   PLAN:   1. Hem Onc now following      5. Essential hypertension  Now admitted with hypotension  PLAN:   1.   Holding amlodipine w/ BP readings in the 90s - at this point would continue to hold at discharge        Thank you for allowing us to participate in care of Ms Null Joseph, JAVIER - CNP   Patient seen and examined. I had a face to face encounter with the patient. Pt states she feels better, she is up eating breakfast  Agree with exam.    Agree with  formulation, assessment and plan as outlined above and directed by me. Addition and corrections were done as deemed appropriate. My exam and plan include:   A/P:  1. Hyponatremia-Na+ up to 132 on the Milagros Lang -will continue the Milagros Lang for present and follow na+    Agree with the remainder as above  Continue current treatment.     MITA Yeung MD

## 2021-11-17 NOTE — PROGRESS NOTES
Subjective: The patient is awake and alert. No problems overnight. She is feeling even better today  Denies bleeding gums, nose bleeds, chest pain, angina, dyspnea or abdominal discomfort. No nausea or vomiting. Tolerating diet. Objective:    /64   Pulse 85   Temp 98.1 °F (36.7 °C) (Oral)   Resp 15   Ht 5' 2\" (1.575 m)   Wt 138 lb (62.6 kg)   SpO2 100%   BMI 25.24 kg/m²     General: NAD  HEENT: No thrush or mucositis, EOMI, PERRLA  Heart:  RRR with occasional ectopic beats, no murmurs, gallops, or rubs. Lungs:  CTA bilaterally, no wheeze, rales or rhonchi  Abd: BS present, nontender, nondistended, no masses, no splenomegaly  Extrem:  No clubbing, cyanosis, or edema  Lymphatics: No palpable adenopathy in cervical and supraclavicular regions  Skin: Intact, no petechia or purpura    CBC with Differential:    Lab Results   Component Value Date    WBC 3.1 11/17/2021    RBC 2.15 11/17/2021    RBC 3.12 07/20/2019    HGB 6.8 11/17/2021    HCT 20.5 11/17/2021    PLT 24 11/17/2021    MCV 95.3 11/17/2021    MCH 31.6 11/17/2021    MCHC 33.2 11/17/2021    RDW 25.1 11/17/2021    NRBC 0.0 11/16/2021    BLASTSPCT 0.9 11/16/2021    METASPCT 0.9 11/16/2021    LYMPHOPCT 36.9 11/16/2021    MONOPCT 15.3 11/16/2021    MYELOPCT 1.8 11/16/2021    BASOPCT 0.0 11/16/2021    MONOSABS 0.63 11/16/2021    LYMPHSABS 1.55 11/16/2021    EOSABS 0.08 11/16/2021    BASOSABS 0.00 11/16/2021     CMP:    Lab Results   Component Value Date     11/17/2021    K 4.5 11/17/2021    K 4.1 11/10/2021    CL 98 11/17/2021    CO2 26 11/17/2021    BUN 17 11/17/2021    CREATININE 0.6 11/17/2021    GFRAA >60 11/17/2021    LABGLOM >60 11/17/2021    GLUCOSE 119 11/17/2021    GLUCOSE 101 12/19/2011    PROT 6.7 11/15/2021    LABALBU 3.3 11/15/2021    LABALBU 4.6 12/19/2011    CALCIUM 8.7 11/17/2021    BILITOT 0.8 11/15/2021    ALKPHOS 91 11/15/2021    AST 15 11/15/2021    ALT 10 11/15/2021   HAPTOGLOBIN 41      Assessment:  1.  Anemia - transfuse 1 unit PRBC with decrease hgb 6.8  2. Ovarian cancer - Treated with Carboplatin and Paclitaxel.  Had biopsy proven recurrence s/p excisional resection on 7/19/19.  Treated with Carbo/ Paclitaxel from Aug to Oct 2019.  Followed by maintenance Jada Luna from 12/11/19. Dose reduced to 200 mg daily due to cytopenias  3. Constipation - BM 11/15  4. Thrombocytopenia     Plan:  IVIG 30 gm IV x 1 11/16/2021  Daily LDH, haptoglobin, retic count, indirect/direct bilirubin  Continue to monitor H/H, platelets and maintain HGB above 7 g/dL and PLT above 10,0000        Electronically signed by GINO Liang on 11/17/2021 at 12:11 PM     Patient seen and examined. No sign of bleeding. She is thrombocytopenia in the anemia. She was given IVIG yesterday. Proceed with another dose of IVIG today. Start steroid as well there is a component of immune mediated process of both cytopenias. Orders have been placed. We will continue to follow with you.     Ruma Pack MD

## 2021-11-17 NOTE — PROGRESS NOTES
[facility] Progress Note    Admitting Date and Time: 11/9/2021  6:29 PM  Admit Dx: Syncope and collapse [R55]  Hyponatremia [E87.1]  Cancer (HCC) [C80.1]  Hypotension, unspecified hypotension type [I95.9]  Anemia, unspecified type [D64.9]    Subjective:  Patient is being followed for Syncope and collapse [R55]  Hyponatremia [E87.1]  Cancer ([address] [C80.1]  Hypotension, unspecified hypotension type [I95.9]  Anemia, unspecified type [D64.9]   Pt feels fine, denied any headache or blurry vision no lightheadedness    ROS: denies fever, chills, cp, sob, n/v, HA unless stated above.       polyethylene glycol  17 g Oral Daily    levothyroxine  75 mcg Oral Daily    urea  15 g Oral Daily    oyster shell calcium w/D  1 tablet Oral Daily with breakfast    vitamin B-12  100 mcg Oral Daily    gabapentin  300 mg Oral TID    vitamin B-6  100 mg Oral Daily    atorvastatin  10 mg Oral Nightly    [Held by provider] amLODIPine  10 mg Oral Daily    sodium chloride flush  5-40 mL IntraVENous 2 times per day    famotidine  20 mg Oral Daily     sodium chloride, , PRN  sodium chloride, , PRN  albuterol, 2.5 mg, Q1H PRN  [Held by provider] tiZANidine, 2 mg, Q8H PRN  sodium chloride flush, 5-40 mL, PRN  sodium chloride, 25 mL, PRN  ondansetron, 4 mg, Q8H PRN   Or  ondansetron, 4 mg, Q6H PRN  acetaminophen, 650 mg, Q6H PRN   Or  acetaminophen, 650 mg, Q6H PRN         Objective:    /64   Pulse 85   Temp 98.1 °F (36.7 °C) (Oral)   Resp 15   Ht 5' 2\" (1.575 m)   Wt 138 lb (62.6 kg)   SpO2 100%   BMI 25.24 kg/m²     General Appearance: alert and oriented to person, place and time and in no acute distress  Skin: warm and dry  Head: normocephalic and atraumatic  Eyes: pupils equal, round, and reactive to light, extraocular eye movements intact, conjunctivae normal  Neck: neck supple and non tender without mass   Pulmonary/Chest: clear to auscultation bilaterally- no wheezes, rales or rhonchi, normal air movement, no respiratory distress  Cardiovascular: normal rate, normal S1 and S2 and no carotid bruits  Abdomen: soft, non-tender, non-distended, normal bowel sounds, no masses or organomegaly  Extremities: no cyanosis, no clubbing and no edema  Neurologic: no cranial nerve deficit and speech normal        Recent Labs     11/15/21  0520 11/16/21  0710 11/17/21  0250   * 130* 132   K 4.3 4.1 4.5   CL 94* 95* 98   CO2 26 25 26   BUN 14 15 17   CREATININE 0.6 0.7 0.6   GLUCOSE 118* 116* 119*   CALCIUM 8.3* 8.6 8.7       Recent Labs     11/14/21  1313 11/14/21  1313 11/15/21  1640 11/16/21  0600 11/17/21  0250   WBC 4.6  --  4.7 4.2*  --    RBC 1.92*  --  1.70* 2.31*  --    HGB 6.7*  --  6.0* 7.2*  --    HCT 19.4*   < > 17.6* 21.9* 20.5*   .0*  --  103.5* 94.8  --    MCH 34.9  --  35.3* 31.2  --    MCHC 34.5  --  34.1 32.9  --    RDW 24.1*  --  23.7* 24.5*  --    PLT 36*  --  35* 25*  --    MPV 12.4*  --  12.2* 12.6*  --     < > = values in this interval not displayed. Assessment:    Active Problems:    Hyponatremia    ELIANA (acute kidney injury) ([facility] [address]  Resolved Problems:    * No resolved hospital problems. *      Plan:  Acute renal failure on CKD  Present with creatinine 1.1, baseline below 1, resolved    Hyponatremia  Serum osmolality  is low, urine osm is elevated. Fluid restriction, still not improving  Started salt tabs. Na is 130    Anemia  Acute on chronic  Ruling out hemolysis, if proven as hemolysis then might start rituximab as inpatient  S/p transfusion  Hg down again to 6.8  transfuse  Given IVIG    Thrombocytopenia  Chronic. Platelets down to 24  Consulted hematology    Hypothyroidism  Patient is on levothyroxine, suppressed TSH and elevated free T4  We need to decrease the dose of levothyroxine    Constipation  Miralax one dose    NOTE: This report was transcribed using voice recognition software.  Every effort was made to ensure accuracy; however, inadvertent computerized transcription errors may be present.   Electronically signed by Nasim Kinsey MD on 11/17/2021 at 10:28 AM

## 2021-11-18 NOTE — PLAN OF CARE
Problem: Falls - Risk of:  Goal: Will remain free from falls  Description: Will remain free from falls  Outcome: Met This Shift  Goal: Absence of physical injury  Description: Absence of physical injury  Outcome: Met This Shift     Problem: Mental Status - Impaired:  Goal: Absence of physical injury  Description: Absence of physical injury  Outcome: Met This Shift     Problem: Pain:  Goal: Pain level will decrease  Description: Pain level will decrease  Outcome: Met This Shift

## 2021-11-18 NOTE — PROGRESS NOTES
awaiting transfusion, retic count worse today  2. Ovarian cancer - Treated with Carboplatin and Paclitaxel.  Had biopsy proven recurrence s/p excisional resection on 7/19/19.  Treated with Carbo/ Paclitaxel from Aug to Oct 2019.  Followed by maintenance Syed Pease from 12/11/19. Dose reduced to 200 mg daily due to cytopenias  3. Constipation - resolved  4. pancytopenic     Plan:  S/p IVIG 30 gm IV x 1 11/16, 11/17  Decadron restarted 11/17  Daily LDH, haptoglobin, retic count, indirect/direct bilirubin  Continue to monitor H/H, platelets and maintain HGB above 7 g/dL and PLT above 10,0000    Electronically signed by GINO Verma on 11/18/2021 at 2:53 PM     Attending Addendum:      Patient seen and examined personally. All pertinent labs and imaging reviewed. Case discussed with PA. Agree with the consult/progress note as above which has been updated to reflect my changes. Continue steroids and monitor labs daily for now. Rituxan as outpatient.      Guilherme Agarwal, [address] [place]

## 2021-11-18 NOTE — PROGRESS NOTES
[facility] Progress Note    Admitting Date and Time: 11/9/2021  6:29 PM  Admit Dx: Syncope and collapse [R55]  Hyponatremia [E87.1]  Cancer (HCC) [C80.1]  Hypotension, unspecified hypotension type [I95.9]  Anemia, unspecified type [D64.9]    Subjective:  Patient is being followed for Syncope and collapse [R55]  Hyponatremia [E87.1]  Cancer ([address] [C80.1]  Hypotension, unspecified hypotension type [I95.9]  Anemia, unspecified type [D64.9]     Patient states that she is feeling better. However still waiting for PRBC    ROS: denies fever, chills, cp, sob, n/v, HA unless stated above.       dexamethasone  4 mg IntraVENous Q8H    polyethylene glycol  17 g Oral Daily    levothyroxine  75 mcg Oral Daily    urea  15 g Oral Daily    oyster shell calcium w/D  1 tablet Oral Daily with breakfast    vitamin B-12  100 mcg Oral Daily    gabapentin  300 mg Oral TID    vitamin B-6  100 mg Oral Daily    atorvastatin  10 mg Oral Nightly    [Held by provider] amLODIPine  10 mg Oral Daily    sodium chloride flush  5-40 mL IntraVENous 2 times per day    famotidine  20 mg Oral Daily     sodium chloride, , PRN  sodium chloride, , PRN  sodium chloride, , PRN  albuterol, 2.5 mg, Q1H PRN  [Held by provider] tiZANidine, 2 mg, Q8H PRN  sodium chloride flush, 5-40 mL, PRN  sodium chloride, 25 mL, PRN  ondansetron, 4 mg, Q8H PRN   Or  ondansetron, 4 mg, Q6H PRN  acetaminophen, 650 mg, Q6H PRN   Or  acetaminophen, 650 mg, Q6H PRN         Objective:    /74   Pulse 75   Temp 98.1 °F (36.7 °C) (Oral)   Resp 16   Ht 5' 2\" (1.575 m)   Wt 138 lb (62.6 kg)   SpO2 100%   BMI 25.24 kg/m²     General Appearance: alert and oriented to person, place and time and in no acute distress  Skin: warm and dry  Head: normocephalic and atraumatic  Eyes: pupils equal, round, and reactive to light, extraocular eye movements intact, conjunctivae normal  Neck: neck supple and non tender without mass   Pulmonary/Chest: clear to auscultation bilaterally- no wheezes, rales or rhonchi, normal air movement, no respiratory distress  Cardiovascular: normal rate, normal S1 and S2 and no carotid bruits  Abdomen: soft, non-tender, non-distended, normal bowel sounds, no masses or organomegaly  Extremities: no cyanosis, no clubbing and no edema  Neurologic: no cranial nerve deficit and speech normal        Recent Labs     11/16/21  0710 11/17/21  0250 11/18/21  0500   * 132 129*   K 4.1 4.5 4.5   CL 95* 98 97*   CO2 25 26 23   BUN 15 17 17   CREATININE 0.7 0.6 0.6   GLUCOSE 116* 119* 259*   CALCIUM 8.6 8.7 8.5*       Recent Labs     11/16/21  0600 11/16/21  0600 11/17/21  0250 11/17/21  1042 11/18/21  0500   WBC 4.2*  --   --  3.1* 2.1*   RBC 2.31*  --   --  2.15* 2.06*   HGB 7.2*  --   --  6.8* 6.6*   HCT 21.9*   < > 20.5* 20.5* 19.6*   MCV 94.8  --   --  95.3 95.1   MCH 31.2  --   --  31.6 32.0   MCHC 32.9  --   --  33.2 33.7   RDW 24.5*  --   --  25.1* 24.4*   PLT 25*  --   --  24* 23*   MPV 12.6*  --   --  11.1 11.9    < > = values in this interval not displayed. Radiology: No new imaging studies    Assessment:    Active Problems:    Hyponatremia    ELIANA (acute kidney injury) ([facility] [address]  Resolved Problems:    * No resolved hospital problems. *      Plan:    1. Acute on chronic anemia - Elevated reticulocyte count, elevated LDH, elevated bilirubin, normal haptoglobin. IVIG was administered 11/16, 11/17. Hematology on board. Pending PRBC for transfusion. 2. Hyponatremia likely 2/2 SIADH - Nephrology on board. Hold lexapro. Patient has been started on salt tabs and has been placed on fluid restriction. 3. Syncope 2/2 anemia and hyponatremia  4. Pancytopenia  5. Ovarian cancer - Treated with chemotherapy  6. DVT prophylaxis - scd    NOTE: This report was transcribed using voice recognition software. Every effort was made to ensure accuracy; however, inadvertent computerized transcription errors may be present.   Electronically signed by Sanjay Ochoa Barbi BYNUM on 11/18/2021 at 4:32 PM

## 2021-11-18 NOTE — PROGRESS NOTES
Notified hospitalists of patient's critical hemoglobin of 6.6 this AM. Still awaiting blood for transfusion.

## 2021-11-18 NOTE — PROGRESS NOTES
NEPHROLOGY Attending   Progress Note  11/18/2021 11:38 AM  Subjective:   Admit Date: 11/9/2021  PCP: Stephen Osorio MD    History of Present Ilness:  Mrs. Kamran Jasso is a 68-year-old -American female with history of hypertension, hyperlipidemia, GERD asthma, depression, hypothyroidism, DJD history of metastatic ovarian cancer with mets to peritoneum and has been on maintenance chemo with Corinne Loly and questionable history of atrial fibrillation diagnosed in September 2021 and now presents to the ED on November 9, 2021 with fatigue and syncope. Vitals upon arrival included   BP (!) 102/57   Pulse 68   Temp 97.8 °F (36.6 °C) (Oral)   Resp 16   Wt 138 lb (62.6 kg)   SpO2 96%   BMI 25.24 kg/m² Oxygen Saturation Interpretation: Normal.  Pertinent labs included WBC 5.0, hemoglobin 6.8, hematocrit 19.5 and platelet count 52. Initial BMP showed sodium of 123, potassium 4.7, chloride 88, CO2 20, BUN 33 and creatinine 1.1.  UA showed clear yellow urine, 1.015, negative protein, negative leukocytes. Chest x-ray showed no acute process. Patient was subsequently admitted with dehydration, anemia, pancytopenia, and hypotension. Interval History:      11/18/21- awake and alert. She is eating lunch. For blood this afternoon. Will follow. Diet: ADULT DIET;  Regular; Low Fat/Low Chol/High Fiber/NICOLASA; 1500 ml    Data:   Scheduled Meds:   dexamethasone  4 mg IntraVENous Q8H    polyethylene glycol  17 g Oral Daily    levothyroxine  75 mcg Oral Daily    urea  15 g Oral Daily    oyster shell calcium w/D  1 tablet Oral Daily with breakfast    vitamin B-12  100 mcg Oral Daily    gabapentin  300 mg Oral TID    vitamin B-6  100 mg Oral Daily    atorvastatin  10 mg Oral Nightly    [Held by provider] amLODIPine  10 mg Oral Daily    sodium chloride flush  5-40 mL IntraVENous 2 times per day    famotidine  20 mg Oral Daily     Continuous Infusions:   sodium chloride      sodium chloride      sodium chloride  sodium chloride 25 mL (11/12/21 2240)     PRN Meds:sodium chloride, sodium chloride, sodium chloride, albuterol, [Held by provider] tiZANidine, sodium chloride flush, sodium chloride, ondansetron **OR** ondansetron, acetaminophen **OR** acetaminophen    Intake/Output Summary (Last 24 hours) at 11/18/2021 1138  Last data filed at 11/18/2021 0845  Gross per 24 hour   Intake 240 ml   Output    Net 240 ml     CBC:   Recent Labs     11/16/21  0600 11/17/21  1042 11/18/21  0500   WBC 4.2* 3.1* 2.1*   HGB 7.2* 6.8* 6.6*   PLT 25* 24* 23*     BMP:    Recent Labs     11/16/21  0710 11/17/21  0250 11/18/21  0500   * 132 129*   K 4.1 4.5 4.5   CL 95* 98 97*   CO2 25 26 23   BUN 15 17 17   CREATININE 0.7 0.6 0.6   GLUCOSE 116* 119* 259*     Hepatic:   Recent Labs     11/15/21  1640   AST 15   ALT 10   BILITOT 0.8   ALKPHOS 91     Troponin: No results for input(s): TROPONINI in the last 72 hours. BNP: No results for input(s): BNP in the last 72 hours. Lipids: No results for input(s): CHOL, HDL in the last 72 hours. Invalid input(s): LDLCALCU  ABGs: No results found for: PHART, PO2ART, SAC3ABB  INR: No results for input(s): INR in the last 72 hours. -----------------------------------------------------------------  RAD: CT HEAD WO CONTRAST    Result Date: 11/9/2021  EXAMINATION: CT OF THE HEAD WITHOUT CONTRAST  11/9/2021 7:16 pm TECHNIQUE: CT of the head was performed without the administration of intravenous contrast. Dose modulation, iterative reconstruction, and/or weight based adjustment of the mA/kV was utilized to reduce the radiation dose to as low as reasonably achievable. COMPARISON: None. HISTORY: ORDERING SYSTEM PROVIDED HISTORY: HEAD TRAUMA, CLOSED, MILD, GCS >= 13, NO RISK FACTORS, NEURO EXAM NORMAL TECHNOLOGIST PROVIDED HISTORY: Has a \"code stroke\" or \"stroke alert\" been called? ->No Reason for exam:->syncope Decision Support Exception - unselect if not a suspected or confirmed emergency medical condition->Emergency Medical Condition (MA) FINDINGS: BRAIN/VENTRICLES: There is no acute intracranial hemorrhage, mass effect or midline shift. No abnormal extra-axial fluid collection. The gray-white differentiation is maintained without evidence of an acute infarct. There is prominence of the ventricles and sulci due to global parenchymal volume loss. There are nonspecific areas of hypoattenuation within the periventricular and subcortical white matter, which likely represent chronic microvascular ischemic change. ORBITS: The visualized portion of the orbits demonstrate no acute abnormality. SINUSES: The visualized paranasal sinuses and mastoid air cells demonstrate no acute abnormality. SOFT TISSUES/SKULL: No acute abnormality of the visualized skull or soft tissues. No acute intracranial abnormality. Age-related loss of brain volume and chronic white matter ischemic changes. XR CHEST PORTABLE    Result Date: 11/9/2021  EXAMINATION: ONE XRAY VIEW OF THE CHEST 11/9/2021 6:41 pm COMPARISON: October 19 HISTORY: ORDERING SYSTEM PROVIDED HISTORY: syncope TECHNOLOGIST PROVIDED HISTORY: Reason for exam:->syncope FINDINGS: The lungs are without acute focal process. There is no effusion or pneumothorax. The cardiomediastinal silhouette is without acute process. The osseous structures are without acute process. MediPort line in the SVC. No acute process. US RETROPERITONEAL COMPLETE    Result Date: 11/10/2021  EXAMINATION: RETROPERITONEAL ULTRASOUND OF THE KIDNEYS AND URINARY BLADDER 11/10/2021 COMPARISON: None HISTORY: ORDERING SYSTEM PROVIDED HISTORY: ELIANA TECHNOLOGIST PROVIDED HISTORY: Reason for exam:->ELIANA What reading provider will be dictating this exam?->CRC FINDINGS: Right kidney measures 9.0 x 3.7 x 5.2 cm Left kidney is 9.4 x 5.4 x 5.1 cm No evidence for hydronephrosis.   Normal renal echogenicity At the upper pole the right kidney there is a 2.7 x 3.0 x 3.0 cm simple appearing cyst There is a Patel within the urinary bladder     Right renal cyst otherwise negative study         Objective:   Vitals:   Vitals:    11/18/21 0739   BP: 114/74   Pulse: 75   Resp: 16   Temp: 98.1 °F (36.7 °C)   SpO2: 100%     Patient Vitals for the past 24 hrs:   BP Temp Temp src Pulse Resp SpO2   11/18/21 0739 114/74 98.1 °F (36.7 °C) Oral 75 16 100 %   11/17/21 1934 114/71 98.1 °F (36.7 °C)  98 16 99 %     General appearance: alert, appears stated age and cooperative  Skin: Skin color, texture, turgor normal. No rashes or lesions  HEENT: Head: Normocephalic, no lesions, without obvious abnormality. Neck: no adenopathy, no carotid bruit, no JVD, supple, symmetrical, trachea midline and thyroid not enlarged, symmetric, no tenderness/mass/nodules  Lungs: Clear  Heart: regular rate and rhythm, S1, S2 normal, no murmur, click, rub or gallop  Abdomen: soft, non-tender; bowel sounds normal; no masses,  no organomegaly  Extremities: extremities normal, atraumatic, no cyanosis or edema  Neurologic: Mental status: Alert, oriented, thought content appropriate      Assessment/Plan:   1. 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 He is current fluid restriction  2. Continue Nicholette Speaks will maintain for now.      2. Syncope which may have related to the hyponatremia also possible due to the tizanidine  PLAN:  1. Agree with holding the tizanidine     3. Hypomagnesemia  1.7 (11/17)  PLAN:  1. IV supplement as needed for goal 2.0     4.  anemia with Pancytopenia  Has been managed by hematology in the outpatient setting   Hgb  6.6 and for transfusion today  PLAN:   1. Hem Onc now following      5. Essential hypertension  Now admitted with hypotension  PLAN:   1.   Holding amlodipine w/ BP readings in the 90s - at this point would continue to hold at discharge        Thank you for allowing us to participate in care of Ms Christopher Trent, APRN - CNP     Patient examined , NA better , HB remains low   For another transfusion today     Plan as outlined     Dr Zaid Stewart

## 2021-11-19 NOTE — PROGRESS NOTES
Subjective:  Chart reviewed, seen at bedside  The patient is awake and alert. No problems overnight. She feels well and has no complaints  Denies bleeding gums, nose bleeds, chest pain, angina, dyspnea or abdominal discomfort. No nausea or vomiting. Tolerating diet. Still awaiting transfusion    Objective:    /77   Pulse 86   Temp 97.7 °F (36.5 °C) (Oral)   Resp 18   Ht 5' 2\" (1.575 m)   Wt 138 lb (62.6 kg)   SpO2 98%   BMI 25.24 kg/m²     General: NAD  HEENT: No thrush or mucositis, EOMI  Heart:  RRR, no murmurs, gallops, or rubs.   Lungs:  CTA bilaterally, no wheeze, rales or rhonchi  Abd: BS present, nontender, nondistended, no masses, no splenomegaly  Extrem:  No clubbing, cyanosis, or edema  Lymphatics: No palpable adenopathy in cervical and supraclavicular regions  Skin: Intact, no petechia or purpura    CBC with Differential:    Lab Results   Component Value Date    WBC 2.0 11/19/2021    RBC 2.03 11/19/2021    RBC 3.12 07/20/2019    HGB 6.5 11/19/2021    HCT 19.5 11/19/2021    PLT 25 11/19/2021    MCV 96.1 11/19/2021    MCH 32.0 11/19/2021    MCHC 33.3 11/19/2021    RDW 23.9 11/19/2021    NRBC 0.0 11/16/2021    BLASTSPCT 0.9 11/16/2021    METASPCT 0.9 11/16/2021    LYMPHOPCT 36.9 11/16/2021    MONOPCT 15.3 11/16/2021    MYELOPCT 1.8 11/16/2021    BASOPCT 0.0 11/16/2021    MONOSABS 0.63 11/16/2021    LYMPHSABS 1.55 11/16/2021    EOSABS 0.08 11/16/2021    BASOSABS 0.00 11/16/2021     CMP:    Lab Results   Component Value Date     11/19/2021    K 4.5 11/19/2021    K 4.1 11/10/2021    CL 95 11/19/2021    CO2 22 11/19/2021    BUN 28 11/19/2021    CREATININE 0.6 11/19/2021    GFRAA >60 11/19/2021    LABGLOM >60 11/19/2021    GLUCOSE 304 11/19/2021    GLUCOSE 101 12/19/2011    PROT 6.7 11/15/2021    LABALBU 3.3 11/15/2021    LABALBU 4.6 12/19/2011    CALCIUM 8.9 11/19/2021    BILITOT 0.8 11/15/2021    ALKPHOS 91 11/15/2021    AST 15 11/15/2021    ALT 10 11/15/2021   HAPTOGLOBIN 41      Assessment:  1. Anemia - awaiting transfusion, Hgb 6.5  2. Ovarian cancer - Treated with Carboplatin and Paclitaxel.  Had biopsy proven recurrence s/p excisional resection on 7/19/19.  Treated with Carbo/ Paclitaxel from Aug to Oct 2019.  Followed by maintenance Irving Netter from 12/11/19. Dose reduced to 200 mg daily due to cytopenias  3. Constipation - resolved  4. pancytopenic     Plan:  S/p IVIG 30 gm IV x 1 11/16, 11/17  Decadron restarted 11/17  Daily LDH, haptoglobin, retic count, indirect/direct bilirubin  Continue to monitor H/H, platelets and maintain HGB above 7 g/dL and PLT above 10,0000    Electronically signed by GINO Briceño on 11/19/2021 at 9:47 AM      Attending Addendum:      Patient seen and examined personally. All pertinent labs and imaging reviewed. Case discussed with PA. Agree with the consult/progress note as above which has been updated to reflect my changes. Transfuse total 2 units, the most compatible blood product. Bone marrow biopsy as outpatient.      Vinh Nunez, [address] for [address] [place] [address]

## 2021-11-19 NOTE — PROGRESS NOTES
[facility] Progress Note    Admitting Date and Time: 11/9/2021  6:29 PM  Admit Dx: Syncope and collapse [R55]  Hyponatremia [E87.1]  Cancer (HCC) [C80.1]  Hypotension, unspecified hypotension type [I95.9]  Anemia, unspecified type [D64.9]    Subjective:  Patient is being followed for Syncope and collapse [R55]  Hyponatremia [E87.1]  Cancer ([address] [C80.1]  Hypotension, unspecified hypotension type [I95.9]  Anemia, unspecified type [D64.9]     Patient was sitting on edge of bed in no acute distress. Denies any new concerns. ROS: denies fever, chills, cp, sob, n/v, HA unless stated above.       dexamethasone  4 mg IntraVENous Q8H    polyethylene glycol  17 g Oral Daily    levothyroxine  75 mcg Oral Daily    urea  15 g Oral Daily    oyster shell calcium w/D  1 tablet Oral Daily with breakfast    vitamin B-12  100 mcg Oral Daily    gabapentin  300 mg Oral TID    vitamin B-6  100 mg Oral Daily    atorvastatin  10 mg Oral Nightly    [Held by provider] amLODIPine  10 mg Oral Daily    sodium chloride flush  5-40 mL IntraVENous 2 times per day    famotidine  20 mg Oral Daily     sodium chloride, , PRN  sodium chloride, , PRN  sodium chloride, , PRN  albuterol, 2.5 mg, Q1H PRN  [Held by provider] tiZANidine, 2 mg, Q8H PRN  sodium chloride flush, 5-40 mL, PRN  sodium chloride, 25 mL, PRN  ondansetron, 4 mg, Q8H PRN   Or  ondansetron, 4 mg, Q6H PRN  acetaminophen, 650 mg, Q6H PRN   Or  acetaminophen, 650 mg, Q6H PRN         Objective:    /77   Pulse 86   Temp 97.7 °F (36.5 °C) (Oral)   Resp 18   Ht 5' 2\" (1.575 m)   Wt 138 lb (62.6 kg)   SpO2 98%   BMI 25.24 kg/m²   General Appearance: alert and oriented to person, place and time and in no acute distress  Skin: warm and dry  Head: normocephalic and atraumatic  Eyes: pupils equal, round, and reactive to light, extraocular eye movements intact, conjunctivae normal  Neck: neck supple and non tender without mass   Pulmonary/Chest: clear to auscultation bilaterally- no wheezes, rales or rhonchi, normal air movement, no respiratory distress  Cardiovascular: normal rate, normal S1 and S2 and no carotid bruits  Abdomen: soft, non-tender, non-distended, normal bowel sounds, no masses or organomegaly  Extremities: no cyanosis, no clubbing and no edema  Neurologic: no cranial nerve deficit and speech normal        Recent Labs     11/17/21  0250 11/18/21  0500 11/19/21  0334    129* 128*   K 4.5 4.5 4.5   CL 98 97* 95*   CO2 26 23 22   BUN 17 17 28*   CREATININE 0.6 0.6 0.6   GLUCOSE 119* 259* 304*   CALCIUM 8.7 8.5* 8.9       Recent Labs     11/17/21  1042 11/18/21  0500 11/19/21  0334   WBC 3.1* 2.1* 2.0*   RBC 2.15* 2.06* 2.03*   HGB 6.8* 6.6* 6.5*   HCT 20.5* 19.6* 19.5*   MCV 95.3 95.1 96.1   MCH 31.6 32.0 32.0   MCHC 33.2 33.7 33.3   RDW 25.1* 24.4* 23.9*   PLT 24* 23* 25*   MPV 11.1 11.9 13.0*        Radiology:  EXAMINATION:  RETROPERITONEAL ULTRASOUND OF THE KIDNEYS AND URINARY BLADDER     11/10/2021     COMPARISON:  None     HISTORY:  ORDERING SYSTEM PROVIDED HISTORY: ELIANA  TECHNOLOGIST PROVIDED HISTORY:     Reason for exam:->ELIANA  What reading provider will be dictating this exam?->CRC     FINDINGS:  Right kidney measures 9.0 x 3.7 x 5.2 cm     Left kidney is 9.4 x 5.4 x 5.1 cm     No evidence for hydronephrosis. Normal renal echogenicity     At the upper pole the right kidney there is a 2.7 x 3.0 x 3.0 cm simple  appearing cyst     There is a Patel within the urinary bladder     IMPRESSION:  Right renal cyst otherwise negative study    Assessment:    Active Problems:    Hyponatremia    ELIANA (acute kidney injury) ([address]  Resolved Problems:    * No resolved hospital problems. *      Plan:  1. Acute on chronic hyponatremia: Patient Na 128. Basline is around 130. Fluid restriction. Continue Manohar Ho. Nephrology following. 2. Acute on chronic anemia: Baseline hgb in 8s. Hgb today 6.5. Pending PRBC for transfusion.     3. Pancytopenia: Given IVIG gm IV x1 on 11/16 and 11/17. Maintain hgb >7, and plt >10,000. Heme/onc following. 4. Syncope: likely 2/2 to anemia and hyponatremia. CT head 11/9 was unremarkable. Possibly due to Tizanidine, holding tizanidine. 5. Hypothyroidism: Continue synthroid    6. HTN: Holding lisinopril and amlodipine. 7. HLD: Continue Lipitor     8. Ovarian cancer: Treated with chemotherapy        DVT prophylaxis: SCDs     NOTE: This report was transcribed using voice recognition software. Every effort was made to ensure accuracy; however, inadvertent computerized transcription errors may be present. Electronically signed by Farheen Person PA-C on 11/19/2021 at 10:36 AM   Addendum: I have personally participated in the history, exam, medical decision making with Willa Galindo on the date of service and I agree with all of the pertinent clinical information unless otherwise noted. I have also reviewed and agree with the past medical, family, and social history unless otherwise noted. Patient was admitted with syncope    PHYSICAL EXAM:  Vitals:  /77   Pulse 86   Temp 97.7 °F (36.5 °C) (Oral)   Resp 18   Ht 5' 2\" (1.575 m)   Wt 138 lb (62.6 kg)   SpO2 98%   BMI 25.24 kg/m²   Gen: awake, alert, NAD  Lungs: clear to auscultation bilaterally no crackles no wheezing. Heart: RRR, no murmur   Abdomen: soft nontender nondistended positive bowel sounds. Extremities: full range of motion no peripheral edema. Impression:  Active Problems:    Hyponatremia    ELIANA (acute kidney injury) ([facility]  Resolved Problems:    * No resolved hospital problems. *      My findings/plan include:    1. Acute on chronic anemia - Elevated reticulocyte count, elevated LDH, elevated bilirubin, normal haptoglobin. IVIG was administered 11/16, 11/17. Hematology on board. Patient is to get 2 units PRBC as per hematology. 2. Hyponatremia likely 2/2 SIADH - Nephrology on board. Hold lexapro.  Patient is on Catarina Bath and is on 1.5 L fluid restriction  3. Syncope 2/2 anemia and hyponatremia  4. Pancytopenia  5. Ovarian cancer - Treated with chemotherapy  6. DVT prophylaxis - SCD    NOTE: This report was transcribed using voice recognition software. Every effort was made to ensure accuracy; however, inadvertent computerized transcription errors may be present.     Electronically signed by Elyse Charles DO on 11/19/2021 at 4:08 PM

## 2021-11-19 NOTE — PROGRESS NOTES
Notified Charles Sullivan NP of patients critical hemoglobin of 6.5 this AM. Still awaiting blood for transfusion. Will continue to monitor.

## 2021-11-19 NOTE — PROGRESS NOTES
Spoke to Dr. Elizabeth Alvarez regarding case. States to premedicate with tylenol and benadryl prior to blood administration, and to transfuse a total of 2U PRBC. See new orders. If stable hgb post transfusions,  states OK to DC from hem onc POV.

## 2021-11-19 NOTE — PROGRESS NOTES
NEPHROLOGY Attending   Progress Note  11/19/2021 11:06 AM  Subjective:   Admit Date: 11/9/2021  PCP: Jose Manuel Allison MD    History of Present Ilness:  Mrs. Luke Quintero is a 75-year-old -American female with history of hypertension, hyperlipidemia, GERD asthma, depression, hypothyroidism, DJD history of metastatic ovarian cancer with mets to peritoneum and has been on maintenance chemo with Miles Amas and questionable history of atrial fibrillation diagnosed in September 2021 and now presents to the ED on November 9, 2021 with fatigue and syncope. Vitals upon arrival included   BP (!) 102/57   Pulse 68   Temp 97.8 °F (36.6 °C) (Oral)   Resp 16   Wt 138 lb (62.6 kg)   SpO2 96%   BMI 25.24 kg/m² Oxygen Saturation Interpretation: Normal.  Pertinent labs included WBC 5.0, hemoglobin 6.8, hematocrit 19.5 and platelet count 52. Initial BMP showed sodium of 123, potassium 4.7, chloride 88, CO2 20, BUN 33 and creatinine 1.1.  UA showed clear yellow urine, 1.015, negative protein, negative leukocytes. Chest x-ray showed no acute process. Patient was subsequently admitted with dehydration, anemia, pancytopenia, and hypotension. Interval History:      11/19/21- awake and alert. For blood again today. Diet: ADULT DIET;  Regular; Low Fat/Low Chol/High Fiber/NICOLASA; 1500 ml    Data:   Scheduled Meds:   dexamethasone  4 mg IntraVENous Q8H    polyethylene glycol  17 g Oral Daily    levothyroxine  75 mcg Oral Daily    urea  15 g Oral Daily    oyster shell calcium w/D  1 tablet Oral Daily with breakfast    vitamin B-12  100 mcg Oral Daily    gabapentin  300 mg Oral TID    vitamin B-6  100 mg Oral Daily    atorvastatin  10 mg Oral Nightly    [Held by provider] amLODIPine  10 mg Oral Daily    sodium chloride flush  5-40 mL IntraVENous 2 times per day    famotidine  20 mg Oral Daily     Continuous Infusions:   sodium chloride      sodium chloride      sodium chloride      sodium chloride 25 mL (11/12/21 (MA) FINDINGS: BRAIN/VENTRICLES: There is no acute intracranial hemorrhage, mass effect or midline shift. No abnormal extra-axial fluid collection. The gray-white differentiation is maintained without evidence of an acute infarct. There is prominence of the ventricles and sulci due to global parenchymal volume loss. There are nonspecific areas of hypoattenuation within the periventricular and subcortical white matter, which likely represent chronic microvascular ischemic change. ORBITS: The visualized portion of the orbits demonstrate no acute abnormality. SINUSES: The visualized paranasal sinuses and mastoid air cells demonstrate no acute abnormality. SOFT TISSUES/SKULL: No acute abnormality of the visualized skull or soft tissues. No acute intracranial abnormality. Age-related loss of brain volume and chronic white matter ischemic changes. XR CHEST PORTABLE    Result Date: 11/9/2021  EXAMINATION: ONE XRAY VIEW OF THE CHEST 11/9/2021 6:41 pm COMPARISON: October 19 HISTORY: ORDERING SYSTEM PROVIDED HISTORY: syncope TECHNOLOGIST PROVIDED HISTORY: Reason for exam:->syncope FINDINGS: The lungs are without acute focal process. There is no effusion or pneumothorax. The cardiomediastinal silhouette is without acute process. The osseous structures are without acute process. MediPort line in the SVC. No acute process. US RETROPERITONEAL COMPLETE    Result Date: 11/10/2021  EXAMINATION: RETROPERITONEAL ULTRASOUND OF THE KIDNEYS AND URINARY BLADDER 11/10/2021 COMPARISON: None HISTORY: ORDERING SYSTEM PROVIDED HISTORY: ELIANA TECHNOLOGIST PROVIDED HISTORY: Reason for exam:->ELIANA What reading provider will be dictating this exam?->CRC FINDINGS: Right kidney measures 9.0 x 3.7 x 5.2 cm Left kidney is 9.4 x 5.4 x 5.1 cm No evidence for hydronephrosis.   Normal renal echogenicity At the upper pole the right kidney there is a 2.7 x 3.0 x 3.0 cm simple appearing cyst There is a Patel within the urinary bladder Right renal cyst otherwise negative study         Objective:   Vitals:   Vitals:    11/19/21 0801   BP: 139/77   Pulse: 86   Resp: 18   Temp: 97.7 °F (36.5 °C)   SpO2: 98%     Patient Vitals for the past 24 hrs:   BP Temp Temp src Pulse Resp SpO2   11/19/21 0801 139/77 97.7 °F (36.5 °C) Oral 86 18 98 %   11/18/21 2106 (!) 140/75 97.7 °F (36.5 °C)  81 18 100 %     General appearance: alert, appears stated age and cooperative  Skin: Skin color, texture, turgor normal. No rashes or lesions  HEENT: Head: Normocephalic, no lesions, without obvious abnormality. Neck: no adenopathy, no carotid bruit, no JVD, supple, symmetrical, trachea midline and thyroid not enlarged, symmetric, no tenderness/mass/nodules  Lungs: Clear  Heart: regular rate and rhythm, S1, S2 normal, no murmur, click, rub or gallop  Abdomen: soft, non-tender; bowel sounds normal; no masses,  no organomegaly  Extremities: extremities normal, atraumatic, no cyanosis or edema  Neurologic: Mental status: Alert, oriented, thought content appropriate      Assessment/Plan:   1. 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 He is current fluid restriction  2. Continue Jennifer Sizer will maintain for now.      2. Syncope which may have related to the hyponatremia also possible due to the tizanidine  PLAN:  1. Agree with holding the tizanidine     3. Hypomagnesemia  1.7 (11/17)  PLAN:  1. IV supplement as needed for goal 2.0     4.  anemia with Pancytopenia  Has been managed by hematology in the outpatient setting   Hgb  6.6-->6.5 S/P transfusion and for transfusion today  PLAN:   1. Hem Onc now following      5. Essential hypertension  Now admitted with hypotension  PLAN:   1.   Holding amlodipine w/ BP readings in the 90s - at this point would continue to hold at discharge        Thank you for allowing us to participate in care of Ms Wright Score, APRN - CNP     Patient doing well feels ok   Plan as outlined above   Awaiting blood tx ( multiple antibodies - Blood transported from [place])     Dr Beatriz Chang

## 2021-11-20 NOTE — PROGRESS NOTES
[facility] Progress Note    Admitting Date and Time: 11/9/2021  6:29 PM  Admit Dx: Syncope and collapse [R55]  Hyponatremia [E87.1]  Cancer (HCC) [C80.1]  Hypotension, unspecified hypotension type [I95.9]  Anemia, unspecified type [D64.9]    Subjective:  Patient is being followed for Syncope and collapse [R55]  Hyponatremia [E87.1]  Cancer ([address] [C80.1]  Hypotension, unspecified hypotension type [I95.9]  Anemia, unspecified type [D64.9]     Patient was lying in bed no acute distress. Denies any new concerns. ROS: denies fever, chills, cp, sob, n/v, HA unless stated above.       insulin lispro  0-12 Units SubCUTAneous TID WC    insulin lispro  0-6 Units SubCUTAneous Nightly    acetaminophen  650 mg Oral Once    diphenhydrAMINE  25 mg IntraVENous Once    dexamethasone  4 mg IntraVENous Q8H    polyethylene glycol  17 g Oral Daily    levothyroxine  75 mcg Oral Daily    urea  15 g Oral Daily    oyster shell calcium w/D  1 tablet Oral Daily with breakfast    vitamin B-12  100 mcg Oral Daily    gabapentin  300 mg Oral TID    vitamin B-6  100 mg Oral Daily    atorvastatin  10 mg Oral Nightly    [Held by provider] amLODIPine  10 mg Oral Daily    sodium chloride flush  5-40 mL IntraVENous 2 times per day    famotidine  20 mg Oral Daily     glucose, 15 g, PRN  dextrose, 12.5 g, PRN  glucagon (rDNA), 1 mg, PRN  dextrose, 100 mL/hr, PRN  sodium chloride, , PRN  sodium chloride, , PRN  sodium chloride, , PRN  sodium chloride, , PRN  albuterol, 2.5 mg, Q1H PRN  [Held by provider] tiZANidine, 2 mg, Q8H PRN  sodium chloride flush, 5-40 mL, PRN  sodium chloride, 25 mL, PRN  ondansetron, 4 mg, Q8H PRN   Or  ondansetron, 4 mg, Q6H PRN  acetaminophen, 650 mg, Q6H PRN   Or  acetaminophen, 650 mg, Q6H PRN         Objective:    /79   Pulse 93   Temp 98.2 °F (36.8 °C) (Oral)   Resp 16   Ht 5' 2\" (1.575 m)   Wt 130 lb 6.4 oz (59.1 kg)   SpO2 100%   BMI 23.85 kg/m²   General Appearance: alert and oriented to person, place and time and in no acute distress  Skin: warm and dry  Head: normocephalic and atraumatic  Eyes: pupils equal, round, and reactive to light, extraocular eye movements intact, conjunctivae normal  Neck: neck supple and non tender without mass   Pulmonary/Chest: clear to auscultation bilaterally- no wheezes, rales or rhonchi, normal air movement, no respiratory distress  Cardiovascular: normal rate, normal S1 and S2 and no carotid bruits  Abdomen: soft, non-tender, non-distended, normal bowel sounds, no masses or organomegaly  Extremities: no cyanosis, no clubbing and no edema  Neurologic: no cranial nerve deficit and speech normal        Recent Labs     11/18/21  0500 11/19/21  0334 11/20/21  0340   * 128* 130*   K 4.5 4.5 4.5   CL 97* 95* 96*   CO2 23 22 26   BUN 17 28* 28*   CREATININE 0.6 0.6 0.6   GLUCOSE 259* 304* 270*   CALCIUM 8.5* 8.9 9.3       Recent Labs     11/18/21  0500 11/19/21  0334 11/20/21  0340   WBC 2.1* 2.0* 2.1*   RBC 2.06* 2.03* 2.09*   HGB 6.6* 6.5* 6.7*   HCT 19.6* 19.5* 19.7*   MCV 95.1 96.1 94.3   MCH 32.0 32.0 32.1   MCHC 33.7 33.3 34.0   RDW 24.4* 23.9* 23.9*   PLT 23* 25* 26*   MPV 11.9 13.0* 12.8*        Radiology:  EXAMINATION:  RETROPERITONEAL ULTRASOUND OF THE KIDNEYS AND URINARY BLADDER     11/10/2021     COMPARISON:  None     HISTORY:  ORDERING SYSTEM PROVIDED HISTORY: ELIANA  TECHNOLOGIST PROVIDED HISTORY:     Reason for exam:->ELIANA  What reading provider will be dictating this exam?->CRC     FINDINGS:  Right kidney measures 9.0 x 3.7 x 5.2 cm     Left kidney is 9.4 x 5.4 x 5.1 cm     No evidence for hydronephrosis.   Normal renal echogenicity     At the upper pole the right kidney there is a 2.7 x 3.0 x 3.0 cm simple  appearing cyst     There is a Patel within the urinary bladder     IMPRESSION:  Right renal cyst otherwise negative study    Assessment:    Active Problems:    Hyponatremia    ELIANA (acute kidney injury) ([facility] [address]  Resolved Problems: * No resolved hospital problems. *      Plan:  1. Acute on chronic hyponatremia: Patient Na 130. Basline around 130. Fluid restriction. Continue [facility]. Nephrology following. 2. Acute on chronic anemia: Baseline hgb in 8s. Hgb today 6.7. Pending 2U of PRBC for transfusion. 3. Pancytopenia: Given IVIG gm IV x1 on 11/16 and 11/17. Maintain hgb >7, and plt >10,000. Heme/onc following. 4. Syncope: likely 2/2 to anemia and hyponatremia. CT head 11/9 was unremarkable. Possibly due to Tizanidine, holding tizanidine. 5. Hypothyroidism: Continue synthroid    6. HTN: Holding lisinopril and amlodipine. 7. HLD: Continue Lipitor     8. Ovarian cancer: Treated with chemotherapy    9. Elevated sugars: Sugars running in 200-300s. Medium dose ssi. Hemoglobin a1c pending. DVT prophylaxis: SCDs     NOTE: This report was transcribed using voice recognition software. Every effort was made to ensure accuracy; however, inadvertent computerized transcription errors may be present. Electronically signed by Andreia Venegas PA-C on 11/20/2021 at 1:28 PM     Addendum: I have personally participated in the history, exam, medical decision making with Clarice Joyce on the date of service and I agree with all of the pertinent clinical information unless otherwise noted. I have also reviewed and agree with the past medical, family, and social history unless otherwise noted. Patient was admitted with syncope    PHYSICAL EXAM:  Vitals:  /79   Pulse 93   Temp 98.2 °F (36.8 °C) (Oral)   Resp 16   Ht 5' 2\" (1.575 m)   Wt 130 lb 6.4 oz (59.1 kg)   SpO2 100%   BMI 23.85 kg/m²   Gen: awake, alert, NAD  Lungs: clear to auscultation bilaterally no crackles no wheezing. Heart: RRR, no murmur   Abdomen: soft nontender nondistended positive bowel sounds. Extremities: full range of motion no peripheral edema.       Impression:  Active Problems:    Hyponatremia    ELIANA (acute kidney injury) ([address]  Resolved Problems: * No resolved hospital problems. *      My findings/plan include:    Still waiting on PRBC to arrive from [place] and then will have 2 units transfused. She states that she is feeling well    NOTE: This report was transcribed using voice recognition software. Every effort was made to ensure accuracy; however, inadvertent computerized transcription errors may be present.     Electronically signed by Ann Waldron DO on 11/20/2021 at 3:37 PM

## 2021-11-20 NOTE — PROGRESS NOTES
NEPHROLOGY Attending   Progress Note  11/20/2021 9:30 AM  Subjective:   Admit Date: 11/9/2021  PCP: Kera Brown MD    History of Present Ilness:  Mrs. Clive Loving is a 69-year-old -American female with history of hypertension, hyperlipidemia, GERD asthma, depression, hypothyroidism, DJD history of metastatic ovarian cancer with mets to peritoneum and has been on maintenance chemo with Karin Lynch and questionable history of atrial fibrillation diagnosed in September 2021 and now presents to the ED on November 9, 2021 with fatigue and syncope. Vitals upon arrival included   BP (!) 102/57   Pulse 68   Temp 97.8 °F (36.6 °C) (Oral)   Resp 16   Wt 138 lb (62.6 kg)   SpO2 96%   BMI 25.24 kg/m² Oxygen Saturation Interpretation: Normal.  Pertinent labs included WBC 5.0, hemoglobin 6.8, hematocrit 19.5 and platelet count 52. Initial BMP showed sodium of 123, potassium 4.7, chloride 88, CO2 20, BUN 33 and creatinine 1.1.  UA showed clear yellow urine, 1.015, negative protein, negative leukocytes. Chest x-ray showed no acute process. Patient was subsequently admitted with dehydration, anemia, pancytopenia, and hypotension. Interval History:      11/20/21: received PRBC last an - tolerated well - no acute events overnight - stable vital signs      Diet: ADULT DIET;  Regular; Low Fat/Low Chol/High Fiber/NICOLASA; 1500 ml    Data:   Scheduled Meds:   acetaminophen  650 mg Oral Once    diphenhydrAMINE  25 mg IntraVENous Once    dexamethasone  4 mg IntraVENous Q8H    polyethylene glycol  17 g Oral Daily    levothyroxine  75 mcg Oral Daily    urea  15 g Oral Daily    oyster shell calcium w/D  1 tablet Oral Daily with breakfast    vitamin B-12  100 mcg Oral Daily    gabapentin  300 mg Oral TID    vitamin B-6  100 mg Oral Daily    atorvastatin  10 mg Oral Nightly    [Held by provider] amLODIPine  10 mg Oral Daily    sodium chloride flush  5-40 mL IntraVENous 2 times per day    famotidine  20 mg Oral Daily Continuous Infusions:   sodium chloride      sodium chloride      sodium chloride      sodium chloride      sodium chloride 25 mL (11/12/21 2240)     PRN Meds:sodium chloride, sodium chloride, sodium chloride, sodium chloride, albuterol, [Held by provider] tiZANidine, sodium chloride flush, sodium chloride, ondansetron **OR** ondansetron, acetaminophen **OR** acetaminophen  No intake or output data in the 24 hours ending 11/20/21 0930  CBC:   Recent Labs     11/18/21  0500 11/19/21 0334 11/20/21  0340   WBC 2.1* 2.0* 2.1*   HGB 6.6* 6.5* 6.7*   PLT 23* 25* 26*     BMP:    Recent Labs     11/18/21  0500 11/19/21 0334 11/20/21  0340   * 128* 130*   K 4.5 4.5 4.5   CL 97* 95* 96*   CO2 23 22 26   BUN 17 28* 28*   CREATININE 0.6 0.6 0.6   GLUCOSE 259* 304* 270*     Hepatic:   No results for input(s): AST, ALT, ALB, BILITOT, ALKPHOS in the last 72 hours. Troponin: No results for input(s): TROPONINI in the last 72 hours. BNP: No results for input(s): BNP in the last 72 hours. Lipids: No results for input(s): CHOL, HDL in the last 72 hours. Invalid input(s): LDLCALCU  ABGs: No results found for: PHART, PO2ART, [id number]  INR: No results for input(s): INR in the last 72 hours. -----------------------------------------------------------------  RAD: CT HEAD WO CONTRAST    Result Date: 11/9/2021  EXAMINATION: CT OF THE HEAD WITHOUT CONTRAST  11/9/2021 7:16 pm TECHNIQUE: CT of the head was performed without the administration of intravenous contrast. Dose modulation, iterative reconstruction, and/or weight based adjustment of the mA/kV was utilized to reduce the radiation dose to as low as reasonably achievable. COMPARISON: None. HISTORY: ORDERING SYSTEM PROVIDED HISTORY: HEAD TRAUMA, CLOSED, MILD, GCS >= 13, NO RISK FACTORS, NEURO EXAM NORMAL TECHNOLOGIST PROVIDED HISTORY: Has a \"code stroke\" or \"stroke alert\" been called? ->No Reason for exam:->syncope Decision Support Exception - unselect if not a suspected or confirmed emergency medical condition->Emergency Medical Condition (MA) FINDINGS: BRAIN/VENTRICLES: There is no acute intracranial hemorrhage, mass effect or midline shift. No abnormal extra-axial fluid collection. The gray-white differentiation is maintained without evidence of an acute infarct. There is prominence of the ventricles and sulci due to global parenchymal volume loss. There are nonspecific areas of hypoattenuation within the periventricular and subcortical white matter, which likely represent chronic microvascular ischemic change. ORBITS: The visualized portion of the orbits demonstrate no acute abnormality. SINUSES: The visualized paranasal sinuses and mastoid air cells demonstrate no acute abnormality. SOFT TISSUES/SKULL: No acute abnormality of the visualized skull or soft tissues. No acute intracranial abnormality. Age-related loss of brain volume and chronic white matter ischemic changes. XR CHEST PORTABLE    Result Date: 11/9/2021  EXAMINATION: ONE XRAY VIEW OF THE CHEST 11/9/2021 6:41 pm COMPARISON: October 19 HISTORY: ORDERING SYSTEM PROVIDED HISTORY: syncope TECHNOLOGIST PROVIDED HISTORY: Reason for exam:->syncope FINDINGS: The lungs are without acute focal process. There is no effusion or pneumothorax. The cardiomediastinal silhouette is without acute process. The osseous structures are without acute process. MediPort line in the SVC. No acute process. US RETROPERITONEAL COMPLETE    Result Date: 11/10/2021  EXAMINATION: RETROPERITONEAL ULTRASOUND OF THE KIDNEYS AND URINARY BLADDER 11/10/2021 COMPARISON: None HISTORY: ORDERING SYSTEM PROVIDED HISTORY: ELIANA TECHNOLOGIST PROVIDED HISTORY: Reason for exam:->ELIANA What reading provider will be dictating this exam?->CRC FINDINGS: Right kidney measures 9.0 x 3.7 x 5.2 cm Left kidney is 9.4 x 5.4 x 5.1 cm No evidence for hydronephrosis.   Normal renal echogenicity At the upper pole the right kidney there is a 2.7 x 3.0 x 3.0 cm simple appearing cyst There is a Patel within the urinary bladder     Right renal cyst otherwise negative study         Objective:   Vitals:   Vitals:    11/20/21 0901   BP: 114/79   Pulse: 93   Resp: 16   Temp:    SpO2: 100%     Patient Vitals for the past 24 hrs:   BP Temp Temp src Pulse Resp SpO2 Height Weight   11/20/21 0901 114/79  Oral 93 16 100 %     11/20/21 0845       5' 2\" (1.575 m) 130 lb 6.4 oz (59.1 kg)   11/19/21 1945 122/77 97.9 °F (36.6 °C) Oral 76 18 100 %       General appearance: alert, appears stated age and cooperative  Skin: Skin color, texture, turgor normal. No rashes or lesions  HEENT: Head: Normocephalic, no lesions, without obvious abnormality. Neck: no adenopathy, no carotid bruit, no JVD, supple, symmetrical, trachea midline and thyroid not enlarged, symmetric, no tenderness/mass/nodules  Lungs: Clear  Heart: regular rate and rhythm, S1, S2 normal, no murmur, click, rub or gallop  Abdomen: soft, non-tender; bowel sounds normal; no masses,  no organomegaly  Extremities: extremities normal, atraumatic, no cyanosis or edema  Neurologic: Mental status: Alert, oriented, thought content appropriate      Assessment/Plan:   1. 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 Ease current fluid restriction  2. Continue Vaibhav Patterson for now.      2. Syncope which may have related to the hyponatremia also possible due to the tizanidine  PLAN:  1. Agree with holding the tizanidine     3. Hypomagnesemia  1.7 (11/17)  PLAN:  1. IV supplement as needed for goal 2.0     4.  anemia with Pancytopenia  Has been managed by hematology in the outpatient setting   S/p transfusion 11/19  PLAN:   1. Hem Onc now following      5.   Essential hypertension  Now admitted with hypotension  PLAN:   1.   Holding amlodipine w/ stable blood pressure - at this point would continue to hold at discharge        Thank you for allowing us to participate in care of Ms Brooklyn All, APRN - CNP     Patient examined ,  at bedside     Plan as outlined   Awaiting blood transfusion     Dr Baxter Mace to previous note under Subjective and Assessment : patient did not receive PRBC as delay in blood arriving*    [facility]

## 2021-11-21 NOTE — PROGRESS NOTES
NEPHROLOGY Attending   Progress Note  11/21/2021 12:36 PM  Subjective:   Admit Date: 11/9/2021  PCP: Bertha Guerrero MD    History of Present Ilness:  Mrs. Adia Thomas is a 18-year-old -American female with history of hypertension, hyperlipidemia, GERD asthma, depression, hypothyroidism, DJD history of metastatic ovarian cancer with mets to peritoneum and has been on maintenance chemo with Estel Netter and questionable history of atrial fibrillation diagnosed in September 2021 and now presents to the ED on November 9, 2021 with fatigue and syncope. Vitals upon arrival included   BP (!) 102/57   Pulse 68   Temp 97.8 °F (36.6 °C) (Oral)   Resp 16   Wt 138 lb (62.6 kg)   SpO2 96%   BMI 25.24 kg/m² Oxygen Saturation Interpretation: Normal.  Pertinent labs included WBC 5.0, hemoglobin 6.8, hematocrit 19.5 and platelet count 52. Initial BMP showed sodium of 123, potassium 4.7, chloride 88, CO2 20, BUN 33 and creatinine 1.1.  UA showed clear yellow urine, 1.015, negative protein, negative leukocytes. Chest x-ray showed no acute process. Patient was subsequently admitted with dehydration, anemia, pancytopenia, and hypotension. Interval History:      11/21/21: no acute events overnight - stable vital signs - still [place] PRBC [place] - good appetite       Diet: ADULT DIET;  Regular; Low Fat/Low Chol/High Fiber/NICOLASA; 1500 ml    Data:   Scheduled Meds:   urea  30 g Oral Daily    insulin lispro  0-18 Units SubCUTAneous TID WC    insulin lispro  0-6 Units SubCUTAneous Nightly    acetaminophen  650 mg Oral Once    diphenhydrAMINE  25 mg IntraVENous Once    dexamethasone  4 mg IntraVENous Q8H    polyethylene glycol  17 g Oral Daily    levothyroxine  75 mcg Oral Daily    oyster shell calcium w/D  1 tablet Oral Daily with breakfast    vitamin B-12  100 mcg Oral Daily    gabapentin  300 mg Oral TID    vitamin B-6  100 mg Oral Daily    atorvastatin  10 mg Oral Nightly    [Held by provider] amLODIPine  10 mg Oral Daily    sodium chloride flush  5-40 mL IntraVENous 2 times per day    famotidine  20 mg Oral Daily     Continuous Infusions:   dextrose      sodium chloride      sodium chloride      sodium chloride      sodium chloride      sodium chloride 25 mL (11/12/21 2240)     PRN Meds:glucose, dextrose, glucagon (rDNA), dextrose, sodium chloride, sodium chloride, sodium chloride, sodium chloride, albuterol, [Held by provider] tiZANidine, sodium chloride flush, sodium chloride, ondansetron **OR** ondansetron, acetaminophen **OR** acetaminophen  No intake or output data in the 24 hours ending 11/21/21 1236  CBC:   Recent Labs     11/19/21  0334 11/20/21  0340 11/21/21  0237   WBC 2.0* 2.1* 2.6*   HGB 6.5* 6.7* 6.4*   PLT 25* 26* 29*     BMP:    Recent Labs     11/19/21  0334 11/20/21  0340 11/21/21  0237   * 130* 130*   K 4.5 4.5 4.7   CL 95* 96* 96*   CO2 22 26 26   BUN 28* 28* 28*   CREATININE 0.6 0.6 0.6   GLUCOSE 304* 270* 178*     Hepatic:   No results for input(s): AST, ALT, ALB, BILITOT, ALKPHOS in the last 72 hours. Troponin: No results for input(s): TROPONINI in the last 72 hours. BNP: No results for input(s): BNP in the last 72 hours. Lipids: No results for input(s): CHOL, HDL in the last 72 hours. Invalid input(s): LDLCALCU  ABGs: No results found for: PHART, PO2ART, [id number]  INR: No results for input(s): INR in the last 72 hours. -----------------------------------------------------------------  RAD: CT HEAD WO CONTRAST    Result Date: 11/9/2021  EXAMINATION: CT OF THE HEAD WITHOUT CONTRAST  11/9/2021 7:16 pm TECHNIQUE: CT of the head was performed without the administration of intravenous contrast. Dose modulation, iterative reconstruction, and/or weight based adjustment of the mA/kV was utilized to reduce the radiation dose to as low as reasonably achievable. COMPARISON: None.  HISTORY: ORDERING SYSTEM PROVIDED HISTORY: HEAD TRAUMA, CLOSED, MILD, GCS >= 13, NO RISK FACTORS, NEURO EXAM NORMAL TECHNOLOGIST PROVIDED HISTORY: Has a \"code stroke\" or \"stroke alert\" been called? ->No Reason for exam:->syncope Decision Support Exception - unselect if not a suspected or confirmed emergency medical condition->Emergency Medical Condition (MA) FINDINGS: BRAIN/VENTRICLES: There is no acute intracranial hemorrhage, mass effect or midline shift. No abnormal extra-axial fluid collection. The gray-white differentiation is maintained without evidence of an acute infarct. There is prominence of the ventricles and sulci due to global parenchymal volume loss. There are nonspecific areas of hypoattenuation within the periventricular and subcortical white matter, which likely represent chronic microvascular ischemic change. ORBITS: The visualized portion of the orbits demonstrate no acute abnormality. SINUSES: The visualized paranasal sinuses and mastoid air cells demonstrate no acute abnormality. SOFT TISSUES/SKULL: No acute abnormality of the visualized skull or soft tissues. No acute intracranial abnormality. Age-related loss of brain volume and chronic white matter ischemic changes. XR CHEST PORTABLE    Result Date: 11/9/2021  EXAMINATION: ONE XRAY VIEW OF THE CHEST 11/9/2021 6:41 pm COMPARISON: October 19 HISTORY: ORDERING SYSTEM PROVIDED HISTORY: syncope TECHNOLOGIST PROVIDED HISTORY: Reason for exam:->syncope FINDINGS: The lungs are without acute focal process. There is no effusion or pneumothorax. The cardiomediastinal silhouette is without acute process. The osseous structures are without acute process. MediPort line in the SVC. No acute process.      US RETROPERITONEAL COMPLETE    Result Date: 11/10/2021  EXAMINATION: RETROPERITONEAL ULTRASOUND OF THE KIDNEYS AND URINARY BLADDER 11/10/2021 COMPARISON: None HISTORY: ORDERING SYSTEM PROVIDED HISTORY: ELIANA TECHNOLOGIST PROVIDED HISTORY: Reason for exam:->ELIANA What reading provider will be dictating this exam?->CRC FINDINGS: Right kidney measures 9.0 x 3.7 x 5.2 cm Left kidney is 9.4 x 5.4 x 5.1 cm No evidence for hydronephrosis. Normal renal echogenicity At the upper pole the right kidney there is a 2.7 x 3.0 x 3.0 cm simple appearing cyst There is a Patel within the urinary bladder     Right renal cyst otherwise negative study         Objective:   Vitals:   Vitals:    11/21/21 0830   BP: 109/68   Pulse: 89   Resp: 14   Temp: 97.8 °F (36.6 °C)   SpO2: 98%     Patient Vitals for the past 24 hrs:   BP Temp Temp src Pulse Resp SpO2   11/21/21 0830 109/68 97.8 °F (36.6 °C) Oral 89 14 98 %   11/20/21 2015 114/78 98 °F (36.7 °C) Oral 86 16 100 %     General appearance: alert, appears stated age and cooperative  Skin: Skin color, texture, turgor normal. No rashes or lesions  HEENT: Head: Normocephalic, no lesions, without obvious abnormality. Neck: no adenopathy, no carotid bruit, no JVD, supple, symmetrical, trachea midline and thyroid not enlarged, symmetric, no tenderness/mass/nodules  Lungs: Clear  Heart: regular rate and rhythm, S1, S2 normal, no murmur, click, rub or gallop  Abdomen: soft, non-tender; bowel sounds normal; no masses,  no organomegaly  Extremities: extremities normal, atraumatic, no cyanosis or edema  Neurologic: Mental status: Alert, oriented, thought content appropriate      Assessment/Plan:   1. 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 Eased current fluid restriction  2. Continue [employer] for now - likely won't need at discharge .      2. Syncope which may have related to the hyponatremia also possible due to the tizanidine  PLAN:  1. Agree with holding the tizanidine     3. Hypomagnesemia  1.7 (11/17)  PLAN:  1. IV supplement as needed for goal 2.0     4.   Anemia with Pancytopenia  Has been managed by hematology in the outpatient setting   PLAN:   1. Hem Onc now following   2. For 2U PRBC     5.  Essential hypertension  Now admitted with hypotension  PLAN:   1.   Holding amlodipine w/ stable blood pressure - at this point would continue to hold at discharge        Thank you for allowing us to participate in care of Ms Todd Anthony, JAVIER - CNP     Patient doing well , awaiting blood from [facility] as outlined , NA at 130     Dr Sarina Kennedy

## 2021-11-21 NOTE — PLAN OF CARE
Problem: Falls - Risk of:  Goal: Will remain free from falls  Description: Will remain free from falls  Outcome: Met This Shift  Goal: Absence of physical injury  Description: Absence of physical injury  Outcome: Met This Shift     Problem: Discharge Planning:  Goal: Participates in care planning  Description: Participates in care planning  Outcome: Met This Shift  Goal: Discharged to appropriate level of care  Description: Discharged to appropriate level of care  Outcome: Met This Shift     Problem: Activity Intolerance:  Goal: Ability to tolerate increased activity will improve  Description: Ability to tolerate increased activity will improve  Outcome: Met This Shift     Problem: Anxiety/Stress:  Goal: Level of anxiety will decrease  Description: Level of anxiety will decrease  Outcome: Met This Shift     Problem:  Bowel Function - Altered:  Goal: Bowel elimination is within specified parameters  Description: Bowel elimination is within specified parameters  Outcome: Met This Shift     Problem: Fluid Volume - Deficit:  Goal: Absence of fluid volume deficit signs and symptoms  Description: Absence of fluid volume deficit signs and symptoms  Outcome: Met This Shift  Goal: Electrolytes within specified parameters  Description: Electrolytes within specified parameters  Outcome: Met This Shift     Problem: Mental Status - Impaired:  Goal: Absence of physical injury  Description: Absence of physical injury  Outcome: Met This Shift  Goal: Absence of continued neurological deterioration signs and symptoms  Description: Absence of continued neurological deterioration signs and symptoms  Outcome: Met This Shift  Goal: Mental status will be restored to baseline  Description: Mental status will be restored to baseline  Outcome: Met This Shift     Problem: Mobility - Impaired:  Goal: Mobility will improve to maximum level  Description: Mobility will improve to maximum level  Outcome: Met This Shift     Problem: Nutrition Deficit:  Goal: Ability to achieve adequate nutritional intake will improve  Description: Ability to achieve adequate nutritional intake will improve  Outcome: Met This Shift     Problem: Pain:  Goal: Pain level will decrease  Description: Pain level will decrease  Outcome: Met This Shift  Goal: Ability to notify healthcare provider of pain before it becomes unmanageable or unbearable will improve  Description: Ability to notify healthcare provider of pain before it becomes unmanageable or unbearable will improve  Outcome: Met This Shift  Goal: Control of acute pain  Description: Control of acute pain  Outcome: Met This Shift  Goal: Control of chronic pain  Description: Control of chronic pain  Outcome: Met This Shift     Problem: Serum Glucose Level - Abnormal:  Goal: Ability to maintain appropriate glucose levels will improve to within specified parameters  Description: Ability to maintain appropriate glucose levels will improve to within specified parameters  Outcome: Met This Shift     Problem: Skin Integrity - Impaired:  Goal: Will show no infection signs and symptoms  Description: Will show no infection signs and symptoms  Outcome: Met This Shift  Goal: Absence of new skin breakdown  Description: Absence of new skin breakdown  Outcome: Met This Shift     Problem: Sleep Pattern Disturbance:  Goal: Appears well-rested  Description: Appears well-rested  Outcome: Met This Shift

## 2021-11-21 NOTE — PROGRESS NOTES
[facility] Progress Note    Admitting Date and Time: 11/9/2021  6:29 PM  Admit Dx: Syncope and collapse [R55]  Hyponatremia [E87.1]  Cancer (HCC) [C80.1]  Hypotension, unspecified hypotension type [I95.9]  Anemia, unspecified type [D64.9]    Subjective:  Patient is being followed for Syncope and collapse [R55]  Hyponatremia [E87.1]  Cancer ([address] [C80.1]  Hypotension, unspecified hypotension type [I95.9]  Anemia, unspecified type [D64.9]     Patient was lying in bed no acute distress. Denies any new concerns. ROS: denies fever, chills, cp, sob, n/v, HA unless stated above.       urea  30 g Oral Daily    insulin lispro  0-12 Units SubCUTAneous TID WC    insulin lispro  0-6 Units SubCUTAneous Nightly    acetaminophen  650 mg Oral Once    diphenhydrAMINE  25 mg IntraVENous Once    dexamethasone  4 mg IntraVENous Q8H    polyethylene glycol  17 g Oral Daily    levothyroxine  75 mcg Oral Daily    oyster shell calcium w/D  1 tablet Oral Daily with breakfast    vitamin B-12  100 mcg Oral Daily    gabapentin  300 mg Oral TID    vitamin B-6  100 mg Oral Daily    atorvastatin  10 mg Oral Nightly    [Held by provider] amLODIPine  10 mg Oral Daily    sodium chloride flush  5-40 mL IntraVENous 2 times per day    famotidine  20 mg Oral Daily     glucose, 15 g, PRN  dextrose, 12.5 g, PRN  glucagon (rDNA), 1 mg, PRN  dextrose, 100 mL/hr, PRN  sodium chloride, , PRN  sodium chloride, , PRN  sodium chloride, , PRN  sodium chloride, , PRN  albuterol, 2.5 mg, Q1H PRN  [Held by provider] tiZANidine, 2 mg, Q8H PRN  sodium chloride flush, 5-40 mL, PRN  sodium chloride, 25 mL, PRN  ondansetron, 4 mg, Q8H PRN   Or  ondansetron, 4 mg, Q6H PRN  acetaminophen, 650 mg, Q6H PRN   Or  acetaminophen, 650 mg, Q6H PRN         Objective:    /68   Pulse 89   Temp 97.8 °F (36.6 °C) (Oral)   Resp 14   Ht 5' 2\" (1.575 m)   Wt 130 lb 6.4 oz (59.1 kg)   SpO2 98%   BMI 23.85 kg/m²   General Appearance: alert and oriented to person, place and time and in no acute distress  Skin: warm and dry  Head: normocephalic and atraumatic  Eyes: pupils equal, round, and reactive to light, extraocular eye movements intact, conjunctivae normal  Neck: neck supple and non tender without mass   Pulmonary/Chest: clear to auscultation bilaterally- no wheezes, rales or rhonchi, normal air movement, no respiratory distress  Cardiovascular: normal rate, normal S1 and S2 and no carotid bruits  Abdomen: soft, non-tender, non-distended, normal bowel sounds, no masses or organomegaly  Extremities: no cyanosis, no clubbing and no edema  Neurologic: no cranial nerve deficit and speech normal        Recent Labs     11/19/21  0334 11/20/21  0340 11/21/21  0237   * 130* 130*   K 4.5 4.5 4.7   CL 95* 96* 96*   CO2 22 26 26   BUN 28* 28* 28*   CREATININE 0.6 0.6 0.6   GLUCOSE 304* 270* 178*   CALCIUM 8.9 9.3 9.2       Recent Labs     11/19/21  0334 11/20/21  0340 11/21/21  0237   WBC 2.0* 2.1* 2.6*   RBC 2.03* 2.09* 1.99*   HGB 6.5* 6.7* 6.4*   HCT 19.5* 19.7* 18.8*   MCV 96.1 94.3 94.5   MCH 32.0 32.1 32.2   MCHC 33.3 34.0 34.0   RDW 23.9* 23.9* 23.6*   PLT 25* 26* 29*   MPV 13.0* 12.8* 11.0        Radiology:  EXAMINATION:  RETROPERITONEAL ULTRASOUND OF THE KIDNEYS AND URINARY BLADDER     11/10/2021     COMPARISON:  None     HISTORY:  ORDERING SYSTEM PROVIDED HISTORY: ELIANA  TECHNOLOGIST PROVIDED HISTORY:     Reason for exam:->ELIANA  What reading provider will be dictating this exam?->CRC     FINDINGS:  Right kidney measures 9.0 x 3.7 x 5.2 cm     Left kidney is 9.4 x 5.4 x 5.1 cm     No evidence for hydronephrosis.   Normal renal echogenicity     At the upper pole the right kidney there is a 2.7 x 3.0 x 3.0 cm simple  appearing cyst     There is a Patel within the urinary bladder     IMPRESSION:  Right renal cyst otherwise negative study    Assessment:    Active Problems:    Hyponatremia    ELIANA (acute kidney injury) ([facility] [address]  Resolved Problems: * No resolved hospital problems. *      Plan:  1. Acute on chronic hyponatremia: Patient Na 130. Basline around 130. Fluid restriction. Continue Anokion SA. Nephrology following. 2. Acute on chronic anemia: Baseline hgb in 8s. Hgb today 6.7. Pending 2U of PRBC for transfusion from [place]. 3. Pancytopenia: Given IVIG gm IV x1 on 11/16 and 11/17. Maintain hgb >7, and plt >10,000. Heme/onc following. 4. Syncope: likely 2/2 to anemia and hyponatremia. CT head 11/9 was unremarkable. Possibly due to Tizanidine, holding tizanidine. 5. Hypothyroidism: Continue synthroid    6. HTN: Holding lisinopril and amlodipine. 7. HLD: Continue Lipitor     8. Ovarian cancer: Treated with chemotherapy    9. New onset DM: Medium dose ssi. Hemoglobin a1c 6.6. Diabetic educator consulted         DVT prophylaxis: SCDs     NOTE: This report was transcribed using voice recognition software. Every effort was made to ensure accuracy; however, inadvertent computerized transcription errors may be present. Electronically signed by Nilson Farias PA-C on 11/21/2021 at 10:23 AM   Addendum: I have personally participated in the history, exam, medical decision making with Wally Christiane on the date of service and I agree with all of the pertinent clinical information unless otherwise noted. I have also reviewed and agree with the past medical, family, and social history unless otherwise noted. Patient was admitted with anemia, syncope    PHYSICAL EXAM:  Vitals:  /68   Pulse 89   Temp 97.8 °F (36.6 °C) (Oral)   Resp 14   Ht 5' 2\" (1.575 m)   Wt 130 lb 6.4 oz (59.1 kg)   SpO2 98%   BMI 23.85 kg/m²   Gen: awake, alert, NAD  Lungs: clear to auscultation bilaterally no crackles no wheezing. Heart: RRR, no murmur   Abdomen: soft nontender nondistended positive bowel sounds. Extremities: full range of motion no peripheral edema.       Impression:  Active Problems:    Hyponatremia    ELIANA (acute kidney injury) ([address]  Resolved Problems:    * No resolved hospital problems. *      My findings/plan include:     Still waiting for [facility] for transfusion. Patient states that she is feeling well and has no complaints. NOTE: This report was transcribed using voice recognition software. Every effort was made to ensure accuracy; however, inadvertent computerized transcription errors may be present.     Electronically signed by Deedee Pratt DO on 11/21/2021 at 3:06 PM

## 2021-11-22 NOTE — PROGRESS NOTES
Reason for consult: new diagnosis DM    A1C: 6/6%     [] Not available                 Patient states the following concerns/barriers to diabetes self-management:       [x] None       [] Medication cost   [] Food cost/availability          [] Reading  [] Hearing   [] Vision                  [] Work    [] Transportation  [] No insurance    [] Physical limitations    [] Other:              Diabetes survival packet provided to:   [x] Patient     [] Other:    Information reviewed:   Definition of diabetes   Target glucose ranges/A1C   Self-monitoring of blood glucose   Prevention/symptoms/treatment of hypo-/hyperglycemia   Medication adherence   The plate method/meal planning guidelines   The benefits of exercise and recommendations   Reducing the risk of chronic complications          Patient states the following:     [x]   Patient skips breakfast frequently, has not been skipping since being in the hospital and hopes to continue with the routine of breakfast once she goes home    [x]   Patient has neuropathy from cancer/treatment               [x]   Patient has two sisters with diabetes    Comment: Patient was pleasant and receptive to education. Reviewed targets for blood glucose and A1c, discussed diagnosis criteria with patient. Reviewed basics on hypo/hyperglycemia cause, signs/symptoms, treatment and prevention. Reviewed basic plate method, which foods contain carbohydrates, and timing of meals. Reviewed insulin injections with insulin pen if patient were to be sent home on insulin. Patient encouraged to call diabetes education with questions and to set up outpatient education session. Diabetes medications reviewed (use, purpose, action, side effects):   Long acting and short acting insulin    Evaluation/Plan/Recommendations:   Patient's understanding of diabetes:   []Poor   [x]Fair    []Good   [] Excellent     Outpatient diabetes education is recommended:   [x] Yes  [] No   [] As needed  Patient is interested

## 2021-11-22 NOTE — PROGRESS NOTES
Progress Note  11/22/2021 3:38 PM  Subjective:   Admit Date: 11/9/2021  PCP: Heather Bonilla MD  Interval History: patient examined doing well feels ok     Diet: ADULT DIET; Regular; Low Fat/Low Chol/High Fiber/NICOLSAA; 1200 ml    Data:   Scheduled Meds:   urea  30 g Oral Daily    insulin lispro  0-18 Units SubCUTAneous TID WC    insulin lispro  0-6 Units SubCUTAneous Nightly    acetaminophen  650 mg Oral Once    diphenhydrAMINE  25 mg IntraVENous Once    dexamethasone  4 mg IntraVENous Q8H    polyethylene glycol  17 g Oral Daily    levothyroxine  75 mcg Oral Daily    oyster shell calcium w/D  1 tablet Oral Daily with breakfast    vitamin B-12  100 mcg Oral Daily    gabapentin  300 mg Oral TID    vitamin B-6  100 mg Oral Daily    atorvastatin  10 mg Oral Nightly    [Held by provider] amLODIPine  10 mg Oral Daily    sodium chloride flush  5-40 mL IntraVENous 2 times per day    famotidine  20 mg Oral Daily     Continuous Infusions:   dextrose      sodium chloride      sodium chloride      sodium chloride      sodium chloride      sodium chloride 25 mL (11/12/21 2240)     PRN Meds:glucose, dextrose, glucagon (rDNA), dextrose, sodium chloride, sodium chloride, sodium chloride, sodium chloride, albuterol, [Held by provider] tiZANidine, sodium chloride flush, sodium chloride, ondansetron **OR** ondansetron, acetaminophen **OR** acetaminophen  No intake/output data recorded. No intake/output data recorded. No intake or output data in the 24 hours ending 11/22/21 1538  CBC:   Recent Labs     11/20/21  0340 11/21/21  0237 11/22/21  0245   WBC 2.1* 2.6* 3.4*   HGB 6.7* 6.4* 6.4*   PLT 26* 29* 28*     BMP:    Recent Labs     11/20/21  0340 11/21/21  0237 11/22/21  0245   * 130* 129*   K 4.5 4.7 4.8   CL 96* 96* 94*   CO2 26 26 26   BUN 28* 28* 29*   CREATININE 0.6 0.6 0.6   GLUCOSE 270* 178* 205*     Hepatic: No results for input(s): AST, ALT, ALB, BILITOT, ALKPHOS in the last 72 hours.   Troponin: volume and chronic white matter ischemic changes. XR CHEST PORTABLE    Result Date: 11/9/2021  EXAMINATION: ONE XRAY VIEW OF THE CHEST 11/9/2021 6:41 pm COMPARISON: October 19 HISTORY: ORDERING SYSTEM PROVIDED HISTORY: syncope TECHNOLOGIST PROVIDED HISTORY: Reason for exam:->syncope FINDINGS: The lungs are without acute focal process. There is no effusion or pneumothorax. The cardiomediastinal silhouette is without acute process. The osseous structures are without acute process. MediPort line in the SVC. No acute process. US RETROPERITONEAL COMPLETE    Result Date: 11/10/2021  EXAMINATION: RETROPERITONEAL ULTRASOUND OF THE KIDNEYS AND URINARY BLADDER 11/10/2021 COMPARISON: None HISTORY: ORDERING SYSTEM PROVIDED HISTORY: ELIANA TECHNOLOGIST PROVIDED HISTORY: Reason for exam:->ELIANA What reading provider will be dictating this exam?->CRC FINDINGS: Right kidney measures 9.0 x 3.7 x 5.2 cm Left kidney is 9.4 x 5.4 x 5.1 cm No evidence for hydronephrosis. Normal renal echogenicity At the upper pole the right kidney there is a 2.7 x 3.0 x 3.0 cm simple appearing cyst There is a Patel within the urinary bladder     Right renal cyst otherwise negative study       Objective:   Vitals: BP (!) 103/59   Pulse 86   Temp 97.7 °F (36.5 °C) (Oral)   Resp 16   Ht 5' 2\" (1.575 m)   Wt 130 lb 6.4 oz (59.1 kg)   SpO2 100%   BMI 23.85 kg/m²   General appearance: appears stated age   Skin:  No rashes or lesions  HEENT: Head: Normocephalic, no lesions, without obvious abnormality.   Neck: no adenopathy, no carotid bruit, no JVD, supple, symmetrical, trachea midline and thyroid not enlarged, symmetric, no tenderness/mass/nodules  Lungs: clear to auscultation bilaterally  Heart: regular rate and rhythm, S1, S2 normal, no murmur, click, rub or gallop  Abdomen: soft, non-tender; bowel sounds normal; no masses,  no organomegaly  Extremities: extremities normal, atraumatic, no cyanosis or edema  Neurologic: Mental status: Alert, After cataract    Atherosclerosis of right carotid artery     delivery delivered    Detached retina    S/P CABG x 1    Uveitic glaucoma of both eyes oriented, thought content appropriate    Assessment:   Patient Active Problem List:     HTN (hypertension)     DJD (degenerative joint disease) of knee     DJD (degenerative joint disease), lumbosacral     Hypothyroid     Hypercholesterolemia     GERD (gastroesophageal reflux disease)     Mild intermittent asthma without complication     Secondary malignant neoplasm of other specified sites [facility])     Depression     COVID-19 virus infection     Hypotension     Pulmonary infiltrates on CXR     Hyponatremia     ELIANA (acute kidney injury) ([facility] [address]     Hyperlipidemia     New onset atrial fibrillation (HCC)     Pancytopenia (HCC)     Anemia     Syncope and collapse    Plan:   1. 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 Continue  fluid restriction  2. Continue Media Alessio for now      2. Syncope which may have related to the hyponatremia also possible due to the tizanidine  PLAN:  1. Agree with holding the tizanidine     3. Hypomagnesemia  1.7 (11/17)  PLAN:  1. IV supplement as needed for goal 2.0     4.  Anemia with Pancytopenia  Has been managed by hematology in the outpatient setting   PLAN:   1. Hem Onc now following   2.  For 2U PRBC awaiting blood transfusion -- multiple antibodies      5.  Essential hypertension BP controlled     Ok to discharge post blood transfusion , follow up in office in 2 weeks Dr Kanu Tracy you for allowing us to participate in care of Ms Kalpana Longo MD

## 2021-11-22 NOTE — PROGRESS NOTES
[facility] Progress Note    Admitting Date and Time: 11/9/2021  6:29 PM  Admit Dx: Syncope and collapse [R55]  Hyponatremia [E87.1]  Cancer (HCC) [C80.1]  Hypotension, unspecified hypotension type [I95.9]  Anemia, unspecified type [D64.9]    Subjective:  Patient is being followed for Syncope and collapse [R55]  Hyponatremia [E87.1]  Cancer ([address] [C80.1]  Hypotension, unspecified hypotension type [I95.9]  Anemia, unspecified type [D64.9]     Patient was lying in bed no acute distress. Denies any new concerns. ROS: denies fever, chills, cp, sob, n/v, HA unless stated above.       urea  30 g Oral Daily    insulin lispro  0-18 Units SubCUTAneous TID WC    insulin lispro  0-6 Units SubCUTAneous Nightly    acetaminophen  650 mg Oral Once    diphenhydrAMINE  25 mg IntraVENous Once    dexamethasone  4 mg IntraVENous Q8H    polyethylene glycol  17 g Oral Daily    levothyroxine  75 mcg Oral Daily    oyster shell calcium w/D  1 tablet Oral Daily with breakfast    vitamin B-12  100 mcg Oral Daily    gabapentin  300 mg Oral TID    vitamin B-6  100 mg Oral Daily    atorvastatin  10 mg Oral Nightly    [Held by provider] amLODIPine  10 mg Oral Daily    sodium chloride flush  5-40 mL IntraVENous 2 times per day    famotidine  20 mg Oral Daily     glucose, 15 g, PRN  dextrose, 12.5 g, PRN  glucagon (rDNA), 1 mg, PRN  dextrose, 100 mL/hr, PRN  sodium chloride, , PRN  sodium chloride, , PRN  sodium chloride, , PRN  sodium chloride, , PRN  albuterol, 2.5 mg, Q1H PRN  [Held by provider] tiZANidine, 2 mg, Q8H PRN  sodium chloride flush, 5-40 mL, PRN  sodium chloride, 25 mL, PRN  ondansetron, 4 mg, Q8H PRN   Or  ondansetron, 4 mg, Q6H PRN  acetaminophen, 650 mg, Q6H PRN   Or  acetaminophen, 650 mg, Q6H PRN        Objective:    BP (!) 103/59   Pulse 86   Temp 97.7 °F (36.5 °C) (Oral)   Resp 16   Ht 5' 2\" (1.575 m)   Wt 130 lb 6.4 oz (59.1 kg)   SpO2 100%   BMI 23.85 kg/m²   General Appearance: alert and oriented to person, place and time and in no acute distress  Skin: warm and dry  Head: normocephalic and atraumatic  Eyes: pupils equal, round, and reactive to light, extraocular eye movements intact, conjunctivae normal  Neck: neck supple and non tender without mass   Pulmonary/Chest: clear to auscultation bilaterally- no wheezes, rales or rhonchi, normal air movement, no respiratory distress  Cardiovascular: normal rate, normal S1 and S2 and no carotid bruits  Abdomen: soft, non-tender, non-distended, normal bowel sounds, no masses or organomegaly  Extremities: no cyanosis, no clubbing and no edema  Neurologic: no cranial nerve deficit and speech normal        Recent Labs     11/20/21  0340 11/21/21  0237 11/22/21  0245   * 130* 129*   K 4.5 4.7 4.8   CL 96* 96* 94*   CO2 26 26 26   BUN 28* 28* 29*   CREATININE 0.6 0.6 0.6   GLUCOSE 270* 178* 205*   CALCIUM 9.3 9.2 9.0       Recent Labs     11/20/21  0340 11/21/21  0237 11/22/21  0245   WBC 2.1* 2.6* 3.4*   RBC 2.09* 1.99* 2.05*   HGB 6.7* 6.4* 6.4*   HCT 19.7* 18.8* 19.5*   MCV 94.3 94.5 95.1   MCH 32.1 32.2 31.2   MCHC 34.0 34.0 32.8   RDW 23.9* 23.6* 23.9*   PLT 26* 29* 28*   MPV 12.8* 11.0 12.7*        Radiology:  EXAMINATION:  RETROPERITONEAL ULTRASOUND OF THE KIDNEYS AND URINARY BLADDER     11/10/2021     COMPARISON:  None     HISTORY:  ORDERING SYSTEM PROVIDED HISTORY: ELIANA  TECHNOLOGIST PROVIDED HISTORY:     Reason for exam:->ELIANA  What reading provider will be dictating this exam?->CRC     FINDINGS:  Right kidney measures 9.0 x 3.7 x 5.2 cm     Left kidney is 9.4 x 5.4 x 5.1 cm     No evidence for hydronephrosis.   Normal renal echogenicity     At the upper pole the right kidney there is a 2.7 x 3.0 x 3.0 cm simple  appearing cyst     There is a Patel within the urinary bladder     IMPRESSION:  Right renal cyst otherwise negative study    Assessment:    Active Problems:    Hyponatremia    ELIANA (acute kidney injury) ([facility] [address]  Resolved Problems: * No resolved hospital problems. *      Plan:  1. Acute on chronic hyponatremia: Patient Na 130. Basline around 130. Fluid restriction. Continue Manohar Ho. Nephrology following. 2. Acute on chronic anemia: Baseline hgb in 8s. Hgb today 6.7. Pending 2U of PRBC for transfusion from [place]. Blood expected to arrive in [facility] later today. 3. Pancytopenia: Given IVIG gm IV x1 on 11/16 and 11/17. Maintain hgb >7, and plt >10,000. Heme/onc following. Suspect a bone marrow failure syndrome. Will obtain a bone marrow biopsy and aspirate. 4. Syncope: likely 2/2 to anemia and hyponatremia. CT head 11/9 was unremarkable. Possibly due to Tizanidine, holding tizanidine. 5. Hypothyroidism: Continue synthroid    6. HTN: Holding lisinopril and amlodipine. 7. HLD: Continue Lipitor     8. Ovarian cancer: Treated with chemotherapy    9. New onset DM: Medium dose ssi. Hemoglobin a1c 6.6. Diabetic educator consulted. DVT prophylaxis: SCDs     NOTE: This report was transcribed using voice recognition software. Every effort was made to ensure accuracy; however, inadvertent computerized transcription errors may be present. Electronically signed by Swetha Jay PA-C on 11/22/2021 at 2:10 PM     Addendum: I have personally participated in the history, exam, medical decision making with Mary Beth Spear on the date of service and I agree with all of the pertinent clinical information unless otherwise noted. I have also reviewed and agree with the past medical, family, and social history unless otherwise noted. Patient was admitted with syncope    PHYSICAL EXAM:  Vitals:  BP (!) 103/59   Pulse 86   Temp 97.7 °F (36.5 °C) (Oral)   Resp 16   Ht 5' 2\" (1.575 m)   Wt 130 lb 6.4 oz (59.1 kg)   SpO2 100%   BMI 23.85 kg/m²   Gen: awake, alert, NAD  Lungs: clear to auscultation bilaterally no crackles no wheezing. Heart: RRR, no murmur   Abdomen: soft nontender nondistended positive bowel sounds.   Extremities: full range of motion no peripheral edema. Impression:  Active Problems:    Hyponatremia    ELIANA (acute kidney injury) ([facility] [address]  Resolved Problems:    * No resolved hospital problems. *      My findings/plan include:    Still waiting for PRBC to arrive. Will transfuse and then should be okay for discharge. No events today and no complaints today. NOTE: This report was transcribed using voice recognition software. Every effort was made to ensure accuracy; however, inadvertent computerized transcription errors may be present.     Electronically signed by Gonzalo Lawton DO on 11/22/2021 at 2:50 PM

## 2021-11-22 NOTE — PROGRESS NOTES
Notified Hilary trivedi of [facility] patient not receiving blood products yet.

## 2021-11-22 NOTE — PROGRESS NOTES
Spoke to renal this am okay to give urea  Also spoke to blood bank [facility]- blood expected to arrive in [place] later sometime today

## 2021-11-22 NOTE — PROGRESS NOTES
Subjective:  Chart reviewed, seen at bedside  The patient is awake and alert. No problems overnight. She feels well and has no complaints  Denies bleeding gums, nose bleeds, chest pain, angina, dyspnea or abdominal discomfort. No nausea or vomiting. Tolerating diet. Still awaiting transfusion    Objective:    BP (!) 103/59   Pulse 86   Temp 97.7 °F (36.5 °C) (Oral)   Resp 16   Ht 5' 2\" (1.575 m)   Wt 130 lb 6.4 oz (59.1 kg)   SpO2 100%   BMI 23.85 kg/m²     General: NAD  HEENT: No thrush or mucositis, EOMI  Heart:  RRR, no murmurs, gallops, or rubs.   Lungs:  CTA bilaterally, no wheeze, rales or rhonchi  Abd: BS present, nontender, nondistended, no masses, no splenomegaly  Extrem:  No clubbing, cyanosis, or edema  Lymphatics: No palpable adenopathy in cervical and supraclavicular regions  Skin: Intact, no petechia or purpura    CBC with Differential:    Lab Results   Component Value Date    WBC 3.4 11/22/2021    RBC 2.05 11/22/2021    RBC 3.12 07/20/2019    HGB 6.4 11/22/2021    HCT 19.5 11/22/2021    PLT 28 11/22/2021    MCV 95.1 11/22/2021    MCH 31.2 11/22/2021    MCHC 32.8 11/22/2021    RDW 23.9 11/22/2021    NRBC 0.0 11/16/2021    BLASTSPCT 0.9 11/16/2021    METASPCT 0.9 11/16/2021    LYMPHOPCT 36.9 11/16/2021    MONOPCT 15.3 11/16/2021    MYELOPCT 1.8 11/16/2021    BASOPCT 0.0 11/16/2021    MONOSABS 0.63 11/16/2021    LYMPHSABS 1.55 11/16/2021    EOSABS 0.08 11/16/2021    BASOSABS 0.00 11/16/2021     CMP:    Lab Results   Component Value Date     11/22/2021    K 4.8 11/22/2021    K 4.1 11/10/2021    CL 94 11/22/2021    CO2 26 11/22/2021    BUN 29 11/22/2021    CREATININE 0.6 11/22/2021    GFRAA >60 11/22/2021    LABGLOM >60 11/22/2021    GLUCOSE 205 11/22/2021    GLUCOSE 101 12/19/2011    PROT 6.7 11/15/2021    LABALBU 3.3 11/15/2021    LABALBU 4.6 12/19/2011    CALCIUM 9.0 11/22/2021    BILITOT 0.8 11/15/2021    ALKPHOS 91 11/15/2021    AST 15 11/15/2021    ALT 10 11/15/2021   HAPTOGLOBIN 41      Assessment:  1. Anemia - awaiting transfusion, Hgb 6.4  2. Ovarian cancer - Treated with Carboplatin and Paclitaxel.  Had biopsy proven recurrence s/p excisional resection on 7/19/19.  Treated with Carbo/ Paclitaxel from Aug to Oct 2019.  Followed by maintenance Irvign Netter from 12/11/19. Dose reduced to 200 mg daily due to cytopenias  3. pancytopenic     Plan:  S/p IVIG 30 gm IV x 1 11/16, 11/17  Decadron restarted 11/17 (patient refused doses yesterday)  Daily LDH, haptoglobin, retic count, indirect/direct bilirubin  Continue to monitor H/H, platelets and maintain HGB above 7 g/dL and PLT above 10,0000      Electronically signed by GINO Briceño on 11/22/2021 at 10:25 AM     Patient was seen and examined. Case discussed with NP. I agree with above documentation. Patient is still pancytopenic with significant anemia requiring frequent blood transfusions. At this time there is no clear evidence of an autoimmune hemolytic process. She has no adequate reticulocyte response. Bilirubin level is normal.  I suspect a bone marrow failure syndrome responsible for the cytopenias at this time.   We will obtain a bone marrow biopsy and aspirate  Continue transfusion support and  Supportive care

## 2021-11-22 NOTE — FLOWSHEET NOTE
Spoke to Piyush Cooper and informed her to call IR when patient receives the 2 units of blood that are ordered and H/H is repeated. Also informed RN to keep patient NPO after midnight.

## 2021-11-23 NOTE — PROGRESS NOTES
and dry  Head: normocephalic and atraumatic  Eyes: pupils equal, round, and reactive to light, extraocular eye movements intact, conjunctivae normal  Neck: neck supple and non tender without mass   Pulmonary/Chest: clear to auscultation bilaterally- no wheezes, rales or rhonchi, normal air movement, no respiratory distress  Cardiovascular: normal rate, normal S1 and S2 and no carotid bruits  Abdomen: soft, non-tender, non-distended, normal bowel sounds, no masses or organomegaly  Extremities: no cyanosis, no clubbing and no edema  Neurologic: no cranial nerve deficit and speech normal        Recent Labs     11/21/21 0237 11/22/21  0245 11/23/21  0155   * 129* 128*   K 4.7 4.8 4.6   CL 96* 94* 93*   CO2 26 26 26   BUN 28* 29* 34*   CREATININE 0.6 0.6 0.6   GLUCOSE 178* 205* 186*   CALCIUM 9.2 9.0 9.0       Recent Labs     11/21/21 0237 11/22/21  0245 11/23/21  0155   WBC 2.6* 3.4* 3.2*   RBC 1.99* 2.05* 2.56*   HGB 6.4* 6.4* 8.0*   HCT 18.8* 19.5* 23.7*   MCV 94.5 95.1 92.6   MCH 32.2 31.2 31.3   MCHC 34.0 32.8 33.8   RDW 23.6* 23.9* 21.0*   PLT 29* 28* 27*   MPV 11.0 12.7* 12.6*        Radiology:  EXAMINATION:  RETROPERITONEAL ULTRASOUND OF THE KIDNEYS AND URINARY BLADDER     11/10/2021     COMPARISON:  None     HISTORY:  ORDERING SYSTEM PROVIDED HISTORY: ELIANA  TECHNOLOGIST PROVIDED HISTORY:     Reason for exam:->ELIANA  What reading provider will be dictating this exam?->CRC     FINDINGS:  Right kidney measures 9.0 x 3.7 x 5.2 cm     Left kidney is 9.4 x 5.4 x 5.1 cm     No evidence for hydronephrosis. Normal renal echogenicity     At the upper pole the right kidney there is a 2.7 x 3.0 x 3.0 cm simple  appearing cyst     There is a Patel within the urinary bladder     IMPRESSION:  Right renal cyst otherwise negative study    Assessment:    Active Problems:    Hyponatremia    ELIANA (acute kidney injury) ([facility] [address]  Resolved Problems:    * No resolved hospital problems. *      Plan:  1.  Acute on chronic hyponatremia: Patient Na 130. Basline around 130. Fluid restriction. Continue Roxine Amaris. Nephrology following. 2. Acute on chronic anemia: Baseline hgb in 8s. Hgb was 6.4. 1U of PRBC transfused 11/23. Hgb 8.0.    3. Pancytopenia: Given IVIG gm IV x1 on 11/16 and 11/17. Maintain hgb >7, and plt >10,000. Heme/onc following. Suspect a bone marrow failure syndrome. Will obtain a bone marrow biopsy and aspirate tomorrow. 4. Syncope: likely 2/2 to anemia and hyponatremia. CT head 11/9 was unremarkable. Possibly due to Tizanidine, holding tizanidine. 5. Hypothyroidism: Continue synthroid    6. HTN: Holding lisinopril and amlodipine. 7. HLD: Continue Lipitor     8. Ovarian cancer: Treated with chemotherapy    9. DM: Medium dose ssi. Hemoglobin a1c 6.6. Diabetic educator consulted. DVT prophylaxis: SCDs     NOTE: This report was transcribed using voice recognition software. Every effort was made to ensure accuracy; however, inadvertent computerized transcription errors may be present. Electronically signed by Christine Schaeffer PA-C on 11/23/2021 at 9:00 AM     Addendum: I have personally participated in the history, exam, medical decision making with Ricci Castro on the date of service and I agree with all of the pertinent clinical information unless otherwise noted. I have also reviewed and agree with the past medical, family, and social history unless otherwise noted. Patient was admitted with syncope    PHYSICAL EXAM:  Vitals:  /67   Pulse 80   Temp 97.7 °F (36.5 °C)   Resp 16   Ht 5' 2\" (1.575 m)   Wt 130 lb 6.4 oz (59.1 kg)   SpO2 100%   BMI 23.85 kg/m²   Gen: awake, alert, NAD  Lungs: clear to auscultation bilaterally no crackles no wheezing. Heart: RRR, no murmur   Abdomen: soft nontender nondistended positive bowel sounds. Extremities: full range of motion no peripheral edema.       Impression:  Active Problems:    Hyponatremia    ELIANA (acute kidney injury) ([address]  Resolved Problems:    * No resolved hospital problems. *      My findings/plan include:   Received 1 unit PRBC. She is to have BM biopsy tomorrow morning and then DC home tomorrow. NOTE: This report was transcribed using voice recognition software. Every effort was made to ensure accuracy; however, inadvertent computerized transcription errors may be present.     Electronically signed by Maria Luz Coronado DO on 11/23/2021 at 3:21 PM

## 2021-11-23 NOTE — PROGRESS NOTES
Message left with Dr. Rosy Escobar office re: Hgb 8 this AM, IR cancelled for today. Awaiting call back.

## 2021-11-23 NOTE — PROGRESS NOTES
Spoke with IR this AM, states bone marrow biopsy will not be able to occur today, possibly tomorrow. Patient's diet resumed.

## 2021-11-23 NOTE — PROGRESS NOTES
Progress Note  11/23/2021 11:58 AM  Subjective:   Admit Date: 11/9/2021  PCP: Juan Petersen MD  Interval History: patient  examined doing well     Diet: ADULT DIET; Regular; Low Fat/Low Chol/High Fiber/NICOLASA; 1200 ml    Data:   Scheduled Meds:   urea  30 g Oral Daily    insulin lispro  0-18 Units SubCUTAneous TID WC    insulin lispro  0-6 Units SubCUTAneous Nightly    dexamethasone  4 mg IntraVENous Q8H    polyethylene glycol  17 g Oral Daily    levothyroxine  75 mcg Oral Daily    oyster shell calcium w/D  1 tablet Oral Daily with breakfast    vitamin B-12  100 mcg Oral Daily    gabapentin  300 mg Oral TID    vitamin B-6  100 mg Oral Daily    atorvastatin  10 mg Oral Nightly    [Held by provider] amLODIPine  10 mg Oral Daily    sodium chloride flush  5-40 mL IntraVENous 2 times per day    famotidine  20 mg Oral Daily     Continuous Infusions:   dextrose      sodium chloride      sodium chloride      sodium chloride      sodium chloride      sodium chloride 25 mL (11/12/21 2240)     PRN Meds:glucose, dextrose, glucagon (rDNA), dextrose, sodium chloride, sodium chloride, sodium chloride, sodium chloride, albuterol, [Held by provider] tiZANidine, sodium chloride flush, sodium chloride, ondansetron **OR** ondansetron, acetaminophen **OR** acetaminophen  I/O last 3 completed shifts: In: 350 [Blood:350]  Out: 400 [Urine:400]  No intake/output data recorded.     Intake/Output Summary (Last 24 hours) at 11/23/2021 1158  Last data filed at 11/23/2021 0120  Gross per 24 hour   Intake 350 ml   Output 400 ml   Net -50 ml     CBC:   Recent Labs     11/21/21 0237 11/22/21 0245 11/23/21 0155   WBC 2.6* 3.4* 3.2*   HGB 6.4* 6.4* 8.0*   PLT 29* 28* 27*     BMP:    Recent Labs     11/21/21 0237 11/22/21 0245 11/23/21  0155   * 129* 128*   K 4.7 4.8 4.6   CL 96* 94* 93*   CO2 26 26 26   BUN 28* 29* 34*   CREATININE 0.6 0.6 0.6   GLUCOSE 178* 205* 186*     Hepatic: No results for input(s): AST, ALT, ALB, BILITOT, ALKPHOS in the last 72 hours. Troponin: No results for input(s): TROPONINI in the last 72 hours. BNP: No results for input(s): BNP in the last 72 hours. Lipids: No results for input(s): CHOL, HDL in the last 72 hours. Invalid input(s): LDLCALCU  ABGs: No results found for: PHART, PO2ART, REB0VWX  INR: No results for input(s): INR in the last 72 hours. -----------------------------------------------------------------  RAD: CT HEAD WO CONTRAST    Result Date: 11/9/2021  EXAMINATION: CT OF THE HEAD WITHOUT CONTRAST  11/9/2021 7:16 pm TECHNIQUE: CT of the head was performed without the administration of intravenous contrast. Dose modulation, iterative reconstruction, and/or weight based adjustment of the mA/kV was utilized to reduce the radiation dose to as low as reasonably achievable. COMPARISON: None. HISTORY: ORDERING SYSTEM PROVIDED HISTORY: HEAD TRAUMA, CLOSED, MILD, GCS >= 13, NO RISK FACTORS, NEURO EXAM NORMAL TECHNOLOGIST PROVIDED HISTORY: Has a \"code stroke\" or \"stroke alert\" been called? ->No Reason for exam:->syncope Decision Support Exception - unselect if not a suspected or confirmed emergency medical condition->Emergency Medical Condition (MA) FINDINGS: BRAIN/VENTRICLES: There is no acute intracranial hemorrhage, mass effect or midline shift. No abnormal extra-axial fluid collection. The gray-white differentiation is maintained without evidence of an acute infarct. There is prominence of the ventricles and sulci due to global parenchymal volume loss. There are nonspecific areas of hypoattenuation within the periventricular and subcortical white matter, which likely represent chronic microvascular ischemic change. ORBITS: The visualized portion of the orbits demonstrate no acute abnormality. SINUSES: The visualized paranasal sinuses and mastoid air cells demonstrate no acute abnormality. SOFT TISSUES/SKULL: No acute abnormality of the visualized skull or soft tissues.      No acute intracranial abnormality. Age-related loss of brain volume and chronic white matter ischemic changes. XR CHEST PORTABLE    Result Date: 11/9/2021  EXAMINATION: ONE XRAY VIEW OF THE CHEST 11/9/2021 6:41 pm COMPARISON: October 19 HISTORY: ORDERING SYSTEM PROVIDED HISTORY: syncope TECHNOLOGIST PROVIDED HISTORY: Reason for exam:->syncope FINDINGS: The lungs are without acute focal process. There is no effusion or pneumothorax. The cardiomediastinal silhouette is without acute process. The osseous structures are without acute process. MediPort line in the SVC. No acute process. US RETROPERITONEAL COMPLETE    Result Date: 11/10/2021  EXAMINATION: RETROPERITONEAL ULTRASOUND OF THE KIDNEYS AND URINARY BLADDER 11/10/2021 COMPARISON: None HISTORY: ORDERING SYSTEM PROVIDED HISTORY: ELIANA TECHNOLOGIST PROVIDED HISTORY: Reason for exam:->ELIANA What reading provider will be dictating this exam?->CRC FINDINGS: Right kidney measures 9.0 x 3.7 x 5.2 cm Left kidney is 9.4 x 5.4 x 5.1 cm No evidence for hydronephrosis. Normal renal echogenicity At the upper pole the right kidney there is a 2.7 x 3.0 x 3.0 cm simple appearing cyst There is a Paetl within the urinary bladder     Right renal cyst otherwise negative study       Objective:   Vitals: /78   Pulse 88   Temp 97.7 °F (36.5 °C) (Oral)   Resp 18   Ht 5' 2\" (1.575 m)   Wt 130 lb 6.4 oz (59.1 kg)   SpO2 100%   BMI 23.85 kg/m²   General appearance: appears stated age   Skin:  No rashes or lesions  HEENT: Head: Normocephalic, no lesions, without obvious abnormality.   Neck: no adenopathy, no carotid bruit, no JVD, supple, symmetrical, trachea midline and thyroid not enlarged, symmetric, no tenderness/mass/nodules  Lungs: clear to auscultation bilaterally  Heart: regular rate and rhythm, S1, S2 normal, no murmur, click, rub or gallop  Abdomen: soft, non-tender; bowel sounds normal; no masses,  no organomegaly  Extremities: extremities normal, atraumatic, no cyanosis or edema  Neurologic: Mental status: Alert, oriented, thought content appropriate    Assessment:   Patient Active Problem List:     HTN (hypertension)     DJD (degenerative joint disease) of knee     DJD (degenerative joint disease), lumbosacral     Hypothyroid     Hypercholesterolemia     GERD (gastroesophageal reflux disease)     Mild intermittent asthma without complication     Secondary malignant neoplasm of other specified sites [facility])     Depression     COVID-19 virus infection     Hypotension     Pulmonary infiltrates on CXR     Hyponatremia     ELIANA (acute kidney injury) ([facility] [address]     Hyperlipidemia     New onset atrial fibrillation (HCC)     Pancytopenia (HCC)     Anemia     Syncope and collapse    Plan:   1. 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 Continue  fluid restriction  2.  Continue Ferd Batters for now      2. Syncope which may have related to the hyponatremia also possible due to the tizanidine  PLAN:  1. Agree with holding the tizanidine     3. Hypomagnesemia  1.7 (11/17)  PLAN:  1. IV supplement as needed for goal 2.0     4.  Anemia with Pancytopenia  Has been managed by hematology in the outpatient setting   PLAN:   1. Hem Onc now following   2.  For 2U PRBC awaiting blood transfusion -- multiple antibodies      5.  Essential hypertension BP controlled      Ok to discharge post blood transfusion , follow up in office in 2 weeks Dr Taty griffin for allowing us to participate in care of Ms Gina Stephen MD

## 2021-11-23 NOTE — PLAN OF CARE
Problem: Falls - Risk of:  Goal: Will remain free from falls  Description: Will remain free from falls  Outcome: Met This Shift  Goal: Absence of physical injury  Description: Absence of physical injury  Outcome: Met This Shift     Problem: Discharge Planning:  Goal: Participates in care planning  Description: Participates in care planning  Outcome: Met This Shift     Problem: Anxiety/Stress:  Goal: Level of anxiety will decrease  Description: Level of anxiety will decrease  Outcome: Met This Shift     Problem: Mental Status - Impaired:  Goal: Absence of physical injury  Description: Absence of physical injury  Outcome: Met This Shift  Goal: Mental status will be restored to baseline  Description: Mental status will be restored to baseline  Outcome: Met This Shift     Problem: Pain:  Goal: Pain level will decrease  Description: Pain level will decrease  Outcome: Met This Shift  Goal: Ability to notify healthcare provider of pain before it becomes unmanageable or unbearable will improve  Description: Ability to notify healthcare provider of pain before it becomes unmanageable or unbearable will improve  Outcome: Met This Shift  Goal: Control of acute pain  Description: Control of acute pain  Outcome: Met This Shift  Goal: Control of chronic pain  Description: Control of chronic pain  Outcome: Met This Shift     Problem: Skin Integrity - Impaired:  Goal: Will show no infection signs and symptoms  Description: Will show no infection signs and symptoms  Outcome: Met This Shift  Goal: Absence of new skin breakdown  Description: Absence of new skin breakdown  Outcome: Met This Shift     Problem: Sleep Pattern Disturbance:  Goal: Appears well-rested  Description: Appears well-rested  Outcome: Met This Shift     Problem: Discharge Planning:  Goal: Discharged to appropriate level of care  Description: Discharged to appropriate level of care  Outcome: Ongoing     Problem:  Activity Intolerance:  Goal: Ability to tolerate

## 2021-11-23 NOTE — PROGRESS NOTES
Subjective:  Chart reviewed, seen at bedside  The patient is awake and alert. No problems overnight. She received her first unit of blood 11/22 in the evening. She is currently receiving her second unit. She feels significantly better with more energy  She has no current complaints and is tolerating the transfusions without acute transfusion reaction  She denies shortness of breath, pain, nausea/vomiting, diarrhea/constipation or any abnormal bleeding or bruising, epistaxis    Objective:    /67   Pulse 80   Temp 97.7 °F (36.5 °C)   Resp 16   Ht 5' 2\" (1.575 m)   Wt 130 lb 6.4 oz (59.1 kg)   SpO2 100%   BMI 23.85 kg/m²     General: NAD, slightly sleepy today but woke easily  HEENT: No thrush or mucositis, EOMI  Heart:  RRR, no murmurs, gallops, or rubs.   Lungs:  CTA bilaterally, no wheeze, rales or rhonchi  Abd: BS present, nontender, nondistended, no masses, no splenomegaly  Extrem:  No clubbing, cyanosis, or edema  Lymphatics: No palpable adenopathy in cervical and supraclavicular regions  Skin: Intact, no petechia or purpura    CBC with Differential:    Lab Results   Component Value Date    WBC 3.2 11/23/2021    RBC 2.56 11/23/2021    RBC 3.12 07/20/2019    HGB 8.0 11/23/2021    HCT 23.7 11/23/2021    PLT 27 11/23/2021    MCV 92.6 11/23/2021    MCH 31.3 11/23/2021    MCHC 33.8 11/23/2021    RDW 21.0 11/23/2021    NRBC 0.0 11/16/2021    BLASTSPCT 0.9 11/16/2021    METASPCT 0.9 11/16/2021    LYMPHOPCT 36.9 11/16/2021    MONOPCT 15.3 11/16/2021    MYELOPCT 1.8 11/16/2021    BASOPCT 0.0 11/16/2021    MONOSABS 0.63 11/16/2021    LYMPHSABS 1.55 11/16/2021    EOSABS 0.08 11/16/2021    BASOSABS 0.00 11/16/2021     CMP:    Lab Results   Component Value Date     11/23/2021    K 4.6 11/23/2021    K 4.1 11/10/2021    CL 93 11/23/2021    CO2 26 11/23/2021    BUN 34 11/23/2021    CREATININE 0.6 11/23/2021    GFRAA >60 11/23/2021    LABGLOM >60 11/23/2021    GLUCOSE 186 11/23/2021    GLUCOSE 101 12/19/2011    PROT 6.7 11/15/2021    LABALBU 3.3 11/15/2021    LABALBU 4.6 12/19/2011    CALCIUM 9.0 11/23/2021    BILITOT 0.8 11/15/2021    ALKPHOS 91 11/15/2021    AST 15 11/15/2021    ALT 10 11/15/2021   HAPTOGLOBIN 41      Assessment:  1. Anemia -H&H with improvement s/p 1 unit PRBC,  patient receiving 2nd unit  2. Ovarian cancer - Treated with Carboplatin and Paclitaxel.  Had biopsy proven recurrence s/p excisional resection on 7/19/19.  Treated with Carbo/ Paclitaxel from Aug to Oct 2019.  Followed by maintenance Julia Damaris from 12/11/19. Dose reduced to 200 mg daily due to cytopenias  3. pancytopenic -need bone marrow biopsy    Plan:  S/p IVIG 30 gm IV x 1 11/16, 11/17  Decadron restarted 11/17 (patient refused doses yesterday)  Daily LDH, haptoglobin, retic count, indirect/direct bilirubin  Continue to monitor H/H, platelets and maintain HGB above 7 g/dL and PLT above 10,0000  Bone marrow biopsy to be completed by IR 11/24 in a.m.   Patient follow-up closely with Dr. Freddie Choi in the office as scheduled    Electronically signed by GINO Connor on 11/23/2021 at 4:40 PM

## 2021-11-23 NOTE — PROGRESS NOTES
Spoke with GNIO Dos Santos. States pt will need IR procedure in AM prior to DC from hem onc POV. Primary team updated.

## 2021-11-24 NOTE — PROGRESS NOTES
Progress Note  11/24/2021 12:32 PM  Subjective:   Admit Date: 11/9/2021  PCP: Amanda Bowser MD  Interval History: patient examined doing better , just came back from Bone marrow Bx     Diet: ADULT DIET; Regular; Low Fat/Low Chol/High Fiber/NICOLASA; 1200 ml    Data:   Scheduled Meds:   urea  30 g Oral Daily    insulin lispro  0-18 Units SubCUTAneous TID WC    insulin lispro  0-6 Units SubCUTAneous Nightly    dexamethasone  4 mg IntraVENous Q8H    polyethylene glycol  17 g Oral Daily    levothyroxine  75 mcg Oral Daily    oyster shell calcium w/D  1 tablet Oral Daily with breakfast    vitamin B-12  100 mcg Oral Daily    gabapentin  300 mg Oral TID    vitamin B-6  100 mg Oral Daily    atorvastatin  10 mg Oral Nightly    [Held by provider] amLODIPine  10 mg Oral Daily    sodium chloride flush  5-40 mL IntraVENous 2 times per day    famotidine  20 mg Oral Daily     Continuous Infusions:   dextrose      sodium chloride      sodium chloride      sodium chloride      sodium chloride      sodium chloride 25 mL (11/12/21 2240)     PRN Meds:glucose, dextrose, glucagon (rDNA), dextrose, sodium chloride, sodium chloride, sodium chloride, sodium chloride, albuterol, [Held by provider] tiZANidine, sodium chloride flush, sodium chloride, ondansetron **OR** ondansetron, acetaminophen **OR** acetaminophen  I/O last 3 completed shifts: In: 450 [P.O.:450]  Out: -   I/O this shift:  In: 250 [I.V.:250]  Out: -     Intake/Output Summary (Last 24 hours) at 11/24/2021 1232  Last data filed at 11/24/2021 0849  Gross per 24 hour   Intake 250 ml   Output    Net 250 ml     CBC:   Recent Labs     11/22/21 0245 11/22/21 0245 11/23/21 0155 11/23/21  1643 11/24/21 0411   WBC 3.4*  --  3.2*  --  3.3*   HGB 6.4*   < > 8.0* 9.3* 9.7*   PLT 28*  --  27*  --  26*    < > = values in this interval not displayed.      BMP:    Recent Labs     11/22/21  0245 11/23/21  0155 11/24/21 0411   * 128* 128*   K 4.8 4.6 4.6   CL 94* 93* 94*   CO2 26 26 26   BUN 29* 34* 32*   CREATININE 0.6 0.6 0.6   GLUCOSE 205* 186* 208*     Hepatic: No results for input(s): AST, ALT, ALB, BILITOT, ALKPHOS in the last 72 hours. Troponin: No results for input(s): TROPONINI in the last 72 hours. BNP: No results for input(s): BNP in the last 72 hours. Lipids: No results for input(s): CHOL, HDL in the last 72 hours. Invalid input(s): LDLCALCU  ABGs: No results found for: PHART, PO2ART, ALA2AMJ  INR: No results for input(s): INR in the last 72 hours. -----------------------------------------------------------------  RAD: CT HEAD WO CONTRAST    Result Date: 11/9/2021  EXAMINATION: CT OF THE HEAD WITHOUT CONTRAST  11/9/2021 7:16 pm TECHNIQUE: CT of the head was performed without the administration of intravenous contrast. Dose modulation, iterative reconstruction, and/or weight based adjustment of the mA/kV was utilized to reduce the radiation dose to as low as reasonably achievable. COMPARISON: None. HISTORY: ORDERING SYSTEM PROVIDED HISTORY: HEAD TRAUMA, CLOSED, MILD, GCS >= 13, NO RISK FACTORS, NEURO EXAM NORMAL TECHNOLOGIST PROVIDED HISTORY: Has a \"code stroke\" or \"stroke alert\" been called? ->No Reason for exam:->syncope Decision Support Exception - unselect if not a suspected or confirmed emergency medical condition->Emergency Medical Condition (MA) FINDINGS: BRAIN/VENTRICLES: There is no acute intracranial hemorrhage, mass effect or midline shift. No abnormal extra-axial fluid collection. The gray-white differentiation is maintained without evidence of an acute infarct. There is prominence of the ventricles and sulci due to global parenchymal volume loss. There are nonspecific areas of hypoattenuation within the periventricular and subcortical white matter, which likely represent chronic microvascular ischemic change. ORBITS: The visualized portion of the orbits demonstrate no acute abnormality.  SINUSES: The visualized paranasal sinuses and mastoid air cells demonstrate no acute abnormality. SOFT TISSUES/SKULL: No acute abnormality of the visualized skull or soft tissues. No acute intracranial abnormality. Age-related loss of brain volume and chronic white matter ischemic changes. XR CHEST PORTABLE    Result Date: 11/9/2021  EXAMINATION: ONE XRAY VIEW OF THE CHEST 11/9/2021 6:41 pm COMPARISON: October 19 HISTORY: ORDERING SYSTEM PROVIDED HISTORY: syncope TECHNOLOGIST PROVIDED HISTORY: Reason for exam:->syncope FINDINGS: The lungs are without acute focal process. There is no effusion or pneumothorax. The cardiomediastinal silhouette is without acute process. The osseous structures are without acute process. MediPort line in the SVC. No acute process. US RETROPERITONEAL COMPLETE    Result Date: 11/10/2021  EXAMINATION: RETROPERITONEAL ULTRASOUND OF THE KIDNEYS AND URINARY BLADDER 11/10/2021 COMPARISON: None HISTORY: ORDERING SYSTEM PROVIDED HISTORY: ELIANA TECHNOLOGIST PROVIDED HISTORY: Reason for exam:->ELIANA What reading provider will be dictating this exam?->CRC FINDINGS: Right kidney measures 9.0 x 3.7 x 5.2 cm Left kidney is 9.4 x 5.4 x 5.1 cm No evidence for hydronephrosis. Normal renal echogenicity At the upper pole the right kidney there is a 2.7 x 3.0 x 3.0 cm simple appearing cyst There is a Patel within the urinary bladder     Right renal cyst otherwise negative study       Objective:   Vitals: /65   Pulse 57   Temp 98.1 °F (36.7 °C) (Oral)   Resp 16   Ht 5' 2\" (1.575 m)   Wt 130 lb 6.4 oz (59.1 kg)   SpO2 100%   BMI 23.85 kg/m²   General appearance: appears stated age   Skin:  No rashes or lesions  HEENT: Head: Normocephalic, no lesions, without obvious abnormality.   Neck: no adenopathy, no carotid bruit, no JVD, supple, symmetrical, trachea midline and thyroid not enlarged, symmetric, no tenderness/mass/nodules  Lungs: clear to auscultation bilaterally  Heart: regular rate and rhythm, S1, S2 normal, no murmur, click, rub or gallop  Abdomen: soft, non-tender; bowel sounds normal; no masses,  no organomegaly  Extremities: extremities normal, atraumatic, no cyanosis or edema  Neurologic: Mental status: Alert, oriented, thought content appropriate    Assessment:   Patient Active Problem List:     HTN (hypertension)     DJD (degenerative joint disease) of knee     DJD (degenerative joint disease), lumbosacral     Hypothyroid     Hypercholesterolemia     GERD (gastroesophageal reflux disease)     Mild intermittent asthma without complication     Secondary malignant neoplasm of other specified sites [facility])     Depression     COVID-19 virus infection     Hypotension     Pulmonary infiltrates on CXR     Hyponatremia     ELIANA (acute kidney injury) ([facility] [address]     Hyperlipidemia     New onset atrial fibrillation (HCC)     Pancytopenia (HCC)     Anemia     Syncope and collapse    Plan:     1. 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 Continue  fluid restriction  2.  Continue Johnita [place] for     NA sits at 128-- 130 range last few days      2. Syncope which may have related to the hyponatremia also possible due to the tizanidine  PLAN:  1. Agree with holding the tizanidine     3.  Hypomagnesemia  1.6 (11/17)  PLAN:  1. IV supplement as needed for goal 2.0     4.  Anemia with Pancytopenia  Has been managed by hematology in the outpatient setting     Received 2 units HB better at 9.7        5.  Essential hypertension BP controlled      Ok to discharge from renal pov  , follow up in office in 2 weeks Dr Jenna girffin for allowing us to participate in care of Ms Francois Duane, MD

## 2021-11-24 NOTE — ANESTHESIA PRE PROCEDURE
Department of Anesthesiology  Preprocedure Note       Name:  Helga Sagastume   Age:  65 y.o.  :  1950                                          MRN:  [id number]         Date:  2021      Surgeon: * Surgery not found *    Procedure: CT BIOPSY W ANESTHESIA    Medications prior to admission:   Prior to Admission medications    Medication Sig Start Date End Date Taking? Authorizing Provider   folic acid (FOLVITE) 1 MG tablet Take 1 tablet by mouth daily 21   Ana Valenzuela MD   lisinopril (PRINIVIL;ZESTRIL) 10 MG tablet Take 1 tablet by mouth daily 10/23/21   Ana Valenzuela MD   vitamin B-6 (PYRIDOXINE) 100 MG tablet Take 1 tablet by mouth daily 10/22/21   Ana Valenzuela MD   calcium-vitamin D (Jorje Legions) 500-200 MG-UNIT per tablet Take 1 tablet by mouth daily 10/23/21   Ana Valenzuela MD   tiZANidine (ZANAFLEX) 2 MG tablet Take 1 tablet by mouth every 8 hours as needed (muscle spasms) 21  Bart Steward MD   levothyroxine (SYNTHROID) 100 MCG tablet Take 1 tablet by mouth Daily 21   Bart Steward MD   lovastatin (MEVACOR) 40 MG tablet Take 1 tablet by mouth nightly 21   Bart Steward MD   pantoprazole (PROTONIX) 40 MG tablet Take 1 tablet by mouth daily 21   Bart Steward MD   escitalopram (LEXAPRO) 10 MG tablet Take 1 tablet by mouth every morning 21   Bart Steward MD   albuterol sulfate HFA (VENTOLIN HFA) 108 (90 Base) MCG/ACT inhaler Inhale 2 puffs into the lungs every 6 hours as needed for Wheezing or Shortness of Breath 21   Bart Steward MD   Cyanocobalamin (VITAMIN B 12 PO) Take 1 tablet by mouth daily     Historical Provider, MD   acetaminophen (TYLENOL) 500 MG tablet Take 500 mg by mouth every 6 hours as needed for Pain    Historical Provider, MD   gabapentin (NEURONTIN) 300 MG capsule Take 1 capsule by mouth 3 times daily for 360 days.  9/2/20 10/19/21  Bart Steward MD       Current medications:    No current facility-administered medications for this visit. No current outpatient medications on file.      Facility-Administered Medications Ordered in Other Visits   Medication Dose Route Frequency Provider Last Rate Last Admin    urea (URE-NA) packet 30 g  30 g Oral Daily Radha Mehta MD   30 g at 11/23/21 0838    insulin lispro (HUMALOG) injection vial 0-18 Units  0-18 Units SubCUTAneous TID  Yolette Landon DO   15 Units at 11/23/21 1137    insulin lispro (HUMALOG) injection vial 0-6 Units  0-6 Units SubCUTAneous Nightly Ludy Maki PA-C   3 Units at 11/23/21 2210    glucose (GLUTOSE) 40 % oral gel 15 g  15 g Oral PRN Atilio Maki PA-C        dextrose 50 % IV solution  12.5 g IntraVENous PRN Ludy Maki PA-C        glucagon (rDNA) injection 1 mg  1 mg IntraMUSCular PRN Atilio Maki PA-C        dextrose 5 % solution  100 mL/hr IntraVENous PRN Atilio Maki PA-C        0.9 % sodium chloride infusion   IntraVENous PRN Hung Galvin MD        0.9 % sodium chloride infusion   IntraVENous PRN Kristy Nye MD        dexamethasone (DECADRON) injection 4 mg  4 mg IntraVENous Q8H Kaela Harrell MD   4 mg at 11/23/21 2203    polyethylene glycol (GLYCOLAX) packet 17 g  17 g Oral Daily Kristy Nye MD   17 g at 11/23/21 0837    0.9 % sodium chloride infusion   IntraVENous PRN Kristy Nye MD        levothyroxine (SYNTHROID) tablet 75 mcg  75 mcg Oral Daily Kristy Nye MD   75 mcg at 11/23/21 9284    oyster shell calcium w/D 500-200 MG-UNIT tablet 1 tablet  1 tablet Oral Daily with breakfast Beverlie Dubin, MD   1 tablet at 11/23/21 0836    0.9 % sodium chloride infusion   IntraVENous PRN Brandon Bonilla DO        albuterol (PROVENTIL) nebulizer solution 2.5 mg  2.5 mg Nebulization Q1H PRN Beverlie Dubin, MD        vitamin B-12 (CYANOCOBALAMIN) tablet 100 mcg  100 mcg Oral Daily Beverlie Dubin, MD   100 mcg at 11/23/21 0836    gabapentin (NEURONTIN) capsule 300 mg  300 mg Oral TID Saloni Bruce MD   300 mg at 11/23/21 2203    vitamin B-6 (PYRIDOXINE) tablet 100 mg  100 mg Oral Daily Saloni Bruce MD   100 mg at 11/23/21 0836    [Held by provider] tiZANidine (ZANAFLEX) tablet 2 mg  2 mg Oral Q8H PRN Saloni Bruce MD        atorvastatin (LIPITOR) tablet 10 mg  10 mg Oral Nightly Saloni Bruce MD   10 mg at 11/23/21 2203    [Held by provider] amLODIPine (NORVASC) tablet 10 mg  10 mg Oral Daily Jaclyn Bolivar MD   10 mg at 11/11/21 0905    sodium chloride flush 0.9 % injection 5-40 mL  5-40 mL IntraVENous 2 times per day Saloni Bruce MD   10 mL at 11/23/21 2204    sodium chloride flush 0.9 % injection 5-40 mL  5-40 mL IntraVENous PRN Saloni Bruce MD   10 mL at 11/23/21 2203    0.9 % sodium chloride infusion  25 mL IntraVENous PRN Saloni Bruce  mL/hr at 11/12/21 2240 25 mL at 11/12/21 2240    ondansetron (ZOFRAN-ODT) disintegrating tablet 4 mg  4 mg Oral Q8H PRN Saloni Bruce MD        Or    ondansetron [facility]) injection 4 mg  4 mg IntraVENous Q6H PRN Saloni Bruce MD   4 mg at 11/12/21 1232    acetaminophen (TYLENOL) tablet 650 mg  650 mg Oral Q6H PRN Saloni Bruce MD   650 mg at 11/19/21 2037    Or    acetaminophen (TYLENOL) suppository 650 mg  650 mg Rectal Q6H PRN Saloni Bruce MD        famotidine (PEPCID) tablet 20 mg  20 mg Oral Daily Saloni Bruce MD   20 mg at 11/23/21 8869       Allergies:  No Known Allergies    Problem List:    Patient Active Problem List   Diagnosis Code    HTN (hypertension) I10    DJD (degenerative joint disease) of knee M17.10    DJD (degenerative joint disease), lumbosacral M47.817    Hypothyroid E03.9    Hypercholesterolemia E78.00    GERD (gastroesophageal reflux disease) K21.9    Mild intermittent asthma without complication D10.55    Secondary malignant neoplasm of other specified sites [facility]) C79.89    Depression F32. A    COVID-19 virus infection U07.1    Hypotension I95.9    Pulmonary infiltrates on CXR R91.8    Hyponatremia E87.1    ELIANA (acute kidney injury) ([facility] [address] N17.9    Hyperlipidemia E78.5    New onset atrial fibrillation (HCC) I48.91    Pancytopenia (HCC) D61.818    Anemia D64.9    Syncope and collapse R55       Past Medical History:        Diagnosis Date    Ankle swelling     takes diuretic prn    Asthma     mild    Cancer ([facility] [address] 2019    ovarian, treated with surgery and chemo    Depression     GERD (gastroesophageal reflux disease)     Hyperlipidemia     Hypertension     Hypothyroidism     Osteoarthritis     Positive FIT (fecal immunochemical test)     Vitamin D deficiency     Wears partial dentures     upper       Past Surgical History:        Procedure Laterality Date    APPENDECTOMY  years ago    BREAST SURGERY  1960s    removal lump from right     SECTION      x 1    COLONOSCOPY N/A 2020    COLONOSCOPY DIAGNOSTIC performed by Altagracia Lester MD at [address] [place] [address] - has menopause the  day    LAPAROTOMY  2019    [facility] of [facility]    LYMPH NODE DISSECTION      TUNNELED VENOUS PORT PLACEMENT         Social History:    Social History     Tobacco Use    Smoking status: Never Smoker    Smokeless tobacco: Never Used   Substance Use Topics    Alcohol use: No                                Counseling given: Not Answered      Vital Signs (Current): There were no vitals filed for this visit.                                            BP Readings from Last 3 Encounters:   21 120/81   10/31/21 115/71   10/22/21 123/65       NPO Status:                                                                                 BMI:   Wt Readings from Last 3 Encounters:   21 130 lb 6.4 oz (59.1 kg)   10/30/21 138 lb 4.8 oz (62.7 kg)   10/19/21 130 lb (59 kg)     There is no height or weight on file to calculate BMI.    CBC:   Lab Results   Component Value Date    WBC 3.3 11/24/2021    RBC 3.17 11/24/2021    RBC 3.12 07/20/2019    HGB 9.7 11/24/2021    HCT 28.8 11/24/2021    MCV 90.9 11/24/2021    RDW 20.0 11/24/2021    PLT 26 11/24/2021       CMP:   Lab Results   Component Value Date     11/24/2021    K 4.6 11/24/2021    K 4.1 11/10/2021    CL 94 11/24/2021    CO2 26 11/24/2021    BUN 32 11/24/2021    CREATININE 0.6 11/24/2021    GFRAA >60 11/24/2021    LABGLOM >60 11/24/2021    GLUCOSE 208 11/24/2021    GLUCOSE 101 12/19/2011    PROT 6.7 11/15/2021    CALCIUM 9.2 11/24/2021    BILITOT 0.8 11/15/2021    ALKPHOS 91 11/15/2021    AST 15 11/15/2021    ALT 10 11/15/2021       POC Tests: No results for input(s): POCGLU, POCNA, POCK, POCCL, POCBUN, POCHEMO, POCHCT in the last 72 hours.     Coags:   Lab Results   Component Value Date    PROTIME 13.4 10/27/2021    PROTIME 11.0 09/14/2011    INR 1.2 10/27/2021    APTT 25.9 10/27/2021       HCG (If Applicable): No results found for: PREGTESTUR, PREGSERUM, HCG, HCGQUANT     ABGs: No results found for: PHART, PO2ART, ZNM0DNH, MFH7PAK, BEART, I1MWZVUJ     Type & Screen (If Applicable):  Lab Results   Component Value Date    LABABO O 07/19/2019       Drug/Infectious Status (If Applicable):  No results found for: HIV, HEPCAB    COVID-19 Screening (If Applicable):   Lab Results   Component Value Date    COVID19 Comment 09/21/2021         Anesthesia Evaluation  Patient summary reviewed no history of anesthetic complications:   Airway: Mallampati: II  TM distance: >3 FB     Mouth opening: > = 3 FB Dental:    (+) upper dentures and partials      Pulmonary: breath sounds clear to auscultation  (+) asthma:     (-) not a current smoker                           Cardiovascular:    (+) hypertension:, hyperlipidemia        Rhythm: regular  Rate: normal                    Neuro/Psych:   (+) depression/anxiety             GI/Hepatic/Renal:   (+) GERD:, bowel prep,           Endo/Other:    (+) hypothyroidism: arthritis: OA., electrolyte abnormalities (hyponatremia (123-130) over the past 2-3 weeks), malignancy/cancer ( Ovarian cancer ). ROS comment: Anemia -H&H with improvement s/p 1 unit PRBC,  patient receiving 2nd unit      Pancytopenic - needs bone marrow biopsy Abdominal:             Vascular: negative vascular ROS. Other Findings:               Anesthesia Plan      MAC     ASA 3       Induction: intravenous. Anesthetic plan and risks discussed with patient. Plan discussed with CRNA and surgical team.    Attending anesthesiologist reviewed and agrees with Preprocedure content      Patient to be reevaluated by staff anesthesiologist on day of procedure. Joe Guaman MD   11/24/2021        DOS STAFF ADDENDUM:    Pt seen and examined, physical exam updated, chart reviewed including anesthesia, drug and allergy history. H&P reviewed. No interval changes to history or physical examination (unless noted above). NPO status confirmed. Anesthetic plan, risks, benefits, alternatives discussed with patient. Patient verbalized an understanding and agrees to proceed.      Bernabe Bolivar MD   Anesthesiologist

## 2021-11-24 NOTE — PROGRESS NOTES
IRFLOWSHEET    Date: 11/24/2021    Time: 8:17 AM     Exam: Image guided bone marrow aspiration and biopsy with anesthesia    Radiologist Performing Procedure: Dr. Claudy Harp    Nurse Reporting/Phone: [phone]     Nurse Receiving: Kamilah Nevarez RN    Permit signed:      Yes    ID Band Checklist: Yes    Allergies: Patient has no known allergies. TIME OUT: 0840    PROCEDURE START TIME: 0840    Puncture Site: left posterior iliac crest     Puncture Time: 0841    Catheters: arrow 11 gauge 10cm    LABS:   Lab Results   Component Value Date    INR 1.2 10/27/2021    PROTIME 13.4 (H) 10/27/2021           Lab Results   Component Value Date    CREATININE 0.6 11/24/2021    BUN 32 (H) 11/24/2021          Lab Results   Component Value Date    HGB 9.7 (L) 11/24/2021    HCT 28.8 (L) 11/24/2021    PLT 26 (L) 11/24/2021         PROCEDURE END TIME: 6427      Complications:  None    Comments: Pt brought to department from floor. Pt is alert and oriented, states pain is a 0/10 prior to procedure. Procedure is explained to patient by Dr. Claudy Harp, including possible risks. Pt verbalizes understanding and consent form signed. Pt placed on ct table in prone position, pt connected to cardiac monitor, oxygen for constant monitoring during procedure. Pt monitored during procedure by anesthesia for sedation for procedure. Procedure done under sterile technique and guidance of ct imaging. 2% Lidocaine also used for comfort measures. Samples obtained and sent to lab. Area sutured with 4-0 vicryl. A op-site dressing applied to site with no bleeding noted. Pt awakes well, PACU called and report given to Mariana Raza. Pt transferred to PACU with nurse and anesthesia.

## 2021-11-24 NOTE — DISCHARGE SUMMARY
[facility] Physician Discharge Summary       Juan Hanson MD  [address] [facility] [place] [address]  [phone]    Schedule an appointment as soon as possible for a visit in 1 week  ASAP    [address], [place]  [address] [id number] [phone]    In 2 weeks  Post hospital follow up      Activity level: As tolerated    Dispo: Home    Condition on discharge: Stable    Patient ID:  Lashaun Shearer  [id number]  65 y.o.  1950    Admit date: 11/9/2021    Discharge date and time:  11/24/2021  1:01 PM    Admission Diagnoses: Active Problems:    Hyponatremia    ELIANA (acute kidney injury) ([facility]  Resolved Problems:    * No resolved hospital problems. *      Discharge Diagnoses: Active Problems:    Hyponatremia    ELIANA (acute kidney injury) ([address]  Resolved Problems:    * No resolved hospital problems. *      Consults:  IP CONSULT TO IV TEAM  IP CONSULT TO IV TEAM  IP CONSULT TO NEPHROLOGY  IP CONSULT TO ONCOLOGY  IP CONSULT TO IV TEAM  IP CONSULT TO DIABETES EDUCATOR    Procedures: Bone marrow biopsy    Hospital Course:   Patient Lashaun Shearer is a 65 y.o. presented with Syncope and collapse [R55]  Hyponatremia [E87.1]  Cancer ([address] [C80.1]  Hypotension, unspecified hypotension type [I95.9]  Anemia, unspecified type [D599]    65year old female with history of primary peritoneal/ovarian adenocarcinoma followed at the [facility], asthma, depression, GERD, hyperlipidemia, hypertension, hypothyroidism, and OA presented to the ED after syncopal episode. In the ED, EKG showed normal sinus rhythm at 61 beats a minute no signs of any ST changes. UA was negative for acute infection. Hemoglobin 6.8. Patient received 1U PRBCs. Patient was hyponatremia with a sodium of 123 (baseline 130). Creatinine of 1.1. Lactic 2.6. CT of the head shows no acute abnormality. Chest x-ray shows no acute process. Patient was admitted for further management.  Patient was placed on Ure-Na per nephrology with improvement of her  Hyponatremia. Patient's hgb declined to the 6s during stay. Patient had to wait for 2U PRBCs to arrive from [place]. 2U transfused with improvement to 9.8. Hematology was consulted due to recurrent low hemoglobin. Thought recurrent transfusions were secondary to a bone marrow failure syndrome. Bone marrow biopsy was done on 11/24. She is now hemodynamically stable and ready for discharge. She is to follow up with Nephrology and the [facility] outpatient. Discharge Exam:  General Appearance: alert and oriented to person, place and time and in no acute distress  Skin: warm and dry  Head: normocephalic and atraumatic  Eyes: pupils equal, round, and reactive to light, extraocular eye movements intact, conjunctivae normal  Neck: neck supple and non tender without mass   Pulmonary/Chest: clear to auscultation bilaterally- no wheezes, rales or rhonchi, normal air movement, no respiratory distress  Cardiovascular: normal rate, normal S1 and S2 and no carotid bruits  Abdomen: soft, non-tender, non-distended, normal bowel sounds, no masses or organomegaly  Extremities: no cyanosis, no clubbing and no edema  Neurologic: no cranial nerve deficit and speech normal    I/O last 3 completed shifts:   In: 450 [P.O.:450]  Out: -   I/O this shift:  In: 250 [I.V.:250]  Out: -       LABS:  Recent Labs     11/22/21 0245 11/23/21  0155 11/24/21  0411   * 128* 128*   K 4.8 4.6 4.6   CL 94* 93* 94*   CO2 26 26 26   BUN 29* 34* 32*   CREATININE 0.6 0.6 0.6   GLUCOSE 205* 186* 208*   CALCIUM 9.0 9.0 9.2       Recent Labs     11/22/21  0245 11/22/21  0245 11/23/21  0155 11/23/21  1643 11/24/21  0411   WBC 3.4*  --  3.2*  --  3.3*   RBC 2.05*  --  2.56*  --  3.17*   HGB 6.4*   < > 8.0* 9.3* 9.7*   HCT 19.5*   < > 23.7* 27.8* 28.8*   MCV 95.1  --  92.6  --  90.9   MCH 31.2  --  31.3  --  30.6   MCHC 32.8  --  33.8  --  33.7   RDW 23.9*  --  21.0*  --  20.0*   PLT 28*  --  27*  --  26*   MPV 12.7* --  12.6*  --  10.0    < > = values in this interval not displayed. No results for input(s): POCGLU in the last 72 hours. Imaging:  CT HEAD WO CONTRAST    Result Date: 11/9/2021  EXAMINATION: CT OF THE HEAD WITHOUT CONTRAST  11/9/2021 7:16 pm TECHNIQUE: CT of the head was performed without the administration of intravenous contrast. Dose modulation, iterative reconstruction, and/or weight based adjustment of the mA/kV was utilized to reduce the radiation dose to as low as reasonably achievable. COMPARISON: None. HISTORY: ORDERING SYSTEM PROVIDED HISTORY: HEAD TRAUMA, CLOSED, MILD, GCS >= 13, NO RISK FACTORS, NEURO EXAM NORMAL TECHNOLOGIST PROVIDED HISTORY: Has a \"code stroke\" or \"stroke alert\" been called? ->No Reason for exam:->syncope Decision Support Exception - unselect if not a suspected or confirmed emergency medical condition->Emergency Medical Condition (MA) FINDINGS: BRAIN/VENTRICLES: There is no acute intracranial hemorrhage, mass effect or midline shift. No abnormal extra-axial fluid collection. The gray-white differentiation is maintained without evidence of an acute infarct. There is prominence of the ventricles and sulci due to global parenchymal volume loss. There are nonspecific areas of hypoattenuation within the periventricular and subcortical white matter, which likely represent chronic microvascular ischemic change. ORBITS: The visualized portion of the orbits demonstrate no acute abnormality. SINUSES: The visualized paranasal sinuses and mastoid air cells demonstrate no acute abnormality. SOFT TISSUES/SKULL: No acute abnormality of the visualized skull or soft tissues. No acute intracranial abnormality. Age-related loss of brain volume and chronic white matter ischemic changes.      XR CHEST PORTABLE    Result Date: 11/9/2021  EXAMINATION: ONE XRAY VIEW OF THE CHEST 11/9/2021 6:41 pm COMPARISON: October 19 HISTORY: ORDERING SYSTEM PROVIDED HISTORY: syncope TECHNOLOGIST PROVIDED mouth daily     tiZANidine 2 MG tablet  Commonly known as: ZANAFLEX  Take 1 tablet by mouth every 8 hours as needed (muscle spasms)     TYLENOL 500 MG tablet  Generic drug: acetaminophen     VITAMIN B 12 PO     vitamin B-6 100 MG tablet  Commonly known as: PYRIDOXINE  Take 1 tablet by mouth daily        STOP taking these medications    dexamethasone 4 MG tablet  Commonly known as: DECADRON              Note that more than 30 minutes was spent in preparing discharge papers, discussing discharge with patient, medication review, etc.    Signed:  Electronically signed by Anum Correa PA-C on 11/24/2021 at 1:01 PM     Addendum: I have personally participated in the history, exam, medical decision making with Teddy Casey on the date of service and I agree with all of the pertinent clinical information unless otherwise noted. I have also reviewed and agree with the past medical, family, and social history unless otherwise noted. Patient was admitted with syncope. PHYSICAL EXAM:  Vitals:  /65   Pulse 57   Temp 98.1 °F (36.7 °C) (Oral)   Resp 16   Ht 5' 2\" (1.575 m)   Wt 130 lb 6.4 oz (59.1 kg)   SpO2 100%   BMI 23.85 kg/m²   Gen: awake, alert, NAD  Lungs: clear to auscultation bilaterally no crackles no wheezing. Heart: RRR, no murmur   Abdomen: soft nontender nondistended positive bowel sounds. Extremities: full range of motion no peripheral edema. Impression:  Active Problems:    Hyponatremia    ELIANA (acute kidney injury) ([address]  Resolved Problems:    * No resolved hospital problems. *      My findings/plan include:      Patient presented with syncope from hyponatremia and anemia. She received 2 units pRBC. She had a BM biopsy with hematology. Patient was see by nephrology for hyponatremia which has resolved. Patient is asymptomatic and stable for discharge. NOTE: This report was transcribed using voice recognition software.  Every effort was made to ensure accuracy; however, inadvertent computerized transcription errors may be present.   Electronically signed by Jarred Siddiqui DO on 11/24/2021 at 1:44 PM

## 2021-11-24 NOTE — ANESTHESIA POSTPROCEDURE EVALUATION
Department of Anesthesiology  Postprocedure Note    Patient: Terence Boyer  MRN: [id number]  YOB: 1950  Date of evaluation: 11/24/2021  Time:  9:24 AM     Procedure Summary     Date: 11/24/21 Room / Location: [address] [place] [address] CT Scan; [address] [place] [address] Radiology    Anesthesia Start: 2913 Anesthesia Stop: 0902    Procedures:       CT BONE MARROW BIOPSY      CT GUIDED NEEDLE PLACEMENT Diagnosis:       (cytopenias. r/o bone marrow failure syndrome)      (cytopenia)    Scheduled Providers: Fer[facility] General Radiologist Responsible Provider: Ivanna Oliver MD    Anesthesia Type: MAC ASA Status: 3          Anesthesia Type: MAC    Jolly Phase I:      Jolly Phase II: Jolly Score: 10    Last vitals: Reviewed and per EMR flowsheets.        Anesthesia Post Evaluation    Patient location during evaluation: PACU  Patient participation: complete - patient participated  Level of consciousness: awake and alert  Pain score: 2  Airway patency: patent  Nausea & Vomiting: no vomiting and no nausea  Complications: no  Cardiovascular status: hemodynamically stable  Respiratory status: spontaneous ventilation  Hydration status: stable

## 2021-11-24 NOTE — PROGRESS NOTES
Chart reviewed  Patient s/p IR-guided bone marrow biopsy 11/23  H/H 9.7/28.8 s/p 2 units PRBC  Plt 26    Patient to f/u with Dr. Jose Coelho at [facility] next week.     Karuna Lilly PA-C

## 2021-11-24 NOTE — PLAN OF CARE
Problem: Falls - Risk of:  Goal: Will remain free from falls  Description: Will remain free from falls  Outcome: Completed  Goal: Absence of physical injury  Description: Absence of physical injury  Outcome: Completed     Problem: Discharge Planning:  Goal: Participates in care planning  Description: Participates in care planning  Outcome: Completed  Goal: Discharged to appropriate level of care  Description: Discharged to appropriate level of care  Outcome: Completed     Problem: Activity Intolerance:  Goal: Ability to tolerate increased activity will improve  Description: Ability to tolerate increased activity will improve  Outcome: Completed     Problem: Anxiety/Stress:  Goal: Level of anxiety will decrease  Description: Level of anxiety will decrease  Outcome: Completed     Problem:  Bowel Function - Altered:  Goal: Bowel elimination is within specified parameters  Description: Bowel elimination is within specified parameters  Outcome: Completed     Problem: Fluid Volume - Deficit:  Goal: Absence of fluid volume deficit signs and symptoms  Description: Absence of fluid volume deficit signs and symptoms  Outcome: Completed  Goal: Electrolytes within specified parameters  Description: Electrolytes within specified parameters  Outcome: Completed     Problem: Mental Status - Impaired:  Goal: Absence of physical injury  Description: Absence of physical injury  Outcome: Completed  Goal: Absence of continued neurological deterioration signs and symptoms  Description: Absence of continued neurological deterioration signs and symptoms  Outcome: Completed  Goal: Mental status will be restored to baseline  Description: Mental status will be restored to baseline  Outcome: Completed     Problem: Mobility - Impaired:  Goal: Mobility will improve to maximum level  Description: Mobility will improve to maximum level  Outcome: Completed     Problem: Nutrition Deficit:  Goal: Ability to achieve adequate nutritional intake will improve  Description: Ability to achieve adequate nutritional intake will improve  Outcome: Completed     Problem: Pain:  Goal: Pain level will decrease  Description: Pain level will decrease  Outcome: Completed  Goal: Ability to notify healthcare provider of pain before it becomes unmanageable or unbearable will improve  Description: Ability to notify healthcare provider of pain before it becomes unmanageable or unbearable will improve  Outcome: Completed  Goal: Control of acute pain  Description: Control of acute pain  Outcome: Completed  Goal: Control of chronic pain  Description: Control of chronic pain  Outcome: Completed     Problem: Serum Glucose Level - Abnormal:  Goal: Ability to maintain appropriate glucose levels will improve to within specified parameters  Description: Ability to maintain appropriate glucose levels will improve to within specified parameters  Outcome: Completed     Problem: Skin Integrity - Impaired:  Goal: Will show no infection signs and symptoms  Description: Will show no infection signs and symptoms  Outcome: Completed  Goal: Absence of new skin breakdown  Description: Absence of new skin breakdown  Outcome: Completed     Problem: Sleep Pattern Disturbance:  Goal: Appears well-rested  Description: Appears well-rested  Outcome: Completed

## 2021-12-02 NOTE — PROGRESS NOTES
Post-Discharge Transitional Care Management Services or Hospital Follow Up    Michael Raphael   YOB: 1950    Date of Office Visit:  12/2/2021  Date of Hospital Admission: 11/9/21  Date of Hospital Discharge: 11/24/21  Readmission Risk Score(high >=14%.  Medium >=10%):Readmission Risk Score: 21.7 ( )    Care management risk score Rising risk (score 2-5) and Complex Care (Scores >=6): 9     Non face to face  following discharge, date last encounter closed (first attempt may have been earlier): *No documented post hospital discharge outreach found in the last 14 days *No documented post hospital discharge outreach found in the last 14 days    Call initiated 2 business days of discharge: *No response recorded in the last 14 days     Patient Active Problem List   Diagnosis    HTN (hypertension)    DJD (degenerative joint disease) of knee    DJD (degenerative joint disease), lumbosacral    Hypothyroid    Hypercholesterolemia    GERD (gastroesophageal reflux disease)    Mild intermittent asthma without complication    Secondary malignant neoplasm of other specified sites ([address]    Depression    COVID-19 virus infection    Hypotension    Pulmonary infiltrates on CXR    Hyponatremia    ELIANA (acute kidney injury) ([address]    Hyperlipidemia    New onset atrial fibrillation ([address]    Pancytopenia ([address]    Anemia    Syncope and collapse       No Known Allergies    Medications listed as ordered at the time of discharge from hospital  @DISCHARGEMEDSLIST(<NOROUTINE> error)@      Medications marked \"taking\" at this time  Outpatient Medications Marked as Taking for the 12/2/21 encounter (Office Visit) with Kera Torres PA-C   Medication Sig Dispense Refill    calcium-vitamin D (OSCAL-500) 500-200 MG-UNIT per tablet Take 1 tablet by mouth daily 90 tablet 1    vitamin B-6 (PYRIDOXINE) 100 MG tablet Take 1 tablet by mouth daily 90 tablet 1    levothyroxine (SYNTHROID) 100 MCG tablet Take 1 tablet by mouth Daily 90 tablet 1    folic acid (FOLVITE) 1 MG tablet Take 1 tablet by mouth daily 30 tablet 3    tiZANidine (ZANAFLEX) 2 MG tablet Take 1 tablet by mouth every 8 hours as needed (muscle spasms) 90 tablet 1    lovastatin (MEVACOR) 40 MG tablet Take 1 tablet by mouth nightly 90 tablet 1    pantoprazole (PROTONIX) 40 MG tablet Take 1 tablet by mouth daily 90 tablet 1    escitalopram (LEXAPRO) 10 MG tablet Take 1 tablet by mouth every morning 90 tablet 1    albuterol sulfate HFA (VENTOLIN HFA) 108 (90 Base) MCG/ACT inhaler Inhale 2 puffs into the lungs every 6 hours as needed for Wheezing or Shortness of Breath 1 each 5    gabapentin (NEURONTIN) 300 MG capsule Take 1 capsule by mouth 3 times daily for 360 days. 270 capsule 3        Medications patient taking as of now reconciled against medications ordered at time of hospital discharge: Yes    Chief Complaint   Patient presents with    Follow-Up from [facility]audh[facility]     discharged 11/24/21     Inpatient course: Discharge summary reviewed- see chart. Interval history/Current status: Patient presented with syncope from hyponatremia and anemia. She received 2 units pRBC. She had a BM biopsy with hematology. Patient was see by nephrology for hyponatremia which has resolved. [facility] on Tuesday for bone marrow biopsy results. Has not had any episodes of lightheadedness or syncope since blood transfusion. Has not had lisinopril since discharge  Has been checking BP at home   Readings 110's/?  BP in office today is 100/50    Review of Systems   Constitutional: Negative for chills and fever. HENT: Negative for ear pain. Eyes: Negative for visual disturbance. Respiratory: Negative for cough and shortness of breath. Cardiovascular: Negative for chest pain and palpitations. Gastrointestinal: Negative for abdominal pain, diarrhea, nausea and vomiting. Genitourinary: Negative for dysuria and frequency. Skin: Negative for rash. Neurological: Negative for syncope. Vitals:    12/02/21 1304   BP: (!) 100/50   Pulse: 101   Resp: 16   Temp: 97.8 °F (36.6 °C)   SpO2: 98%   Weight: 133 lb (60.3 kg)     Body mass index is 24.33 kg/m². Wt Readings from Last 3 Encounters:   12/02/21 133 lb (60.3 kg)   11/20/21 130 lb 6.4 oz (59.1 kg)   10/30/21 138 lb 4.8 oz (62.7 kg)     BP Readings from Last 3 Encounters:   12/02/21 (!) 100/50   11/24/21 127/65   11/24/21 (!) 109/59     Physical Exam  Constitutional:       Appearance: She is well-developed. HENT:      Head: Normocephalic. Neck:      Thyroid: No thyromegaly. Cardiovascular:      Rate and Rhythm: Normal rate and regular rhythm. Heart sounds: Normal heart sounds. No murmur heard. No friction rub. No gallop. Pulmonary:      Effort: Pulmonary effort is normal. No respiratory distress. Breath sounds: Normal breath sounds. No wheezing or rales. Abdominal:      General: Bowel sounds are normal. There is no distension. Palpations: Abdomen is soft. Tenderness: There is no abdominal tenderness. Musculoskeletal:      Cervical back: Neck supple. Skin:     General: Skin is warm and dry. Findings: No rash. Neurological:      Mental Status: She is alert and oriented to person, place, and time. Assessment/Plan:  1. Syncope and collapse  Resolved at this time    2. Anemia, unspecified type  3. Pancytopenia ([address]  Labs drawn today  Seeing heme/onc next week   Will be getting bone marrow biopsy results then as well  - OK DISCHARGE MEDS RECONCILED W/ CURRENT OUTPATIENT MED LIST  - Comprehensive Metabolic Panel; Future  - CBC Auto Differential; Future    4. Hyponatremia  Labs today  - OK DISCHARGE MEDS RECONCILED W/ CURRENT OUTPATIENT MED LIST  - Comprehensive Metabolic Panel; Future  - CBC Auto Differential; Future    5.  Hypertension, unspecified type  Will continue to hold lisinopril for now  Hypotensive without it and history of syncope   Continue to monitor BP at home and keep log  To bring with her to appointment next week      Medical Decision Making: moderate complexity

## 2021-12-06 NOTE — TELEPHONE ENCOUNTER
Hgb down to 6.3 from 7.5 in the last 4 days. Down from discharge at 9.7. Has appt with the [facility] in 2 days to get results of recent bone marrow biopsy. Spoke with Dr. Cammy Holder, who is on call for Dr. Michael Chavez today. She recommends ED for transfusion but likely does not require admission since there is no bleeding and she is awaiting results of biopsy. Patient notified. She will go directly to ED.

## 2021-12-07 NOTE — H&P
[facility] History and Physical          ASSESSMENT:      Active Problems:    Anemia  Resolved Problems:    * No resolved hospital problems. *      PLAN:    1. Acute on chronic macrocytic  anemia  :  patient was admitted from 11/9-11/24 and received PRBCs . HGB is 6.3 and platelets are 20 on admission. Patient is asymptomatic . She has antibodies Anti C, E Fya and S . Admit observation vitals telemetry Hematology consult Type and Screen  keep HGB >7.0 and PLT >10     Pancytopenia  :   LD elevated haptoglobin normal ,retic fraction elevated  B12 and Folate were WNL . Iron studies revealed no deficiency  . Patient received  IVIG gm IV x1 on 11/16 and 11/17 There was discussion of placing patient on rituximab as an outpatient  . C/w B6 and folic  acid . Bone marrow biospy on 11/24 results are pending . Suspect possible MDS      Thrombocytopenia :  PLT 20 on admission , keep platelet count above 10     Hypokalemia ;poatssium was 2.9 on admission . Patient revealed oral potassium in ED     History of Ovarian cancer: on maintenance therapy with Mary Valadez currently on hold     Type 2 Diabetes :  glucose was 200 on admission patient is not on an oral hypoglycemic, will need on discharge . A1c is 6.6 % on 10/2021 sliding scale and acccheks      Hypertension : C/w Lisinopril      Hypothyroidism ; C/w Synthroid     GERD : C/w Protonix     Hyperlipidemia : C/w Lipitor                 Code Status:Full   DVT prophylaxis: SCDs       CHIEF COMPLAINT:  Anemia    History of Present Illness:   Mrs. Meghan Monsivais is a pleasant 66year old female She hs a past medical history of  primary peritoneal/ovarian adenocarcinoma followed at the [facility], asthma, depression, GERD, hyperlipidemia, hypertension, hypothyroidism, and OA. She presents to the Ed due to anemia . Hemoglobin was 6.3 on 12/6 . Patient was last admitted from 11/9-11/24 due to to anemia and  hyponatremia .  Patient has a history of multiple antibodies anti C E, Fya and S  and blood arrived from [place] . Patient was evaluated bu hematology and had bone marrow biospy on  . Patient reports no chest pain shortness of breath fever chills . She denies palpitations  fatigue or dizziness . In the ED patient was hemodynamically stable . Hemoglobin was 6.3 WBC 3.5 platelets 20 glucose 200  Sodium 134 potassium 2.9 . She received oral potassium in the ED     Informant(s) for H&P:patient     REVIEW OF SYSTEMS:  A comprehensive review of systems was negative except for: what is in the HPI      PMH:  Past Medical History:   Diagnosis Date    Ankle swelling     takes diuretic prn    Asthma     mild    Cancer ([address] 2019    ovarian, treated with surgery and chemo    Depression     GERD (gastroesophageal reflux disease)     Hyperlipidemia     Hypertension     Hypothyroidism     Osteoarthritis     Positive FIT (fecal immunochemical test)     Vitamin D deficiency     Wears partial dentures     upper       Surgical History:  Past Surgical History:   Procedure Laterality Date    APPENDECTOMY  years ago    BREAST SURGERY  1960s    removal lump from right     SECTION      x 1    COLONOSCOPY N/A 2020    COLONOSCOPY DIAGNOSTIC performed by Marlyn Morejon MD at [id number] [facility] CT BONE MARROW BIOPSY  2021    CT BONE MARROW BIOPSY 2021 [facility] CT    HYSTERECTOMY  1984    abdominal - has menopause the nect day    LAPAROTOMY  2019    debulking of pelvic mass, DR. Santiago LOCK [facility]    LYMPH NODE DISSECTION      TUNNELED VENOUS PORT PLACEMENT         Medications Prior to Admission:    Prior to Admission medications    Medication Sig Start Date End Date Taking?  Authorizing Provider   calcium-vitamin D (OSCAL-500) 500-200 MG-UNIT per tablet Take 1 tablet by mouth daily 21   Jelena Park PA-C   vitamin B-6 (PYRIDOXINE) 100 MG tablet Take 1 tablet by mouth daily 21   Jelena aPrk PA-C   levothyroxine (SYNTHROID) 100 MCG tablet Take 1 tablet by mouth Daily 11/26/21   Hermes, DO   folic acid (FOLVITE) 1 MG tablet Take 1 tablet by mouth daily 11/1/21   Bharti Black MD   lisinopril (PRINIVIL;ZESTRIL) 10 MG tablet Take 1 tablet by mouth daily  Patient not taking: Reported on 12/2/2021 10/23/21   Bharti Black MD   tiZANidine (ZANAFLEX) 2 MG tablet Take 1 tablet by mouth every 8 hours as needed (muscle spasms) 9/21/21 12/20/21  Marquis Noah MD   lovastatin (MEVACOR) 40 MG tablet Take 1 tablet by mouth nightly 9/21/21   Marquis Noah MD   pantoprazole (PROTONIX) 40 MG tablet Take 1 tablet by mouth daily 9/21/21   Marquis Noah MD   escitalopram (LEXAPRO) 10 MG tablet Take 1 tablet by mouth every morning 9/21/21   Marquis Noah MD   albuterol sulfate HFA (VENTOLIN HFA) 108 (90 Base) MCG/ACT inhaler Inhale 2 puffs into the lungs every 6 hours as needed for Wheezing or Shortness of Breath 9/21/21   Marquis Noah MD   Cyanocobalamin (VITAMIN B 12 PO) Take 1 tablet by mouth daily     Historical Provider, MD   acetaminophen (TYLENOL) 500 MG tablet Take 500 mg by mouth every 6 hours as needed for Pain    Historical Provider, MD   gabapentin (NEURONTIN) 300 MG capsule Take 1 capsule by mouth 3 times daily for 360 days. 9/2/20 12/2/21  Marquis Noah MD       Allergies:    Patient has no known allergies. Social History:    reports that she has never smoked. She has never used smokeless tobacco. She reports that she does not drink alcohol and does not use drugs. Family History:   family history includes Colon Cancer in her son; Diabetes in her father; Heart Disease in her mother; High Blood Pressure in her mother; No Known Problems in her brother and sister; Thyroid Disease in her sister.        PHYSICAL EXAM:  Vitals:  BP (!) 107/57   Pulse 97   Temp 97.5 °F (36.4 °C) (Temporal)   Resp 16   Ht 5' 2\" (1.575 m)   Wt 134 lb (60.8 kg)   SpO2 93%   BMI 24.51 kg/m²     General Appearance: alert and oriented to person, place and time and in no acute distress  Skin: warm and dry  Head: normocephalic and atraumatic  Eyes: pupils equal, round, and reactive to light, extraocular eye movements intact, conjunctivae normal  Neck: neck supple and non tender without mass   Pulmonary/Chest: clear to auscultation bilaterally- no wheezes, rales or rhonchi, normal air movement, no respiratory distress  Cardiovascular: normal rate, normal S1 and S2 and no carotid bruits  Abdomen: soft, non-tender, non-distended, normal bowel sounds, no masses or organomegaly  Extremities: no cyanosis, no clubbing and no edema  Neurologic: no cranial nerve deficit and speech normal        LABS:  Recent Labs     12/06/21  2109      K 2.9*   CL 95*   CO2 26   BUN 8   CREATININE 0.5   GLUCOSE 200*   CALCIUM 8.4*       Recent Labs     12/06/21  1107 12/06/21  2109   WBC 3.0* 3.5*   RBC 2.02* 2.09*   HGB 6.3* 6.3*   HCT 19.1* 19.4*   MCV 94.6 92.8   MCH 31.2 30.1   MCHC 33.0 32.5   RDW 20.0* 19.8*   PLT 18* 20*   MPV 10.5 11.5       No results for input(s): POCGLU in the last 72 hours. Radiology:   No orders to display           NOTE: This report was transcribed using voice recognition software. Every effort was made to ensure accuracy; however, inadvertent computerized transcription errors may be present.   Electronically signed by Cordell Negrete MD on 12/7/2021 at 1:14 PM

## 2021-12-07 NOTE — ED PROVIDER NOTES
Chief Complaint   Patient presents with    Abnormal Lab     Hgb 6.3, sent in by pcp unsure of where she is bleeding       64-year-old female past medical history of hypertension, hypothyroidism, acid reflux, resolved ovarian cancer presenting today with anemia. Said that her doctor sent her in, this has been a recurring issue, nothing makes it better or worse, not associated with lightheadedness, dizziness, chest pain, shortness of breath, numbness tingling or weakness. She has multiple antibodies in her blood and transfusions generally take a substantial amount of time. She was just admitted from 11/9-11/24 for hyponatremia and anemia. According to hematology note from prior admission, she does have anti-C, E, Fya and S antibodies, there was concern for severe hemolytic anemia. Hematology/oncology wants her hemoglobin above 7 and her platelets above 10. She completed chemotherapy for her cancer about 2-1/2 years ago. She has not been sick at all recently and has no other acute complaints. Her hemoglobin was 6.3. She has not noted any melena or hematochezia, no hematemesis, no hematuria. Review of Systems   Constitutional: Negative for chills and fever. HENT: Negative for ear pain, sinus pain and sore throat. Eyes: Negative for pain. Respiratory: Negative for shortness of breath. Cardiovascular: Negative for chest pain. Gastrointestinal: Negative for abdominal pain, diarrhea, nausea and vomiting. Genitourinary: Negative for flank pain and pelvic pain. Musculoskeletal: Negative for back pain and neck pain. Skin: Negative for rash. Neurological: Negative for seizures and headaches. Hematological: Negative for adenopathy. All other systems reviewed and are negative. Physical Exam  Vitals and nursing note reviewed. Constitutional:       Appearance: Normal appearance. She is not ill-appearing. HENT:      Head: Normocephalic and atraumatic.       Right Ear: External ear normal.      Left Ear: External ear normal.      Nose: Nose normal.      Mouth/Throat:      Mouth: Mucous membranes are moist.      Pharynx: Oropharynx is clear. Eyes:      Conjunctiva/sclera: Conjunctivae normal.      Pupils: Pupils are equal, round, and reactive to light. Cardiovascular:      Rate and Rhythm: Normal rate and regular rhythm. Pulmonary:      Breath sounds: Normal breath sounds. Abdominal:      General: Abdomen is flat. There is no distension. Palpations: Abdomen is soft. Tenderness: There is no abdominal tenderness. Genitourinary:     Rectum: Guaiac result negative. Musculoskeletal:         General: No deformity or signs of injury. Cervical back: Neck supple. Skin:     General: Skin is warm and dry. Neurological:      General: No focal deficit present. Mental Status: She is alert. Mental status is at baseline. Procedures       MDM  Number of Diagnoses or Management Options  Anemia, unspecified type  Hypokalemia  Diagnosis management comments: 24-year-old female past medical history of hypertension, hypothyroidism, acid reflux, resolved ovarian cancer presented today with anemia. She is hemodynamically stable on arrival to emergency department. BMP showed hypokalemia at 2.9 and CBC showed a hemoglobin of 6.3. The patient had several very rare antibodies, last time she needed a transfusion when she was in the hospital 11/9 but had to be brought in from [place]. Type and screen and crossmatch are in process. She will need to be admitted until the transfusion is able to be had. Hematology was following her throughout her last admission. I spoke with Dr. Jeffrey Rendon and she will admit the patient. The patient and her  verbalized understanding. ED Course as of 12/07/21 1420   Tue Dec 07, 2021   0951 Spoke with the blood bank about the status of her unit of blood, with all of her antibody will be surprised if they hear anything today.   They will keep us posted. [MM]   2521 Repleting potassium. [MM]   1200 Hemoccult was negative. [MM]   12 Spoke with Dr. Lang Mauro and she will admit the patient. [MM]      ED Course User Index  [MM] Adan Guillaume DO      ED Course as of 21 1420   Tue Dec 07, 2021   0971 Spoke with the blood bank about the status of her unit of blood, with all of her antibody will be surprised if they hear anything today. They will keep us posted. [MM]   4864 Repleting potassium. [MM]   1200 Hemoccult was negative. [MM]   12 Spoke with Dr. Lang Mauro and she will admit the patient. [MM]      ED Course User Index  [MM] Adan Guillaume DO       --------------------------------------------- PAST HISTORY ---------------------------------------------  Past Medical History:  has a past medical history of Ankle swelling, Asthma, Cancer [address] [address] Depression, GERD (gastroesophageal reflux disease), Hyperlipidemia, Hypertension, Hypothyroidism, Osteoarthritis, Positive FIT (fecal immunochemical test), Vitamin D deficiency, and Wears partial dentures. Past Surgical History:  has a past surgical history that includes Hysterectomy (); Tunneled venous port placement (); lymph node dissection;  section; laparotomy (2019); Appendectomy (years ago); Breast surgery (); Colonoscopy (N/A, 2020); and CT BIOPSY BONE MARROW (2021). Social History:  reports that she has never smoked. She has never used smokeless tobacco. She reports that she does not drink alcohol and does not use drugs. Family History: family history includes Colon Cancer in her son; Diabetes in her father; Heart Disease in her mother; High Blood Pressure in her mother; No Known Problems in her brother and sister; Thyroid Disease in her sister. The patients home medications have been reviewed. Allergies: Patient has no known allergies.     -------------------------------------------------- RESULTS -------------------------------------------------    LABS:  Results for orders placed or performed during the hospital encounter of 12/07/21   CBC Auto Differential   Result Value Ref Range    WBC 3.5 (L) 4.5 - 11.5 E9/L    RBC 2.09 (L) 3.50 - 5.50 E12/L    Hemoglobin 6.3 (LL) 11.5 - 15.5 g/dL    Hematocrit 19.4 (L) 34.0 - 48.0 %    MCV 92.8 80.0 - 99.9 fL    MCH 30.1 26.0 - 35.0 pg    MCHC 32.5 32.0 - 34.5 %    RDW 19.8 (H) 11.5 - 15.0 fL    Platelets 20 (L) 072 - 450 E9/L    MPV 11.5 7.0 - 12.0 fL    Neutrophils % 38.3 (L) 43.0 - 80.0 %    Lymphocytes % 53.0 (H) 20.0 - 42.0 %    Monocytes % 6.9 2.0 - 12.0 %    Eosinophils % 0.9 0.0 - 6.0 %    Basophils % 0.0 0.0 - 2.0 %    Neutrophils Absolute 1.37 (L) 1.80 - 7.30 E9/L    Lymphocytes Absolute 1.86 1.50 - 4.00 E9/L    Monocytes Absolute 0.24 0.10 - 0.95 E9/L    Eosinophils Absolute 0.03 (L) 0.05 - 0.50 E9/L    Basophils Absolute 0.00 0.00 - 0.20 E9/L    Myelocyte Percent 0.9 0 - 0 %    nRBC 0.0 /100 WBC    Vacuolated Neutrophils 1+     Anisocytosis 1+     Polychromasia 1+     Hypochromia 1+    Basic Metabolic Panel   Result Value Ref Range    Sodium 134 132 - 146 mmol/L    Potassium 2.9 (L) 3.5 - 5.0 mmol/L    Chloride 95 (L) 98 - 107 mmol/L    CO2 26 22 - 29 mmol/L    Anion Gap 13 7 - 16 mmol/L    Glucose 200 (H) 74 - 99 mg/dL    BUN 8 6 - 23 mg/dL    CREATININE 0.5 0.5 - 1.0 mg/dL    GFR Non-African American >60 >=60 mL/min/1.73    GFR African American >60     Calcium 8.4 (L) 8.6 - 10.2 mg/dL   Platelet Confirmation   Result Value Ref Range    Platelet Confirmation CONFIRMED    Doctor Notification   Result Value Ref Range      NOTIFY COMPL    TYPE AND SCREEN   Result Value Ref Range    ABO/Rh O POS     Antibody Screen POS    DIRECT ANTIGLOBULIN TEST   Result Value Ref Range    Polyspecific Trevon POS    QUINCY PANEL   Result Value Ref Range    QUINCY IgG POS     QUINCY C3 POS    ELUTION & ANTIBODY IDENTIFICATION, RBC   Result Value Ref Range    [place]uate Ab ID PERF Antibody ID POS, INCONCLUSIVE PANEL        RADIOLOGY:  No orders to display     ------------------------- NURSING NOTES AND VITALS REVIEWED ---------------------------  Date / Time Roomed:  12/7/2021  7:51 AM  ED Bed Assignment:  15/15    The nursing notes within the ED encounter and vital signs as below have been reviewed. Patient Vitals for the past 24 hrs:   BP Temp Temp src Pulse Resp SpO2 Height Weight   12/07/21 0752 (!) 107/57   97 16 93 %     12/06/21 1845 124/64 97.5 °F (36.4 °C) Temporal 92 16 95 % 5' 2\" (1.575 m) 134 lb (60.8 kg)       Oxygen Saturation Interpretation: Normal    ------------------------------------------ PROGRESS NOTES ------------------------------------------  Re-evaluation(s):  Time: 0827  Patients symptoms show no change  Repeat physical examination is not changed    Counseling:  I have spoken with the patient and discussed todays results, in addition to providing specific details for the plan of care and counseling regarding the diagnosis and prognosis. Their questions are answered at this time and they are agreeable with the plan of admission.    --------------------------------- ADDITIONAL PROVIDER NOTES ---------------------------------  Consultations:  Time: 1036. Spoke with Dr. Corey Segovia. Discussed case. They will admit the patient. This patient's ED course included: a personal history and physicial examination, re-evaluation prior to disposition, multiple bedside re-evaluations, IV medications, cardiac monitoring, continuous pulse oximetry and complex medical decision making and emergency management    This patient has remained hemodynamically stable during their ED course. Diagnosis:  1. Anemia, unspecified type    2. Hypokalemia        Disposition:  Patient's disposition: Admit to med/surg floor  Patient's condition is stable.            Adan Calderon DO  Resident  12/07/21 0552

## 2021-12-08 NOTE — DISCHARGE SUMMARY
[facility] [place] Phys[place] Discharge Summary       No follow-up provider specified. Activity level: As tolerated    Diet: ADULT DIET; Regular; 3 carb choices (45 gm/meal)    Labs:    Dispo: Home    Condition at discharge: Stable    Continue supplemental oxygen via nasal canula @ 2 LPM round-the-clock. Continue CPAP / BiPAP during sleep as prior to admission. Patient ID:  Magdalena Gar  [id number]  66 y.o.  1950    Admit date: 12/7/2021    Discharge date and time:  12/8/2021  4:02 PM    Admission Diagnoses: Active Problems:    Anemia  Resolved Problems:    * No resolved hospital problems. *      Discharge Diagnoses: Active Problems:    Anemia  Resolved Problems:    * No resolved hospital problems. *      Consults:  IP CONSULT TO ONCOLOGY    Procedures: PRBC transfusion    Hospital Course: Patient was admitted with Hypokalemia [E87.6]  Anemia [D64.9]  Anemia, unspecified type [D64.9]. Patient Admitted on seventh, recent hospitalization requiring PRBC transfusion, came with acute on chronic macrocytic anemia. Patient with antibodies to anti C, E Fya and S. Hematology consulted. Patient also with pancytopenia. Known ovarian adenocarcinoma, on maintenance therapy with Zejulaon hold at this time. Also was hypokalemic on presentation. Known diabetes, hypertension, hypothyroidism, GERD, hyperlipidemia. Did receive PRBC from ED. Hemoglobin this morning of 6.7 which was 6.3 on presentation. Also K has improved to 3.8 from 2.9. 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 We will continue with the DC process for patient to go home.     Discharge Exam:  Vitals:    12/08/21 0647 12/08/21 0815 12/08/21 0945 12/08/21 1114   BP: (!) 112/53 (!) 119/57 (!) 108/57 115/63   Pulse: 92 101 97 94   Resp: 18 18 18 16 Temp: 99.3 °F (37.4 °C) 98.1 °F (36.7 °C) 98.2 °F (36.8 °C) 99 °F (37.2 °C)   TempSrc: Axillary Oral Oral Oral   SpO2: 96% 97% 96% 96%   Weight:       Height:           General Appearance: alert and oriented to person, place and time, well-developed and well-nourished, in no acute distress  Skin: warm and dry, no rash or erythema  Head: normocephalic and atraumatic  Eyes: pupils equal, round, and reactive to light, extraocular eye movements intact, conjunctivae normal  ENT: tympanic membrane, external ear and ear canal normal bilaterally, oropharynx clear and moist with normal mucous membranes  Neck: neck supple and non tender without mass, no thyromegaly or thyroid nodules, no cervical lymphadenopathy   Pulmonary/Chest: clear to auscultation bilaterally- no wheezes, rales or rhonchi, normal air movement, no respiratory distress  Cardiovascular: normal rate, normal S1 and S2, no gallops, intact distal pulses and no carotid bruits  Abdomen: soft, non-tender, non-distended, normal bowel sounds, no masses or organomegaly  No intake/output data recorded. No intake/output data recorded. LABS:  Recent Labs     12/06/21 2109 12/06/21 2109 12/07/21 2019 12/07/21 2028 12/08/21  0500     --  135  --  135   K 2.9*  --  3.7  --  3.8   CL 95*  --  98  --  100   CO2 26  --  25  --  26   BUN 8  --  9  --  9   CREATININE 0.5  --  0.6  --  0.5   GLUCOSE 200*   < > 165* 172 144*   CALCIUM 8.4*  --  7.9*  --  8.4*    < > = values in this interval not displayed.        Recent Labs     12/06/21  1107 12/06/21  1107 12/06/21 2109 12/08/21  0500 12/08/21  1120   WBC 3.0*  --  3.5* 3.2*  --    RBC 2.02*  --  2.09* 2.27*  --    HGB 6.3*   < > 6.3* 6.7* 7.9*   HCT 19.1*   < > 19.4* 20.3* 23.7*   MCV 94.6  --  92.8 89.4  --    MCH 31.2  --  30.1 29.5  --    MCHC 33.0  --  32.5 33.0  --    RDW 20.0*  --  19.8* 17.8*  --    PLT 18*  --  20* 18*  --    MPV 10.5  --  11.5 12.6*  --     < > = values in this interval not displayed. Imaging:   No orders to display       Patient Instructions:      Medication List      ASK your doctor about these medications    albuterol sulfate  (90 Base) MCG/ACT inhaler  Commonly known as: Ventolin HFA  Inhale 2 puffs into the lungs every 6 hours as needed for Wheezing or Shortness of Breath     calcium-vitamin D 500-200 MG-UNIT per tablet  Commonly known as: OSCAL-500  Take 1 tablet by mouth daily     escitalopram 10 MG tablet  Commonly known as: LEXAPRO  Take 1 tablet by mouth every morning     folic acid 1 MG tablet  Commonly known as: FOLVITE  Take 1 tablet by mouth daily     gabapentin 300 MG capsule  Commonly known as: NEURONTIN  Take 1 capsule by mouth 3 times daily for 360 days. levothyroxine 100 MCG tablet  Commonly known as: SYNTHROID  Take 1 tablet by mouth Daily     lovastatin 40 MG tablet  Commonly known as: MEVACOR  Take 1 tablet by mouth nightly     pantoprazole 40 MG tablet  Commonly known as: PROTONIX  Take 1 tablet by mouth daily     TYLENOL 500 MG tablet  Generic drug: acetaminophen     VITAMIN B 12 PO     vitamin B-6 100 MG tablet  Commonly known as: PYRIDOXINE  Take 1 tablet by mouth daily              Note that more than 30 minutes was spent in preparing discharge papers, discussing discharge with patient, medication review, etc.    Signed:  Electronically signed by Tatiana Navarro MD on 12/8/2021 at 4:02 PM    NOTE: This report was transcribed using voice recognition software. Every effort was made to ensure accuracy; however, inadvertent computerized transcription errors may be present.

## 2021-12-08 NOTE — CONSULTS
30     Department of Medicine  Division of Hematology/Oncology  Attending Consult Note     Reason for Consult:  pancytpenia  Requesting Physician:  Melonie Meeks MD     CHIEF COMPLAINT:  low blood counts     History Obtained From:  Patient     HISTORY OF PRESENT ILLNESS:    The patient is a 65 y.o. female with significant past medical history of primary peritoneal/ovarian adenocarcinoma status post carboplatin/paclitaxel and maintenance Zejula, hemolytic anemia/transfusion reaction status post steroids and IVIG, hypertension, hyperlipidemia, hypothyroidism who presents with low blood counts from PCP. Patient recently hospitalized 11/9 through 11/24/2021 for acute kidney injury, hyponatremia and anemia. She had a bone marrow biopsy completed during admission FISH positive for 2 abnormalities, del20q and del5q and hematopathology with multilineage dyplasia, increased ringed sideroblasts and myeloblasts consistent with MDS. Hemoglobin back down to 6.3 and platelets at 17,198 on presentation. Repeat bloodwork 12/8 with Hgb 6.7 and platelets down to 42,191. PRBC received PRBC. Chart reviewed. Patient seen at bedside. She is awake and pleasant without complaints. She denies any bleeding, nose bleeds, abnormal bleeding or blood in her stool or urine. She denies SOB, CP, palpitations.       Past Medical History:    Metastatic primary peritoneal adenocarcinoma, hemolytic anemia, delayed hemolytic transfusion reaction,   hypertension, hyperlipidemia, GERD, hypothyroidism, vitamin D deficiency, depression, uterine fibroids sp ROSALIO and oophorectomy in 1984.   Gallstones with gallbladder sludge, Diverticulitis on 8/24/2019  Past Medical History             Diagnosis Date    Ankle swelling       takes diuretic prn    Asthma       mild    Cancer (HCC) 07/2019     ovarian, treated with surgery and chemo    Depression      GERD (gastroesophageal reflux disease)      Hyperlipidemia      Hypertension      Hypothyroidism      Osteoarthritis      Positive FIT (fecal immunochemical test)      Vitamin D deficiency      Wears partial dentures       upper            Past Surgical History:    Past Surgical History             Procedure Laterality Date    APPENDECTOMY   years ago    BREAST SURGERY   1960s     removal lump from right     SECTION         x 1    COLONOSCOPY N/A 2020     COLONOSCOPY DIAGNOSTIC performed by Mauricio Sales MD at [address]     abdominal - has menopause the  day    LAPAROTOMY   2019     debulking of pelvic mass, DR. Tripathi Board [facility]    LYMPH NODE DISSECTION        TUNNELED VENOUS PORT PLACEMENT               Current Medications:      Current Hospital Medications   Current Facility-Administered Medications: 0.9 % sodium chloride infusion, , IntraVENous, PRN  levothyroxine (SYNTHROID) tablet 75 mcg, 75 mcg, Oral, Daily  urea (URE-NA) packet 15 g, 15 g, Oral, Daily  oyster shell calcium w/D 500-200 MG-UNIT tablet 1 tablet, 1 tablet, Oral, Daily with breakfast  0.9 % sodium chloride infusion, , IntraVENous, PRN  albuterol (PROVENTIL) nebulizer solution 2.5 mg, 2.5 mg, Nebulization, Q1H PRN  vitamin B-12 (CYANOCOBALAMIN) tablet 100 mcg, 100 mcg, Oral, Daily  gabapentin (NEURONTIN) capsule 300 mg, 300 mg, Oral, TID  vitamin B-6 (PYRIDOXINE) tablet 100 mg, 100 mg, Oral, Daily  [Held by provider] tiZANidine (ZANAFLEX) tablet 2 mg, 2 mg, Oral, Q8H PRN  atorvastatin (LIPITOR) tablet 10 mg, 10 mg, Oral, Nightly  [Held by provider] amLODIPine (NORVASC) tablet 10 mg, 10 mg, Oral, Daily  sodium chloride flush 0.9 % injection 5-40 mL, 5-40 mL, IntraVENous, 2 times per day  sodium chloride flush 0.9 % injection 5-40 mL, 5-40 mL, IntraVENous, PRN  0.9 % sodium chloride infusion, 25 mL, IntraVENous, PRN  ondansetron (ZOFRAN-ODT) disintegrating tablet 4 mg, 4 mg, Oral, Q8H PRN **OR** ondansetron (ZOFRAN) injection 4 mg, 4 mg, IntraVENous, Q6H PRN  acetaminophen (TYLENOL) tablet 650 mg, 650 mg, Oral, Q6H PRN **OR** acetaminophen (TYLENOL) suppository 650 mg, 650 mg, Rectal, Q6H PRN  famotidine (PEPCID) tablet 20 mg, 20 mg, Oral, Daily        Allergies:  Patient has no known allergies.     Social History:   Social History               Socioeconomic History    Marital status:        Spouse name: Mr. Handy Beckett Number of children: 1    Years of education: Not on file    Highest education level: Not on file   Occupational History    Occupation: disability       Comment: ankle/low back    Occupation: CNA       Comment: disabled   Tobacco Use    Smoking status: Never Smoker    Smokeless tobacco: Never Used   Vaping Use    Vaping Use: Never used   Substance and Sexual Activity    Alcohol use: No    Drug use: Never    Sexual activity: Not on file       Comment: hsd a , had given birth to a son who has passed since   Other Topics Concern    Not on file   Social History Narrative     CODE STATUS: Full Code     HEALTH CARE PROXY: her , Mr. Carlitos Ortega, [phone] (land) and [phone] (mobile)     AMBULATES: independently      DOMICILED: has stairs in the home which she uses, her  lives with her, they are allowing her daughter and grand daughter to live there at the moment      Social Determinants of Health          Financial Resource Strain: Medium Risk    Difficulty of Paying Living Expenses: Somewhat hard   Food Insecurity: Food Insecurity Present    Worried About [address] in the Last Year: Sometimes true    [address] in the Last Year: Sometimes true   Transportation Needs: No Transportation Needs    Lack of Transportation (Medical): No    Lack of Transportation (Non-Medical):  No   Physical Activity:     Days of Exercise per Week: Not on file    Minutes of Exercise per Session: Not on file   Stress:     Feeling of Stress : Not on file   Social Connections:     Frequency of Communication with Friends and Family: Not on file    Frequency of Social Gatherings with Friends and Family: Not on file    Attends Confucianism Services: Not on file    Active Member of Clubs or Organizations: Not on file    Attends Club or Organization Meetings: Not on file    Marital Status: Not on file   Intimate Partner Violence:     Fear of Current or Ex-Partner: Not on file    Emotionally Abused: Not on file    Physically Abused: Not on file    Sexually Abused: Not on file   Housing Stability:     Unable to Pay for Housing in the Last Year: Not on file    Number of [place] in the Last Year: Not on file    Unstable Housing in the Last Year: Not on file            Family History:     Family History             Problem Relation Age of Onset    Heart Disease Mother      High Blood Pressure Mother      Diabetes Father      Thyroid Disease Sister           2    No Known Problems Sister      No Known Problems Brother      Colon Cancer Son      Ovarian Cancer Neg Hx      Uterine Cancer Neg Hx      Breast Cancer Neg Hx              REVIEW OF SYSTEMS:    As per HPI remaining review systems negative    PHYSICAL EXAM:       Vitals:  /63   Pulse 93   Temp 98.9 °F (37.2 °C) (Oral)   Resp 16   Ht 5' 2\" (1.575 m)   Wt 138 lb (62.6 kg)   SpO2 100%   BMI 25.24 kg/m²      CONSTITUTIONAL:  awake, alert, cooperative, no apparent distress, and appears stated age  HEENT:  Normocepalic, atraumatic, no obvious abnormality.  Lids and lashes normal, PERRLA, EOMI, sclera clear, conjunctiva normal  NECK:  Supple, trachea midline, no adenopathy  HEMATOLOGIC/LYMPHATICS:  no cervical or supraclavicular lymphadenopathy  LUNGS:  No increased work of breathing, good air exchange, clear to auscultation bilaterally, no crackles or wheezing  CARDIOVASCULAR:  Normal apical impulse, regular rate and rhythm, normal S1 and S2, no S3 or S4, and no murmur noted  ABDOMEN:  No scars, normal BS, soft, non-distended, non-tender, no masses palpated, no hepatosplenomegaly  MUSCULOSKELETAL:  there is no redness, warmth, or swelling of the joints; motor strength is 5 out of 5 in all extremities bilaterally; tone is normal  NEUROLOGIC:  Awake, alert, oriented to name, place and time. No gross neurologic deficits     DATA:     CMP:          Lab Results   Component Value Date      11/15/2021     K 4.3 11/15/2021     K 4.1 11/10/2021     CL 94 11/15/2021     CO2 26 11/15/2021     BUN 14 11/15/2021     PROT 7.0 11/10/2021         CBC:        Recent Labs     11/14/21  1313   WBC 4.6   HGB 6.7*   PLT 36*         Radiology:     US RETROPERITONEAL COMPLETE   Final Result   Right renal cyst otherwise negative study           CT HEAD WO CONTRAST   Final Result   No acute intracranial abnormality. Age-related loss of brain volume and   chronic white matter ischemic changes.           XR CHEST PORTABLE   Final Result   No acute process.                 IMPRESSION:   64 yo female known to Dr. Francoise Raymond with metastatic primary peritoneal/ovarian adenocarcinoma status post first-line treatment of carboplatin and paclitaxel. She had a disease recurrence and underwent an excisional and received Carboplatin and Paclitaxel August-October 2019. She had been on maintenance Zejula from 12/11/19 until 10/2021. Her dose was reduced to 200 mg daily due to cytopenias.  Ovarian cancer on maintenance therapy with Glory Horn is on hold.   She has had several recent medical issues including eecurrent syncope and concern for parox AFib, trial of BB and Eliquis for about a week, these were recently discontinued by EP.  Had recent URI earlier in Oct and was treated with abx about a week prior.  At the time received 2 units pRBC on 76/24/75 w/o any complications at the time.  Hgb 9.9 upon discharge on 10/22/21.    Admitted 10/27/21 with syncope and found to have severe hemolytic anemia decreased to 4.9 g/dL with thrombocytopenia/leukopenia and jaundice.  Found to have anti-C, E, Fya and S antibody.  Consistent with delayed hemolytic transfusion reaction.  Treated with steroids from 10/27/21 and IVIG on 10/28/21.   Hospital admission 11/9 through 11/24/2021 for acute kidney injury, hyponatremia and significant anemia, macrocytic, progressive thrombocytopenia. She ultimately had a bone marrow biopsy with findings consistent with MDS. She returns to the hospital with anemia and thrombocytopenia. 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 I reviewed results with patient and advised recommendations from Dr. Griselda Isbell are to begin chemotherapy with Sid Demetrio. If patient is discharged today, she can follow-up with Dr. Griselda Isbell in the office and likely begin treatment tomorrow, 12/9.          Anemia: Keep hemoglobin above 7 g/dL. Thrombocytopenia - Keep platelet count above 10. Transfuse as needed. Thank you - Chen Sandoval PA-C         Electronically signed by GINO Sanchez on 11/15/2021 at 11:15 AM    PATIENT seen and examined. Agree with above assessment plan. She has newly diagnosed MDS and will be starting treatment with Sid Demetrio outpatient. CBC daily.     Gavin Scott MD

## 2021-12-08 NOTE — PROGRESS NOTES
[facility]   Progress Note    Admitting Date and Time: 12/7/2021  7:51 AM  Admit Dx: Hypokalemia [E87.6]  Anemia [D64.9]  Anemia, unspecified type [D64.9]    Subjective: Admitted on seventh, recent hospitalization requiring PRBC transfusion, came with acute on chronic macrocytic anemia. Patient with antibodies to anti C, E Fya and S. Hematology consulted. Patient also with pancytopenia. Known ovarian adenocarcinoma, on maintenance therapy with Zejulaon hold at this time. Also was hypokalemic on presentation. Known diabetes, hypertension, hypothyroidism, GERD, hyperlipidemia. Did receive PRBC from ED. Hemoglobin this morning of 6.7 which was 6.3 on presentation. Also K has improved to 3.8 from 2.9. Patient was admitted with Hypokalemia [E87.6]  Anemia [D64.9]  Anemia, unspecified type [D64.9]. Patient is comfortable, at this time awake, alert, resting in bed, does communicate well, does say that she had a bone marrow done 2 weeks ago. Patient was updated of last hemoglobin. Per RN: As informed by she is no longer on lisinopril which was discontinued. ROS: denies fever, chills, cp, sob, n/v, HA unless stated above.      oyster shell calcium w/D  1 tablet Oral Daily    escitalopram  10 mg Oral QAM    folic acid  1 mg Oral Daily    gabapentin  300 mg Oral TID    levothyroxine  100 mcg Oral Daily    lisinopril  10 mg Oral Daily    atorvastatin  10 mg Oral Daily    pantoprazole  40 mg Oral Daily    vitamin B-6  100 mg Oral Daily    sodium chloride flush  5-40 mL IntraVENous 2 times per day    insulin lispro  0-12 Units SubCUTAneous TID WC    insulin lispro  0-6 Units SubCUTAneous Nightly     sodium chloride, , PRN  tiZANidine, 2 mg, Q8H PRN  sodium chloride flush, 5-40 mL, PRN  sodium chloride, 25 mL, PRN  potassium chloride, 40 mEq, PRN   Or  potassium alternative oral replacement, 40 mEq, PRN   Or  potassium chloride, 10 mEq, PRN  ondansetron, 4 mg, Q8H PRN Or  ondansetron, 4 mg, Q6H PRN  polyethylene glycol, 17 g, Daily PRN  acetaminophen, 650 mg, Q6H PRN   Or  acetaminophen, 650 mg, Q6H PRN  glucose, 15 g, PRN  dextrose, 12.5 g, PRN  glucagon (rDNA), 1 mg, PRN  dextrose, 100 mL/hr, PRN  albuterol, 2.5 mg, Q6H PRN         Objective:    BP (!) 119/57   Pulse 101   Temp 98.1 °F (36.7 °C) (Oral)   Resp 18   Ht 5' 2\" (1.575 m)   Wt 140 lb 3.2 oz (63.6 kg)   SpO2 97%   BMI 25.64 kg/m²   General Appearance: alert and oriented to person, place and time, well-developed and well-nourished, in no acute distress  Skin: warm and dry, no rash or erythema  Head: normocephalic and atraumatic  Eyes: pupils equal, round, and reactive to light, extraocular eye movements intact, conjunctivae normal  ENT: tympanic membrane, external ear and ear canal normal bilaterally, oropharynx clear and moist with normal mucous membranes  Neck: neck supple and non tender without mass, no thyromegaly or thyroid nodules, no cervical lymphadenopathy   Pulmonary/Chest: clear to auscultation bilaterally- no wheezes, rales or rhonchi, normal air movement, no respiratory distress  Cardiovascular: normal rate, normal S1 and S2, no gallops, intact distal pulses and no carotid bruits  Abdomen: soft, non-tender, non-distended, normal bowel sounds, no masses or organomegaly      Recent Labs     12/06/21 2109 12/06/21 2109 12/07/21 2019 12/07/21 2028 12/08/21  0500     --  135  --  135   K 2.9*  --  3.7  --  3.8   CL 95*  --  98  --  100   CO2 26  --  25  --  26   BUN 8  --  9  --  9   CREATININE 0.5  --  0.6  --  0.5   GLUCOSE 200*   < > 165* 172 144*   CALCIUM 8.4*  --  7.9*  --  8.4*    < > = values in this interval not displayed.        Recent Labs     12/06/21  1107 12/06/21 2109 12/08/21  0500   WBC 3.0* 3.5* 3.2*   RBC 2.02* 2.09* 2.27*   HGB 6.3* 6.3* 6.7*   HCT 19.1* 19.4* 20.3*   MCV 94.6 92.8 89.4   MCH 31.2 30.1 29.5   MCHC 33.0 32.5 33.0   RDW 20.0* 19.8* 17.8*   PLT 18* 20* 18* MPV 10.5 11.5 12.6*     Hemoglobin 7.9  PLT 18    Radiology:   No orders to display       Assessment:    Active Problems:    Anemia  Resolved Problems:    * No resolved hospital problems. *      Plan:  1. Acute on chronic macrocytic anemia, multifactorial, patient did receive transfusion, last hemoglobin 7.9, input from hematology pending at this time. 2. Patient with ovarian adenocarcinoma, according to her she is no longer on any cancer medications. Oncology on board, no change. 3. Suspected MDS, according to patient she did have a bone marrow, will wait for input from hematology. 4. Thrombocytopenia, last platelets of 18, no active blood loss, observe for now, will wait for input from hematology. 5. Electrolyte imbalance, corrected, now K in normal range. 6. Hyperglycemia on presentation, A1c of 6.6, no change. 7. Hypothyroidism, stable on Synthroid. 8. GERD, controlled with PPI. 9. Hyperlipidemia, remains on statins. 10. DC planning, once okay with hematology.         Electronically signed by David Bee MD on 12/8/2021 at 9:09 AM

## 2021-12-08 NOTE — ED NOTES
Blood was started at this time and report handed off to FERNANDO Yang.       Beena Sykes RN  12/07/21 2077

## 2021-12-08 NOTE — CARE COORDINATION
Pt independent from home; lives with spouse;admitted with anemia requiring transfusion; plan is home, no needs. Eric Sanches.

## 2021-12-08 NOTE — PROGRESS NOTES
Hollie Ahuja from [facility] called, per Jenn Ibarra it is okay to be d/c if okay with [facility] - okay per them. Patient sees Dr. Cruzito Gamboa and he can see them tomorrow. Informed primary RN.

## 2021-12-08 NOTE — ED NOTES
RN faxed SBAR to floor. Confirmation received and spoke with staff. Pt ready for transport to room.        Juan Ellsworth RN  12/07/21 1908

## 2021-12-10 NOTE — TELEPHONE ENCOUNTER
Medication Refill Request    LOV 12/2/2021  NOV 3/9/2022    Lab Results   Component Value Date    CREATININE 0.5 12/08/2021

## 2021-12-10 NOTE — TELEPHONE ENCOUNTER
----- Message from [facility] sent at 12/10/2021 12:49 PM EST -----  Subject: Refill Request    QUESTIONS  Name of Medication? gabapentin (NEURONTIN) 300 MG capsule  Patient-reported dosage and instructions? Take 1 capsule by mouth three   times daily  How many days do you have left? 0  Preferred Pharmacy? [id number] [facility] phone number (if available)? [phone]  ---------------------------------------------------------------------------  --------------  CALL BACK INFO  What is the best way for the office to contact you? OK to leave message on   voicemail  Preferred Call Back Phone Number?  [phone]

## 2021-12-12 NOTE — ED NOTES
Pt. Noemy [place] to bed last evening 1930, per  was normal, woke 0300 with headache and slurred speech.      Aidan Aldridge RN  12/12/21 9209

## 2021-12-12 NOTE — ED NOTES
Bed: 16  Expected date:   Expected time:   Means of arrival:   Comments:  Tomas Peguero RN  12/12/21 6750

## 2021-12-12 NOTE — ED PROVIDER NOTES
Terence Boyer is a 66 y.o. female presenting to the ED for confusio, beginning waking up at 0300 ago. The complaint has been intermittent, moderate in severity, and worsened by nothing. 65 yo f cherrie tly admitted for anemia. Pt notes woke up at 0300 confused,  notes no focal weakness, no falls, no sensory complaints, no facial asymmetry. Pt is oriented to person only. Pt only saying ok pt has expressive aphasia. Pt went to bed at 730pm and that was her last known well.,  No cough, fever, cp, sob, no abdominal pain, vomniting or diarrhea, + frontal headache, pt had last night and is b/l. Review of Systems:   Review of Systems   Unable to perform ROS: Acuity of condition                 --------------------------------------------- PAST HISTORY ---------------------------------------------  Past Medical History:  has a past medical history of Ankle swelling, Asthma, Cancer ([facility] [address] Depression, GERD (gastroesophageal reflux disease), Hyperlipidemia, Hypertension, Hypothyroidism, Osteoarthritis, Positive FIT (fecal immunochemical test), Vitamin D deficiency, and Wears partial dentures. Past Surgical History:  has a past surgical history that includes Hysterectomy (); Tunneled venous port placement (); lymph node dissection;  section; laparotomy (2019); Appendectomy (years ago); Breast surgery (1960s); Colonoscopy (N/A, 2020); and CT BIOPSY BONE MARROW (2021). Social History:  reports that she has never smoked. She has never used smokeless tobacco. She reports that she does not drink alcohol and does not use drugs. Family History: family history includes Colon Cancer in her son; Diabetes in her father; Heart Disease in her mother; High Blood Pressure in her mother; No Known Problems in her brother and sister; Thyroid Disease in her sister. The patients home medications have been reviewed.     Allergies: Patient has no known allergies.     -------------------------------------------------- RESULTS -------------------------------------------------  All laboratory and radiology results have been personally reviewed by myself   LABS:  Results for orders placed or performed during the hospital encounter of 12/12/21   COVID-19, Rapid    Specimen: Nasopharyngeal Swab   Result Value Ref Range    SARS-CoV-2, NAAT Not Detected Not Detected   Lactic Acid, Plasma   Result Value Ref Range    Lactic Acid 1.4 0.5 - 2.2 mmol/L   CBC Auto Differential   Result Value Ref Range    WBC 3.1 (L) 4.5 - 11.5 E9/L    RBC 2.75 (L) 3.50 - 5.50 E12/L    Hemoglobin 8.0 (L) 11.5 - 15.5 g/dL    Hematocrit 24.6 (L) 34.0 - 48.0 %    MCV 89.5 80.0 - 99.9 fL    MCH 29.1 26.0 - 35.0 pg    MCHC 32.5 32.0 - 34.5 %    RDW 16.7 (H) 11.5 - 15.0 fL    Platelets 18 (LL) 425 - 450 E9/L    MPV 12.5 (H) 7.0 - 12.0 fL    Neutrophils % 49.0 43.0 - 80.0 %    Lymphocytes % 41.0 20.0 - 42.0 %    Monocytes % 10.0 2.0 - 12.0 %    Eosinophils % 0.0 0.0 - 6.0 %    Basophils % 0.0 0.0 - 2.0 %    Neutrophils Absolute 1.52 (L) 1.80 - 7.30 E9/L    Lymphocytes Absolute 1.27 (L) 1.50 - 4.00 E9/L    Monocytes Absolute 0.31 0.10 - 0.95 E9/L    Eosinophils Absolute 0.00 (L) 0.05 - 0.50 E9/L    Basophils Absolute 0.00 0.00 - 0.20 E9/L    Anisocytosis 1+     Polychromasia 1+     Hypochromia 1+    Basic Metabolic Panel   Result Value Ref Range    Sodium 135 132 - 146 mmol/L    Potassium 3.6 3.5 - 5.0 mmol/L    Chloride 98 98 - 107 mmol/L    CO2 26 22 - 29 mmol/L    Anion Gap 11 7 - 16 mmol/L    Glucose 121 (H) 74 - 99 mg/dL    BUN 8 6 - 23 mg/dL    CREATININE 0.5 0.5 - 1.0 mg/dL    GFR Non-African American >60 >=60 mL/min/1.73    GFR African American >60     Calcium 8.6 8.6 - 10.2 mg/dL   Magnesium   Result Value Ref Range    Magnesium 1.3 (L) 1.6 - 2.6 mg/dL   Hepatic Function Panel   Result Value Ref Range    Total Protein 8.1 6.4 - 8.3 g/dL    Albumin 3.3 (L) 3.5 - 5.2 g/dL    Alkaline Phosphatase 103 35 - 104 U/L    ALT 10 0 - 32 U/L    AST 17 0 - 31 U/L    Total Bilirubin 1.0 0.0 - 1.2 mg/dL    Bilirubin, Direct 0.3 0.0 - 0.3 mg/dL    Bilirubin, Indirect 0.7 0.0 - 1.0 mg/dL   Lipase   Result Value Ref Range    Lipase 16 13 - 60 U/L   Troponin   Result Value Ref Range    Troponin, High Sensitivity 7 0 - 9 ng/L   Brain Natriuretic Peptide   Result Value Ref Range    Pro- (H) 0 - 125 pg/mL   Protime-INR   Result Value Ref Range    Protime 13.7 (H) 9.3 - 12.4 sec    INR 1.3    APTT   Result Value Ref Range    aPTT 28.6 24.5 - 35.1 sec   Sedimentation Rate   Result Value Ref Range    Sed Rate 140 (H) 0 - 20 mm/Hr   Serum Drug Screen   Result Value Ref Range    Ethanol Lvl <10 mg/dL    Acetaminophen Level 10.7 10.0 - 97.0 mcg/mL    Salicylate, Serum <8.5 0.0 - 30.0 mg/dL   URINE DRUG SCREEN   Result Value Ref Range    Drug Screen Comment: see below    Platelet Confirmation   Result Value Ref Range    Platelet Confirmation CONFIRMED    POCT Glucose   Result Value Ref Range    Glucose 121 mg/dL    QC OK? ok    POCT Glucose   Result Value Ref Range    Meter Glucose 121 (H) 74 - 99 mg/dL   EKG 12 Lead   Result Value Ref Range    Ventricular Rate 89 BPM    Atrial Rate 89 BPM    P-R Interval 126 ms    QRS Duration 72 ms    Q-T Interval 352 ms    QTc Calculation (Bazett) 428 ms    P Axis 62 degrees    R Axis -5 degrees    T Axis 49 degrees   TYPE AND SCREEN   Result Value Ref Range    ABO/Rh O POS     Antibody Screen POS    PREPARE PLATELETS, 1 Product   Result Value Ref Range    Product Code Blood Bank [id number]     Description Blood Bank      Unit Number [id number]     Dispense Status Blood Bank issued        RADIOLOGY:  Interpreted by Radiologist.  XR CHEST PORTABLE   Final Result   No acute cardiopulmonary process. CT Head WO Contrast   Final Result   1. Acute left subdural and left parietal subarachnoid hemorrhage as described   above.   The subdural hematoma measures up to 1.4 cm in thickness with slight   midline shift to the left of about 3.5 mm.   2. Tiny low-density subdural collection on the right that is also new   compared to 11/09/2021. RECOMMENDATIONS:   Unavailable             ------------------------- NURSING NOTES AND VITALS REVIEWED ---------------------------   The nursing notes within the ED encounter and vital signs as below have been reviewed. BP (!) 149/80   Pulse 94   Temp 99.8 °F (37.7 °C) (Oral)   Resp 14   Ht 5' 2\" (1.575 m)   Wt 140 lb (63.5 kg)   SpO2 98%   BMI 25.61 kg/m²   Oxygen Saturation Interpretation: Normal      ---------------------------------------------------PHYSICAL EXAM--------------------------------------    Physical Exam  Vitals reviewed. Constitutional:       General: She is not in acute distress. Appearance: Normal appearance. She is not toxic-appearing. HENT:      Head: Normocephalic and atraumatic. Right Ear: External ear normal.      Left Ear: External ear normal.      Nose: Nose normal. No congestion. Mouth/Throat:      Mouth: Mucous membranes are moist.      Pharynx: Oropharynx is clear. No posterior oropharyngeal erythema. Eyes:      General: No visual field deficit. Extraocular Movements: Extraocular movements intact. Pupils: Pupils are equal, round, and reactive to light. Cardiovascular:      Rate and Rhythm: Normal rate and regular rhythm. Pulses: Normal pulses. Heart sounds: No murmur heard. Pulmonary:      Effort: Pulmonary effort is normal.      Breath sounds: No wheezing or rhonchi. Chest:      Chest wall: No tenderness. Abdominal:      General: Bowel sounds are normal.      Tenderness: There is no abdominal tenderness. There is no right CVA tenderness, left CVA tenderness or guarding. Musculoskeletal:         General: No swelling or deformity. Cervical back: Normal range of motion and neck supple. No muscular tenderness. Skin:     General: Skin is warm and dry.       Capillary legs he noted no gait disturbance. Patient has a history of recent diagnosis of myelodysplastic syndrome supposed to be starting Vidaza next week. She has had pretty profound pancytopenia and and has had to have blood transfusions in the past.  CT found her to have intercranial hemorrhage which includes 1.4 cm left sided subdural trace right-sided subdural and some subarachnoid extension. Neurosurgery was contacted at [facility] who stated they do not have the capacity to take care of her and her transfer somewhere else. I called [facility] and discussed with neurosurgeon there Dr. Kevin Rich, who accepted the patient. Patient's platelet count was 51,225 and this likely caused her to have a spontaneous bleed for she has had no trauma. We did order emergent stat platelets which were type compatible and their  and from [facility] for we did not have any our facility. Platelets were started. Patient's blood pressure was in the 140s and less than 160 goal.  She was  protecting her airway and alert and following commands. She was life flighted to [facility]. Critical care:  Please note that the withdrawal or failure to initiate urgent interventions for this patient would likely result in a life threatening deterioration or permanent disability. Accordingly this patient received 78 minutes of critical care time, excluding separately billable procedures. Counseling: The emergency provider has spoken with the patient and discussed todays results, in addition to providing specific details for the plan of care and counseling regarding the diagnosis and prognosis. Questions are answered at this time and they are agreeable with the plan.      --------------------------------- IMPRESSION AND DISPOSITION ---------------------------------    IMPRESSION  1. Subdural hematoma ([address]    2. Expressive aphasia    3. Subarachnoid bleed ([address]    4.  Hypomagnesemia DISPOSITION  Disposition: Transfer to [facility] to icu  Patient condition is critical      NOTE: This report was transcribed using voice recognition software.  Every effort was made to ensure accuracy; however, inadvertent computerized transcription errors may be present       Za Holley DO  12/12/21 0414

## 2021-12-12 NOTE — ED NOTES
Pt. Alert at time of transfer with symptoms remaining as arrived.      Estelita Eubanks RN  12/12/21 0194

## 2021-12-15 NOTE — TELEPHONE ENCOUNTER
Patient was transferred to [facility] with [facility] at this time from a fall on 12/12/21 and ED visit @ [facility]. A letter was sent to the patient regarding her hospital encounter on 12/7/21 for an ED follow up appointment due to no answer when phoned.

## 2021-12-15 NOTE — TELEPHONE ENCOUNTER
Dr. Beau Sanodval phoned in from [facility] concerned regarding patient pathology report from her Bone Marrow biopsy. Notation of pathology report noted in progress report from Dr. Lori Cantor / [facility]. Pathology report notation verbalized over the phone and results sent to Dr. Anibal Hall CNP who is on the patient's case at [facility]. Patient may be eligible for specific medication research.

## 2021-12-15 NOTE — TELEPHONE ENCOUNTER
----- Message from Moe Contreras sent at 12/10/2021 12:48 PM EST -----  Subject: Appointment Request    Reason for Call: Routine ED Follow Up Visit    QUESTIONS  Type of Appointment? Established Patient  Reason for appointment request? Other - er follow up needed   Additional Information for Provider? client was admitted for a blood   transfusion, she had to have 2 units of blood . discharged on Thursday december 9th, please contact for a follow up visit   ---------------------------------------------------------------------------  --------------  [address]  What is the best way for the office to contact you? OK to leave message on   voicemail  Preferred Call Back Phone Number? [phone]  ---------------------------------------------------------------------------  --------------  SCRIPT ANSWERS  Relationship to Patient? Self  (Patient requests to see provider urgently. )? No  Do you have any questions for your primary care provider that need to be   answered prior to your appointment? No  Have you been diagnosed with, awaiting test results for, or told that you   are suspected of having COVID-19 (Coronavirus)? (If patient has tested   negative or was tested as a requirement for work, school, or travel and   not based on symptoms, answer no)? No  Within the past two weeks have you developed any of the following symptoms   (answer no if symptoms have been present longer than 2 weeks or began   more than 2 weeks ago)? Fever or Chills, Cough, Shortness of breath or   difficulty breathing, Loss of taste or smell, Sore throat, Nasal   congestion, Sneezing or runny nose, Fatigue or generalized body aches   (answer no if pain is specific to a body part e.g. back pain), Diarrhea,   Headache? No  Have you had close contact with someone with COVID-19 in the last 14 days? No  (Service Expert  click yes below to proceed with Paul Westbrook As Usual   Scheduling)?  Yes

## 2022-12-14 NOTE — PROGRESS NOTES
PROCEDURE NOTE: Avastin () OS. Diagnosis: Proliferative Diabetic Retinopathy. Anesthesia: Lidocaine 4%. Prep: Betadine Drops. Prior to injection, risks/benefits/alternatives discussed including infection, loss of vision, hemorrhage, cataract, glaucoma, retinal tears or detachment. The off-label status of Intravitreal Avastin also was reviewed. The patient wished to proceed with treatment. Topical anesthesia was induced with Alcaine. Additional anesthesia was achieved using drop(s) or injection checked above. a drop of Povidone-iodine 5% ophthalmic solution was instilled over the injection site and in the inferior fornix. Betadine prep was performed. Using the syringe provided, Avastin 1.25 mg in 0.05 cc was injected into the vitreous cavity. The needle was passed 3.0 mm posterior to the limbus in pseudophakic patients, and 3.5 mm posterior to the lumbus in phakic patients. The remainder of the Avastin in the single-use vial was then discarded in a medical waste disposal container. Unused medication was discarded. Patient tolerated procedure well. There were no complications. Injection time: *. Post-op instructions given. Expiration Date: 2221-06-67P35:00:00. The patient was instructed to return for re-evaluation in approximately 4-12 weeks depending on his/her condition and was told to call immediately if vision decreases and/or if his/her eye becomes red, painful, and/or light sensitive. The patient was instructed to go to the emergency room or call 911 if unable to reach the doctor within an hour or two of trying or calling. The patient was instructed to use Artificial Tears q.i.d. p.r.n for comfort. Arnold Daily PROCEDURE NOTE: Lucentis 0.3mg PFS OD. Diagnosis: Proliferative Diabetic Retinopathy. Anesthesia: Lidocaine. Prep: Betadine Flush. Prior to injection, risks/benefits/alternatives discussed including but not limited to infection, loss of vision or eye, hemorrhage, cataract, glaucoma, retinal tears or detachment. The patient wished to proceed with treatment. Topical anesthesia was induced with Alcaine. Additional anesthesia was achieved using drop(s) or injection checked above. A drop of Povidone-iodine 5% ophthalmic solution was instilled over the injection site and in the inferior fornix. Betadine prep was performed. A single use prefilled syringe of intravitreal Lucentis 0.3mg/0.05ml was used and excess discarded. The needle was passed 3.0 mm posterior to the limbus in pseudophakic patients, and 3.5 mm posterior to the limbus in phakic patients. The remainder of the Lucentis 0.3mg in the single-use vial was then discarded in a medical waste disposal container. The eye was irrigated with sterile irrigating solution. Patient tolerated the procedure well. There were no complications. Post procedure instructions given. CF vision checked. Injection Time *. The patient was instructed to return for re-evaluation in approximately 4-12 weeks depending on his/her condition and was told to call immediately if vision decreases and/or if his/her eye becomes red, painful, and/or light sensitive. The patient was instructed to go to the emergency room or call 911 if unable to reach the doctor within an hour or two of trying or calling. Arnold Daily Subjective:  No acute events overnight. PACs noted and was evaluated by cardiology. Denies any pain. Resting comfortably and watching tv. Tolerating diet. No n/v/d/c. Denies any CP, palp, sob. Objective:    /63   Pulse 88   Temp 98.4 °F (36.9 °C) (Oral)   Resp 16   Ht 5' 2\" (1.575 m)   Wt 130 lb (59 kg)   SpO2 98%   BMI 23.78 kg/m²     NAD, AAOX3  EOMI, anicteric  NSR, S1S2, no murmurs  CTAB, no wheezing or rales  No c/c/e.    CN II-XII grossly intact.       CBC with Differential:    Lab Results   Component Value Date    WBC 2.2 10/21/2021    RBC 2.73 10/21/2021    RBC 3.12 07/20/2019    HGB 9.6 10/21/2021    HCT 28.7 10/21/2021    PLT 71 10/21/2021    .1 10/21/2021    MCH 35.2 10/21/2021    MCHC 33.4 10/21/2021    RDW 22.6 10/21/2021    NRBC 0.0 10/19/2021    METASPCT 0.9 03/02/2021    LYMPHOPCT 45.3 10/21/2021    MONOPCT 12.6 10/21/2021    MYELOPCT 0.9 03/02/2021    BASOPCT 0.9 10/21/2021    MONOSABS 0.28 10/21/2021    LYMPHSABS 1.01 10/21/2021    EOSABS 0.06 10/21/2021    BASOSABS 0.02 10/21/2021     CMP:    Lab Results   Component Value Date     10/21/2021    K 3.9 10/21/2021    K 4.0 10/20/2021    CL 99 10/21/2021    CO2 24 10/21/2021    BUN 10 10/21/2021    CREATININE 0.7 10/21/2021    GFRAA >60 10/21/2021    LABGLOM >60 10/21/2021    GLUCOSE 114 10/21/2021    GLUCOSE 101 12/19/2011    PROT 6.8 10/20/2021    LABALBU 3.7 10/20/2021    LABALBU 4.6 12/19/2011    CALCIUM 8.9 10/21/2021    BILITOT 0.7 10/20/2021    ALKPHOS 94 10/20/2021    AST 12 10/20/2021    ALT 7 10/20/2021      [id number]}     Current Facility-Administered Medications:     [START ON 10/22/2021] lisinopril (PRINIVIL;ZESTRIL) tablet 10 mg, 10 mg, Oral, Daily, Andrey Negvielka ALBARRAN PA    perflutren lipid microspheres (DEFINITY) injection 1.65 mg, 1.5 mL, IntraVENous, ONCE PRN, Samia Zaidi MD    sodium chloride flush 0.9 % injection 10 mL, 10 mL, IntraVENous, PRN, Dyan Doyle DO    sodium chloride flush 0.9 % injection 10 mL, 10 mL, IntraVENous, PRN, Dorene Irby DO    acetaminophen (TYLENOL) tablet 500 mg, 500 mg, Oral, Q6H PRN, Stephanie Kirkpatrick MD, 500 mg at 10/20/21 1424    escitalopram (LEXAPRO) tablet 10 mg, 10 mg, Oral, QAM, Stephanie Kirkpatrick MD, 10 mg at 10/21/21 0955    gabapentin (NEURONTIN) capsule 300 mg, 300 mg, Oral, TID, Stephanie Kirkpatrick MD, 300 mg at 10/21/21 1444    levothyroxine (SYNTHROID) tablet 100 mcg, 100 mcg, Oral, Daily, Stephanie Kirkpatrick MD, 100 mcg at 10/21/21 0548    atorvastatin (LIPITOR) tablet 10 mg, 10 mg, Oral, Nightly, Stephanie Kirkpatrick MD, 10 mg at 10/20/21 2008    pantoprazole (PROTONIX) tablet 40 mg, 40 mg, Oral, Daily, Stephanie Kirkpatrick MD, 40 mg at 10/21/21 0548    tiZANidine (ZANAFLEX) tablet 2 mg, 2 mg, Oral, Q8H PRN, Stephanie Kirkpatrick MD    vitamin B-6 (PYRIDOXINE) tablet 100 mg, 100 mg, Oral, Daily, Stephanie Kirkpatrick MD, 100 mg at 10/21/21 0955    sodium chloride flush 0.9 % injection 5-40 mL, 5-40 mL, IntraVENous, 2 times per day, Stephanie Kirkpatrick MD, 10 mL at 10/21/21 0955    sodium chloride flush 0.9 % injection 5-40 mL, 5-40 mL, IntraVENous, PRN, Stephanie Kirkpatrick MD, 10 mL at 10/19/21 1817    0.9 % sodium chloride infusion, 25 mL, IntraVENous, PRN, Stephanie Kirkpatrick MD    ondansetron (ZOFRAN-ODT) disintegrating tablet 4 mg, 4 mg, Oral, Q8H PRN **OR** ondansetron (ZOFRAN) injection 4 mg, 4 mg, IntraVENous, Q6H PRN, Stephanie Kirkpatrick MD    polyethylene glycol (GLYCOLAX) packet 17 g, 17 g, Oral, Daily PRN, Stephanie Kirkpatrick MD    acetaminophen (TYLENOL) tablet 650 mg, 650 mg, Oral, Q6H PRN **OR** acetaminophen (TYLENOL) suppository 650 mg, 650 mg, Rectal, Q6H PRN, Stephanie Kirkpatrick MD    albuterol (PROVENTIL) nebulizer solution 2.5 mg, 2.5 mg, Nebulization, Q6H PRN, Stephanie Kirkpatrick MD    calcium-vitamin D (OSCAL-500) 500-200 MG-UNIT per tablet 1 tablet, 1 tablet, Oral, Daily, Stephanie Kirkpatrick MD, 1 tablet at 10/21/21 6046    vitamin B-12 (CYANOCOBALAMIN) tablet 250 mcg, 250 mcg, Oral, Daily, Ant Luis MD, 250 mcg at 10/21/21 0954    multivitamin 1 tablet, 1 tablet, Oral, Daily, Ant Luis MD, 1 tablet at 10/21/21 0954    XR CHEST (2 VW)   Final Result   No acute process. Assessment:    Active Problems:    Pancytopenia ([address]  Resolved Problems:    * No resolved hospital problems. *    64 yo female known to me with metastatic primary peritoneal/ovarian adenocarcinoma. Treated with Carboplatin and Paclitaxel. Had biopsy proven recurrence s/p excisional resection on 7/19/19. Treated with Carbo/ Paclitaxel from Aug to Oct 2019. Followed by maintenance [facility] [place] from 12/11/19. Dose reduced to 200 mg daily due to cytopenias. Recently had recurrent syncope and concern for parox AFib, trial of BB and Eliquis for about a week, these were recently discontinued by EP. Also recent URI and was treated with abx about a week earlier. Admitted with syncope. Plan:  Counts are stable. Does not require further transfusion support. Has acute on chronic anemia with baseline around 10-10.9. Her platelets and WBC have also decreased on hospital labs. Previously were WNL since dose reduction of Zejula. Ephriam Slice for now. If no improvement in counts will consider outpatient bone marrow biopsy evaluation. Hgb has improved after 2 units so far. Monitor CBC w diff daily. If counts stable can likely discharge tomorrow.     Electronically signed by Rowena Ramirez MD on 10/21/2021 at 6:03 PM WDL

## 2024-02-20 NOTE — PROGRESS NOTES
Bessie Read served Dr. Samuel Norton regarding results of BMP and return call and spoke with her. No new orders at this time. Name band; No difficulties

## 2025-03-17 NOTE — PROGRESS NOTES
Behavioral Health Psychotherapy Progress Note    Psychotherapy Provided: Individual Psychotherapy     Encounter Diagnoses   Name Primary?   • INES (generalized anxiety disorder) Yes         Goals addressed in session: Goal 1     DATA:  Nury spoke during today's session about her recent appt for re-assessment of her pelvic floor dysfunction and the way that she managed the stress of speaking about this topic.   Nury also discussed the stress of her past weekend at work and her concern about reaching a level of burnout as she approaches her 2-year anniversary lady.    During this session, this clinician used the following therapeutic modalities: Motivational Interviewing and Supportive Psychotherapy    Substance Abuse was not addressed during this session. If the client is diagnosed with a co-occurring substance use disorder, please indicate any changes in the frequency or amount of use: . Stage of change for addressing substance use diagnoses: No substance use/Not applicable    ASSESSMENT:  Nury Hernandez presents with a Euthymic/ normal mood.  Nury again allowed this worker to provide her with support and feedback on this topic.  She was able to plan for setting time aside to do her homework for PT this week while also agreeing to write out her distorted thoughts on this topic.   her affect is Normal range and intensity, which is congruent, with her mood and the content of the session. Nury Hernandez was oriented to person, place, and time. Grooming and Hygiene were good  and clothing was casually dressed and appropriate for weather. Ability to perform ADLs has not changed. she was cooperative. Nury Hernandez denied suicidal thoughts, homicidal thoughts, and self injurious behaviors.   The client has made progress on their goals.     Nury Hernandez presents with a minimal risk of suicide, minimal risk of self-harm, and minimal risk of harm to others.    For any risk assessment that surpasses a  NEPHROLOGY Attending   Progress Note  11/15/2021 9:44 AM  Subjective:   Admit Date: 11/9/2021  PCP: Masha Munguia MD    History of Present Ilness:  Mrs. May Sorto is a 22-year-old -American female with history of hypertension, hyperlipidemia, GERD asthma, depression, hypothyroidism, DJD history of metastatic ovarian cancer with mets to peritoneum and has been on maintenance chemo with Romayne Rouleau and questionable history of atrial fibrillation diagnosed in September 2021 and now presents to the ED on November 9, 2021 with fatigue and syncope. Vitals upon arrival included   BP (!) 102/57   Pulse 68   Temp 97.8 °F (36.6 °C) (Oral)   Resp 16   Wt 138 lb (62.6 kg)   SpO2 96%   BMI 25.24 kg/m² Oxygen Saturation Interpretation: Normal.  Pertinent labs included WBC 5.0, hemoglobin 6.8, hematocrit 19.5 and platelet count 52. Initial BMP showed sodium of 123, potassium 4.7, chloride 88, CO2 20, BUN 33 and creatinine 1.1.  UA showed clear yellow urine, 1.015, negative protein, negative leukocytes. Chest x-ray showed no acute process. Patient was subsequently admitted with dehydration, anemia, pancytopenia, and hypotension. Interval History:      11/15/21: no adverse events overnight - overall feels better other than constipation (has received miralax) - for 1 u PRBC today        Diet: ADULT DIET;  Regular; Low Fat/Low Chol/High Fiber/NICOLASA; 1200 ml    Data:   Scheduled Meds:   levothyroxine  75 mcg Oral Daily    urea  15 g Oral Daily    oyster shell calcium w/D  1 tablet Oral Daily with breakfast    vitamin B-12  100 mcg Oral Daily    gabapentin  300 mg Oral TID    vitamin B-6  100 mg Oral Daily    atorvastatin  10 mg Oral Nightly    [Held by provider] amLODIPine  10 mg Oral Daily    sodium chloride flush  5-40 mL IntraVENous 2 times per day    famotidine  20 mg Oral Daily     Continuous Infusions:   sodium chloride      sodium chloride      sodium chloride 25 mL (11/12/21 6310)     PRN Meds:sodium "\"low\" rating, a safety plan must be developed.    A safety plan was indicated: no  If yes, describe in detail     PLAN: Between sessions, Nury Hernandez will continue to utilize therapy sessions to increase her mindfulness and self compassion. . At the next session, the therapist will use Supportive Psychotherapy to address the above in further detail.    Behavioral Health Treatment Plan and Discharge Planning: Nury Hernandez is aware of and agrees to continue to work on their treatment plan. They have identified and are working toward their discharge goals. yes        Visit start and stop times:      3/17/25        " chloride, sodium chloride, albuterol, [Held by provider] tiZANidine, sodium chloride flush, sodium chloride, ondansetron **OR** ondansetron, acetaminophen **OR** acetaminophen    Intake/Output Summary (Last 24 hours) at 11/15/2021 0944  Last data filed at 11/14/2021 2300  Gross per 24 hour   Intake    Output 1900 ml   Net -1900 ml     CBC:   Recent Labs     11/14/21  1313   WBC 4.6   HGB 6.7*   PLT 36*     BMP:    Recent Labs     11/14/21  0453 11/14/21  1313 11/15/21  0520   * 128* 129*   K 3.7 3.8 4.3   CL 92* 93* 94*   CO2 25 25 26   BUN 14 32* 14   CREATININE 0.6 0.6 0.6   GLUCOSE 119* 152* 118*     Hepatic:   No results for input(s): AST, ALT, ALB, BILITOT, ALKPHOS in the last 72 hours. Troponin: No results for input(s): TROPONINI in the last 72 hours. BNP: No results for input(s): BNP in the last 72 hours. Lipids: No results for input(s): CHOL, HDL in the last 72 hours. Invalid input(s): LDLCALCU  ABGs: No results found for: PHART, PO2ART, CIV9IAE  INR: No results for input(s): INR in the last 72 hours. -----------------------------------------------------------------  RAD: CT HEAD WO CONTRAST    Result Date: 11/9/2021  EXAMINATION: CT OF THE HEAD WITHOUT CONTRAST  11/9/2021 7:16 pm TECHNIQUE: CT of the head was performed without the administration of intravenous contrast. Dose modulation, iterative reconstruction, and/or weight based adjustment of the mA/kV was utilized to reduce the radiation dose to as low as reasonably achievable. COMPARISON: None. HISTORY: ORDERING SYSTEM PROVIDED HISTORY: HEAD TRAUMA, CLOSED, MILD, GCS >= 13, NO RISK FACTORS, NEURO EXAM NORMAL TECHNOLOGIST PROVIDED HISTORY: Has a \"code stroke\" or \"stroke alert\" been called? ->No Reason for exam:->syncope Decision Support Exception - unselect if not a suspected or confirmed emergency medical condition->Emergency Medical Condition (MA) FINDINGS: BRAIN/VENTRICLES: There is no acute intracranial hemorrhage, mass effect or midline shift. No abnormal extra-axial fluid collection. The gray-white differentiation is maintained without evidence of an acute infarct. There is prominence of the ventricles and sulci due to global parenchymal volume loss. There are nonspecific areas of hypoattenuation within the periventricular and subcortical white matter, which likely represent chronic microvascular ischemic change. ORBITS: The visualized portion of the orbits demonstrate no acute abnormality. SINUSES: The visualized paranasal sinuses and mastoid air cells demonstrate no acute abnormality. SOFT TISSUES/SKULL: No acute abnormality of the visualized skull or soft tissues. No acute intracranial abnormality. Age-related loss of brain volume and chronic white matter ischemic changes. XR CHEST PORTABLE    Result Date: 11/9/2021  EXAMINATION: ONE XRAY VIEW OF THE CHEST 11/9/2021 6:41 pm COMPARISON: October 19 HISTORY: ORDERING SYSTEM PROVIDED HISTORY: syncope TECHNOLOGIST PROVIDED HISTORY: Reason for exam:->syncope FINDINGS: The lungs are without acute focal process. There is no effusion or pneumothorax. The cardiomediastinal silhouette is without acute process. The osseous structures are without acute process. MediPort line in the SVC. No acute process. US RETROPERITONEAL COMPLETE    Result Date: 11/10/2021  EXAMINATION: RETROPERITONEAL ULTRASOUND OF THE KIDNEYS AND URINARY BLADDER 11/10/2021 COMPARISON: None HISTORY: ORDERING SYSTEM PROVIDED HISTORY: ELIANA TECHNOLOGIST PROVIDED HISTORY: Reason for exam:->ELIANA What reading provider will be dictating this exam?->CRC FINDINGS: Right kidney measures 9.0 x 3.7 x 5.2 cm Left kidney is 9.4 x 5.4 x 5.1 cm No evidence for hydronephrosis.   Normal renal echogenicity At the upper pole the right kidney there is a 2.7 x 3.0 x 3.0 cm simple appearing cyst There is a Patel within the urinary bladder     Right renal cyst otherwise negative study         Objective:   Vitals:   Vitals:    11/15/21 amlodipine         Thank you for allowing us to participate in care of Ms Cindy Barnett, APRN - CNP   Patient seen and examined. I had a face to face encounter with the patient. Pt states she feels Ok no CP or SOB. Awaiting blood for the transfusion  Agree with exam.    Agree with  formulation, assessment and plan as outlined above and directed by me. Addition and corrections were done as deemed appropriate. My exam and plan include:  A/P:  1. Hyponatremia-Na+ up to 129-continue to follow and continue the Urea    Agree with the remainder as above    Continue current treatment.     MITA Yeung MD

## 2025-06-18 NOTE — TELEPHONE ENCOUNTER
----- Message from DINO GARAY sent at 8/16/2021  3:03 PM EDT -----  Subject: Message to Provider    QUESTIONS  Information for Provider? Patient would like to be on a cancelation list.   Her daughters appointment is on September 2nd 2021 at 9:30 and if she   could get in around that time that would be great for her! Or anytime   there is a cancellation she would love to get in with her PCP.  ---------------------------------------------------------------------------  --------------  CALL BACK INFO  What is the best way for the office to contact you? OK to leave message on   voicemail  Preferred Call Back Phone Number? [phone]  ---------------------------------------------------------------------------  --------------  SCRIPT ANSWERS  Relationship to Patient?  Self F/u as needed  Finish therapy.  Activity as tolerated.

## (undated) DEVICE — SPONGE GZ W4XL4IN RAYON POLY FILL CVR W/ NONWOVEN FAB

## (undated) DEVICE — GRADUATE TRIANG MEASURE 1000ML BLK PRNT